# Patient Record
Sex: FEMALE | Race: WHITE | Employment: FULL TIME | URBAN - METROPOLITAN AREA
[De-identification: names, ages, dates, MRNs, and addresses within clinical notes are randomized per-mention and may not be internally consistent; named-entity substitution may affect disease eponyms.]

---

## 2017-01-11 ENCOUNTER — TELEPHONE (OUTPATIENT)
Dept: FAMILY MEDICINE CLINIC | Age: 47
End: 2017-01-11

## 2017-01-11 DIAGNOSIS — E11.9 TYPE 2 DIABETES MELLITUS WITHOUT COMPLICATION, WITH LONG-TERM CURRENT USE OF INSULIN (HCC): Primary | ICD-10-CM

## 2017-01-11 DIAGNOSIS — Z79.4 TYPE 2 DIABETES MELLITUS WITHOUT COMPLICATION, WITH LONG-TERM CURRENT USE OF INSULIN (HCC): Primary | ICD-10-CM

## 2017-01-24 RX ORDER — POTASSIUM CHLORIDE 750 MG/1
TABLET, EXTENDED RELEASE ORAL
Qty: 180 TABLET | Refills: 1 | Status: SHIPPED | OUTPATIENT
Start: 2017-01-24 | End: 2018-06-27 | Stop reason: SDUPTHER

## 2017-02-08 ENCOUNTER — OFFICE VISIT (OUTPATIENT)
Dept: SURGERY | Age: 47
End: 2017-02-08

## 2017-02-08 VITALS
WEIGHT: 201 LBS | SYSTOLIC BLOOD PRESSURE: 129 MMHG | DIASTOLIC BLOOD PRESSURE: 84 MMHG | HEIGHT: 63 IN | BODY MASS INDEX: 35.61 KG/M2 | HEART RATE: 88 BPM | OXYGEN SATURATION: 95 %

## 2017-02-08 DIAGNOSIS — E11.9 TYPE 2 DIABETES MELLITUS WITHOUT COMPLICATION, WITH LONG-TERM CURRENT USE OF INSULIN (HCC): Primary | ICD-10-CM

## 2017-02-08 DIAGNOSIS — Z79.4 TYPE 2 DIABETES MELLITUS WITHOUT COMPLICATION, WITH LONG-TERM CURRENT USE OF INSULIN (HCC): Primary | ICD-10-CM

## 2017-02-08 LAB
GLUCOSE BLD-MCNC: 419 MG/DL
HBA1C MFR BLD: 11.2 %

## 2017-02-08 PROCEDURE — 82962 GLUCOSE BLOOD TEST: CPT | Performed by: INTERNAL MEDICINE

## 2017-02-08 PROCEDURE — G8484 FLU IMMUNIZE NO ADMIN: HCPCS | Performed by: INTERNAL MEDICINE

## 2017-02-08 PROCEDURE — 3046F HEMOGLOBIN A1C LEVEL >9.0%: CPT | Performed by: INTERNAL MEDICINE

## 2017-02-08 PROCEDURE — 1036F TOBACCO NON-USER: CPT | Performed by: INTERNAL MEDICINE

## 2017-02-08 PROCEDURE — 99203 OFFICE O/P NEW LOW 30 MIN: CPT | Performed by: INTERNAL MEDICINE

## 2017-02-08 PROCEDURE — G8427 DOCREV CUR MEDS BY ELIG CLIN: HCPCS | Performed by: INTERNAL MEDICINE

## 2017-02-08 PROCEDURE — G8417 CALC BMI ABV UP PARAM F/U: HCPCS | Performed by: INTERNAL MEDICINE

## 2017-02-08 PROCEDURE — 83036 HEMOGLOBIN GLYCOSYLATED A1C: CPT | Performed by: INTERNAL MEDICINE

## 2017-02-08 RX ORDER — BLOOD SUGAR DIAGNOSTIC
1 STRIP MISCELLANEOUS DAILY
Qty: 100 EACH | Refills: 3 | Status: SHIPPED | OUTPATIENT
Start: 2017-02-08 | End: 2017-06-08 | Stop reason: ALTCHOICE

## 2017-02-19 ASSESSMENT — ENCOUNTER SYMPTOMS: VISUAL CHANGE: 0

## 2017-03-08 ENCOUNTER — OFFICE VISIT (OUTPATIENT)
Dept: SURGERY | Age: 47
End: 2017-03-08

## 2017-03-08 VITALS
DIASTOLIC BLOOD PRESSURE: 88 MMHG | HEIGHT: 63 IN | BODY MASS INDEX: 35.08 KG/M2 | WEIGHT: 198 LBS | HEART RATE: 84 BPM | SYSTOLIC BLOOD PRESSURE: 134 MMHG

## 2017-03-08 LAB — GLUCOSE BLD-MCNC: 323 MG/DL

## 2017-03-08 PROCEDURE — 3046F HEMOGLOBIN A1C LEVEL >9.0%: CPT | Performed by: INTERNAL MEDICINE

## 2017-03-08 PROCEDURE — G8417 CALC BMI ABV UP PARAM F/U: HCPCS | Performed by: INTERNAL MEDICINE

## 2017-03-08 PROCEDURE — 82962 GLUCOSE BLOOD TEST: CPT | Performed by: INTERNAL MEDICINE

## 2017-03-08 PROCEDURE — 1036F TOBACCO NON-USER: CPT | Performed by: INTERNAL MEDICINE

## 2017-03-08 PROCEDURE — G8484 FLU IMMUNIZE NO ADMIN: HCPCS | Performed by: INTERNAL MEDICINE

## 2017-03-08 PROCEDURE — G8427 DOCREV CUR MEDS BY ELIG CLIN: HCPCS | Performed by: INTERNAL MEDICINE

## 2017-03-08 PROCEDURE — 99213 OFFICE O/P EST LOW 20 MIN: CPT | Performed by: INTERNAL MEDICINE

## 2017-03-08 RX ORDER — BLOOD SUGAR DIAGNOSTIC
1 STRIP MISCELLANEOUS DAILY
Qty: 100 EACH | Refills: 3 | Status: SHIPPED | OUTPATIENT
Start: 2017-03-08 | End: 2019-08-12

## 2017-03-20 RX ORDER — RAMIPRIL 5 MG/1
5 CAPSULE ORAL DAILY
Qty: 90 CAPSULE | Refills: 3 | Status: SHIPPED | OUTPATIENT
Start: 2017-03-20 | End: 2017-03-21 | Stop reason: SDUPTHER

## 2017-03-21 RX ORDER — RAMIPRIL 5 MG/1
5 CAPSULE ORAL DAILY
Qty: 90 CAPSULE | Refills: 3 | Status: SHIPPED | OUTPATIENT
Start: 2017-03-21 | End: 2018-03-30 | Stop reason: SDUPTHER

## 2017-03-29 RX ORDER — PROPRANOLOL HYDROCHLORIDE 20 MG/1
TABLET ORAL
Qty: 180 TABLET | Refills: 0 | Status: SHIPPED | OUTPATIENT
Start: 2017-03-29 | End: 2017-08-02 | Stop reason: SDUPTHER

## 2017-06-08 ENCOUNTER — OFFICE VISIT (OUTPATIENT)
Dept: SURGERY | Age: 47
End: 2017-06-08

## 2017-06-08 VITALS
SYSTOLIC BLOOD PRESSURE: 110 MMHG | WEIGHT: 200 LBS | DIASTOLIC BLOOD PRESSURE: 74 MMHG | BODY MASS INDEX: 35.44 KG/M2 | HEART RATE: 86 BPM | HEIGHT: 63 IN

## 2017-06-08 DIAGNOSIS — E04.9 GOITER: ICD-10-CM

## 2017-06-08 LAB
GLUCOSE BLD-MCNC: 229 MG/DL
HBA1C MFR BLD: 12 %

## 2017-06-08 PROCEDURE — G8427 DOCREV CUR MEDS BY ELIG CLIN: HCPCS | Performed by: INTERNAL MEDICINE

## 2017-06-08 PROCEDURE — 99213 OFFICE O/P EST LOW 20 MIN: CPT | Performed by: INTERNAL MEDICINE

## 2017-06-08 PROCEDURE — G8417 CALC BMI ABV UP PARAM F/U: HCPCS | Performed by: INTERNAL MEDICINE

## 2017-06-08 PROCEDURE — 1036F TOBACCO NON-USER: CPT | Performed by: INTERNAL MEDICINE

## 2017-06-08 PROCEDURE — 3046F HEMOGLOBIN A1C LEVEL >9.0%: CPT | Performed by: INTERNAL MEDICINE

## 2017-06-08 PROCEDURE — 82962 GLUCOSE BLOOD TEST: CPT | Performed by: INTERNAL MEDICINE

## 2017-06-08 PROCEDURE — 83036 HEMOGLOBIN GLYCOSYLATED A1C: CPT | Performed by: INTERNAL MEDICINE

## 2017-06-08 RX ORDER — INSULIN GLARGINE 100 [IU]/ML
INJECTION, SOLUTION SUBCUTANEOUS
Qty: 1 VIAL | Refills: 3 | Status: SHIPPED | OUTPATIENT
Start: 2017-06-08 | End: 2017-09-07 | Stop reason: SDUPTHER

## 2017-06-08 RX ORDER — LAMOTRIGINE 200 MG/1
200 TABLET ORAL 2 TIMES DAILY
COMMUNITY
End: 2021-05-03

## 2017-06-18 ENCOUNTER — HOSPITAL ENCOUNTER (EMERGENCY)
Age: 47
Discharge: HOME | End: 2017-06-18
Attending: FAMILY MEDICINE
Payer: COMMERCIAL

## 2017-06-18 ENCOUNTER — APPOINTMENT (OUTPATIENT)
Dept: ULTRASOUND IMAGING | Age: 47
End: 2017-06-18
Payer: COMMERCIAL

## 2017-06-18 VITALS
TEMPERATURE: 97.9 F | SYSTOLIC BLOOD PRESSURE: 155 MMHG | HEIGHT: 63 IN | DIASTOLIC BLOOD PRESSURE: 95 MMHG | WEIGHT: 200 LBS | RESPIRATION RATE: 16 BRPM | BODY MASS INDEX: 35.44 KG/M2 | OXYGEN SATURATION: 96 % | HEART RATE: 99 BPM

## 2017-06-18 DIAGNOSIS — M79.605 PAIN OF LEFT LOWER EXTREMITY: Primary | ICD-10-CM

## 2017-06-18 PROCEDURE — 93971 EXTREMITY STUDY: CPT

## 2017-06-18 PROCEDURE — 99283 EMERGENCY DEPT VISIT LOW MDM: CPT

## 2017-06-18 RX ORDER — IBUPROFEN 600 MG/1
600 TABLET ORAL EVERY 6 HOURS PRN
Qty: 30 TABLET | Refills: 0 | Status: SHIPPED | OUTPATIENT
Start: 2017-06-18 | End: 2018-06-27

## 2017-06-18 ASSESSMENT — PAIN DESCRIPTION - DESCRIPTORS: DESCRIPTORS: ACHING

## 2017-06-18 ASSESSMENT — PAIN DESCRIPTION - ORIENTATION: ORIENTATION: LEFT

## 2017-06-18 ASSESSMENT — ENCOUNTER SYMPTOMS: BACK PAIN: 0

## 2017-06-18 ASSESSMENT — PAIN DESCRIPTION - PAIN TYPE: TYPE: ACUTE PAIN

## 2017-06-18 ASSESSMENT — PAIN DESCRIPTION - ONSET: ONSET: ON-GOING

## 2017-06-18 ASSESSMENT — PAIN DESCRIPTION - LOCATION: LOCATION: LEG

## 2017-06-18 ASSESSMENT — PAIN SCALES - GENERAL: PAINLEVEL_OUTOF10: 3

## 2017-06-18 ASSESSMENT — PAIN DESCRIPTION - FREQUENCY: FREQUENCY: CONTINUOUS

## 2017-06-27 RX ORDER — OMEPRAZOLE 20 MG/1
CAPSULE, DELAYED RELEASE ORAL
Qty: 360 CAPSULE | Refills: 1 | Status: SHIPPED | OUTPATIENT
Start: 2017-06-27 | End: 2017-12-13 | Stop reason: SDUPTHER

## 2017-07-08 ENCOUNTER — HOSPITAL ENCOUNTER (OUTPATIENT)
Dept: ULTRASOUND IMAGING | Age: 47
Discharge: HOME | End: 2017-07-08
Payer: COMMERCIAL

## 2017-07-08 DIAGNOSIS — E04.9 GOITER: ICD-10-CM

## 2017-07-08 PROCEDURE — 76536 US EXAM OF HEAD AND NECK: CPT

## 2017-07-13 ENCOUNTER — TELEPHONE (OUTPATIENT)
Dept: SURGERY | Age: 47
End: 2017-07-13

## 2017-08-04 RX ORDER — PROPRANOLOL HYDROCHLORIDE 20 MG/1
TABLET ORAL
Qty: 180 TABLET | Refills: 0 | Status: SHIPPED | OUTPATIENT
Start: 2017-08-04 | End: 2018-03-28 | Stop reason: SDUPTHER

## 2017-09-02 LAB
ANION GAP SERPL CALCULATED.3IONS-SCNC: 17 MEQ/L (ref 7–13)
BUN BLDV-MCNC: 8 MG/DL (ref 6–20)
CALCIUM SERPL-MCNC: 9.2 MG/DL (ref 8.6–10.2)
CHLORIDE BLD-SCNC: 97 MEQ/L (ref 98–107)
CO2: 29 MEQ/L (ref 22–29)
CREAT SERPL-MCNC: 0.32 MG/DL (ref 0.5–0.9)
CREATININE URINE: 50.7 MG/DL
GFR AFRICAN AMERICAN: >60
GFR NON-AFRICAN AMERICAN: >60
GLUCOSE BLD-MCNC: 311 MG/DL (ref 74–109)
MICROALBUMIN UR-MCNC: <1.2 MG/DL
MICROALBUMIN/CREAT UR-RTO: NORMAL MG/G (ref 0–30)
POTASSIUM SERPL-SCNC: 3.5 MEQ/L (ref 3.5–5.1)
SODIUM BLD-SCNC: 143 MEQ/L (ref 132–144)

## 2017-09-07 ENCOUNTER — OFFICE VISIT (OUTPATIENT)
Dept: SURGERY | Age: 47
End: 2017-09-07

## 2017-09-07 VITALS
HEART RATE: 94 BPM | DIASTOLIC BLOOD PRESSURE: 77 MMHG | HEIGHT: 63 IN | SYSTOLIC BLOOD PRESSURE: 112 MMHG | RESPIRATION RATE: 20 BRPM | OXYGEN SATURATION: 90 % | BODY MASS INDEX: 35.61 KG/M2 | WEIGHT: 201 LBS

## 2017-09-07 DIAGNOSIS — E04.2 GOITER, NONTOXIC, MULTINODULAR: ICD-10-CM

## 2017-09-07 LAB
GLUCOSE BLD-MCNC: 294 MG/DL
HBA1C MFR BLD: 10.2 %

## 2017-09-07 PROCEDURE — 82962 GLUCOSE BLOOD TEST: CPT | Performed by: INTERNAL MEDICINE

## 2017-09-07 PROCEDURE — 99213 OFFICE O/P EST LOW 20 MIN: CPT | Performed by: INTERNAL MEDICINE

## 2017-09-07 PROCEDURE — 1036F TOBACCO NON-USER: CPT | Performed by: INTERNAL MEDICINE

## 2017-09-07 PROCEDURE — 3046F HEMOGLOBIN A1C LEVEL >9.0%: CPT | Performed by: INTERNAL MEDICINE

## 2017-09-07 PROCEDURE — 83036 HEMOGLOBIN GLYCOSYLATED A1C: CPT | Performed by: INTERNAL MEDICINE

## 2017-09-07 PROCEDURE — G8427 DOCREV CUR MEDS BY ELIG CLIN: HCPCS | Performed by: INTERNAL MEDICINE

## 2017-09-07 PROCEDURE — G8417 CALC BMI ABV UP PARAM F/U: HCPCS | Performed by: INTERNAL MEDICINE

## 2017-09-07 RX ORDER — INSULIN GLARGINE 100 [IU]/ML
INJECTION, SOLUTION SUBCUTANEOUS
Qty: 1 VIAL | Refills: 3 | Status: SHIPPED | OUTPATIENT
Start: 2017-09-07 | End: 2018-06-27

## 2017-09-12 DIAGNOSIS — E78.2 MIXED HYPERLIPIDEMIA: Chronic | ICD-10-CM

## 2017-09-13 RX ORDER — ATORVASTATIN CALCIUM 80 MG/1
TABLET, FILM COATED ORAL
Qty: 90 TABLET | Refills: 1 | Status: SHIPPED | OUTPATIENT
Start: 2017-09-13 | End: 2018-06-27 | Stop reason: SDUPTHER

## 2017-09-28 ENCOUNTER — OFFICE VISIT (OUTPATIENT)
Dept: CARDIOLOGY | Age: 47
End: 2017-09-28

## 2017-09-28 VITALS
RESPIRATION RATE: 18 BRPM | HEIGHT: 63 IN | SYSTOLIC BLOOD PRESSURE: 123 MMHG | OXYGEN SATURATION: 96 % | BODY MASS INDEX: 34.73 KG/M2 | WEIGHT: 196 LBS | DIASTOLIC BLOOD PRESSURE: 80 MMHG | TEMPERATURE: 97.8 F | HEART RATE: 97 BPM

## 2017-09-28 DIAGNOSIS — I10 ESSENTIAL HYPERTENSION: Chronic | ICD-10-CM

## 2017-09-28 DIAGNOSIS — Z82.49 FAMILY HISTORY OF EARLY CAD: ICD-10-CM

## 2017-09-28 DIAGNOSIS — E78.2 MIXED HYPERLIPIDEMIA: Primary | Chronic | ICD-10-CM

## 2017-09-28 PROCEDURE — 99213 OFFICE O/P EST LOW 20 MIN: CPT | Performed by: INTERNAL MEDICINE

## 2017-09-28 PROCEDURE — G8427 DOCREV CUR MEDS BY ELIG CLIN: HCPCS | Performed by: INTERNAL MEDICINE

## 2017-09-28 PROCEDURE — 1036F TOBACCO NON-USER: CPT | Performed by: INTERNAL MEDICINE

## 2017-09-28 PROCEDURE — 93000 ELECTROCARDIOGRAM COMPLETE: CPT | Performed by: INTERNAL MEDICINE

## 2017-09-28 PROCEDURE — G8417 CALC BMI ABV UP PARAM F/U: HCPCS | Performed by: INTERNAL MEDICINE

## 2017-09-28 NOTE — MR AVS SNAPSHOT
After Visit Summary             Brenda Ricks   2017 4:00 PM   Office Visit    Description:  Female : 1970   Provider:  Mariam Dela Cruz MD   Department:  [facility]              Your Follow-Up and Future Appointments         Below is a list of your follow-up and future appointments. This may not be a complete list as you may have made appointments directly with providers that we are not aware of or your providers may have made some for you. Please call your providers to confirm appointments. It is important to keep your appointments. Please bring your current insurance card, photo ID, co-pay, and all medication bottles to your appointment. If self-pay, payment is expected at the time of service. Your To-Do List     Future Appointments Provider Department Dept Phone    2017 4:00 PM Yana Florence MD [address] [place] [address] [phone]    Please arrive 15 minutes prior to appointment, bring photo ID and insurance card. Please arrive 15 minutes prior to your appointment. 2018 4:15 PM Arya Hurley MD [facility] [phone]    Please arrive 15 minutes prior to appointment time, bring insurance card and photo ID. Information from Your Visit        Department     Name Address Phone Fax    [facility] [address] [facility] [facility] [address]  [address] [place] [address] [phone]      You Were Seen for:         Comments    Mixed hyperlipidemia   [272. 2. ICD-9-CM]         Vital Signs     Blood Pressure Pulse Temperature Respirations Height Weight    123/80 97 97.8 °F (36.6 °C) (Temporal) 18 5' 3\" (1.6 m) 196 lb (88.9 kg)    Oxygen Saturation Body Mass Index Smoking Status             96% 34.72 kg/m2 Never Smoker         Additional Information about your Body Mass Index (BMI)           Your BMI as listed above is considered obese (30 or more). BMI is an estimate of body fat, calculated from your height and weight.   The higher your BMI, the greater your risk of heart disease, high blood pressure, type 2 diabetes, stroke, gallstones, arthritis, sleep apnea, and certain cancers. BMI is not perfect. It may overestimate body fat in athletes and people who are more muscular. Even a small weight loss (between 5 and 10 percent of your current weight) by decreasing your calorie intake and becoming more physically active will help lower your risk of developing or worsening diseases associated with obesity. Learn more at: [place] [email or web address]             Today's Medication Changes          These changes are accurate as of: 9/28/17  5:19 PM.  If you have any questions, ask your nurse or doctor. CHANGE how you take these medications           * omeprazole 20 MG delayed release capsule   Commonly known as:  PRILOSEC   Instructions: Take 1 capsule by mouth Daily   Quantity:  360 capsule   Refills:  1   What changed:  when to take this   Changed by:  Rubén Barraza MD       * omeprazole 20 MG delayed release capsule   Commonly known as:  PRILOSEC   Instructions:  TAKE 2 CAPSULES BY MOUTH TWICE DAILY   Quantity:  360 capsule   Refills:  1   What changed:  Another medication with the same name was changed. Make sure you understand how and when to take each. Changed by:  Rubén Barraza MD       * Notice: This list has 2 medication(s) that are the same as other medications prescribed for you. Read the directions carefully, and ask your doctor or other care provider to review them with you.             Your Current Medications Are              cetirizine (ZYRTEC) 10 MG tablet Take 1 tablet by mouth daily    fluticasone (FLONASE) 50 MCG/ACT nasal spray 2 sprays by Nasal route daily    atorvastatin (LIPITOR) 80 MG tablet TAKE 1 TABLET BY MOUTH DAILY    insulin glargine (LANTUS) 100 UNIT/ML injection vial 50 units at bedtime Hyperlipidemia (Chronic)    Hypertension (Chronic)    GERD (gastroesophageal reflux disease)      Your Goals as of 9/28/2017 at 5:19 PM              9/7/17 6/8/17 2/8/17       Diet    Follow Diabetic Diet           Notes    Care Coordination Goal: Nutrition  Goal: I will follow the recommended diet- diabetic Will limit total carbohydrate intake to 11carb servings (165 grams) per day   Barriers: fear of failure, lack of motivation, lack of support and overwhelmed by complexity of regimen  Plan for overcoming my barriers: I will read food labels. I will keep a food diary. I will pack my lunch and plan at least that meal ahead. Confidence: 6/10             Lifestyle    Compliant with SBGM           Notes    Care Coordination Goal:  Patient Self-Monitoring  Choose a Goal:  BG monitoring: Yes - Other: Fasting in Am and postprandial in PM  Barriers to success: lack of motivation, overwhelmed by complexity of regimen and stress  Plan for overcoming my barriers: I will put the BG monitor in the kitchen to help me remember and I will keep a BG log. Confidence: 6/10                            Result Component    HEMOGLOBIN A1C < 7.0   10.2  12.0  11.2       Weight    Weight < 199 lb (90.266 kg)           Notes    Care Coordination Goal:    Weight loss Goal:  Patient will lose 5% of her current body weight- 16lbs   Barriers to success: fear of failure, lack of motivation, lack of support, overwhelmed by complexity of regimen, time constraints and medication side effects  Plan for overcoming my barriers: I will practice carb counting. Better plan for meals. Keep a food diary.      Confidence: 6/10        Immunizations as of 9/28/2017     Name Date    Influenza Virus Vaccine 12/16/2015, 12/1/2014    Influenza, Preston Drum, 3 Years and older, IM 10/24/2016      Preventive Care        Date Due    HIV screening is recommended for all people regardless of risk factors aged 15-65 years at least once (lifetime) who have never been HIV tested. 12/17/1986    Tetanus Combination Vaccine (1 - Tdap) 12/17/1990    Pneumococcal Vaccine - Pneumovax for adults aged 19-64 years with: chronic heart disease, chronic lung disease, diabetes mellitus, alcoholism, chronic liver disease, or cigarette smoking. (1 of 1 - PPSV23) 12/17/1990    Eye Exam By An Eye Doctor 7/18/2016    Yearly Flu Vaccine (1) 9/1/2017    Diabetic Foot Exam 11/16/2017    Cholesterol Screening 11/16/2017    Hemoglobin A1C (Test For Long-Term Glucose Control) 12/7/2017    Pap Smear 2/18/2018    Urine Check For Kidney Problems 9/2/2018            Compufirstt Signup           Our records indicate that you have an active [employer] account. You can view your After Visit Summary by going to [email or web address] and logging in with your [employer] username and password. If you don't have a [employer] username and password but a parent or guardian has access to your record, the parent or guardian should login with their own [employer] username and password and access your record to view the After Visit Summary. Additional Information  If you have questions, please contact the physician practice where you receive care. Remember, [employer] is NOT to be used for urgent needs. For medical emergencies, dial 911. For questions regarding your [employer] account call [phone]. If you have a clinical question, please call your doctor's office.

## 2017-10-09 RX ORDER — FLUCONAZOLE 150 MG/1
150 TABLET ORAL ONCE
Qty: 1 TABLET | Refills: 0 | Status: SHIPPED | OUTPATIENT
Start: 2017-10-09 | End: 2017-10-09

## 2017-10-09 NOTE — TELEPHONE ENCOUNTER
Diflucan rx request,  Pt has current infection and can not use otc treatment.   Please send to umesh on 28th and erik

## 2017-10-23 ASSESSMENT — ENCOUNTER SYMPTOMS
GASTROINTESTINAL NEGATIVE: 1
ALLERGIC/IMMUNOLOGIC NEGATIVE: 1
SHORTNESS OF BREATH: 0
CHEST TIGHTNESS: 0
EYES NEGATIVE: 1

## 2017-10-23 NOTE — PROGRESS NOTES
Diana Tristan A     40 y.o. female  Chief Complaint   Patient presents with    1 Year Follow Up     Inceased risk for CAD due to family history of heart disease.  Hypertension    Hyperlipidemia        History and Physical:   Please see attached note in media. Past Medical History: She  has a past medical history of Depression; Environmental allergies; GERD (gastroesophageal reflux disease); Hyperlipidemia; Hypertension; Leukocytosis; and Type II or unspecified type diabetes mellitus without mention of complication, not stated as uncontrolled. Past Surgical History: She  has a past surgical history that includes Gallbladder surgery (1999); Breast surgery (1999); bone marrow biopsy (2012); Cardiac catheterization (2009); Endometrial ablation (2012?); and Breast reduction surgery (1999). Family History:   Family History   Problem Relation Age of Onset    Heart Disease Mother     Cancer Father     Diabetes Father     Heart Disease Father        Social History: She  reports that she has never smoked. She has never used smokeless tobacco. She reports that she drinks alcohol. She reports that she does not use drugs.     Current Medications:   Current Outpatient Prescriptions on File Prior to Visit   Medication Sig Dispense Refill    cetirizine (ZYRTEC) 10 MG tablet Take 1 tablet by mouth daily 30 tablet 0    fluticasone (FLONASE) 50 MCG/ACT nasal spray 2 sprays by Nasal route daily 1 Bottle 0    atorvastatin (LIPITOR) 80 MG tablet TAKE 1 TABLET BY MOUTH DAILY 90 tablet 1    insulin glargine (LANTUS) 100 UNIT/ML injection vial 50 units at bedtime 1 vial 3    insulin 70-30 (NOVOLIN 70/30) (70-30) 100 UNIT per ML injection vial 90 units twice 5 vial 3    propranolol (INDERAL) 20 MG tablet TAKE 1 TABLET BY MOUTH TWICE DAILY 180 tablet 0    omeprazole (PRILOSEC) 20 MG delayed release capsule TAKE 2 CAPSULES BY MOUTH TWICE DAILY 360 capsule 1    L-Methylfolate-Algae-B12-B6 (METANX PO) Take 1

## 2017-12-13 ENCOUNTER — OFFICE VISIT (OUTPATIENT)
Dept: OBGYN | Age: 47
End: 2017-12-13

## 2017-12-13 VITALS
DIASTOLIC BLOOD PRESSURE: 78 MMHG | HEIGHT: 63 IN | BODY MASS INDEX: 34.2 KG/M2 | WEIGHT: 193 LBS | SYSTOLIC BLOOD PRESSURE: 122 MMHG

## 2017-12-13 DIAGNOSIS — Z78.0 POST-MENOPAUSAL: ICD-10-CM

## 2017-12-13 DIAGNOSIS — Z01.419 WOMEN'S ANNUAL ROUTINE GYNECOLOGICAL EXAMINATION: Primary | ICD-10-CM

## 2017-12-13 DIAGNOSIS — Z11.51 SPECIAL SCREENING EXAMINATION FOR HUMAN PAPILLOMAVIRUS (HPV): ICD-10-CM

## 2017-12-13 DIAGNOSIS — Z12.31 SCREENING MAMMOGRAM, ENCOUNTER FOR: ICD-10-CM

## 2017-12-13 DIAGNOSIS — N63.11 BREAST LUMP ON RIGHT SIDE AT 10 O'CLOCK POSITION: ICD-10-CM

## 2017-12-13 DIAGNOSIS — E11.9 TYPE 2 DIABETES MELLITUS WITHOUT COMPLICATION, UNSPECIFIED LONG TERM INSULIN USE STATUS: ICD-10-CM

## 2017-12-13 LAB
FOLLICLE STIMULATING HORMONE: 34.1 MIU/ML
LUTEINIZING HORMONE: 25.8 MIU/ML

## 2017-12-13 PROCEDURE — G8427 DOCREV CUR MEDS BY ELIG CLIN: HCPCS | Performed by: OBSTETRICS & GYNECOLOGY

## 2017-12-13 PROCEDURE — G8484 FLU IMMUNIZE NO ADMIN: HCPCS | Performed by: OBSTETRICS & GYNECOLOGY

## 2017-12-13 PROCEDURE — 99396 PREV VISIT EST AGE 40-64: CPT | Performed by: OBSTETRICS & GYNECOLOGY

## 2017-12-13 PROCEDURE — G8417 CALC BMI ABV UP PARAM F/U: HCPCS | Performed by: OBSTETRICS & GYNECOLOGY

## 2017-12-13 PROCEDURE — 3046F HEMOGLOBIN A1C LEVEL >9.0%: CPT | Performed by: OBSTETRICS & GYNECOLOGY

## 2017-12-13 PROCEDURE — 1036F TOBACCO NON-USER: CPT | Performed by: OBSTETRICS & GYNECOLOGY

## 2017-12-13 PROCEDURE — 99213 OFFICE O/P EST LOW 20 MIN: CPT | Performed by: OBSTETRICS & GYNECOLOGY

## 2017-12-13 RX ORDER — FLUOXETINE 10 MG/1
CAPSULE ORAL
COMMUNITY
Start: 2017-12-12 | End: 2018-05-15 | Stop reason: CLARIF

## 2017-12-13 RX ORDER — LURASIDONE HYDROCHLORIDE 40 MG/1
TABLET, FILM COATED ORAL
COMMUNITY
Start: 2017-12-12 | End: 2018-05-15

## 2017-12-13 NOTE — PROGRESS NOTES
Subjective:      Patient ID: Dominga Garcia is a 40 y.o. female    Annual exam.  No GYN complaints. Normal cycles. Pap performed and Dx mammogram ordered for findings of right breast lump. STD screening offered. Monthly SBE encouraged. F/U annual or prn. Pt also wished to discuss perimenopausal sx  today  extending her appointment time by 15 minutes. Patient states cycles are still regular, but are occasionally heavier followed by a lighter cycle. Also having some increased mood irritability throughout the month. Requested lab work to assess perimenopausal status. Ordered LH and FSH and discussed perimenopause and menopause. All questions answered. Vitals:  /78   Ht 5' 3\" (1.6 m)   Wt 193 lb (87.5 kg)   BMI 34.19 kg/m²   Past Medical History:   Diagnosis Date    Depression     Environmental allergies     GERD (gastroesophageal reflux disease)     HPV in female     Hyperlipidemia     Hypertension     Leukocytosis     Type II or unspecified type diabetes mellitus without mention of complication, not stated as uncontrolled      Past Surgical History:   Procedure Laterality Date    [facility]    BREAST REDUCTION SURGERY  1999    BREAST SURGERY  1999    CARDIAC CATHETERIZATION  2009    (-)[facility]ALKA    CHOLECYSTECTOMY      ENDOMETRIAL ABLATION  2012?     GALLBLADDER SURGERY  1999     Allergies:  Relafen [nabumetone]  Current Outpatient Prescriptions   Medication Sig Dispense Refill    FLUoxetine (PROZAC) 10 MG capsule       cetirizine (ZYRTEC) 10 MG tablet Take 1 tablet by mouth daily 30 tablet 0    fluticasone (FLONASE) 50 MCG/ACT nasal spray 2 sprays by Nasal route daily 1 Bottle 0    atorvastatin (LIPITOR) 80 MG tablet TAKE 1 TABLET BY MOUTH DAILY 90 tablet 1    insulin glargine (LANTUS) 100 UNIT/ML injection vial 50 units at bedtime 1 vial 3    insulin 70-30 (NOVOLIN 70/30) (70-30) 100 UNIT per ML injection vial 90 units twice 5 vial 3    propranolol (INDERAL) 20 MG tablet TAKE 1 TABLET BY MOUTH TWICE DAILY 180 tablet 0    ibuprofen (ADVIL;MOTRIN) 600 MG tablet Take 1 tablet by mouth every 6 hours as needed for Pain 30 tablet 0    lamoTRIgine (LAMICTAL) 200 MG tablet Take 200 mg by mouth 2 times daily      ramipril (ALTACE) 5 MG capsule Take 1 capsule by mouth daily 90 capsule 3    Insulin Syringe-Needle U-100 ([employer] INSULIN SYRINGE) 31G X 5/16\" 1 ML MISC 1 each by Does not apply route daily 100 each 3    metFORMIN (GLUCOPHAGE) 500 MG tablet 2 po at dinner 180 tablet 1    potassium chloride (KLOR-CON M) 10 MEQ extended release tablet TAKE 2 TABLETS BY MOUTH DAILY 180 tablet 1    desonide (DESOWEN) 0.05 % cream Apply topically 2 times daily. 1 Tube 1    NITROSTAT 0.4 MG SL tablet       clotrimazole-betamethasone (LOTRISONE) 1-0.05 % cream Apply topically 2 times daily. 45 g 1    B-D UF III MINI PEN NEEDLES 31G X 5 MM MISC Use 1 daily to inject insulin 100 each 1    omeprazole (PRILOSEC) 20 MG capsule Take 1 capsule by mouth Daily (Patient taking differently: Take 20 mg by mouth 2 times daily ) 360 capsule 1    VRAYLAR 3 MG CAPS TK ONE C PO  QHS  1    traZODone (DESYREL) 100 MG tablet TK 1 T PO  QHS  1    LATUDA 40 MG TABS tablet       L-Methylfolate-Algae-B12-B6 (METANX PO) Take 1 tablet by mouth 2 times daily Indications: for neuropathy      cholestyramine (QUESTRAN) 4 G packet Take 1 packet by mouth 2 times daily 90 packet 3    LORazepam (ATIVAN) 0.5 MG tablet Take 1 tablet by mouth daily as needed 30 tablet 2     No current facility-administered medications for this visit. Social History     Social History    Marital status:      Spouse name: N/A    Number of children: N/A    Years of education: N/A     Occupational History    Not on file. Social History Main Topics    Smoking status: Never Smoker    Smokeless tobacco: Never Used    Alcohol use 0.0 oz/week      Comment: ocassionally    Drug use:  No encounter:  No orders of the defined types were placed in this encounter. Orders placed this encounter:  Orders Placed This Encounter   Procedures    CANDIE DIGITAL SCREEN W OR WO CAD BILATERAL     Standing Status:   Future     Standing Expiration Date:   2/13/2019    PAP SMEAR     Standing Status:   Future     Standing Expiration Date:   12/13/2018     Order Specific Question:   Collection Type     Answer:   SurePath     Order Specific Question:   Prior Abnormal Pap Test     Answer:   No     Order Specific Question:   Screening or Diagnostic     Answer:   Screening     Order Specific Question:   HPV Requested? Answer:   HPV 16/18     Order Specific Question:   High Risk Patient     Answer:   N/A    Luteinizing Hormone     Standing Status:   Future     Standing Expiration Date:   02/92/8152    Follicle Stimulating Hormone     Standing Status:   Future     Standing Expiration Date:   12/13/2018         Follow up: Annual exam or prn.

## 2017-12-14 RX ORDER — FLUCONAZOLE 150 MG/1
150 TABLET ORAL
Qty: 5 TABLET | Refills: 2 | Status: SHIPPED | OUTPATIENT
Start: 2017-12-14 | End: 2017-12-14

## 2017-12-15 DIAGNOSIS — Z78.0 POST-MENOPAUSAL: Primary | ICD-10-CM

## 2017-12-17 ASSESSMENT — ENCOUNTER SYMPTOMS
CONSTIPATION: 0
RECTAL PAIN: 0
VOMITING: 0
ABDOMINAL PAIN: 0
ANAL BLEEDING: 0
RESPIRATORY NEGATIVE: 1
NAUSEA: 0
ABDOMINAL DISTENTION: 0
EYES NEGATIVE: 1
BLOOD IN STOOL: 0
ALLERGIC/IMMUNOLOGIC NEGATIVE: 1
DIARRHEA: 0

## 2017-12-22 DIAGNOSIS — Z11.51 SPECIAL SCREENING EXAMINATION FOR HUMAN PAPILLOMAVIRUS (HPV): ICD-10-CM

## 2017-12-22 DIAGNOSIS — Z01.419 WOMEN'S ANNUAL ROUTINE GYNECOLOGICAL EXAMINATION: ICD-10-CM

## 2018-01-23 ENCOUNTER — OFFICE VISIT (OUTPATIENT)
Dept: ENDOCRINOLOGY | Age: 48
End: 2018-01-23
Payer: COMMERCIAL

## 2018-01-23 VITALS
DIASTOLIC BLOOD PRESSURE: 71 MMHG | HEIGHT: 63 IN | BODY MASS INDEX: 34.02 KG/M2 | SYSTOLIC BLOOD PRESSURE: 107 MMHG | HEART RATE: 83 BPM | WEIGHT: 192 LBS

## 2018-01-23 DIAGNOSIS — E04.9 GOITER: ICD-10-CM

## 2018-01-23 DIAGNOSIS — E87.6 HYPOKALEMIA: ICD-10-CM

## 2018-01-23 LAB
ANION GAP SERPL CALCULATED.3IONS-SCNC: 13 MEQ/L (ref 7–13)
BUN BLDV-MCNC: 7 MG/DL (ref 6–20)
CALCIUM SERPL-MCNC: 9.2 MG/DL (ref 8.6–10.2)
CHLORIDE BLD-SCNC: 93 MEQ/L (ref 98–107)
CO2: 33 MEQ/L (ref 22–29)
CREAT SERPL-MCNC: 0.32 MG/DL (ref 0.5–0.9)
GFR AFRICAN AMERICAN: >60
GFR NON-AFRICAN AMERICAN: >60
GLUCOSE BLD-MCNC: 306 MG/DL (ref 74–109)
GLUCOSE BLD-MCNC: 315 MG/DL
HBA1C MFR BLD: 9.2 %
POTASSIUM SERPL-SCNC: 3.1 MEQ/L (ref 3.5–5.1)
SODIUM BLD-SCNC: 139 MEQ/L (ref 132–144)
T4 FREE: 1.3 NG/DL (ref 0.93–1.7)
TSH SERPL DL<=0.05 MIU/L-ACNC: 0.79 UIU/ML (ref 0.27–4.2)

## 2018-01-23 PROCEDURE — 83036 HEMOGLOBIN GLYCOSYLATED A1C: CPT | Performed by: INTERNAL MEDICINE

## 2018-01-23 PROCEDURE — 82962 GLUCOSE BLOOD TEST: CPT | Performed by: INTERNAL MEDICINE

## 2018-01-23 PROCEDURE — G8484 FLU IMMUNIZE NO ADMIN: HCPCS | Performed by: INTERNAL MEDICINE

## 2018-01-23 PROCEDURE — 99213 OFFICE O/P EST LOW 20 MIN: CPT | Performed by: INTERNAL MEDICINE

## 2018-01-23 PROCEDURE — G8417 CALC BMI ABV UP PARAM F/U: HCPCS | Performed by: INTERNAL MEDICINE

## 2018-01-23 PROCEDURE — G8427 DOCREV CUR MEDS BY ELIG CLIN: HCPCS | Performed by: INTERNAL MEDICINE

## 2018-01-23 PROCEDURE — 3046F HEMOGLOBIN A1C LEVEL >9.0%: CPT | Performed by: INTERNAL MEDICINE

## 2018-01-23 PROCEDURE — 1036F TOBACCO NON-USER: CPT | Performed by: INTERNAL MEDICINE

## 2018-01-24 ENCOUNTER — HOSPITAL ENCOUNTER (OUTPATIENT)
Dept: WOMENS IMAGING | Age: 48
Discharge: HOME | End: 2018-01-24
Payer: COMMERCIAL

## 2018-01-24 ENCOUNTER — APPOINTMENT (OUTPATIENT)
Dept: ULTRASOUND IMAGING | Age: 48
End: 2018-01-24
Payer: COMMERCIAL

## 2018-01-24 DIAGNOSIS — Z12.31 ENCOUNTER FOR SCREENING MAMMOGRAM FOR BREAST CANCER: ICD-10-CM

## 2018-01-24 DIAGNOSIS — N63.11 BREAST LUMP ON RIGHT SIDE AT 10 O'CLOCK POSITION: ICD-10-CM

## 2018-01-24 PROCEDURE — 77063 BREAST TOMOSYNTHESIS BI: CPT

## 2018-01-31 ENCOUNTER — HOSPITAL ENCOUNTER (OUTPATIENT)
Dept: ULTRASOUND IMAGING | Age: 48
Discharge: HOME | End: 2018-02-02
Payer: COMMERCIAL

## 2018-01-31 DIAGNOSIS — E04.9 GOITER: ICD-10-CM

## 2018-01-31 PROCEDURE — 76536 US EXAM OF HEAD AND NECK: CPT

## 2018-01-31 RX ORDER — INSULIN LISPRO 100 [IU]/ML
90 INJECTION, SUSPENSION SUBCUTANEOUS 2 TIMES DAILY WITH MEALS
Qty: 55 PEN | Refills: 3 | Status: SHIPPED | OUTPATIENT
Start: 2018-01-31 | End: 2019-03-01 | Stop reason: SDUPTHER

## 2018-02-20 ENCOUNTER — TELEPHONE (OUTPATIENT)
Dept: ENDOCRINOLOGY | Age: 48
End: 2018-02-20

## 2018-02-20 NOTE — TELEPHONE ENCOUNTER
----- Message from Michael Proctor MD sent at 1/30/2018 10:39 AM EST -----  Call patient potassium at 3.1 continue to take her potassium chloride 10 mEq 2 daily

## 2018-02-23 RX ORDER — OMEPRAZOLE 20 MG/1
CAPSULE, DELAYED RELEASE ORAL
Qty: 360 CAPSULE | Refills: 0 | Status: SHIPPED | OUTPATIENT
Start: 2018-02-23 | End: 2018-06-27 | Stop reason: SDUPTHER

## 2018-02-23 RX ORDER — OMEPRAZOLE 20 MG/1
CAPSULE, DELAYED RELEASE ORAL
Qty: 360 CAPSULE | Refills: 0 | Status: SHIPPED | OUTPATIENT
Start: 2018-02-23 | End: 2018-06-27

## 2018-03-30 RX ORDER — PROPRANOLOL HYDROCHLORIDE 20 MG/1
TABLET ORAL
Qty: 180 TABLET | Refills: 0 | Status: SHIPPED | OUTPATIENT
Start: 2018-03-30 | End: 2018-05-03 | Stop reason: SDUPTHER

## 2018-04-04 RX ORDER — RAMIPRIL 5 MG/1
5 CAPSULE ORAL DAILY
Qty: 90 CAPSULE | Refills: 3 | Status: SHIPPED | OUTPATIENT
Start: 2018-04-04 | End: 2018-06-27 | Stop reason: SDUPTHER

## 2018-04-20 ENCOUNTER — OFFICE VISIT (OUTPATIENT)
Dept: FAMILY MEDICINE CLINIC | Age: 48
End: 2018-04-20
Payer: COMMERCIAL

## 2018-04-20 VITALS
SYSTOLIC BLOOD PRESSURE: 118 MMHG | HEART RATE: 89 BPM | WEIGHT: 185 LBS | OXYGEN SATURATION: 96 % | BODY MASS INDEX: 32.78 KG/M2 | DIASTOLIC BLOOD PRESSURE: 76 MMHG | HEIGHT: 63 IN | TEMPERATURE: 98.2 F

## 2018-04-20 DIAGNOSIS — R31.9 HEMATURIA, UNSPECIFIED TYPE: ICD-10-CM

## 2018-04-20 DIAGNOSIS — R30.0 DYSURIA: ICD-10-CM

## 2018-04-20 DIAGNOSIS — E87.6 HYPOKALEMIA: ICD-10-CM

## 2018-04-20 DIAGNOSIS — R81 GLUCOSURIA: ICD-10-CM

## 2018-04-20 DIAGNOSIS — R30.0 DYSURIA: Primary | ICD-10-CM

## 2018-04-20 LAB
ALBUMIN SERPL-MCNC: 4.4 G/DL (ref 3.9–4.9)
ALP BLD-CCNC: 188 U/L (ref 40–130)
ALT SERPL-CCNC: 8 U/L (ref 0–33)
ANION GAP SERPL CALCULATED.3IONS-SCNC: 16 MEQ/L (ref 7–13)
AST SERPL-CCNC: 8 U/L (ref 0–35)
BASOPHILS ABSOLUTE: 0.1 K/UL (ref 0–0.2)
BASOPHILS RELATIVE PERCENT: 0.5 %
BILIRUB SERPL-MCNC: 0.3 MG/DL (ref 0–1.2)
BILIRUBIN, POC: NEGATIVE
BLOOD URINE, POC: ABNORMAL
BUN BLDV-MCNC: 5 MG/DL (ref 6–20)
CALCIUM SERPL-MCNC: 9.3 MG/DL (ref 8.6–10.2)
CHLORIDE BLD-SCNC: 89 MEQ/L (ref 98–107)
CLARITY, POC: ABNORMAL
CO2: 31 MEQ/L (ref 22–29)
COLOR, POC: YELLOW
CREAT SERPL-MCNC: 0.32 MG/DL (ref 0.5–0.9)
EOSINOPHILS ABSOLUTE: 0.2 K/UL (ref 0–0.7)
EOSINOPHILS RELATIVE PERCENT: 1.4 %
GFR AFRICAN AMERICAN: >60
GFR NON-AFRICAN AMERICAN: >60
GLOBULIN: 2.8 G/DL (ref 2.3–3.5)
GLUCOSE BLD-MCNC: 303 MG/DL (ref 74–109)
GLUCOSE BLD-MCNC: 353 MG/DL
GLUCOSE URINE, POC: ABNORMAL
HCT VFR BLD CALC: 44.4 % (ref 37–47)
HEMOGLOBIN: 14.9 G/DL (ref 12–16)
KETONES, POC: NEGATIVE
LEUKOCYTE EST, POC: NEGATIVE
LYMPHOCYTES ABSOLUTE: 4 K/UL (ref 1–4.8)
LYMPHOCYTES RELATIVE PERCENT: 23.4 %
MCH RBC QN AUTO: 30.2 PG (ref 27–31.3)
MCHC RBC AUTO-ENTMCNC: 33.5 % (ref 33–37)
MCV RBC AUTO: 90 FL (ref 82–100)
MONOCYTES ABSOLUTE: 1 K/UL (ref 0.2–0.8)
MONOCYTES RELATIVE PERCENT: 6.1 %
NEUTROPHILS ABSOLUTE: 11.8 K/UL (ref 1.4–6.5)
NEUTROPHILS RELATIVE PERCENT: 68.6 %
NITRITE, POC: NEGATIVE
PDW BLD-RTO: 13.5 % (ref 11.5–14.5)
PH, POC: 5.5
PLATELET # BLD: 341 K/UL (ref 130–400)
POTASSIUM SERPL-SCNC: 3.6 MEQ/L (ref 3.5–5.1)
PROTEIN, POC: POSITIVE
RBC # BLD: 4.93 M/UL (ref 4.2–5.4)
SODIUM BLD-SCNC: 136 MEQ/L (ref 132–144)
SPECIFIC GRAVITY, POC: 1.01
TOTAL PROTEIN: 7.2 G/DL (ref 6.4–8.1)
UROBILINOGEN, POC: NEGATIVE
WBC # BLD: 17.2 K/UL (ref 4.8–10.8)

## 2018-04-20 PROCEDURE — G8417 CALC BMI ABV UP PARAM F/U: HCPCS | Performed by: NURSE PRACTITIONER

## 2018-04-20 PROCEDURE — 99213 OFFICE O/P EST LOW 20 MIN: CPT | Performed by: NURSE PRACTITIONER

## 2018-04-20 PROCEDURE — G8427 DOCREV CUR MEDS BY ELIG CLIN: HCPCS | Performed by: NURSE PRACTITIONER

## 2018-04-20 PROCEDURE — 81002 URINALYSIS NONAUTO W/O SCOPE: CPT | Performed by: NURSE PRACTITIONER

## 2018-04-20 PROCEDURE — 93000 ELECTROCARDIOGRAM COMPLETE: CPT | Performed by: NURSE PRACTITIONER

## 2018-04-20 PROCEDURE — 82962 GLUCOSE BLOOD TEST: CPT | Performed by: NURSE PRACTITIONER

## 2018-04-20 PROCEDURE — 1036F TOBACCO NON-USER: CPT | Performed by: NURSE PRACTITIONER

## 2018-04-20 RX ORDER — CEPHALEXIN 500 MG/1
500 CAPSULE ORAL 4 TIMES DAILY
Qty: 20 CAPSULE | Refills: 0 | Status: SHIPPED | OUTPATIENT
Start: 2018-04-20 | End: 2018-04-25

## 2018-04-20 RX ORDER — FLUCONAZOLE 150 MG/1
150 TABLET ORAL ONCE
Qty: 1 TABLET | Refills: 0 | Status: SHIPPED | OUTPATIENT
Start: 2018-04-20 | End: 2018-04-20

## 2018-04-20 ASSESSMENT — ENCOUNTER SYMPTOMS
COUGH: 0
SORE THROAT: 0
CONSTIPATION: 0
NAUSEA: 1
DIARRHEA: 0
BACK PAIN: 0
ABDOMINAL DISTENTION: 0
SHORTNESS OF BREATH: 0
ABDOMINAL PAIN: 1
CHEST TIGHTNESS: 0
VOMITING: 0

## 2018-04-23 DIAGNOSIS — N39.0 UTI DUE TO KLEBSIELLA SPECIES: Primary | ICD-10-CM

## 2018-04-23 DIAGNOSIS — B96.89 UTI DUE TO KLEBSIELLA SPECIES: Primary | ICD-10-CM

## 2018-04-23 LAB
ORGANISM: ABNORMAL
URINE CULTURE, ROUTINE: ABNORMAL
URINE CULTURE, ROUTINE: ABNORMAL

## 2018-04-23 RX ORDER — SULFAMETHOXAZOLE AND TRIMETHOPRIM 800; 160 MG/1; MG/1
1 TABLET ORAL 2 TIMES DAILY
Qty: 14 TABLET | Refills: 0 | Status: SHIPPED | OUTPATIENT
Start: 2018-04-23 | End: 2018-04-30

## 2018-05-04 RX ORDER — PROPRANOLOL HYDROCHLORIDE 20 MG/1
TABLET ORAL
Qty: 180 TABLET | Refills: 1 | Status: SHIPPED | OUTPATIENT
Start: 2018-05-04 | End: 2018-06-27 | Stop reason: SDUPTHER

## 2018-05-15 ENCOUNTER — OFFICE VISIT (OUTPATIENT)
Dept: FAMILY MEDICINE CLINIC | Age: 48
End: 2018-05-15
Payer: COMMERCIAL

## 2018-05-15 VITALS
BODY MASS INDEX: 33.13 KG/M2 | DIASTOLIC BLOOD PRESSURE: 60 MMHG | WEIGHT: 187 LBS | HEART RATE: 88 BPM | HEIGHT: 63 IN | TEMPERATURE: 97.5 F | OXYGEN SATURATION: 97 % | SYSTOLIC BLOOD PRESSURE: 96 MMHG | RESPIRATION RATE: 16 BRPM

## 2018-05-15 DIAGNOSIS — E11.8 TYPE 2 DIABETES MELLITUS WITH COMPLICATION, WITH LONG-TERM CURRENT USE OF INSULIN (HCC): ICD-10-CM

## 2018-05-15 DIAGNOSIS — E78.5 HYPERLIPIDEMIA, UNSPECIFIED HYPERLIPIDEMIA TYPE: ICD-10-CM

## 2018-05-15 DIAGNOSIS — Z79.4 TYPE 2 DIABETES MELLITUS WITH COMPLICATION, WITH LONG-TERM CURRENT USE OF INSULIN (HCC): ICD-10-CM

## 2018-05-15 DIAGNOSIS — R09.1 PLEURISY: ICD-10-CM

## 2018-05-15 DIAGNOSIS — R07.9 CHEST PAIN, UNSPECIFIED TYPE: Primary | ICD-10-CM

## 2018-05-15 PROCEDURE — 3046F HEMOGLOBIN A1C LEVEL >9.0%: CPT | Performed by: INTERNAL MEDICINE

## 2018-05-15 PROCEDURE — G8427 DOCREV CUR MEDS BY ELIG CLIN: HCPCS | Performed by: INTERNAL MEDICINE

## 2018-05-15 PROCEDURE — 1036F TOBACCO NON-USER: CPT | Performed by: INTERNAL MEDICINE

## 2018-05-15 PROCEDURE — 2022F DILAT RTA XM EVC RTNOPTHY: CPT | Performed by: INTERNAL MEDICINE

## 2018-05-15 PROCEDURE — 99214 OFFICE O/P EST MOD 30 MIN: CPT | Performed by: INTERNAL MEDICINE

## 2018-05-15 PROCEDURE — G8417 CALC BMI ABV UP PARAM F/U: HCPCS | Performed by: INTERNAL MEDICINE

## 2018-05-15 PROCEDURE — 93000 ELECTROCARDIOGRAM COMPLETE: CPT | Performed by: INTERNAL MEDICINE

## 2018-05-15 RX ORDER — FLUCONAZOLE 150 MG/1
TABLET ORAL
Refills: 2 | COMMUNITY
Start: 2018-05-03 | End: 2018-06-27

## 2018-05-15 RX ORDER — LURASIDONE HYDROCHLORIDE 60 MG/1
TABLET, FILM COATED ORAL
Refills: 1 | COMMUNITY
Start: 2018-05-10 | End: 2019-02-11 | Stop reason: DRUGHIGH

## 2018-05-15 RX ORDER — SULFAMETHOXAZOLE AND TRIMETHOPRIM 800; 160 MG/1; MG/1
TABLET ORAL
Refills: 0 | COMMUNITY
Start: 2018-05-01 | End: 2018-06-27 | Stop reason: ALTCHOICE

## 2018-05-15 RX ORDER — BETAMETHASONE DIPROPIONATE 0.5 MG/G
CREAM TOPICAL
Refills: 3 | Status: ON HOLD | COMMUNITY
Start: 2018-05-03 | End: 2022-06-25

## 2018-05-15 ASSESSMENT — PATIENT HEALTH QUESTIONNAIRE - PHQ9
SUM OF ALL RESPONSES TO PHQ QUESTIONS 1-9: 0
2. FEELING DOWN, DEPRESSED OR HOPELESS: 0
1. LITTLE INTEREST OR PLEASURE IN DOING THINGS: 0
SUM OF ALL RESPONSES TO PHQ9 QUESTIONS 1 & 2: 0

## 2018-05-15 ASSESSMENT — ENCOUNTER SYMPTOMS
BACK PAIN: 0
SHORTNESS OF BREATH: 0
ABDOMINAL PAIN: 0
EYE PAIN: 0

## 2018-06-27 ENCOUNTER — OFFICE VISIT (OUTPATIENT)
Dept: FAMILY MEDICINE CLINIC | Age: 48
End: 2018-06-27
Payer: COMMERCIAL

## 2018-06-27 VITALS
WEIGHT: 184 LBS | HEART RATE: 76 BPM | HEIGHT: 63 IN | BODY MASS INDEX: 32.6 KG/M2 | DIASTOLIC BLOOD PRESSURE: 68 MMHG | TEMPERATURE: 97.5 F | SYSTOLIC BLOOD PRESSURE: 122 MMHG | RESPIRATION RATE: 16 BRPM

## 2018-06-27 DIAGNOSIS — I10 ESSENTIAL HYPERTENSION: Chronic | ICD-10-CM

## 2018-06-27 DIAGNOSIS — F41.8 MIXED ANXIETY AND DEPRESSIVE DISORDER: ICD-10-CM

## 2018-06-27 DIAGNOSIS — E78.2 MIXED HYPERLIPIDEMIA: Chronic | ICD-10-CM

## 2018-06-27 PROCEDURE — G8427 DOCREV CUR MEDS BY ELIG CLIN: HCPCS | Performed by: FAMILY MEDICINE

## 2018-06-27 PROCEDURE — 99214 OFFICE O/P EST MOD 30 MIN: CPT | Performed by: FAMILY MEDICINE

## 2018-06-27 PROCEDURE — 1036F TOBACCO NON-USER: CPT | Performed by: FAMILY MEDICINE

## 2018-06-27 PROCEDURE — 3046F HEMOGLOBIN A1C LEVEL >9.0%: CPT | Performed by: FAMILY MEDICINE

## 2018-06-27 PROCEDURE — 2022F DILAT RTA XM EVC RTNOPTHY: CPT | Performed by: FAMILY MEDICINE

## 2018-06-27 PROCEDURE — G8417 CALC BMI ABV UP PARAM F/U: HCPCS | Performed by: FAMILY MEDICINE

## 2018-06-27 RX ORDER — POTASSIUM CHLORIDE 750 MG/1
TABLET, EXTENDED RELEASE ORAL
Qty: 180 TABLET | Refills: 1 | Status: SHIPPED | OUTPATIENT
Start: 2018-06-27 | End: 2018-11-14 | Stop reason: ALTCHOICE

## 2018-06-27 RX ORDER — OMEPRAZOLE 20 MG/1
CAPSULE, DELAYED RELEASE ORAL
Qty: 360 CAPSULE | Refills: 1 | Status: SHIPPED | OUTPATIENT
Start: 2018-06-27

## 2018-06-27 RX ORDER — ATORVASTATIN CALCIUM 80 MG/1
TABLET, FILM COATED ORAL
Qty: 90 TABLET | Refills: 1 | Status: SHIPPED | OUTPATIENT
Start: 2018-06-27 | End: 2021-05-03

## 2018-06-27 RX ORDER — FLUOXETINE 10 MG/1
10 TABLET, FILM COATED ORAL DAILY
COMMUNITY
End: 2018-09-27 | Stop reason: ALTCHOICE

## 2018-06-27 RX ORDER — PROPRANOLOL HYDROCHLORIDE 20 MG/1
TABLET ORAL
Qty: 180 TABLET | Refills: 1 | Status: SHIPPED | OUTPATIENT
Start: 2018-06-27 | End: 2019-05-22 | Stop reason: SDUPTHER

## 2018-06-27 RX ORDER — RAMIPRIL 5 MG/1
5 CAPSULE ORAL DAILY
Qty: 90 CAPSULE | Refills: 1 | Status: SHIPPED | OUTPATIENT
Start: 2018-06-27 | End: 2019-05-22 | Stop reason: SDUPTHER

## 2018-08-27 ENCOUNTER — OFFICE VISIT (OUTPATIENT)
Dept: FAMILY MEDICINE CLINIC | Age: 48
End: 2018-08-27
Payer: COMMERCIAL

## 2018-08-27 VITALS
WEIGHT: 179 LBS | TEMPERATURE: 98.5 F | RESPIRATION RATE: 16 BRPM | HEART RATE: 108 BPM | OXYGEN SATURATION: 95 % | SYSTOLIC BLOOD PRESSURE: 122 MMHG | HEIGHT: 63 IN | BODY MASS INDEX: 31.71 KG/M2 | DIASTOLIC BLOOD PRESSURE: 80 MMHG

## 2018-08-27 DIAGNOSIS — L03.115 CELLULITIS OF RIGHT LOWER EXTREMITY: Primary | ICD-10-CM

## 2018-08-27 PROCEDURE — G8427 DOCREV CUR MEDS BY ELIG CLIN: HCPCS | Performed by: FAMILY MEDICINE

## 2018-08-27 PROCEDURE — 99213 OFFICE O/P EST LOW 20 MIN: CPT | Performed by: FAMILY MEDICINE

## 2018-08-27 PROCEDURE — G8417 CALC BMI ABV UP PARAM F/U: HCPCS | Performed by: FAMILY MEDICINE

## 2018-08-27 PROCEDURE — 1036F TOBACCO NON-USER: CPT | Performed by: FAMILY MEDICINE

## 2018-08-27 RX ORDER — DOXYCYCLINE HYCLATE 100 MG
100 TABLET ORAL 2 TIMES DAILY
Qty: 30 TABLET | Refills: 0 | Status: SHIPPED | OUTPATIENT
Start: 2018-08-27 | End: 2018-09-11

## 2018-08-27 RX ORDER — FLUCONAZOLE 150 MG/1
150 TABLET ORAL DAILY
Qty: 1 TABLET | Refills: 1 | Status: SHIPPED | OUTPATIENT
Start: 2018-08-27 | End: 2018-09-03

## 2018-08-27 ASSESSMENT — ENCOUNTER SYMPTOMS
GASTROINTESTINAL NEGATIVE: 1
RESPIRATORY NEGATIVE: 1
VOMITING: 0
SORE THROAT: 0
COUGH: 0
EYE PAIN: 0
DIARRHEA: 0
NAIL CHANGES: 0
RHINORRHEA: 0
SHORTNESS OF BREATH: 0

## 2018-08-30 ENCOUNTER — TELEPHONE (OUTPATIENT)
Dept: FAMILY MEDICINE CLINIC | Age: 48
End: 2018-08-30

## 2018-09-03 ENCOUNTER — PATIENT MESSAGE (OUTPATIENT)
Dept: FAMILY MEDICINE CLINIC | Age: 48
End: 2018-09-03

## 2018-09-04 NOTE — TELEPHONE ENCOUNTER
From: Margot Rank  To: Apple King MD  Sent: 9/3/2018 12:58 AM EDT  Subject: Test Results Question    Can you release the results from my pap smear from February 2015?

## 2018-09-27 ENCOUNTER — OFFICE VISIT (OUTPATIENT)
Dept: CARDIOLOGY CLINIC | Age: 48
End: 2018-09-27
Payer: COMMERCIAL

## 2018-09-27 VITALS
WEIGHT: 181 LBS | SYSTOLIC BLOOD PRESSURE: 112 MMHG | BODY MASS INDEX: 32.07 KG/M2 | HEIGHT: 63 IN | DIASTOLIC BLOOD PRESSURE: 74 MMHG | RESPIRATION RATE: 16 BRPM | OXYGEN SATURATION: 97 % | HEART RATE: 96 BPM

## 2018-09-27 DIAGNOSIS — E87.6 HYPOKALEMIA: Primary | ICD-10-CM

## 2018-09-27 DIAGNOSIS — E78.2 MIXED HYPERLIPIDEMIA: Chronic | ICD-10-CM

## 2018-09-27 DIAGNOSIS — I10 ESSENTIAL HYPERTENSION: Chronic | ICD-10-CM

## 2018-09-27 PROCEDURE — 99214 OFFICE O/P EST MOD 30 MIN: CPT | Performed by: INTERNAL MEDICINE

## 2018-09-27 PROCEDURE — 1036F TOBACCO NON-USER: CPT | Performed by: INTERNAL MEDICINE

## 2018-09-27 PROCEDURE — G8427 DOCREV CUR MEDS BY ELIG CLIN: HCPCS | Performed by: INTERNAL MEDICINE

## 2018-09-27 PROCEDURE — G8417 CALC BMI ABV UP PARAM F/U: HCPCS | Performed by: INTERNAL MEDICINE

## 2018-09-27 RX ORDER — ESCITALOPRAM OXALATE 10 MG/1
10 TABLET ORAL
COMMUNITY
Start: 2018-09-24 | End: 2019-02-11 | Stop reason: DRUGHIGH

## 2018-09-27 ASSESSMENT — ENCOUNTER SYMPTOMS
BLOOD IN STOOL: 0
SHORTNESS OF BREATH: 0
STRIDOR: 0
CHEST TIGHTNESS: 0
NAUSEA: 0
COUGH: 0
RESPIRATORY NEGATIVE: 1
GASTROINTESTINAL NEGATIVE: 1
EYES NEGATIVE: 1
WHEEZING: 0

## 2018-09-27 NOTE — PROGRESS NOTES
Other Topics Concern    None     Social History Narrative    None       Allergies   Allergen Reactions    Relafen [Nabumetone] Rash       Current Outpatient Prescriptions   Medication Sig Dispense Refill    escitalopram (LEXAPRO) 10 MG tablet Take 10 mg by mouth      insulin lispro (HUMALOG) 100 UNIT/ML pen Inject into the skin      atorvastatin (LIPITOR) 80 MG tablet TAKE 1 TABLET BY MOUTH DAILY 90 tablet 1    propranolol (INDERAL) 20 MG tablet TAKE 1 TABLET BY MOUTH TWICE DAILY 180 tablet 1    omeprazole (PRILOSEC) 20 MG delayed release capsule TAKE 2 CAPSULES BY MOUTH TWICE DAILY 360 capsule 1    ramipril (ALTACE) 5 MG capsule Take 1 capsule by mouth daily 90 capsule 1    potassium chloride (KLOR-CON M) 10 MEQ extended release tablet TAKE 2 TABLETS BY MOUTH DAILY 180 tablet 1    LATUDA 60 MG TABS tablet TK 1 T PO  QPM WITH MEALS  1    augmented betamethasone dipropionate (DIPROLENE-AF) 0.05 % cream SONDRA EXT AA BID  3    lamoTRIgine (LAMICTAL) 200 MG tablet Take 200 mg by mouth 2 times daily      Insulin Syringe-Needle U-100 ([employer] INSULIN SYRINGE) 31G X 5/16\" 1 ML MISC 1 each by Does not apply route daily 100 each 3    metFORMIN (GLUCOPHAGE) 500 MG tablet 2 po at dinner (Patient taking differently: Take 1,000 mg by mouth 2 times daily (with meals) 2 po at dinner) 180 tablet 1    B-D UF III MINI PEN NEEDLES 31G X 5 MM MISC Use 1 daily to inject insulin 100 each 1    VRAYLAR 3 MG CAPS TK ONE C PO  QHS  1    traZODone (DESYREL) 100 MG tablet 50 mg nightly  1    insulin lispro protamine & lispro (HUMALOG MIX 75/25 KWIKPEN) (75-25) 100 UNIT per ML SUPN injection pen Inject 0.9 mLs into the skin 2 times daily (with meals) 55 pens for 90-day supply 55 pen 3    NITROSTAT 0.4 MG SL tablet        No current facility-administered medications for this visit. Review of Systems:   Review of Systems   Constitutional: Negative. Negative for diaphoresis and fatigue. HENT: Negative.     Eyes: encounter. Assessment/Plan:    1. Mixed hyperlipidemia  Will need better compliance. She has not been taking Statin. She will begin. 2. Essential hypertension  stable  - ECHO Complete 2D W Doppler W Color; Future    3. Hypokalemia   Has not been taking K+. Recent K+ 3.0-  She will start taking 10 Meq QD       Counseling:  Heart Healthy Lifestyle, Improve BMI, Low Salt Diet, Take Precautions to Prevent Falls, Regular Exercise and Walk Daily    Return in about 1 year (around 9/27/2019) for Cardiovascular care. .      Electronically signed by Dolly Monte MD on 9/27/2018 at 4:38 PM

## 2018-11-14 ENCOUNTER — PATIENT MESSAGE (OUTPATIENT)
Dept: FAMILY MEDICINE CLINIC | Age: 48
End: 2018-11-14

## 2018-11-14 RX ORDER — POTASSIUM CHLORIDE 750 MG/1
10 TABLET, FILM COATED, EXTENDED RELEASE ORAL DAILY
Qty: 180 TABLET | Refills: 3 | Status: SHIPPED | OUTPATIENT
Start: 2018-11-14 | End: 2022-05-18

## 2018-12-07 ENCOUNTER — APPOINTMENT (OUTPATIENT)
Dept: GENERAL RADIOLOGY | Age: 48
End: 2018-12-07
Payer: OTHER MISCELLANEOUS

## 2018-12-07 ENCOUNTER — HOSPITAL ENCOUNTER (EMERGENCY)
Age: 48
Discharge: HOME | End: 2018-12-07
Payer: OTHER MISCELLANEOUS

## 2018-12-07 ENCOUNTER — APPOINTMENT (OUTPATIENT)
Dept: CT IMAGING | Age: 48
End: 2018-12-07
Payer: OTHER MISCELLANEOUS

## 2018-12-07 VITALS
RESPIRATION RATE: 16 BRPM | BODY MASS INDEX: 31.89 KG/M2 | DIASTOLIC BLOOD PRESSURE: 82 MMHG | HEART RATE: 92 BPM | HEIGHT: 63 IN | SYSTOLIC BLOOD PRESSURE: 124 MMHG | TEMPERATURE: 98.1 F | WEIGHT: 180 LBS | OXYGEN SATURATION: 96 %

## 2018-12-07 DIAGNOSIS — S16.1XXA ACUTE STRAIN OF NECK MUSCLE, INITIAL ENCOUNTER: ICD-10-CM

## 2018-12-07 DIAGNOSIS — T14.8XXA MYALGIA, TRAUMATIC: ICD-10-CM
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
## 2018-12-07 LAB
EKG ATRIAL RATE: 87 BPM
EKG P AXIS: 20 DEGREES
EKG P-R INTERVAL: 150 MS
EKG Q-T INTERVAL: 358 MS
EKG QRS DURATION: 76 MS
EKG QTC CALCULATION (BAZETT): 430 MS
EKG R AXIS: 11 DEGREES
EKG T AXIS: 31 DEGREES
EKG VENTRICULAR RATE: 87 BPM

## 2018-12-07 PROCEDURE — 93005 ELECTROCARDIOGRAM TRACING: CPT

## 2018-12-07 PROCEDURE — 70450 CT HEAD/BRAIN W/O DYE: CPT

## 2018-12-07 PROCEDURE — 71046 X-RAY EXAM CHEST 2 VIEWS: CPT

## 2018-12-07 PROCEDURE — 6370000000 HC RX 637 (ALT 250 FOR IP): Performed by: PERSONAL EMERGENCY RESPONSE ATTENDANT

## 2018-12-07 PROCEDURE — 72050 X-RAY EXAM NECK SPINE 4/5VWS: CPT

## 2018-12-07 PROCEDURE — 99285 EMERGENCY DEPT VISIT HI MDM: CPT

## 2018-12-07 RX ORDER — TIZANIDINE 4 MG/1
4 TABLET ORAL EVERY 6 HOURS PRN
Qty: 6 TABLET | Refills: 0 | Status: SHIPPED | OUTPATIENT
Start: 2018-12-07 | End: 2019-05-22 | Stop reason: ALTCHOICE

## 2018-12-07 RX ORDER — NAPROXEN 500 MG/1
500 TABLET ORAL ONCE
Status: COMPLETED | OUTPATIENT
Start: 2018-12-07 | End: 2018-12-07

## 2018-12-07 RX ADMIN — NAPROXEN 500 MG: 500 TABLET ORAL at 19:52

## 2018-12-07 ASSESSMENT — PAIN DESCRIPTION - FREQUENCY
FREQUENCY: CONTINUOUS
FREQUENCY: CONTINUOUS

## 2018-12-07 ASSESSMENT — PAIN DESCRIPTION - LOCATION
LOCATION: ARM;HEAD;LEG
LOCATION: ARM;LEG;HEAD

## 2018-12-07 ASSESSMENT — ENCOUNTER SYMPTOMS
VOMITING: 0
SHORTNESS OF BREATH: 0
SORE THROAT: 0
NAUSEA: 0
ABDOMINAL PAIN: 0
DIARRHEA: 0
RHINORRHEA: 0
COLOR CHANGE: 0
BLOOD IN STOOL: 0
COUGH: 0

## 2018-12-07 ASSESSMENT — PAIN DESCRIPTION - ORIENTATION
ORIENTATION: RIGHT;LEFT
ORIENTATION: LEFT
ORIENTATION: LEFT;RIGHT

## 2018-12-07 ASSESSMENT — PAIN DESCRIPTION - PAIN TYPE
TYPE: ACUTE PAIN

## 2018-12-07 ASSESSMENT — PAIN DESCRIPTION - DESCRIPTORS
DESCRIPTORS: ACHING
DESCRIPTORS: ACHING
DESCRIPTORS: ACHING;BURNING

## 2018-12-07 ASSESSMENT — PAIN SCALES - GENERAL
PAINLEVEL_OUTOF10: 6
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
## 2018-12-10 PROCEDURE — 93010 ELECTROCARDIOGRAM REPORT: CPT | Performed by: INTERNAL MEDICINE

## 2018-12-18 ENCOUNTER — OFFICE VISIT (OUTPATIENT)
Dept: OBGYN CLINIC | Age: 48
End: 2018-12-18
Payer: COMMERCIAL

## 2018-12-18 VITALS
WEIGHT: 185 LBS | HEIGHT: 63 IN | BODY MASS INDEX: 32.78 KG/M2 | SYSTOLIC BLOOD PRESSURE: 124 MMHG | DIASTOLIC BLOOD PRESSURE: 68 MMHG

## 2018-12-18 DIAGNOSIS — Z11.3 SCREEN FOR SEXUALLY TRANSMITTED DISEASES: ICD-10-CM

## 2018-12-18 DIAGNOSIS — Z01.419 PAP SMEAR, AS PART OF ROUTINE GYNECOLOGICAL EXAMINATION: Primary | ICD-10-CM

## 2018-12-18 DIAGNOSIS — Z11.51 SCREENING FOR HUMAN PAPILLOMAVIRUS: ICD-10-CM

## 2018-12-18 DIAGNOSIS — N63.11 BREAST LUMP ON RIGHT SIDE AT 10 O'CLOCK POSITION: ICD-10-CM

## 2018-12-18 PROCEDURE — G8484 FLU IMMUNIZE NO ADMIN: HCPCS | Performed by: OBSTETRICS & GYNECOLOGY

## 2018-12-18 PROCEDURE — 99396 PREV VISIT EST AGE 40-64: CPT | Performed by: OBSTETRICS & GYNECOLOGY

## 2018-12-18 ASSESSMENT — ENCOUNTER SYMPTOMS
DIARRHEA: 0
CONSTIPATION: 0
NAUSEA: 0
RESPIRATORY NEGATIVE: 1
ABDOMINAL PAIN: 0
RECTAL PAIN: 0
ANAL BLEEDING: 0
ALLERGIC/IMMUNOLOGIC NEGATIVE: 1
BLOOD IN STOOL: 0
VOMITING: 0
ABDOMINAL DISTENTION: 0
EYES NEGATIVE: 1

## 2018-12-18 ASSESSMENT — PATIENT HEALTH QUESTIONNAIRE - PHQ9
SUM OF ALL RESPONSES TO PHQ QUESTIONS 1-9: 0
SUM OF ALL RESPONSES TO PHQ QUESTIONS 1-9: 0
2. FEELING DOWN, DEPRESSED OR HOPELESS: 0
SUM OF ALL RESPONSES TO PHQ9 QUESTIONS 1 & 2: 0
1. LITTLE INTEREST OR PLEASURE IN DOING THINGS: 0

## 2018-12-18 NOTE — PROGRESS NOTES
Subjective:      Patient ID: Nadya Greene is a 53 y.o. female    Annual exam.  No GYN complaints. Normal cycles. Pap performed and screening mammogram ordered. STD screening offered. Monthly SBE encouraged. F/U annual or prn. Vitals:  /68   Ht 5' 3\" (1.6 m)   Wt 185 lb (83.9 kg)   BMI 32.77 kg/m²   Past Medical History:   Diagnosis Date    Depression     Environmental allergies     GERD (gastroesophageal reflux disease)     HPV in female     Hyperlipidemia     Hypertension     Leukocytosis     Type II or unspecified type diabetes mellitus without mention of complication, not stated as uncontrolled      Past Surgical History:   Procedure Laterality Date    [facility]    BREAST REDUCTION SURGERY  1999    BREAST SURGERY  1999    CARDIAC CATHETERIZATION  2009    (-)[facility]ALKA    CHOLECYSTECTOMY      ENDOMETRIAL ABLATION  2012?     GALLBLADDER SURGERY  1999     Allergies:  Relafen [nabumetone]  Current Outpatient Prescriptions   Medication Sig Dispense Refill    tiZANidine (ZANAFLEX) 4 MG tablet Take 1 tablet by mouth every 6 hours as needed (pain) 6 tablet 0    potassium chloride (K-TAB) 10 MEQ extended release tablet Take 1 tablet by mouth daily 180 tablet 3    escitalopram (LEXAPRO) 10 MG tablet Take 10 mg by mouth      insulin lispro (HUMALOG) 100 UNIT/ML pen Inject into the skin      atorvastatin (LIPITOR) 80 MG tablet TAKE 1 TABLET BY MOUTH DAILY 90 tablet 1    propranolol (INDERAL) 20 MG tablet TAKE 1 TABLET BY MOUTH TWICE DAILY 180 tablet 1    omeprazole (PRILOSEC) 20 MG delayed release capsule TAKE 2 CAPSULES BY MOUTH TWICE DAILY 360 capsule 1    ramipril (ALTACE) 5 MG capsule Take 1 capsule by mouth daily 90 capsule 1    LATUDA 60 MG TABS tablet TK 1 T PO  QPM WITH MEALS  1    augmented betamethasone dipropionate (DIPROLENE-AF) 0.05 % cream SONDRA EXT AA BID  3    insulin lispro protamine & lispro (HUMALOG MIX 75/25 KWIKPEN) (75-25) 100

## 2018-12-26 ENCOUNTER — OFFICE VISIT (OUTPATIENT)
Dept: FAMILY MEDICINE CLINIC | Age: 48
End: 2018-12-26
Payer: COMMERCIAL

## 2018-12-26 VITALS
BODY MASS INDEX: 32.78 KG/M2 | RESPIRATION RATE: 16 BRPM | HEART RATE: 90 BPM | DIASTOLIC BLOOD PRESSURE: 76 MMHG | WEIGHT: 185 LBS | HEIGHT: 63 IN | SYSTOLIC BLOOD PRESSURE: 116 MMHG | TEMPERATURE: 97.9 F

## 2018-12-26 DIAGNOSIS — I10 ESSENTIAL HYPERTENSION: Chronic | ICD-10-CM

## 2018-12-26 DIAGNOSIS — F41.8 MIXED ANXIETY AND DEPRESSIVE DISORDER: ICD-10-CM

## 2018-12-26 DIAGNOSIS — E78.2 MIXED HYPERLIPIDEMIA: Chronic | ICD-10-CM

## 2018-12-26 PROCEDURE — G8484 FLU IMMUNIZE NO ADMIN: HCPCS | Performed by: FAMILY MEDICINE

## 2018-12-26 PROCEDURE — G8427 DOCREV CUR MEDS BY ELIG CLIN: HCPCS | Performed by: FAMILY MEDICINE

## 2018-12-26 PROCEDURE — 1036F TOBACCO NON-USER: CPT | Performed by: FAMILY MEDICINE

## 2018-12-26 PROCEDURE — 2022F DILAT RTA XM EVC RTNOPTHY: CPT | Performed by: FAMILY MEDICINE

## 2018-12-26 PROCEDURE — 3046F HEMOGLOBIN A1C LEVEL >9.0%: CPT | Performed by: FAMILY MEDICINE

## 2018-12-26 PROCEDURE — G8417 CALC BMI ABV UP PARAM F/U: HCPCS | Performed by: FAMILY MEDICINE

## 2018-12-26 PROCEDURE — 99214 OFFICE O/P EST MOD 30 MIN: CPT | Performed by: FAMILY MEDICINE

## 2018-12-26 RX ORDER — FLUCONAZOLE 150 MG/1
150 TABLET ORAL
Qty: 2 TABLET | Refills: 5 | Status: SHIPPED | OUTPATIENT
Start: 2018-12-26 | End: 2018-12-30

## 2018-12-27 DIAGNOSIS — Z11.3 SCREEN FOR SEXUALLY TRANSMITTED DISEASES: ICD-10-CM

## 2018-12-27 DIAGNOSIS — Z01.419 PAP SMEAR, AS PART OF ROUTINE GYNECOLOGICAL EXAMINATION: ICD-10-CM

## 2018-12-27 DIAGNOSIS — Z11.51 SCREENING FOR HUMAN PAPILLOMAVIRUS: ICD-10-CM

## 2019-01-04 ENCOUNTER — TELEPHONE (OUTPATIENT)
Dept: OBGYN CLINIC | Age: 49
End: 2019-01-04

## 2019-01-04 RX ORDER — FLUCONAZOLE 150 MG/1
150 TABLET ORAL ONCE
Qty: 1 TABLET | Refills: 0 | Status: SHIPPED | OUTPATIENT
Start: 2019-01-04 | End: 2019-01-04

## 2019-01-04 RX ORDER — TINIDAZOLE 500 MG/1
2 TABLET ORAL ONCE
Qty: 4 TABLET | Refills: 0 | Status: SHIPPED | OUTPATIENT
Start: 2019-01-04 | End: 2019-01-04

## 2019-02-11 ENCOUNTER — OFFICE VISIT (OUTPATIENT)
Dept: FAMILY MEDICINE CLINIC | Age: 49
End: 2019-02-11
Payer: COMMERCIAL

## 2019-02-11 VITALS
WEIGHT: 190 LBS | HEART RATE: 72 BPM | BODY MASS INDEX: 33.66 KG/M2 | HEIGHT: 63 IN | SYSTOLIC BLOOD PRESSURE: 116 MMHG | TEMPERATURE: 98.4 F | RESPIRATION RATE: 16 BRPM | DIASTOLIC BLOOD PRESSURE: 60 MMHG

## 2019-02-11 DIAGNOSIS — I51.9 CARDIAC DISEASE: ICD-10-CM

## 2019-02-11 DIAGNOSIS — E78.2 MIXED HYPERLIPIDEMIA: Chronic | ICD-10-CM

## 2019-02-11 DIAGNOSIS — L03.031 PARONYCHIA OF GREAT TOE, RIGHT: ICD-10-CM

## 2019-02-11 DIAGNOSIS — F41.8 MIXED ANXIETY AND DEPRESSIVE DISORDER: ICD-10-CM

## 2019-02-11 DIAGNOSIS — I10 ESSENTIAL HYPERTENSION: Chronic | ICD-10-CM

## 2019-02-11 LAB — GLUCOSE BLD-MCNC: 268 MG/DL

## 2019-02-11 PROCEDURE — 2022F DILAT RTA XM EVC RTNOPTHY: CPT | Performed by: FAMILY MEDICINE

## 2019-02-11 PROCEDURE — 1036F TOBACCO NON-USER: CPT | Performed by: FAMILY MEDICINE

## 2019-02-11 PROCEDURE — G8417 CALC BMI ABV UP PARAM F/U: HCPCS | Performed by: FAMILY MEDICINE

## 2019-02-11 PROCEDURE — 82962 GLUCOSE BLOOD TEST: CPT | Performed by: FAMILY MEDICINE

## 2019-02-11 PROCEDURE — G8484 FLU IMMUNIZE NO ADMIN: HCPCS | Performed by: FAMILY MEDICINE

## 2019-02-11 PROCEDURE — 99214 OFFICE O/P EST MOD 30 MIN: CPT | Performed by: FAMILY MEDICINE

## 2019-02-11 PROCEDURE — 3046F HEMOGLOBIN A1C LEVEL >9.0%: CPT | Performed by: FAMILY MEDICINE

## 2019-02-11 PROCEDURE — G8427 DOCREV CUR MEDS BY ELIG CLIN: HCPCS | Performed by: FAMILY MEDICINE

## 2019-02-11 RX ORDER — ESCITALOPRAM OXALATE 20 MG/1
TABLET ORAL
Refills: 1 | COMMUNITY
Start: 2019-01-04

## 2019-02-11 RX ORDER — TRAZODONE HYDROCHLORIDE 50 MG/1
100 TABLET ORAL NIGHTLY
COMMUNITY
Start: 2019-02-04

## 2019-02-11 RX ORDER — AMOXICILLIN AND CLAVULANATE POTASSIUM 875; 125 MG/1; MG/1
1 TABLET, FILM COATED ORAL 2 TIMES DAILY
Qty: 14 TABLET | Refills: 0 | Status: SHIPPED | OUTPATIENT
Start: 2019-02-11 | End: 2019-02-18

## 2019-02-11 RX ORDER — LURASIDONE HYDROCHLORIDE 80 MG/1
TABLET, FILM COATED ORAL
Refills: 2 | COMMUNITY
Start: 2019-02-02 | End: 2019-08-12

## 2019-02-11 RX ORDER — TINIDAZOLE 500 MG/1
TABLET ORAL
Refills: 0 | COMMUNITY
Start: 2019-01-07 | End: 2019-05-22 | Stop reason: ALTCHOICE

## 2019-02-11 RX ORDER — BUPROPION HYDROCHLORIDE 150 MG/1
TABLET ORAL
Refills: 0 | COMMUNITY
Start: 2019-01-25 | End: 2019-05-22 | Stop reason: DRUGHIGH

## 2019-03-01 ENCOUNTER — PATIENT MESSAGE (OUTPATIENT)
Dept: FAMILY MEDICINE CLINIC | Age: 49
End: 2019-03-01

## 2019-03-04 RX ORDER — INSULIN LISPRO 100 [IU]/ML
90 INJECTION, SUSPENSION SUBCUTANEOUS 2 TIMES DAILY WITH MEALS
Qty: 55 PEN | Refills: 3 | Status: SHIPPED | OUTPATIENT
Start: 2019-03-04 | End: 2021-05-03

## 2019-03-09 DIAGNOSIS — E78.2 MIXED HYPERLIPIDEMIA: Chronic | ICD-10-CM

## 2019-03-09 DIAGNOSIS — L03.031 PARONYCHIA OF GREAT TOE, RIGHT: ICD-10-CM

## 2019-03-09 LAB
ALBUMIN SERPL-MCNC: 4.1 G/DL (ref 3.5–4.6)
ALP BLD-CCNC: 146 U/L (ref 40–130)
ALT SERPL-CCNC: 9 U/L (ref 0–33)
ANION GAP SERPL CALCULATED.3IONS-SCNC: 18 MEQ/L (ref 9–15)
AST SERPL-CCNC: 9 U/L (ref 0–35)
BILIRUB SERPL-MCNC: 0.3 MG/DL (ref 0.2–0.7)
BUN BLDV-MCNC: 7 MG/DL (ref 6–20)
CALCIUM SERPL-MCNC: 9.1 MG/DL (ref 8.5–9.9)
CHLORIDE BLD-SCNC: 93 MEQ/L (ref 95–107)
CHOLESTEROL, TOTAL: 244 MG/DL (ref 0–199)
CO2: 28 MEQ/L (ref 20–31)
CREAT SERPL-MCNC: 0.28 MG/DL (ref 0.5–0.9)
CREATININE URINE: 88.9 MG/DL
GFR AFRICAN AMERICAN: >60
GFR NON-AFRICAN AMERICAN: >60
GLOBULIN: 3.7 G/DL (ref 2.3–3.5)
GLUCOSE BLD-MCNC: 315 MG/DL (ref 70–99)
HBA1C MFR BLD: 11.6 % (ref 4.8–5.9)
HCT VFR BLD CALC: 43.6 % (ref 37–47)
HDLC SERPL-MCNC: 29 MG/DL (ref 40–59)
HEMOGLOBIN: 14.8 G/DL (ref 12–16)
LDL CHOLESTEROL CALCULATED: 153 MG/DL (ref 0–129)
MCH RBC QN AUTO: 30.8 PG (ref 27–31.3)
MCHC RBC AUTO-ENTMCNC: 33.8 % (ref 33–37)
MCV RBC AUTO: 90.9 FL (ref 82–100)
MICROALBUMIN UR-MCNC: 7.9 MG/DL
MICROALBUMIN/CREAT UR-RTO: 88.9 MG/G (ref 0–30)
PDW BLD-RTO: 13.4 % (ref 11.5–14.5)
PLATELET # BLD: 288 K/UL (ref 130–400)
POTASSIUM SERPL-SCNC: 2.9 MEQ/L (ref 3.4–4.9)
RBC # BLD: 4.8 M/UL (ref 4.2–5.4)
SODIUM BLD-SCNC: 139 MEQ/L (ref 135–144)
TOTAL PROTEIN: 7.8 G/DL (ref 6.3–8)
TRIGL SERPL-MCNC: 312 MG/DL (ref 0–150)
WBC # BLD: 11.7 K/UL (ref 4.8–10.8)

## 2019-03-15 ENCOUNTER — PROCEDURE VISIT (OUTPATIENT)
Dept: FAMILY MEDICINE CLINIC | Age: 49
End: 2019-03-15
Payer: COMMERCIAL

## 2019-03-15 VITALS
BODY MASS INDEX: 32.78 KG/M2 | HEIGHT: 63 IN | SYSTOLIC BLOOD PRESSURE: 138 MMHG | WEIGHT: 185 LBS | HEART RATE: 84 BPM | TEMPERATURE: 98.2 F | DIASTOLIC BLOOD PRESSURE: 92 MMHG | RESPIRATION RATE: 16 BRPM

## 2019-03-15 DIAGNOSIS — I10 ESSENTIAL HYPERTENSION: ICD-10-CM

## 2019-03-15 DIAGNOSIS — E87.6 HYPOKALEMIA: ICD-10-CM

## 2019-03-15 DIAGNOSIS — F41.8 MIXED ANXIETY AND DEPRESSIVE DISORDER: ICD-10-CM

## 2019-03-15 DIAGNOSIS — E78.2 MIXED HYPERLIPIDEMIA: ICD-10-CM

## 2019-03-15 DIAGNOSIS — D72.829 LEUKOCYTOSIS, UNSPECIFIED TYPE: ICD-10-CM

## 2019-03-15 PROCEDURE — 2022F DILAT RTA XM EVC RTNOPTHY: CPT | Performed by: FAMILY MEDICINE

## 2019-03-15 PROCEDURE — 99213 OFFICE O/P EST LOW 20 MIN: CPT | Performed by: FAMILY MEDICINE

## 2019-03-15 PROCEDURE — G8484 FLU IMMUNIZE NO ADMIN: HCPCS | Performed by: FAMILY MEDICINE

## 2019-03-15 PROCEDURE — 3046F HEMOGLOBIN A1C LEVEL >9.0%: CPT | Performed by: FAMILY MEDICINE

## 2019-03-15 PROCEDURE — G8417 CALC BMI ABV UP PARAM F/U: HCPCS | Performed by: FAMILY MEDICINE

## 2019-03-15 PROCEDURE — G8427 DOCREV CUR MEDS BY ELIG CLIN: HCPCS | Performed by: FAMILY MEDICINE

## 2019-03-15 PROCEDURE — 1036F TOBACCO NON-USER: CPT | Performed by: FAMILY MEDICINE

## 2019-04-15 ENCOUNTER — HOSPITAL ENCOUNTER (EMERGENCY)
Age: 49
Discharge: HOME | End: 2019-04-15
Payer: COMMERCIAL

## 2019-04-15 ENCOUNTER — APPOINTMENT (OUTPATIENT)
Dept: CT IMAGING | Age: 49
End: 2019-04-15
Payer: COMMERCIAL

## 2019-04-15 ENCOUNTER — HOSPITAL ENCOUNTER (EMERGENCY)
Age: 49
Discharge: HOME | End: 2019-04-16
Payer: COMMERCIAL

## 2019-04-15 VITALS
OXYGEN SATURATION: 96 % | HEIGHT: 63 IN | RESPIRATION RATE: 18 BRPM | WEIGHT: 185 LBS | BODY MASS INDEX: 32.78 KG/M2 | DIASTOLIC BLOOD PRESSURE: 85 MMHG | TEMPERATURE: 98 F | SYSTOLIC BLOOD PRESSURE: 135 MMHG | HEART RATE: 89 BPM

## 2019-04-15 DIAGNOSIS — R51.9 ACUTE NONINTRACTABLE HEADACHE, UNSPECIFIED HEADACHE TYPE: Primary | ICD-10-CM

## 2019-04-15 LAB
ALBUMIN SERPL-MCNC: 4.1 G/DL (ref 3.5–4.6)
ALP BLD-CCNC: 171 U/L (ref 40–130)
ALT SERPL-CCNC: 11 U/L (ref 0–33)
ANION GAP SERPL CALCULATED.3IONS-SCNC: 15 MEQ/L (ref 9–15)
AST SERPL-CCNC: 8 U/L (ref 0–35)
BASOPHILS ABSOLUTE: 0.1 K/UL (ref 0–0.2)
BASOPHILS RELATIVE PERCENT: 0.9 %
BILIRUB SERPL-MCNC: 0.3 MG/DL (ref 0.2–0.7)
BILIRUBIN URINE: NEGATIVE
BLOOD, URINE: NEGATIVE
BUN BLDV-MCNC: 8 MG/DL (ref 6–20)
CALCIUM SERPL-MCNC: 9.4 MG/DL (ref 8.5–9.9)
CHLORIDE BLD-SCNC: 90 MEQ/L (ref 95–107)
CLARITY: CLEAR
CO2: 29 MEQ/L (ref 20–31)
COLOR: YELLOW
CREAT SERPL-MCNC: 0.37 MG/DL (ref 0.5–0.9)
EOSINOPHILS ABSOLUTE: 0.2 K/UL (ref 0–0.7)
EOSINOPHILS RELATIVE PERCENT: 1.1 %
GFR AFRICAN AMERICAN: >60
GFR NON-AFRICAN AMERICAN: >60
GLOBULIN: 3.4 G/DL (ref 2.3–3.5)
GLUCOSE BLD-MCNC: 397 MG/DL (ref 70–99)
GLUCOSE URINE: >=1000 MG/DL
HCT VFR BLD CALC: 41.9 % (ref 37–47)
HEMOGLOBIN: 14.7 G/DL (ref 12–16)
KETONES, URINE: 15 MG/DL
LEUKOCYTE ESTERASE, URINE: NEGATIVE
LYMPHOCYTES ABSOLUTE: 3.6 K/UL (ref 1–4.8)
LYMPHOCYTES RELATIVE PERCENT: 23.6 %
MCH RBC QN AUTO: 31.2 PG (ref 27–31.3)
MCHC RBC AUTO-ENTMCNC: 35.1 % (ref 33–37)
MCV RBC AUTO: 89 FL (ref 82–100)
MONOCYTES ABSOLUTE: 1 K/UL (ref 0.2–0.8)
MONOCYTES RELATIVE PERCENT: 6.3 %
NEUTROPHILS ABSOLUTE: 10.3 K/UL (ref 1.4–6.5)
NEUTROPHILS RELATIVE PERCENT: 68.1 %
NITRITE, URINE: NEGATIVE
PDW BLD-RTO: 12.6 % (ref 11.5–14.5)
PH UA: 5.5 (ref 5–9)
PLATELET # BLD: 329 K/UL (ref 130–400)
POTASSIUM SERPL-SCNC: 3.3 MEQ/L (ref 3.4–4.9)
PROTEIN UA: NEGATIVE MG/DL
RBC # BLD: 4.71 M/UL (ref 4.2–5.4)
SODIUM BLD-SCNC: 134 MEQ/L (ref 135–144)
SPECIFIC GRAVITY UA: 1.04 (ref 1–1.03)
TOTAL PROTEIN: 7.5 G/DL (ref 6.3–8)
URINE REFLEX TO CULTURE: ABNORMAL
UROBILINOGEN, URINE: 0.2 E.U./DL
WBC # BLD: 15.2 K/UL (ref 4.8–10.8)

## 2019-04-15 PROCEDURE — 80053 COMPREHEN METABOLIC PANEL: CPT

## 2019-04-15 PROCEDURE — 96374 THER/PROPH/DIAG INJ IV PUSH: CPT

## 2019-04-15 PROCEDURE — 99284 EMERGENCY DEPT VISIT MOD MDM: CPT

## 2019-04-15 PROCEDURE — 36415 COLL VENOUS BLD VENIPUNCTURE: CPT

## 2019-04-15 PROCEDURE — 96375 TX/PRO/DX INJ NEW DRUG ADDON: CPT

## 2019-04-15 PROCEDURE — 85025 COMPLETE CBC W/AUTO DIFF WBC: CPT

## 2019-04-15 PROCEDURE — 70450 CT HEAD/BRAIN W/O DYE: CPT

## 2019-04-15 PROCEDURE — 99283 EMERGENCY DEPT VISIT LOW MDM: CPT

## 2019-04-15 PROCEDURE — 6360000002 HC RX W HCPCS: Performed by: NURSE PRACTITIONER

## 2019-04-15 PROCEDURE — 2580000003 HC RX 258: Performed by: NURSE PRACTITIONER

## 2019-04-15 PROCEDURE — 81003 URINALYSIS AUTO W/O SCOPE: CPT

## 2019-04-15 RX ORDER — BUTALBITAL, ACETAMINOPHEN AND CAFFEINE 300; 40; 50 MG/1; MG/1; MG/1
1 CAPSULE ORAL EVERY 4 HOURS PRN
Qty: 56 CAPSULE | Refills: 0 | Status: SHIPPED | OUTPATIENT
Start: 2019-04-15 | End: 2019-05-22 | Stop reason: ALTCHOICE

## 2019-04-15 RX ORDER — DIPHENHYDRAMINE HYDROCHLORIDE 50 MG/ML
50 INJECTION INTRAMUSCULAR; INTRAVENOUS ONCE
Status: COMPLETED | OUTPATIENT
Start: 2019-04-15 | End: 2019-04-15

## 2019-04-15 RX ORDER — METOCLOPRAMIDE HYDROCHLORIDE 5 MG/ML
10 INJECTION INTRAMUSCULAR; INTRAVENOUS ONCE
Status: COMPLETED | OUTPATIENT
Start: 2019-04-15 | End: 2019-04-15

## 2019-04-15 RX ORDER — 0.9 % SODIUM CHLORIDE 0.9 %
1000 INTRAVENOUS SOLUTION INTRAVENOUS ONCE
Status: COMPLETED | OUTPATIENT
Start: 2019-04-15 | End: 2019-04-15

## 2019-04-15 RX ORDER — KETOROLAC TROMETHAMINE 30 MG/ML
30 INJECTION, SOLUTION INTRAMUSCULAR; INTRAVENOUS ONCE
Status: COMPLETED | OUTPATIENT
Start: 2019-04-15 | End: 2019-04-15

## 2019-04-15 RX ORDER — ONDANSETRON 2 MG/ML
4 INJECTION INTRAMUSCULAR; INTRAVENOUS ONCE
Status: COMPLETED | OUTPATIENT
Start: 2019-04-15 | End: 2019-04-15

## 2019-04-15 RX ADMIN — KETOROLAC TROMETHAMINE 30 MG: 30 INJECTION, SOLUTION INTRAMUSCULAR at 04:29

## 2019-04-15 RX ADMIN — METOCLOPRAMIDE 10 MG: 5 INJECTION, SOLUTION INTRAMUSCULAR; INTRAVENOUS at 04:28

## 2019-04-15 RX ADMIN — SODIUM CHLORIDE 1000 ML: 9 INJECTION, SOLUTION INTRAVENOUS at 04:28

## 2019-04-15 RX ADMIN — ONDANSETRON 4 MG: 2 INJECTION INTRAMUSCULAR; INTRAVENOUS at 04:29

## 2019-04-15 RX ADMIN — DIPHENHYDRAMINE HYDROCHLORIDE 50 MG: 50 INJECTION, SOLUTION INTRAMUSCULAR; INTRAVENOUS at 04:29

## 2019-04-15 ASSESSMENT — ENCOUNTER SYMPTOMS
BACK PAIN: 0
ABDOMINAL PAIN: 0
NAUSEA: 1
COLOR CHANGE: 0
DIARRHEA: 0
COUGH: 0
VOMITING: 1
RHINORRHEA: 0
WHEEZING: 0
CHEST TIGHTNESS: 0
SHORTNESS OF BREATH: 0
PHOTOPHOBIA: 0

## 2019-04-15 ASSESSMENT — PAIN SCALES - GENERAL
PAINLEVEL_OUTOF10: 10
PAINLEVEL_OUTOF10: 10
PAINLEVEL_OUTOF10: 1
PAINLEVEL_OUTOF10: 9

## 2019-04-15 ASSESSMENT — PAIN DESCRIPTION - PAIN TYPE
TYPE: ACUTE PAIN

## 2019-04-15 ASSESSMENT — PAIN DESCRIPTION - DESCRIPTORS
DESCRIPTORS: ACHING
DESCRIPTORS: HEADACHE

## 2019-04-15 ASSESSMENT — PAIN DESCRIPTION - LOCATION
LOCATION: HEAD

## 2019-04-15 ASSESSMENT — PAIN DESCRIPTION - ORIENTATION: ORIENTATION: POSTERIOR

## 2019-04-15 ASSESSMENT — PAIN DESCRIPTION - FREQUENCY: FREQUENCY: CONTINUOUS

## 2019-04-15 ASSESSMENT — PAIN DESCRIPTION - PROGRESSION: CLINICAL_PROGRESSION: RAPIDLY IMPROVING

## 2019-04-15 NOTE — ED PROVIDER NOTES
[facility] ED  eMERGENCY dEPARTMENT eNCOUnter      Pt Name: Jessica Rodriguez  MRN: [id number]  [place] 1970  Date of evaluation: 4/15/2019  Provider: PAOLO Morris 6626       Chief Complaint   Patient presents with    Headache     onset 10p assoc with nausea and vomiting. Pt denies dizziness, diplopia, or CP. HISTORY OF PRESENT ILLNESS   (Location/Symptom, Timing/Onset,Context/Setting, Quality, Duration, Modifying Factors, Severity)  Note limiting factors. Jessica Rodriguez is a 53 y.o. female who presents to the emergency department for complaint of onset of an intense headache with nausea and vomiting around 10 PM last night. Patient states that the headache came on seemingly suddenly followed by nausea and times one episode of vomiting. She states that she attempted to take some over-the-counter medications at home and relax and rest to relieve the headache. She states that this did not provide relief and is currently 10 out of 10 pressure aching in the back of her head. She states that the presentation and the headache was sudden and rapidly increased, was made worse after the vomiting episode. She denies any changes in vision dizziness disorientation. She denies any chest pain or abdominal pain. She states that although she has vomited only one time she continues to have sensation of nausea feels that she might vomit again. She denies any recent injuries to her head or neck. Denies neck pain. Nursing Notes were reviewed. REVIEW OF SYSTEMS    (2-9 systems for level 4, 10 or more for level 5)     Review of Systems   Constitutional: Negative for activity change, appetite change, chills, diaphoresis, fatigue and fever. HENT: Negative for congestion and rhinorrhea. Eyes: Negative for photophobia and visual disturbance. Respiratory: Negative for cough, chest tightness, shortness of breath and wheezing.     Cardiovascular: Days per week: None     Minutes per session: None    Stress: None   Relationships    Social connections:     Talks on phone: None     Gets together: None     Attends Sabianist service: None     Active member of club or organization: None     Attends meetings of clubs or organizations: None     Relationship status: None    Intimate partner violence:     Fear of current or ex partner: None     Emotionally abused: None     Physically abused: None     Forced sexual activity: None   Other Topics Concern    None   Social History Narrative    None       SCREENINGS      @[id number])@      PHYSICAL EXAM    (up to 7 for level 4, 8 or more for level 5)     ED Triage Vitals [04/15/19 0353]   BP Temp Temp Source Pulse Resp SpO2 Height Weight   (!) 161/84 98 °F (36.7 °C) Oral 108 19 94 % 5' 3\" (1.6 m) 185 lb (83.9 kg)       Physical Exam   Constitutional: She is oriented to person, place, and time. She appears well-developed and well-nourished. No distress. HENT:   Head: Normocephalic and atraumatic. Right Ear: External ear normal.   Left Ear: External ear normal.   Mouth/Throat: Oropharynx is clear and moist. No oropharyngeal exudate. Eyes: Pupils are equal, round, and reactive to light. Conjunctivae and EOM are normal. Right eye exhibits no discharge. Left eye exhibits no discharge. Neck: Normal range of motion. Cardiovascular: Normal rate, regular rhythm and intact distal pulses. Pulmonary/Chest: Effort normal.   Abdominal: Soft. She exhibits no distension. There is no tenderness. Musculoskeletal: Normal range of motion. She exhibits no tenderness. Neurological: She is alert and oriented to person, place, and time. No cranial nerve deficit. She exhibits normal muscle tone. Coordination normal.   Skin: Skin is warm and dry. Capillary refill takes less than 2 seconds. She is not diaphoretic.        DIAGNOSTIC RESULTS     EKG: All EKG's are interpreted by the Emergency Department Physician who either signs

## 2019-04-16 VITALS
OXYGEN SATURATION: 96 % | DIASTOLIC BLOOD PRESSURE: 85 MMHG | WEIGHT: 180 LBS | SYSTOLIC BLOOD PRESSURE: 133 MMHG | RESPIRATION RATE: 18 BRPM | BODY MASS INDEX: 31.89 KG/M2 | TEMPERATURE: 97.5 F | HEART RATE: 94 BPM

## 2019-04-16 PROCEDURE — 2580000003 HC RX 258: Performed by: NURSE PRACTITIONER

## 2019-04-16 PROCEDURE — 96374 THER/PROPH/DIAG INJ IV PUSH: CPT

## 2019-04-16 PROCEDURE — 6360000002 HC RX W HCPCS: Performed by: NURSE PRACTITIONER

## 2019-04-16 PROCEDURE — 96375 TX/PRO/DX INJ NEW DRUG ADDON: CPT

## 2019-04-16 RX ORDER — 0.9 % SODIUM CHLORIDE 0.9 %
1000 INTRAVENOUS SOLUTION INTRAVENOUS ONCE
Status: COMPLETED | OUTPATIENT
Start: 2019-04-16 | End: 2019-04-16

## 2019-04-16 RX ORDER — METOCLOPRAMIDE HYDROCHLORIDE 5 MG/ML
10 INJECTION INTRAMUSCULAR; INTRAVENOUS ONCE
Status: COMPLETED | OUTPATIENT
Start: 2019-04-16 | End: 2019-04-16

## 2019-04-16 RX ORDER — DIPHENHYDRAMINE HYDROCHLORIDE 50 MG/ML
50 INJECTION INTRAMUSCULAR; INTRAVENOUS ONCE
Status: COMPLETED | OUTPATIENT
Start: 2019-04-16 | End: 2019-04-16

## 2019-04-16 RX ORDER — ONDANSETRON 2 MG/ML
4 INJECTION INTRAMUSCULAR; INTRAVENOUS ONCE
Status: COMPLETED | OUTPATIENT
Start: 2019-04-16 | End: 2019-04-16

## 2019-04-16 RX ORDER — KETOROLAC TROMETHAMINE 30 MG/ML
30 INJECTION, SOLUTION INTRAMUSCULAR; INTRAVENOUS ONCE
Status: COMPLETED | OUTPATIENT
Start: 2019-04-16 | End: 2019-04-16

## 2019-04-16 RX ADMIN — ONDANSETRON 4 MG: 2 INJECTION INTRAMUSCULAR; INTRAVENOUS at 00:55

## 2019-04-16 RX ADMIN — KETOROLAC TROMETHAMINE 30 MG: 30 INJECTION, SOLUTION INTRAMUSCULAR at 00:55

## 2019-04-16 RX ADMIN — METOCLOPRAMIDE 10 MG: 5 INJECTION, SOLUTION INTRAMUSCULAR; INTRAVENOUS at 00:55

## 2019-04-16 RX ADMIN — SODIUM CHLORIDE 1000 ML: 9 INJECTION, SOLUTION INTRAVENOUS at 00:55

## 2019-04-16 RX ADMIN — DIPHENHYDRAMINE HYDROCHLORIDE 50 MG: 50 INJECTION INTRAMUSCULAR; INTRAVENOUS at 00:55

## 2019-04-16 ASSESSMENT — PAIN DESCRIPTION - ORIENTATION: ORIENTATION: POSTERIOR

## 2019-04-16 ASSESSMENT — ENCOUNTER SYMPTOMS
COUGH: 0
PHOTOPHOBIA: 0
NAUSEA: 1
DIARRHEA: 0
VOMITING: 0
RHINORRHEA: 0
ABDOMINAL PAIN: 0
CHEST TIGHTNESS: 0
WHEEZING: 0
SHORTNESS OF BREATH: 0
COLOR CHANGE: 0
BACK PAIN: 0

## 2019-04-16 ASSESSMENT — PAIN DESCRIPTION - DESCRIPTORS: DESCRIPTORS: THROBBING

## 2019-04-16 ASSESSMENT — PAIN DESCRIPTION - LOCATION: LOCATION: HEAD

## 2019-04-16 ASSESSMENT — PAIN SCALES - GENERAL
PAINLEVEL_OUTOF10: 10
PAINLEVEL_OUTOF10: 2

## 2019-04-16 ASSESSMENT — PAIN DESCRIPTION - PROGRESSION: CLINICAL_PROGRESSION: RAPIDLY IMPROVING

## 2019-04-16 ASSESSMENT — PAIN DESCRIPTION - PAIN TYPE: TYPE: ACUTE PAIN

## 2019-04-16 NOTE — ED TRIAGE NOTES
Pt presents from home with c/o headache. Pt seen in er 3am 4/15 for same complaint. Work up including CT brain negative. Pt a&o x4. resp even. Skin p/w/d. Pt reports nausea, no active emesis noted. Pt reports pain to posterior head, throbbing in nature. All neuro checks intact. Reports photosensitivity.

## 2019-04-16 NOTE — ED NOTES
Patient given water and watching TV at this time with mother at bedside. No distress noted. Patient medicated for pain per order. Patient A&Ox4 and reports being seen in ER last night for same complaint. Patient reports hx.  Of migraines     Adalberto Lopez RN  04/16/19 0110

## 2019-04-16 NOTE — ED NOTES
Patient discharge instructions reviewed with patient at this time. Patient verbalized understanding of the instructions reviewed with her, need for further follow up, medications prescribed, and when to return to the ER for worsening or persistent symptoms at this time. Patient denies any comments, questions, or concerns prior to discharge. Patient stable and ambulatory upon discharge home. No acute signs or symptoms of distress noted at this time.       Aaron Ridley RN  04/16/19 5434

## 2019-04-16 NOTE — ED PROVIDER NOTES
[address] [facility] ED  eMERGENCY dEPARTMENT eNCOUnter      Pt Name: Jerry Duke  MRN: [id number]  [place] 1970  Date of evaluation: 4/15/2019  Provider: Dia Collier       Chief Complaint   Patient presents with    Headache         HISTORY OF PRESENT ILLNESS   (Location/Symptom, Timing/Onset,Context/Setting, Quality, Duration, Modifying Factors, Severity)  Note limiting factors. Jerry Duke is a 53 y.o. female who presents to the emergency department for complaint of returning headache. Patient reports that she was seen in the ER last night with a similar headache had a workup done along with medications. She states that the medications that she was given in the ER previously worked significantly to reduce and relieve the headache. She states that earlier in the day she began to feel the pressure sensation in the back of her head which slowly increased to a current 10 out of 10 aching throbbing headache. She states that the headache is exactly the same as the previous headache without a sudden onset she denies any thunderclap. She denies any changes in vision dizziness or disorientation. She denies any difficulties with speech or facial droop or loss of sensation or function of extremity. She exhibits a Tylenol at home which she states delayed the onset of the intensity of headache but did not relieve it. She did not yet fill the prescription for Fioricet and has not yet started taking. She states that she is nauseated with the intensity of the headache but has not vomited. She denies any illnesses or injuries. She returns to the ER requesting the IV medications given last night. Nursing Notes were reviewed. REVIEW OF SYSTEMS    (2-9 systems for level 4, 10 or more for level 5)     Review of Systems   Constitutional: Negative for activity change, appetite change, chills, diaphoresis, fatigue and fever.    HENT: Negative for congestion and rhinorrhea. Eyes: Negative for photophobia and visual disturbance. Respiratory: Negative for cough, chest tightness, shortness of breath and wheezing. Cardiovascular: Negative for chest pain and palpitations. Gastrointestinal: Positive for nausea. Negative for abdominal pain, diarrhea and vomiting. Genitourinary: Negative for difficulty urinating, dysuria, flank pain, frequency and urgency. Musculoskeletal: Negative for arthralgias, back pain, myalgias, neck pain and neck stiffness. Skin: Negative for color change and rash. Neurological: Positive for headaches. Negative for dizziness, tremors, seizures, syncope, speech difficulty, weakness, light-headedness and numbness. Except as noted above the remainder of the review of systems was reviewed and negative. PAST MEDICAL HISTORY     Past Medical History:   Diagnosis Date    Depression     Environmental allergies     GERD (gastroesophageal reflux disease)     HPV in female     Hyperlipidemia     Hypertension     Leukocytosis     Type II or unspecified type diabetes mellitus without mention of complication, not stated as uncontrolled      Past Surgical History:   Procedure Laterality Date    [facility]SY  2012    [facility]    [facility]    CARDIAC CATHETERIZATION  2009    (-)[facility]    CHOLECYSTECTOMY      ENDOMETRIAL ABLATION  2012?     GALLBLADDER SURGERY  1999     Social History     Socioeconomic History    Marital status:      Spouse name: None    Number of children: None    Years of education: None    Highest education level: None   Occupational History    None   Social Needs    Financial resource strain: None    Food insecurity:     Worry: None     Inability: None    Transportation needs:     Medical: None     Non-medical: None   Tobacco Use    Smoking status: Never Smoker    Smokeless tobacco: Never Used   Substance and Sexual Activity    Alcohol use: Yes     Alcohol/week: 0.0 oz     Comment: ocassionally    Drug use: No    Sexual activity: Not Currently     Partners: Male   Lifestyle    Physical activity:     Days per week: None     Minutes per session: None    Stress: None   Relationships    Social connections:     Talks on phone: None     Gets together: None     Attends Taoist service: None     Active member of club or organization: None     Attends meetings of clubs or organizations: None     Relationship status: None    Intimate partner violence:     Fear of current or ex partner: None     Emotionally abused: None     Physically abused: None     Forced sexual activity: None   Other Topics Concern    None   Social History Narrative    None       SCREENINGS      @[id number])@      PHYSICAL EXAM    (up to 7 for level 4, 8 or more for level 5)     ED Triage Vitals   BP Temp Temp Source Pulse Resp SpO2 Height Weight   04/16/19 0001 04/15/19 2356 04/15/19 2356 04/15/19 2356 04/15/19 2356 04/15/19 2356 -- 04/15/19 2356   (!) 176/107 97.5 °F (36.4 °C) Oral 98 18 96 %  180 lb (81.6 kg)       Physical Exam   Constitutional: She is oriented to person, place, and time. She appears well-developed and well-nourished. No distress. HENT:   Head: Normocephalic and atraumatic. Eyes: Pupils are equal, round, and reactive to light. Conjunctivae and EOM are normal. Right eye exhibits no discharge. Left eye exhibits no discharge. Neck: Normal range of motion. Neck supple. Cardiovascular: Normal rate, regular rhythm and intact distal pulses. Pulmonary/Chest: Effort normal.   Neurological: She is alert and oriented to person, place, and time. No cranial nerve deficit or sensory deficit. She exhibits normal muscle tone. Coordination normal.   Skin: Skin is warm and dry. Capillary refill takes less than 2 seconds. She is not diaphoretic.        DIAGNOSTIC RESULTS     EKG: All EKG's are interpreted by the Emergency Department Physician who either signs or Co-signsthis chart in the absence of a cardiologist.        RADIOLOGY:   Gregary Francesco such as CT, Ultrasound and MRI are read by the radiologist. Plain radiographic images are visualized and preliminarily interpreted by the emergency physician with the below findings:        Interpretation per the Radiologist below, if available at the time ofthis note:    No orders to display         ED BEDSIDE ULTRASOUND:   Performed by ED Physician - none    LABS:  Labs Reviewed - No data to display    All other labs were within normal range or not returned as of this dictation. EMERGENCY DEPARTMENT COURSE and DIFFERENTIAL DIAGNOSIS/MDM:   Vitals:    Vitals:    04/16/19 0001 04/16/19 0109 04/16/19 0131 04/16/19 0225   BP: (!) 176/107 (!) 169/100 (!) 148/83 (!) 143/79   Pulse:  96 94 94   Resp:  18 16 18   Temp:       TempSrc:       SpO2:  98% 97% 96%   Weight:                MDM patient presents to the ER nontoxic afebrile no acute distress hemodynamically stable with report of recurrent headache. This patient was seen by this provider 24 hours prior with similar complaint without a sudden onset. At that time patient had a CT scan of the head that was negative for acute changes. Lab work at that time was performed. This time patient's headache is of less intensity but similar results. She is requesting the IV medications and provided recently. Patient has been given these IV medications for headache relief. On reevaluation patient states she feels significantly better pain is now a 2 out of 10 tolerable is requesting be discharged home. Patient again has no signs of neurological focal deficits. She stable for discharge at this time and encouraged to fill the prescription for the headache medication. Follow-up primary care providers as possible further evaluation return to the ER medially for any onset of new concerning symptoms or worsening condition.   Patient verbalizes understand of all given instruction education. CRITICAL CARE TIME       CONSULTS:  None    PROCEDURES:  Unless otherwise noted below, none     Procedures    FINAL IMPRESSION      1.  Acute nonintractable headache, unspecified headache type          DISPOSITION/PLAN   DISPOSITION        PATIENT REFERRED TO:  Jose Francisco Jang MD    Call in 1 day        DISCHARGE MEDICATIONS:  New Prescriptions    No medications on file          (Please notethat portions of this note were completed with a voice recognition program.  Efforts were made to edit the dictations but occasionally words are mis-transcribed.)    PAOLO Esquivel CNP (electronically signed)  Attending Emergency Physician         PAOLO Esquivel CNP  04/16/19 8411

## 2019-04-16 NOTE — ED NOTES
Patient reports that her headache has rapidly improved since being medicated for pain per order. Patient reports that her headache is now a 2/10 at this time. No distress noted from patient at this time.       Elo Castillo RN  04/16/19 9242

## 2019-04-16 NOTE — ED NOTES
Patient reports that her headache is completely resolved at this time. Patient resting in bed, no distress noted.  Mother remains at [address] [place] [address]  04/16/19 0720

## 2019-04-23 ENCOUNTER — HOSPITAL ENCOUNTER (EMERGENCY)
Age: 49
Discharge: HOME | End: 2019-04-23
Attending: EMERGENCY MEDICINE
Payer: COMMERCIAL

## 2019-04-23 VITALS
RESPIRATION RATE: 17 BRPM | OXYGEN SATURATION: 93 % | HEART RATE: 94 BPM | HEIGHT: 63 IN | SYSTOLIC BLOOD PRESSURE: 148 MMHG | TEMPERATURE: 97.5 F | BODY MASS INDEX: 32.43 KG/M2 | DIASTOLIC BLOOD PRESSURE: 78 MMHG | WEIGHT: 183 LBS

## 2019-04-23 DIAGNOSIS — G44.039 NONINTRACTABLE PAROXYSMAL HEMICRANIA, UNSPECIFIED CHRONICITY PATTERN: Primary | ICD-10-CM

## 2019-04-23 PROCEDURE — 6360000002 HC RX W HCPCS: Performed by: EMERGENCY MEDICINE

## 2019-04-23 PROCEDURE — 99283 EMERGENCY DEPT VISIT LOW MDM: CPT

## 2019-04-23 PROCEDURE — 96375 TX/PRO/DX INJ NEW DRUG ADDON: CPT

## 2019-04-23 PROCEDURE — 2580000003 HC RX 258: Performed by: EMERGENCY MEDICINE

## 2019-04-23 PROCEDURE — 96374 THER/PROPH/DIAG INJ IV PUSH: CPT

## 2019-04-23 RX ORDER — 0.9 % SODIUM CHLORIDE 0.9 %
1000 INTRAVENOUS SOLUTION INTRAVENOUS ONCE
Status: COMPLETED | OUTPATIENT
Start: 2019-04-23 | End: 2019-04-23

## 2019-04-23 RX ORDER — ONDANSETRON 4 MG/1
4-8 TABLET, ORALLY DISINTEGRATING ORAL EVERY 12 HOURS PRN
Qty: 12 TABLET | Refills: 0 | Status: SHIPPED | OUTPATIENT
Start: 2019-04-23 | End: 2021-05-03

## 2019-04-23 RX ORDER — DEXAMETHASONE SODIUM PHOSPHATE 10 MG/ML
10 INJECTION INTRAMUSCULAR; INTRAVENOUS ONCE
Status: COMPLETED | OUTPATIENT
Start: 2019-04-23 | End: 2019-04-23

## 2019-04-23 RX ORDER — METOCLOPRAMIDE HYDROCHLORIDE 5 MG/ML
10 INJECTION INTRAMUSCULAR; INTRAVENOUS ONCE
Status: COMPLETED | OUTPATIENT
Start: 2019-04-23 | End: 2019-04-23

## 2019-04-23 RX ORDER — KETOROLAC TROMETHAMINE 30 MG/ML
30 INJECTION, SOLUTION INTRAMUSCULAR; INTRAVENOUS ONCE
Status: COMPLETED | OUTPATIENT
Start: 2019-04-23 | End: 2019-04-23

## 2019-04-23 RX ORDER — DIPHENHYDRAMINE HYDROCHLORIDE 50 MG/ML
50 INJECTION INTRAMUSCULAR; INTRAVENOUS ONCE
Status: COMPLETED | OUTPATIENT
Start: 2019-04-23 | End: 2019-04-23

## 2019-04-23 RX ADMIN — DEXAMETHASONE SODIUM PHOSPHATE 10 MG: 10 INJECTION INTRAMUSCULAR; INTRAVENOUS at 03:06

## 2019-04-23 RX ADMIN — METOCLOPRAMIDE 10 MG: 5 INJECTION, SOLUTION INTRAMUSCULAR; INTRAVENOUS at 03:06

## 2019-04-23 RX ADMIN — DIPHENHYDRAMINE HYDROCHLORIDE 50 MG: 50 INJECTION, SOLUTION INTRAMUSCULAR; INTRAVENOUS at 03:06

## 2019-04-23 RX ADMIN — SODIUM CHLORIDE 1000 ML: 9 INJECTION, SOLUTION INTRAVENOUS at 03:05

## 2019-04-23 RX ADMIN — KETOROLAC TROMETHAMINE 30 MG: 30 INJECTION, SOLUTION INTRAMUSCULAR at 03:06

## 2019-04-23 ASSESSMENT — PAIN DESCRIPTION - LOCATION: LOCATION: HEAD

## 2019-04-23 ASSESSMENT — PAIN SCALES - GENERAL
PAINLEVEL_OUTOF10: 10
PAINLEVEL_OUTOF10: 10

## 2019-04-23 ASSESSMENT — PAIN DESCRIPTION - ONSET: ONSET: AWAKENED FROM SLEEP

## 2019-04-23 ASSESSMENT — PAIN DESCRIPTION - DESCRIPTORS: DESCRIPTORS: ACHING

## 2019-04-23 ASSESSMENT — ENCOUNTER SYMPTOMS
RESPIRATORY NEGATIVE: 1
GASTROINTESTINAL NEGATIVE: 1
ALLERGIC/IMMUNOLOGIC NEGATIVE: 1

## 2019-04-23 ASSESSMENT — PAIN DESCRIPTION - PAIN TYPE: TYPE: ACUTE PAIN

## 2019-04-23 NOTE — ED TRIAGE NOTES
Patient presents to the ER with c/o severe headache that awakened her at 0100 today. Patient has been seen in this ER for same. Patient states she took tylenol at 2200 prior to going to bed but still awakened with headache. Patient reports pain 10/10 at this time. Patient A&Ox4 at this time. Patient speech clear, no deficits noted. Patient states photosensitivity, but denies blurred or impaired vision.

## 2019-04-23 NOTE — ED NOTES
Patient medicated for headache, just before I was medicating her she began vomiting, vomited approximately 300ml of liquid emesis. Gave her a towel and some ice water to sip on.       Kimberly Toure RN  04/23/19 2431

## 2019-04-23 NOTE — ED PROVIDER NOTES
[facility] [facility] ED  EMERGENCY DEPARTMENT ENCOUNTER      Pt Name: Barbie Rae  MRN: [id number]  [place] 1970  Date of evaluation: 4/23/2019  Provider: Fallon Martinez MD, MD    [address] [place] [address]       Chief Complaint   Patient presents with    Headache     onset at 0100          HISTORY OF PRESENT ILLNESS   (Location/Symptom, Timing/Onset, Context/Setting, Quality, Duration, Modifying Factors, Severity)  Note limiting factors. Barbie Rae is a 53 y.o. female who presents to the emergency department headache onset approximately 1 AM today after waking from sleep describes the pain as a throbbing posterior headache with associated photophobia and phonophobia and the presentation is similar to multiple recent episodes of headaches for which she has been seen and had a negative CAT scan evaluation as well as ER visit and outpatient follow-up. Fioricet normally helps but did not help tonight when she took it. Associated nausea with retching but no productive emesis. HPI    Nursing Notes were reviewed. REVIEW OF SYSTEMS    (2-9 systems for level 4, 10 or more for level 5)     Review of Systems   Constitutional: Negative. HENT: Negative. Eyes:        As per HPI   Respiratory: Negative. Cardiovascular: Negative. Gastrointestinal: Negative. Endocrine: Negative. Genitourinary: Negative. Musculoskeletal: Negative. Skin: Negative. Allergic/Immunologic: Negative. Neurological:        As per HPI   Hematological: Negative. Psychiatric/Behavioral: Negative. Except as noted above the remainder of the review of systems was reviewed and negative.        PAST MEDICAL HISTORY     Past Medical History:   Diagnosis Date    Depression     Environmental allergies     GERD (gastroesophageal reflux disease)     HPV in female     Hyperlipidemia     Hypertension     Leukocytosis     Migraines     Type II or unspecified type diabetes mellitus without mention of complication, not stated as uncontrolled          SURGICAL HISTORY       Past Surgical History:   Procedure Laterality Date    BONE MARROW BIOPSY  2012    (-)[facility]    BREAST REDUCTION SURGERY  1999    BREAST SURGERY  1999    CARDIAC CATHETERIZATION  2009    (-)[facility]    CHOLECYSTECTOMY      ENDOMETRIAL ABLATION  2012?     GALLBLADDER SURGERY  1999         CURRENT MEDICATIONS       Previous Medications    ATORVASTATIN (LIPITOR) 80 MG TABLET    TAKE 1 TABLET BY MOUTH DAILY    AUGMENTED BETAMETHASONE DIPROPIONATE (DIPROLENE-AF) 0.05 % CREAM    SONDRA EXT AA BID    B-D UF III MINI PEN NEEDLES 31G X 5 MM MISC    Use 1 daily to inject insulin    BUPROPION (WELLBUTRIN XL) 150 MG EXTENDED RELEASE TABLET    TK 2 TS PO QD    BUTALBITAL-APAP-CAFFEINE (FIORICET) -40 MG CAPS PER CAPSULE    Take 1 capsule by mouth every 4 hours as needed for Headaches or Migraine    ESCITALOPRAM (LEXAPRO) 20 MG TABLET    TK 1 T PO QD    INSULIN LISPRO (HUMALOG) 100 UNIT/ML PEN    Inject into the skin    INSULIN LISPRO PROTAMINE & LISPRO (HUMALOG MIX 75/25 KWIKPEN) (75-25) 100 UNIT PER ML SUPN INJECTION PEN    Inject 0.9 mLs into the skin 2 times daily (with meals) 55 pens for 90-day supply    INSULIN SYRINGE-NEEDLE U-100 ([employer] INSULIN SYRINGE) 31G X 5/16\" 1 ML MISC    1 each by Does not apply route daily    LAMOTRIGINE (LAMICTAL) 200 MG TABLET    Take 200 mg by mouth 2 times daily    LATUDA 80 MG TABS TABLET    TK 1 T PO QPM WC    METFORMIN (GLUCOPHAGE) 500 MG TABLET    2 po at dinner    NITROSTAT 0.4 MG SL TABLET        OMEPRAZOLE (PRILOSEC) 20 MG DELAYED RELEASE CAPSULE    TAKE 2 CAPSULES BY MOUTH TWICE DAILY    POTASSIUM CHLORIDE (K-TAB) 10 MEQ EXTENDED RELEASE TABLET    Take 1 tablet by mouth daily    PROPRANOLOL (INDERAL) 20 MG TABLET    TAKE 1 TABLET BY MOUTH TWICE DAILY    RAMIPRIL (ALTACE) 5 MG CAPSULE    Take 1 capsule by mouth daily    TINIDAZOLE (TINDAMAX) 500 MG TABLET    TK 4 TS PO 1 TIME FOR 1 DOSE TIZANIDINE (ZANAFLEX) 4 MG TABLET    Take 1 tablet by mouth every 6 hours as needed (pain)    TRAZODONE (DESYREL) 50 MG TABLET        VRAYLAR 3 MG CAPS    TK ONE C PO  QHS       ALLERGIES     Relafen [nabumetone]    FAMILY HISTORY       Family History   Problem Relation Age of Onset    Heart Disease Mother     Cancer Father     Diabetes Father     Heart Disease Father     No Known Problems Sister     No Known Problems Paternal Grandfather     No Known Problems Paternal Grandmother     No Known Problems Maternal Grandmother     No Known Problems Maternal Grandfather     No Known Problems Brother     No Known Problems Other     Breast Cancer Neg Hx     Colon Cancer Neg Hx     Eclampsia Neg Hx     Hypertension Neg Hx     Ovarian Cancer Neg Hx      Labor Neg Hx     Spont Abortions Neg Hx     Stroke Neg Hx           SOCIAL HISTORY       Social History     Socioeconomic History    Marital status:      Spouse name: None    Number of children: None    Years of education: None    Highest education level: None   Occupational History    None   Social Needs    Financial resource strain: None    Food insecurity:     Worry: None     Inability: None    Transportation needs:     Medical: None     Non-medical: None   Tobacco Use    Smoking status: Never Smoker    Smokeless tobacco: Never Used   Substance and Sexual Activity    Alcohol use:  Yes     Alcohol/week: 0.0 oz     Comment: ocassionally    Drug use: No    Sexual activity: Not Currently     Partners: Male   Lifestyle    Physical activity:     Days per week: None     Minutes per session: None    Stress: None   Relationships    Social connections:     Talks on phone: None     Gets together: None     Attends Holiness service: None     Active member of club or organization: None     Attends meetings of clubs or organizations: None     Relationship status: None    Intimate partner violence:     Fear of current or ex partner: None Emotionally abused: None     Physically abused: None     Forced sexual activity: None   Other Topics Concern    None   Social History Narrative    None       SCREENINGS               PHYSICAL EXAM    (up to 7 for level 4, 8 or more for level 5)     ED Triage Vitals   BP Temp Temp Source Pulse Resp SpO2 Height Weight   04/23/19 0214 04/23/19 0212 04/23/19 0212 04/23/19 0212 04/23/19 0212 04/23/19 0214 04/23/19 0212 04/23/19 0212   (!) 205/97 97.5 °F (36.4 °C) Oral 78 18 95 % 5' 3\" (1.6 m) 183 lb (83 kg)       Physical Exam   Constitutional: She is oriented to person, place, and time. She appears well-developed and well-nourished. HENT:   Head: Normocephalic and atraumatic. Eyes: Pupils are equal, round, and reactive to light. Conjunctivae and EOM are normal.   Neck: Normal range of motion. Neck supple. Cardiovascular: Normal rate and regular rhythm. Pulmonary/Chest: Effort normal and breath sounds normal.   Abdominal: Soft. Musculoskeletal: Normal range of motion. Neurological: She is alert and oriented to person, place, and time. Skin: Skin is warm and dry. Psychiatric: She has a normal mood and affect. DIAGNOSTIC RESULTS       Interpretation per the Radiologist below, if available at the time of this note:    No orders to display         ED BEDSIDE ULTRASOUND:   Performed by ED Physician - none    LABS:  Labs Reviewed - No data to display    All other labs were within normal range or not returned as of this dictation. EMERGENCY DEPARTMENT COURSE and DIFFERENTIAL DIAGNOSIS/MDM:   Vitals:    Vitals:    04/23/19 0212 04/23/19 0214 04/23/19 0231   BP:  (!) 205/97 (!) 170/101   Pulse: 78  85   Resp: 18  18   Temp: 97.5 °F (36.4 °C)     TempSrc: Oral     SpO2:  95% 96%   Weight: 183 lb (83 kg)     Height: 5' 3\" (1.6 m)     The patient has similar presentation to prior headaches and has been evaluated in the past for same including a negative head CT.   I do not suspect a subarachnoid hemorrhage and would not pursue a lumbar puncture for xanthochromia at this time. Enzymatic treatment similar prior visits and reevaluation for disposition    MDM      REASSESSMENT       4 AM: The patient is significantly improved and near resolved and does not want any further treatment at this time is comfortable with discharge. We discussed outpatient follow-up as well as red flag signs for reasons to return to the emergency department        CONSULTS:  None    PROCEDURES:  Unless otherwise noted below, none     Procedures        FINAL IMPRESSION    Headache      DISPOSITION/PLAN   DISPOSITION    Home    PATIENT REFERRED TO:  No follow-up provider specified. DISCHARGE MEDICATIONS:  New Prescriptions    No medications on file     Controlled Substances Monitoring:     No flowsheet data found.     (Please note that portions of this note were completed with a voice recognition program.  Efforts were made to edit the dictations but occasionally words are mis-transcribed.)    Janie Vizcarra MD, MD (electronically signed)  Attending Emergency Physician           Janie Vizcarra MD  04/23/19 7344

## 2019-04-26 ENCOUNTER — TELEPHONE (OUTPATIENT)
Dept: FAMILY MEDICINE CLINIC | Age: 49
End: 2019-04-26

## 2019-05-22 ENCOUNTER — OFFICE VISIT (OUTPATIENT)
Dept: FAMILY MEDICINE CLINIC | Age: 49
End: 2019-05-22
Payer: COMMERCIAL

## 2019-05-22 VITALS
BODY MASS INDEX: 32.28 KG/M2 | HEART RATE: 112 BPM | DIASTOLIC BLOOD PRESSURE: 80 MMHG | SYSTOLIC BLOOD PRESSURE: 112 MMHG | OXYGEN SATURATION: 98 % | TEMPERATURE: 97.4 F | RESPIRATION RATE: 16 BRPM | WEIGHT: 182.2 LBS | HEIGHT: 63 IN

## 2019-05-22 DIAGNOSIS — G47.33 OSA (OBSTRUCTIVE SLEEP APNEA): ICD-10-CM

## 2019-05-22 DIAGNOSIS — I10 ESSENTIAL HYPERTENSION: Chronic | ICD-10-CM

## 2019-05-22 DIAGNOSIS — E78.2 MIXED HYPERLIPIDEMIA: ICD-10-CM

## 2019-05-22 DIAGNOSIS — G44.1 OTHER VASCULAR HEADACHE: ICD-10-CM

## 2019-05-22 PROCEDURE — G8427 DOCREV CUR MEDS BY ELIG CLIN: HCPCS | Performed by: FAMILY MEDICINE

## 2019-05-22 PROCEDURE — 3046F HEMOGLOBIN A1C LEVEL >9.0%: CPT | Performed by: FAMILY MEDICINE

## 2019-05-22 PROCEDURE — 99204 OFFICE O/P NEW MOD 45 MIN: CPT | Performed by: FAMILY MEDICINE

## 2019-05-22 PROCEDURE — 1036F TOBACCO NON-USER: CPT | Performed by: FAMILY MEDICINE

## 2019-05-22 PROCEDURE — G8417 CALC BMI ABV UP PARAM F/U: HCPCS | Performed by: FAMILY MEDICINE

## 2019-05-22 PROCEDURE — 2022F DILAT RTA XM EVC RTNOPTHY: CPT | Performed by: FAMILY MEDICINE

## 2019-05-22 RX ORDER — FLUCONAZOLE 150 MG/1
1 TABLET ORAL PRN
Refills: 5 | COMMUNITY
Start: 2019-03-15 | End: 2019-05-22 | Stop reason: SDUPTHER

## 2019-05-22 RX ORDER — FLUCONAZOLE 150 MG/1
150 TABLET ORAL DAILY
Qty: 4 TABLET | Refills: 1 | Status: SHIPPED | OUTPATIENT
Start: 2019-05-22 | End: 2020-02-10

## 2019-05-22 RX ORDER — PROPRANOLOL HYDROCHLORIDE 20 MG/1
TABLET ORAL
Qty: 180 TABLET | Refills: 3 | Status: SHIPPED | OUTPATIENT
Start: 2019-05-22

## 2019-05-22 RX ORDER — BUPROPION HYDROCHLORIDE 300 MG/1
1 TABLET ORAL DAILY
Refills: 2 | COMMUNITY
Start: 2019-04-11 | End: 2021-05-03

## 2019-05-22 RX ORDER — RAMIPRIL 5 MG/1
5 CAPSULE ORAL DAILY
Qty: 90 CAPSULE | Refills: 3 | Status: ON HOLD | OUTPATIENT
Start: 2019-05-22 | End: 2022-06-25

## 2019-05-22 RX ORDER — TOPIRAMATE 25 MG/1
2 TABLET ORAL 2 TIMES DAILY
Refills: 1 | COMMUNITY
Start: 2019-05-10 | End: 2021-05-03

## 2019-05-22 NOTE — PROGRESS NOTES
my decision making for this patient for care, diagnostics, consultations and treatment for today's visit. This patient has completely given up on her health. She stopped taking her insulin she's not been following up and she finally did get new psychiatrist.  We spent about 25 minutes discussing all of her issues and reviewing everything going over the chart. She and her psychiatrist come to the understanding that she'll focus on to things and then she'll work on putting everything back in order she can deal with it. First she was sleep study as it runs in her family obstructive sleep apnea and she stores and stops breathing in her family T is her about this. Next she is a get her diabetes under control she stopped her insulin. Her last hemoglobin A1c was over 11 and today its over 12. We spent a good deal of time discussing this and will try a reinstitute her insulin and get her in to see endocrinology. Review of Systems    Constitutional: positive for fatigue and sweats and negative for fever. HEENT: Negative for eye discharge and vision loss. Negative for ear drainage, hearing loss and nasal drainage. Respiratory: Negative for cough, dyspnea and wheezing. Cardiovascular:  Negative for chest pain, claudication and irregular heartbeat/palpitations. Gastrointestinal: Negative for abdominal pain, nausea, constipation and diarrhea. Genitourinary: Negative for dysuria, patient had an ablation. Metabolic/Endocrine: Negative for cold intolerance, heat intolerance, polydipsia and polyphagia. No unintended weight loss or weight gain. Neuro/Psychiatric: Negative for gait disturbance. Negative for psychiatric symptoms. Dermatologic: Negative for pruritus and rash. Musculoskeletal: positive for bone/joint symptoms. No numbness or tingling. No loss of function. Hematology: Negative for bleeding and easy bruising. Immunology:  Negative for environmental allergies and food allergies.     Physical Exam    Patient's medication, allergies, past medical, surgical, social and family histories were reviewed and updated as appropriate. PHYSICAL EXAM   General appearance: Alert oriented pleasant cooperative in no acute distress. HEENT: Eyes clear nonicteric facial muscles symmetrical.  Hearing normal oropharynx clear she's not pale  Neck: Soft nontender no adenopathy or masses no carotid bruits  Lungs: Clear to auscultation no wheezes rhonchi or rales  Heart: Regular rate and rhythm without murmurs rubs or gallops  Extremities: No rashes or edema. Assessment:   Diagnosis Orders   1. Uncontrolled type 2 diabetes mellitus without complication, with long-term current use of insulin ([facility] [address]  Marilin Bianchi, Endocrinology, [place]  And have her start her insulin at 45 units twice a day this 7525 blind in that she's anemic with 15 units with meals she's had increase her insulin by 3-5 units every 5 days and bring a log in of her blood sugars every other day at her next visit. 2. Essential hypertension  ramipril (ALTACE) 5 MG capsule   3. Mixed hyperlipidemia     4. BMI 32.0-32.9,adult     5. FRANK (obstructive sleep apnea)  Baseline Diagnostic Sleep Study   6. Other vascular headache                 Plan:  Current Outpatient Medications   Medication Sig Dispense Refill    buPROPion (WELLBUTRIN XL) 300 MG extended release tablet Take 1 tablet by mouth daily  2    topiramate (TOPAMAX) 25 MG tablet Take 2 tablets by mouth 2 times daily  1    propranolol (INDERAL) 20 MG tablet TAKE 1 TABLET BY MOUTH TWICE DAILY 180 tablet 3    ramipril (ALTACE) 5 MG capsule Take 1 capsule by mouth daily 90 capsule 3    fluconazole (DIFLUCAN) 150 MG tablet Take 1 tablet by mouth daily As needed for discharge 4 tablet 1    ondansetron (ZOFRAN ODT) 4 MG disintegrating tablet Take 1-2 tablets by mouth every 12 hours as needed for Nausea May Sub regular tablet (non-ODT) if insurance does not cover ODT.  12 tablet 0  insulin lispro protamine & lispro (HUMALOG MIX 75/25 KWIKPEN) (75-25) 100 UNIT per ML SUPN injection pen Inject 0.9 mLs into the skin 2 times daily (with meals) 55 pens for 90-day supply 55 pen 3    escitalopram (LEXAPRO) 20 MG tablet TK 1 T PO QD  1    LATUDA 80 MG TABS tablet TK 1 T PO QPM WC  2    traZODone (DESYREL) 50 MG tablet Take 50 mg by mouth nightly       potassium chloride (K-TAB) 10 MEQ extended release tablet Take 1 tablet by mouth daily 180 tablet 3    insulin lispro (HUMALOG) 100 UNIT/ML pen Inject into the skin      atorvastatin (LIPITOR) 80 MG tablet TAKE 1 TABLET BY MOUTH DAILY 90 tablet 1    omeprazole (PRILOSEC) 20 MG delayed release capsule TAKE 2 CAPSULES BY MOUTH TWICE DAILY 360 capsule 1    augmented betamethasone dipropionate (DIPROLENE-AF) 0.05 % cream SONDRA EXT AA BID  3    lamoTRIgine (LAMICTAL) 200 MG tablet Take 200 mg by mouth 2 times daily      Insulin Syringe-Needle U-100 ([employer] INSULIN SYRINGE) 31G X 5/16\" 1 ML MISC 1 each by Does not apply route daily 100 each 3    metFORMIN (GLUCOPHAGE) 500 MG tablet 2 po at dinner (Patient taking differently: Take 1,000 mg by mouth 2 times daily (with meals) 2 po at dinner) 180 tablet 1    B-D UF III MINI PEN NEEDLES 31G X 5 MM MISC Use 1 daily to inject insulin 100 each 1    VRAYLAR 3 MG CAPS TK ONE C PO  QHS  1     No current facility-administered medications for this visit. Orders Placed This Encounter   Procedures   [facility], Endocrinology, [place]     Referral Priority:   Routine     Referral Type:   Eval and Treat     Referral Reason:   Specialty Services Required     Referred to Provider:   MANDO Mcgregor     Requested Specialty:   Endocrinology     Number of Visits Requested:   1    Baseline Diagnostic Sleep Study     Patients with abnormal results will be assessed and referred to the sleep  as needed.      Standing Status:   Future     Standing Expiration Date:   5/22/2020 Scheduling Instructions:      Scheduling and Pre-Certification: [phone]      The following must be completed and FAXED to [phone]      1) Sleep Evaluation Orders Form      2) Office note justifying study      3) Demographic info / insurance card     Order Specific Question:   Adult or Pediatric     Answer:   Adult Study (>7 Years)     Order Specific Question:   Location For Sleep Study     Answer:   [place]     Order Specific Question:   Select Sleep Lab Location     Answer:   [facility]     Comments:   [place]       Orders Placed This Encounter   Medications    propranolol (INDERAL) 20 MG tablet     Sig: TAKE 1 TABLET BY MOUTH TWICE DAILY     Dispense:  180 tablet     Refill:  3     **Patient requests 90 days supply**    ramipril (ALTACE) 5 MG capsule     Sig: Take 1 capsule by mouth daily     Dispense:  90 capsule     Refill:  3    fluconazole (DIFLUCAN) 150 MG tablet     Sig: Take 1 tablet by mouth daily As needed for discharge     Dispense:  4 tablet     Refill:  1              Return in about 2 weeks (around 6/5/2019).     Dr. Alex Keen      5/22/19  4:32 PM

## 2019-08-06 ENCOUNTER — HOSPITAL ENCOUNTER (EMERGENCY)
Age: 49
Discharge: HOME | End: 2019-08-06
Attending: EMERGENCY MEDICINE
Payer: COMMERCIAL

## 2019-08-06 ENCOUNTER — APPOINTMENT (OUTPATIENT)
Dept: GENERAL RADIOLOGY | Age: 49
End: 2019-08-06
Payer: COMMERCIAL

## 2019-08-06 VITALS
BODY MASS INDEX: 31.89 KG/M2 | TEMPERATURE: 97.9 F | HEIGHT: 63 IN | SYSTOLIC BLOOD PRESSURE: 120 MMHG | RESPIRATION RATE: 14 BRPM | DIASTOLIC BLOOD PRESSURE: 85 MMHG | HEART RATE: 71 BPM | OXYGEN SATURATION: 96 % | WEIGHT: 180 LBS

## 2019-08-06 DIAGNOSIS — N63.10 BREAST MASS, RIGHT: ICD-10-CM

## 2019-08-06 DIAGNOSIS — R73.9 HYPERGLYCEMIA: ICD-10-CM

## 2019-08-06 DIAGNOSIS — R07.89 CHEST WALL PAIN: Primary | ICD-10-CM

## 2019-08-06 LAB
ALBUMIN SERPL-MCNC: 4.3 G/DL (ref 3.5–4.6)
ALP BLD-CCNC: 190 U/L (ref 40–130)
ALT SERPL-CCNC: 12 U/L (ref 0–33)
AMYLASE: 13 U/L (ref 22–93)
ANION GAP SERPL CALCULATED.3IONS-SCNC: 16 MEQ/L (ref 9–15)
AST SERPL-CCNC: 10 U/L (ref 0–35)
BASOPHILS ABSOLUTE: 0.1 K/UL (ref 0–0.2)
BASOPHILS RELATIVE PERCENT: 0.8 %
BILIRUB SERPL-MCNC: <0.2 MG/DL (ref 0.2–0.7)
BUN BLDV-MCNC: 7 MG/DL (ref 6–20)
CALCIUM SERPL-MCNC: 9.4 MG/DL (ref 8.5–9.9)
CHLORIDE BLD-SCNC: 95 MEQ/L (ref 95–107)
CHP ED QC CHECK: NORMAL
CO2: 26 MEQ/L (ref 20–31)
CREAT SERPL-MCNC: 0.37 MG/DL (ref 0.5–0.9)
D DIMER: 0.35 MG/L FEU (ref 0–0.5)
EKG ATRIAL RATE: 90 BPM
EKG P AXIS: 44 DEGREES
EKG P-R INTERVAL: 164 MS
EKG Q-T INTERVAL: 338 MS
EKG QRS DURATION: 64 MS
EKG QTC CALCULATION (BAZETT): 413 MS
EKG R AXIS: 8 DEGREES
EKG T AXIS: 32 DEGREES
EKG VENTRICULAR RATE: 90 BPM
EOSINOPHILS ABSOLUTE: 0.2 K/UL (ref 0–0.7)
EOSINOPHILS RELATIVE PERCENT: 1.2 %
GFR AFRICAN AMERICAN: >60
GFR NON-AFRICAN AMERICAN: >60
GLOBULIN: 3.5 G/DL (ref 2.3–3.5)
GLUCOSE BLD-MCNC: 369 MG/DL
GLUCOSE BLD-MCNC: 369 MG/DL (ref 60–115)
GLUCOSE BLD-MCNC: 497 MG/DL (ref 70–99)
HCT VFR BLD CALC: 40.9 % (ref 37–47)
HEMOGLOBIN: 14.5 G/DL (ref 12–16)
LIPASE: 9 U/L (ref 12–95)
LYMPHOCYTES ABSOLUTE: 3.4 K/UL (ref 1–4.8)
LYMPHOCYTES RELATIVE PERCENT: 25.4 %
MCH RBC QN AUTO: 31.8 PG (ref 27–31.3)
MCHC RBC AUTO-ENTMCNC: 35.4 % (ref 33–37)
MCV RBC AUTO: 89.8 FL (ref 82–100)
MONOCYTES ABSOLUTE: 0.9 K/UL (ref 0.2–0.8)
MONOCYTES RELATIVE PERCENT: 6.8 %
NEUTROPHILS ABSOLUTE: 8.7 K/UL (ref 1.4–6.5)
NEUTROPHILS RELATIVE PERCENT: 65.8 %
PDW BLD-RTO: 12.5 % (ref 11.5–14.5)
PERFORMED ON: ABNORMAL
PLATELET # BLD: 323 K/UL (ref 130–400)
POTASSIUM SERPL-SCNC: 3.3 MEQ/L (ref 3.4–4.9)
RBC # BLD: 4.55 M/UL (ref 4.2–5.4)
SODIUM BLD-SCNC: 137 MEQ/L (ref 135–144)
TOTAL CK: 32 U/L (ref 0–170)
TOTAL PROTEIN: 7.8 G/DL (ref 6.3–8)
TROPONIN: <0.01 NG/ML (ref 0–0.01)
WBC # BLD: 13.2 K/UL (ref 4.8–10.8)

## 2019-08-06 PROCEDURE — 82550 ASSAY OF CK (CPK): CPT

## 2019-08-06 PROCEDURE — 83690 ASSAY OF LIPASE: CPT

## 2019-08-06 PROCEDURE — 6370000000 HC RX 637 (ALT 250 FOR IP): Performed by: NURSE PRACTITIONER

## 2019-08-06 PROCEDURE — 80053 COMPREHEN METABOLIC PANEL: CPT

## 2019-08-06 PROCEDURE — 85025 COMPLETE CBC W/AUTO DIFF WBC: CPT

## 2019-08-06 PROCEDURE — 36415 COLL VENOUS BLD VENIPUNCTURE: CPT

## 2019-08-06 PROCEDURE — 71046 X-RAY EXAM CHEST 2 VIEWS: CPT

## 2019-08-06 PROCEDURE — 2580000003 HC RX 258: Performed by: NURSE PRACTITIONER

## 2019-08-06 PROCEDURE — 93005 ELECTROCARDIOGRAM TRACING: CPT | Performed by: EMERGENCY MEDICINE

## 2019-08-06 PROCEDURE — 99285 EMERGENCY DEPT VISIT HI MDM: CPT

## 2019-08-06 PROCEDURE — 85379 FIBRIN DEGRADATION QUANT: CPT

## 2019-08-06 PROCEDURE — 82150 ASSAY OF AMYLASE: CPT

## 2019-08-06 PROCEDURE — 84484 ASSAY OF TROPONIN QUANT: CPT

## 2019-08-06 RX ORDER — CYCLOBENZAPRINE HCL 10 MG
10 TABLET ORAL 3 TIMES DAILY PRN
Qty: 15 TABLET | Refills: 0 | Status: SHIPPED | OUTPATIENT
Start: 2019-08-06 | End: 2019-08-16

## 2019-08-06 RX ORDER — NAPROXEN 500 MG/1
500 TABLET ORAL 2 TIMES DAILY
Qty: 20 TABLET | Refills: 0 | Status: SHIPPED | OUTPATIENT
Start: 2019-08-06 | End: 2021-05-03

## 2019-08-06 RX ORDER — IBUPROFEN 800 MG/1
800 TABLET ORAL ONCE
Status: COMPLETED | OUTPATIENT
Start: 2019-08-06 | End: 2019-08-06

## 2019-08-06 RX ORDER — 0.9 % SODIUM CHLORIDE 0.9 %
1000 INTRAVENOUS SOLUTION INTRAVENOUS ONCE
Status: COMPLETED | OUTPATIENT
Start: 2019-08-06 | End: 2019-08-06

## 2019-08-06 RX ADMIN — SODIUM CHLORIDE 1000 ML: 9 INJECTION, SOLUTION INTRAVENOUS at 12:07

## 2019-08-06 RX ADMIN — IBUPROFEN 800 MG: 800 TABLET, FILM COATED ORAL at 11:00

## 2019-08-06 ASSESSMENT — ENCOUNTER SYMPTOMS
BACK PAIN: 0
DIARRHEA: 0
COUGH: 0
VOMITING: 0
NAUSEA: 0
CHEST TIGHTNESS: 0
SORE THROAT: 0
SHORTNESS OF BREATH: 0
ABDOMINAL PAIN: 0
WHEEZING: 0

## 2019-08-06 ASSESSMENT — PAIN SCALES - GENERAL
PAINLEVEL_OUTOF10: 3
PAINLEVEL_OUTOF10: 3

## 2019-08-06 ASSESSMENT — PAIN DESCRIPTION - ORIENTATION: ORIENTATION: RIGHT

## 2019-08-06 ASSESSMENT — PAIN DESCRIPTION - LOCATION: LOCATION: CHEST

## 2019-08-06 ASSESSMENT — PAIN DESCRIPTION - DESCRIPTORS: DESCRIPTORS: ACHING

## 2019-08-06 ASSESSMENT — HEART SCORE: ECG: 0

## 2019-08-06 ASSESSMENT — PAIN DESCRIPTION - PAIN TYPE: TYPE: ACUTE PAIN

## 2019-08-06 NOTE — ED NOTES
Pt discharged with DC instructions, education, and scripts  Pt denies having any questions at time of DC  Up with steady gait  To [facility] with PCP and 49 Alex Romero RN  08/06/19 5900

## 2019-08-07 PROCEDURE — 93010 ELECTROCARDIOGRAM REPORT: CPT | Performed by: INTERNAL MEDICINE

## 2019-08-12 ENCOUNTER — OFFICE VISIT (OUTPATIENT)
Dept: ENDOCRINOLOGY | Age: 49
End: 2019-08-12
Payer: COMMERCIAL

## 2019-08-12 VITALS
DIASTOLIC BLOOD PRESSURE: 94 MMHG | WEIGHT: 176 LBS | SYSTOLIC BLOOD PRESSURE: 154 MMHG | BODY MASS INDEX: 31.18 KG/M2 | HEART RATE: 96 BPM | HEIGHT: 63 IN

## 2019-08-12 LAB
CHP ED QC CHECK: NORMAL
GLUCOSE BLD-MCNC: 356 MG/DL

## 2019-08-12 PROCEDURE — G8417 CALC BMI ABV UP PARAM F/U: HCPCS | Performed by: PHYSICIAN ASSISTANT

## 2019-08-12 PROCEDURE — G8427 DOCREV CUR MEDS BY ELIG CLIN: HCPCS | Performed by: PHYSICIAN ASSISTANT

## 2019-08-12 PROCEDURE — 99243 OFF/OP CNSLTJ NEW/EST LOW 30: CPT | Performed by: PHYSICIAN ASSISTANT

## 2019-08-12 PROCEDURE — 95250 CONT GLUC MNTR PHYS/QHP EQP: CPT | Performed by: PHYSICIAN ASSISTANT

## 2019-08-12 PROCEDURE — 2022F DILAT RTA XM EVC RTNOPTHY: CPT | Performed by: PHYSICIAN ASSISTANT

## 2019-08-12 RX ORDER — METFORMIN HYDROCHLORIDE 500 MG/1
500 TABLET, EXTENDED RELEASE ORAL
Qty: 30 TABLET | Refills: 3 | Status: SHIPPED | OUTPATIENT
Start: 2019-08-12 | End: 2021-07-09 | Stop reason: SINTOL

## 2019-08-12 ASSESSMENT — ENCOUNTER SYMPTOMS
ABDOMINAL PAIN: 0
EYE PAIN: 0
COUGH: 0
SINUS PRESSURE: 0
SORE THROAT: 0
RHINORRHEA: 0
WHEEZING: 0
SHORTNESS OF BREATH: 0
NAUSEA: 0
BLURRED VISION: 1
EYE REDNESS: 0
VOMITING: 0
DIARRHEA: 0

## 2019-09-04 ENCOUNTER — OFFICE VISIT (OUTPATIENT)
Dept: FAMILY MEDICINE CLINIC | Age: 49
End: 2019-09-04
Payer: COMMERCIAL

## 2019-09-04 VITALS
OXYGEN SATURATION: 98 % | SYSTOLIC BLOOD PRESSURE: 136 MMHG | DIASTOLIC BLOOD PRESSURE: 88 MMHG | TEMPERATURE: 98.1 F | BODY MASS INDEX: 31.4 KG/M2 | WEIGHT: 177.2 LBS | HEIGHT: 63 IN | RESPIRATION RATE: 16 BRPM | HEART RATE: 104 BPM

## 2019-09-04 DIAGNOSIS — H60.312 ACUTE DIFFUSE OTITIS EXTERNA OF LEFT EAR: ICD-10-CM

## 2019-09-04 PROCEDURE — 1036F TOBACCO NON-USER: CPT | Performed by: NURSE PRACTITIONER

## 2019-09-04 PROCEDURE — G8417 CALC BMI ABV UP PARAM F/U: HCPCS | Performed by: NURSE PRACTITIONER

## 2019-09-04 PROCEDURE — 99213 OFFICE O/P EST LOW 20 MIN: CPT | Performed by: NURSE PRACTITIONER

## 2019-09-04 PROCEDURE — 4130F TOPICAL PREP RX AOE: CPT | Performed by: NURSE PRACTITIONER

## 2019-09-04 PROCEDURE — G8427 DOCREV CUR MEDS BY ELIG CLIN: HCPCS | Performed by: NURSE PRACTITIONER

## 2019-09-04 RX ORDER — LURASIDONE HYDROCHLORIDE 20 MG/1
TABLET, FILM COATED ORAL
Refills: 0 | COMMUNITY
Start: 2019-07-19 | End: 2021-05-03

## 2019-09-04 ASSESSMENT — ENCOUNTER SYMPTOMS
CHEST TIGHTNESS: 0
NAUSEA: 0
SORE THROAT: 0
WHEEZING: 0
SHORTNESS OF BREATH: 0
ABDOMINAL DISTENTION: 0
FACIAL SWELLING: 0
BACK PAIN: 0
SINUS PRESSURE: 0
EYE REDNESS: 0
EYE DISCHARGE: 0
ABDOMINAL PAIN: 0
COUGH: 0
DIARRHEA: 0
CONSTIPATION: 0

## 2019-09-04 NOTE — PROGRESS NOTES
Tobacco Use    Smoking status: Never Smoker    Smokeless tobacco: Never Used   Substance and Sexual Activity    Alcohol use:  Yes     Alcohol/week: 0.0 standard drinks     Comment: ocassionally    Drug use: No    Sexual activity: Not Currently     Partners: Male   Lifestyle    Physical activity:     Days per week: Not on file     Minutes per session: Not on file    Stress: Not on file   Relationships    Social connections:     Talks on phone: Not on file     Gets together: Not on file     Attends Congregational service: Not on file     Active member of club or organization: Not on file     Attends meetings of clubs or organizations: Not on file     Relationship status: Not on file    Intimate partner violence:     Fear of current or ex partner: Not on file     Emotionally abused: Not on file     Physically abused: Not on file     Forced sexual activity: Not on file   Other Topics Concern    Not on file   Social History Narrative    Not on file     Current Outpatient Medications on File Prior to Visit   Medication Sig Dispense Refill    LATUDA 20 MG TABS tablet TK 1 T PO QD  0    Insulin Degludec (TRESIBA FLEXTOUCH) 100 UNIT/ML SOPN Inject 50 Units into the skin nightly 5 pen 3    insulin lispro (HUMALOG KWIKPEN) 100 UNIT/ML pen Inject 20 Units into the skin 3 times daily (before meals) 5 pen 3    metFORMIN (GLUCOPHAGE XR) 500 MG extended release tablet Take 1 tablet by mouth daily (with breakfast) 30 tablet 3    buPROPion (WELLBUTRIN XL) 300 MG extended release tablet Take 1 tablet by mouth daily  2    topiramate (TOPAMAX) 25 MG tablet Take 2 tablets by mouth 2 times daily  1    propranolol (INDERAL) 20 MG tablet TAKE 1 TABLET BY MOUTH TWICE DAILY 180 tablet 3    ramipril (ALTACE) 5 MG capsule Take 1 capsule by mouth daily 90 capsule 3    fluconazole (DIFLUCAN) 150 MG tablet Take 1 tablet by mouth daily As needed for discharge 4 tablet 1    ondansetron (ZOFRAN ODT) 4 MG disintegrating tablet Take

## 2019-11-05 ENCOUNTER — HOSPITAL ENCOUNTER (OUTPATIENT)
Dept: WOMENS IMAGING | Age: 49
Discharge: HOME | End: 2019-11-07
Payer: COMMERCIAL

## 2019-11-05 ENCOUNTER — APPOINTMENT (OUTPATIENT)
Dept: ULTRASOUND IMAGING | Age: 49
End: 2019-11-05
Payer: COMMERCIAL

## 2019-11-05 DIAGNOSIS — N63.11 BREAST LUMP ON RIGHT SIDE AT 10 O'CLOCK POSITION: ICD-10-CM

## 2019-11-05 PROCEDURE — G0279 TOMOSYNTHESIS, MAMMO: HCPCS

## 2019-12-02 ENCOUNTER — OFFICE VISIT (OUTPATIENT)
Dept: FAMILY MEDICINE CLINIC | Age: 49
End: 2019-12-02
Payer: COMMERCIAL

## 2019-12-02 VITALS
TEMPERATURE: 98 F | RESPIRATION RATE: 16 BRPM | WEIGHT: 177 LBS | DIASTOLIC BLOOD PRESSURE: 84 MMHG | SYSTOLIC BLOOD PRESSURE: 132 MMHG | BODY MASS INDEX: 31.36 KG/M2 | HEART RATE: 67 BPM | HEIGHT: 63 IN | OXYGEN SATURATION: 98 %

## 2019-12-02 DIAGNOSIS — M79.18 INTERCOSTAL MUSCLE PAIN: Primary | ICD-10-CM

## 2019-12-02 PROCEDURE — 96372 THER/PROPH/DIAG INJ SC/IM: CPT | Performed by: NURSE PRACTITIONER

## 2019-12-02 PROCEDURE — 93000 ELECTROCARDIOGRAM COMPLETE: CPT | Performed by: NURSE PRACTITIONER

## 2019-12-02 PROCEDURE — 99213 OFFICE O/P EST LOW 20 MIN: CPT | Performed by: NURSE PRACTITIONER

## 2019-12-02 RX ORDER — KETOROLAC TROMETHAMINE 10 MG/1
10 TABLET, FILM COATED ORAL EVERY 6 HOURS PRN
Qty: 12 TABLET | Refills: 0 | Status: SHIPPED | OUTPATIENT
Start: 2019-12-02 | End: 2021-05-03

## 2019-12-02 RX ORDER — TRIAMCINOLONE ACETONIDE 40 MG/ML
40 INJECTION, SUSPENSION INTRA-ARTICULAR; INTRAMUSCULAR ONCE
Status: COMPLETED | OUTPATIENT
Start: 2019-12-02 | End: 2019-12-02

## 2019-12-02 RX ORDER — CYCLOBENZAPRINE HCL 5 MG
5 TABLET ORAL 2 TIMES DAILY PRN
Qty: 10 TABLET | Refills: 0 | Status: SHIPPED | OUTPATIENT
Start: 2019-12-02 | End: 2019-12-12

## 2019-12-02 RX ORDER — KETOROLAC TROMETHAMINE 30 MG/ML
60 INJECTION, SOLUTION INTRAMUSCULAR; INTRAVENOUS ONCE
Status: COMPLETED | OUTPATIENT
Start: 2019-12-02 | End: 2019-12-02

## 2019-12-02 RX ADMIN — TRIAMCINOLONE ACETONIDE 40 MG: 40 INJECTION, SUSPENSION INTRA-ARTICULAR; INTRAMUSCULAR at 13:38

## 2019-12-02 RX ADMIN — KETOROLAC TROMETHAMINE 60 MG: 30 INJECTION, SOLUTION INTRAMUSCULAR; INTRAVENOUS at 13:20

## 2019-12-02 ASSESSMENT — ENCOUNTER SYMPTOMS
NAUSEA: 0
VOMITING: 0
WHEEZING: 0
COUGH: 0
SORE THROAT: 1
CHEST TIGHTNESS: 0
SHORTNESS OF BREATH: 1

## 2019-12-05 ENCOUNTER — HOSPITAL ENCOUNTER (OUTPATIENT)
Dept: GENERAL RADIOLOGY | Age: 49
Discharge: HOME | End: 2019-12-07
Payer: COMMERCIAL

## 2019-12-05 DIAGNOSIS — M79.18 INTERCOSTAL MUSCLE PAIN: ICD-10-CM

## 2019-12-05 PROCEDURE — 71111 X-RAY EXAM RIBS/CHEST4/> VWS: CPT

## 2020-02-25 ENCOUNTER — HOSPITAL ENCOUNTER (EMERGENCY)
Age: 50
Discharge: HOME | End: 2020-02-25
Payer: COMMERCIAL

## 2020-02-25 VITALS
RESPIRATION RATE: 18 BRPM | HEART RATE: 117 BPM | OXYGEN SATURATION: 95 % | BODY MASS INDEX: 31.54 KG/M2 | HEIGHT: 63 IN | WEIGHT: 178 LBS | TEMPERATURE: 98.3 F | DIASTOLIC BLOOD PRESSURE: 72 MMHG | SYSTOLIC BLOOD PRESSURE: 143 MMHG

## 2020-02-25 LAB
INFLUENZA A BY PCR: NEGATIVE
INFLUENZA B BY PCR: POSITIVE

## 2020-02-25 PROCEDURE — 87502 INFLUENZA DNA AMP PROBE: CPT

## 2020-02-25 PROCEDURE — 6370000000 HC RX 637 (ALT 250 FOR IP): Performed by: PHYSICIAN ASSISTANT

## 2020-02-25 PROCEDURE — 99283 EMERGENCY DEPT VISIT LOW MDM: CPT

## 2020-02-25 RX ORDER — OSELTAMIVIR PHOSPHATE 75 MG/1
75 CAPSULE ORAL 2 TIMES DAILY
Qty: 10 CAPSULE | Refills: 0 | Status: SHIPPED | OUTPATIENT
Start: 2020-02-25 | End: 2021-05-03

## 2020-02-25 RX ORDER — OSELTAMIVIR PHOSPHATE 75 MG/1
75 CAPSULE ORAL ONCE
Status: COMPLETED | OUTPATIENT
Start: 2020-02-25 | End: 2020-02-25

## 2020-02-25 RX ORDER — ACETAMINOPHEN 325 MG/1
650 TABLET ORAL EVERY 6 HOURS PRN
Qty: 20 TABLET | Refills: 3 | Status: SHIPPED | OUTPATIENT
Start: 2020-02-25 | End: 2021-05-03

## 2020-02-25 RX ADMIN — OSELTAMIVIR PHOSPHATE 75 MG: 75 CAPSULE ORAL at 10:13

## 2020-02-25 ASSESSMENT — PAIN SCALES - GENERAL: PAINLEVEL_OUTOF10: 7

## 2020-02-25 ASSESSMENT — PAIN DESCRIPTION - LOCATION: LOCATION: GENERALIZED

## 2020-02-25 ASSESSMENT — PAIN DESCRIPTION - PAIN TYPE: TYPE: ACUTE PAIN

## 2020-02-25 ASSESSMENT — ENCOUNTER SYMPTOMS
EYES NEGATIVE: 1
GASTROINTESTINAL NEGATIVE: 1
COUGH: 1
RHINORRHEA: 1

## 2020-02-25 ASSESSMENT — PAIN DESCRIPTION - DESCRIPTORS: DESCRIPTORS: ACHING

## 2020-02-25 NOTE — ED PROVIDER NOTES
BY MOUTH DAILY    AUGMENTED BETAMETHASONE DIPROPIONATE (DIPROLENE-AF) 0.05 % CREAM    SONDRA EXT AA BID    B-D UF III MINI PEN NEEDLES 31G X 5 MM MISC    Use 1 daily to inject insulin    BUPROPION (WELLBUTRIN XL) 300 MG EXTENDED RELEASE TABLET    Take 1 tablet by mouth daily    ESCITALOPRAM (LEXAPRO) 20 MG TABLET    TK 1 T PO QD    FLUCONAZOLE (DIFLUCAN) 150 MG TABLET    TAKE 1 TABLET BY MOUTH DAILY AS NEEDED FOR DISCHARGE    INSULIN DEGLUDEC (TRESIBA FLEXTOUCH) 100 UNIT/ML SOPN    Inject 50 Units into the skin nightly    INSULIN DEGLUDEC (TRESIBA FLEXTOUCH) 100 UNIT/ML SOPN    Lot [id number] Exp 06/2021 given by Brea Kam for [email or web address] Law on 08/12/2019    INSULIN LISPRO (HUMALOG KWIKPEN) 100 UNIT/ML PEN    Inject 20 Units into the skin 3 times daily (before meals)    INSULIN LISPRO (HUMALOG) 100 UNIT/ML PEN    Inject into the skin Take 2 units for every 50 above 150    INSULIN LISPRO PROTAMINE & LISPRO (HUMALOG MIX 75/25 KWIKPEN) (75-25) 100 UNIT PER ML SUPN INJECTION PEN    Inject 0.9 mLs into the skin 2 times daily (with meals) 55 pens for 90-day supply    KETOROLAC (TORADOL) 10 MG TABLET    Take 1 tablet by mouth every 6 hours as needed for Pain    LAMOTRIGINE (LAMICTAL) 200 MG TABLET    Take 200 mg by mouth 2 times daily    LATUDA 20 MG TABS TABLET    TK 1 T PO QD    METFORMIN (GLUCOPHAGE XR) 500 MG EXTENDED RELEASE TABLET    Take 1 tablet by mouth daily (with breakfast)    NAPROXEN (NAPROSYN) 500 MG TABLET    Take 1 tablet by mouth 2 times daily for 20 doses    OMEPRAZOLE (PRILOSEC) 20 MG DELAYED RELEASE CAPSULE    TAKE 2 CAPSULES BY MOUTH TWICE DAILY    ONDANSETRON (ZOFRAN ODT) 4 MG DISINTEGRATING TABLET    Take 1-2 tablets by mouth every 12 hours as needed for Nausea May Sub regular tablet (non-ODT) if insurance does not cover ODT.     POTASSIUM CHLORIDE (K-TAB) 10 MEQ EXTENDED RELEASE TABLET    Take 1 tablet by mouth daily    PROPRANOLOL (INDERAL) 20 MG TABLET    TAKE 1 TABLET BY MOUTH TWICE DAILY    RAMIPRIL

## 2020-10-23 ENCOUNTER — APPOINTMENT (OUTPATIENT)
Dept: CT IMAGING | Age: 50
End: 2020-10-23
Payer: COMMERCIAL

## 2020-10-23 ENCOUNTER — HOSPITAL ENCOUNTER (EMERGENCY)
Age: 50
Discharge: HOME | End: 2020-10-23
Attending: EMERGENCY MEDICINE
Payer: COMMERCIAL

## 2020-10-23 ENCOUNTER — TELEPHONE (OUTPATIENT)
Dept: NEUROSURGERY | Age: 50
End: 2020-10-23

## 2020-10-23 VITALS
TEMPERATURE: 96.9 F | WEIGHT: 170 LBS | HEART RATE: 97 BPM | RESPIRATION RATE: 17 BRPM | SYSTOLIC BLOOD PRESSURE: 138 MMHG | DIASTOLIC BLOOD PRESSURE: 90 MMHG | BODY MASS INDEX: 30.12 KG/M2 | HEIGHT: 63 IN | OXYGEN SATURATION: 99 %

## 2020-10-23 PROCEDURE — 72131 CT LUMBAR SPINE W/O DYE: CPT

## 2020-10-23 PROCEDURE — 6370000000 HC RX 637 (ALT 250 FOR IP): Performed by: EMERGENCY MEDICINE

## 2020-10-23 PROCEDURE — 74176 CT ABD & PELVIS W/O CONTRAST: CPT

## 2020-10-23 PROCEDURE — 96372 THER/PROPH/DIAG INJ SC/IM: CPT

## 2020-10-23 PROCEDURE — 6360000002 HC RX W HCPCS: Performed by: EMERGENCY MEDICINE

## 2020-10-23 PROCEDURE — 72128 CT CHEST SPINE W/O DYE: CPT

## 2020-10-23 PROCEDURE — 71250 CT THORAX DX C-: CPT

## 2020-10-23 PROCEDURE — 99285 EMERGENCY DEPT VISIT HI MDM: CPT

## 2020-10-23 RX ORDER — OXYCODONE HYDROCHLORIDE AND ACETAMINOPHEN 5; 325 MG/1; MG/1
1 TABLET ORAL EVERY 6 HOURS PRN
Qty: 20 TABLET | Refills: 0 | Status: SHIPPED | OUTPATIENT
Start: 2020-10-23 | End: 2020-10-30

## 2020-10-23 RX ORDER — ONDANSETRON 4 MG/1
4 TABLET, ORALLY DISINTEGRATING ORAL ONCE
Status: COMPLETED | OUTPATIENT
Start: 2020-10-23 | End: 2020-10-23

## 2020-10-23 RX ADMIN — ONDANSETRON 4 MG: 4 TABLET, ORALLY DISINTEGRATING ORAL at 09:01

## 2020-10-23 RX ADMIN — HYDROMORPHONE HYDROCHLORIDE 1 MG: 1 INJECTION, SOLUTION INTRAMUSCULAR; INTRAVENOUS; SUBCUTANEOUS at 09:01

## 2020-10-23 ASSESSMENT — PAIN SCALES - GENERAL
PAINLEVEL_OUTOF10: 8
PAINLEVEL_OUTOF10: 8
PAINLEVEL_OUTOF10: 3
PAINLEVEL_OUTOF10: 3

## 2020-10-23 ASSESSMENT — PAIN DESCRIPTION - FREQUENCY
FREQUENCY: CONTINUOUS

## 2020-10-23 ASSESSMENT — PAIN DESCRIPTION - PAIN TYPE
TYPE: ACUTE PAIN

## 2020-10-23 ASSESSMENT — PAIN DESCRIPTION - DESCRIPTORS
DESCRIPTORS: ACHING

## 2020-10-23 ASSESSMENT — PAIN DESCRIPTION - LOCATION
LOCATION: BACK

## 2020-10-23 NOTE — TELEPHONE ENCOUNTER
I TRIED CALLING PT TO SCHEDULE BRACE FITTING APPT FOR Monday, 10/26 ANYTIME BETWEEN 8:30-4 AND CONSULT WITH DR. Coco Unger FOR WED, 10/28 @ 9:00AM IN [place]. WHEN PT CALLS BACK, PLEASE SCHEDULE FOR BOTH APPTS.

## 2020-10-23 NOTE — ED PROVIDER NOTES
HPI:  10/23/20, Time: 8:50 AM EDT         Jordin Jo is a 51 y.o. female presenting to the ED for back pain after a fall, beginning several hours ago. The complaint has been intermittent, moderate in severity, and worsened by changing position. Patient had a mechanical fall injuring her midline and lower back. No incontinence no paresthesias no radicular pain. She describes midline back pain that is worse with movement. She also has pain in the rib cage bilaterally posteriorly. No abdominal pain but she states there is pain around the sacrum. No numbness no tingling no paresthesias      ROS:   Pertinent positives and negatives are stated within HPI, all other systems reviewed and are negative.  --------------------------------------------- PAST HISTORY ---------------------------------------------  Past Medical History:  has a past medical history of Depression, Environmental allergies, GERD (gastroesophageal reflux disease), Headache, HPV in female, Hyperlipidemia, Hypertension, Leukocytosis, Migraines, and Type II or unspecified type diabetes mellitus without mention of complication, not stated as uncontrolled. Past Surgical History:  has a past surgical history that includes Gallbladder surgery (1999); Breast surgery (1999); bone marrow biopsy (2012); Cardiac catheterization (2009); Endometrial ablation (2012?); Breast reduction surgery (1999); and Cholecystectomy. Social History:  reports that she has never smoked. She has never used smokeless tobacco. She reports current alcohol use. She reports that she does not use drugs. Family History: family history includes Cancer in her father; Diabetes in her father; Heart Disease in her father and mother; No Known Problems in her brother, maternal grandfather, maternal grandmother, paternal grandfather, paternal grandmother, sister, and another family member. The patients home medications have been reviewed.     Allergies: Relafen [nabumetone]    ---------------------------------------------------PHYSICAL EXAM--------------------------------------     Constitutional/General: Alert and oriented x3, well appearing, non toxic in NAD  Head: Normocephalic and atraumatic  Eyes: PERRL, EOMI  Mouth: Oropharynx clear, handling secretions, no trismus  Neck: Supple, full ROM, non tender to palpation in the midline, no stridor, no crepitus, no meningeal signs  Pulmonary: Lungs clear to auscultation bilaterally, no wheezes, rales, or rhonchi. Not in respiratory distress  Cardiovascular:  Regular rate. Regular rhythm. No murmurs, gallops, or rubs. 2+ distal pulses  Back reveals midline tenderness from T8-T12. There is also midline lumbar tenderness on exam.  There is no tenderness of the tailbone or coccyx. There is thoracic and lumbar paraspinal muscle spasm  Chest: There is tenderness of the ribs bilaterally posteriorly no step-off no deformity. No anterior nor lateral chest wall tenderness  Abdomen: Soft. Non tender. Non distended. +BS. No rebound, guarding, or rigidity. No pulsatile masses appreciated. Musculoskeletal: Moves all extremities x 4. Warm and well perfused, no clubbing, cyanosis, or edema. Capillary refill <3 seconds  Skin: warm and dry. No rashes. Neurologic: GCS 15, CN 2-12 grossly intact, no focal deficits, symmetric strength 5/5 in the upper and lower extremities bilaterally  Psych: Normal Affect    -------------------------------------------------- RESULTS -------------------------------------------------  I have personally reviewed all laboratory and imaging results for this patient. Results are listed below. LABS:  No results found for this visit on 10/23/20. RADIOLOGY:  Interpreted by Radiologist.  Steffanie Carroll   Final Result   NEGATIVE CT OF THE CHEST. EXAMINATION: CT ABDOMEN AND PELVIS WITHOUT CONTRAST       TECHNIQUE: Contiguous axial CT sections of the abdomen and pelvis.   Imaging was obtained after IV contrast administration. .  All CT scans at this facility use dose modulation, iterative reconstruction, and/or weight based dosing when appropriate to    reduce radiation dose to as low as reasonably achievable. FINDINGS      Liver: The liver is normal in size and attenuation without intra or extrahepatic bile duct dilatation. There are no focal lesions. There is no intra or extrahepatic bile duct dilatation. Gallbladder: Patient is status post cholecystectomy. Spleen: There are no focal lesions or calcifications in the spleen. There is no splenomegaly        Pancreas: The pancreas is normal in size and attenuation without focal lesions or dilatation of the pancreatic duct. Kidneys: There are no solid or cystic lesions. There is no hydronephrosis. Adrenal glands are negative. Bowel: There is no bowel dilatation or evidence of diverticulitis. Appendix: There  is no CT evidence for appendicitis. Nodes: No lymphadenopathy. Aorta: No aneurysm        Peritoneum: No free fluid or free air. The abdominal wall is intact. Pelvis: There are no solid or cystic lesions. The urinary bladder is within normal limits. IMPRESSION: No acute pathology in the abdomen and pelvis. EXAMINATION: CT THORACIC SPINE WITHOUT CONTRAST      CLINICAL HISTORY:  Bilateral posterior rib pain       COMPARISONS:  NONE AVAILABLE      TECHNIQUE:  Contiguous axial images of the spine were obtained without IV contrast administration. Sagittal and coronal reformats were performed. All CT scans at this facility use dose modulation, iterative reconstruction, and/or weight based dosing when appropriate to reduce radiation dose to as low as reasonably achievable. FINDINGS: There is no loss of vertebral body height. There is no acute fracture or subluxation.       There is mild irregularity with disc space narrowing at each level and there are mild marginal contrast administration. .  All CT scans at this facility use dose modulation, iterative reconstruction, and/or weight based dosing when appropriate to    reduce radiation dose to as low as reasonably achievable. FINDINGS      Liver: The liver is normal in size and attenuation without intra or extrahepatic bile duct dilatation. There are no focal lesions. There is no intra or extrahepatic bile duct dilatation. Gallbladder: Patient is status post cholecystectomy. Spleen: There are no focal lesions or calcifications in the spleen. There is no splenomegaly        Pancreas: The pancreas is normal in size and attenuation without focal lesions or dilatation of the pancreatic duct. Kidneys: There are no solid or cystic lesions. There is no hydronephrosis. Adrenal glands are negative. Bowel: There is no bowel dilatation or evidence of diverticulitis. Appendix: There  is no CT evidence for appendicitis. Nodes: No lymphadenopathy. Aorta: No aneurysm        Peritoneum: No free fluid or free air. The abdominal wall is intact. Pelvis: There are no solid or cystic lesions. The urinary bladder is within normal limits. IMPRESSION: No acute pathology in the abdomen and pelvis. EXAMINATION: CT THORACIC SPINE WITHOUT CONTRAST      CLINICAL HISTORY:  Bilateral posterior rib pain       COMPARISONS:  NONE AVAILABLE      TECHNIQUE:  Contiguous axial images of the spine were obtained without IV contrast administration. Sagittal and coronal reformats were performed. All CT scans at this facility use dose modulation, iterative reconstruction, and/or weight based dosing when appropriate to reduce radiation dose to as low as reasonably achievable. FINDINGS: There is no loss of vertebral body height. There is no acute fracture or subluxation.       There is mild irregularity with disc space narrowing at each level and there are mild marginal osteophytes of the mid thoracic spine. . The spine is in anatomic alignment. There is no evidence of central canal narrowing or neural foraminal narrowing at any level. The surrounding soft tissue are within normal limits. IMPRESSION:  Negative thoracic spine CT. EXAMINATION: CT LUMBAR SPINE WITHOUT CONTRAST      CLINICAL HISTORY:  Bilateral posterior rib pain       COMPARISONS:  NONE AVAILABLE      TECHNIQUE:  Contiguous axial images of the spine were obtained without IV contrast administration. Sagittal and coronal reformats were performed. All CT scans at this facility use dose modulation, iterative reconstruction, and/or weight based dosing when appropriate to reduce radiation dose to as low as reasonably achievable. FINDINGS: There is intervertebral body compression fracture involving the superior articular surface of L1 with approximately 10% vertebral body height loss. There is mild surrounding soft tissue swelling. There are no significant retropulsed fragments    into spinal canal or extension into the posterior elements. The remaining vertebral bodies are intact. There is preservation of the intervertebral disc spaces. The spine is in anatomic alignment. There is no evidence of central canal narrowing or neural foraminal narrowing at any level. The surrounding soft tissue are within normal limits. IMPRESSION:  Acute vertebral body compression fracture involving the superior articular surface of L1 with approximately 10% vertebral body height loss. There is no retropulsion of bony fragments to the central canal. The remaining vertebral bodies are    within normal limits. CT THORACIC SPINE WO CONTRAST   Final Result   NEGATIVE CT OF THE CHEST. EXAMINATION: CT ABDOMEN AND PELVIS WITHOUT CONTRAST       TECHNIQUE: Contiguous axial CT sections of the abdomen and pelvis. Imaging was obtained after IV contrast administration. .  All CT scans at this facility use dose modulation, iterative reconstruction, and/or weight based dosing when appropriate to    reduce radiation dose to as low as reasonably achievable. FINDINGS      Liver: The liver is normal in size and attenuation without intra or extrahepatic bile duct dilatation. There are no focal lesions. There is no intra or extrahepatic bile duct dilatation. Gallbladder: Patient is status post cholecystectomy. Spleen: There are no focal lesions or calcifications in the spleen. There is no splenomegaly        Pancreas: The pancreas is normal in size and attenuation without focal lesions or dilatation of the pancreatic duct. Kidneys: There are no solid or cystic lesions. There is no hydronephrosis. Adrenal glands are negative. Bowel: There is no bowel dilatation or evidence of diverticulitis. Appendix: There  is no CT evidence for appendicitis. Nodes: No lymphadenopathy. Aorta: No aneurysm        Peritoneum: No free fluid or free air. The abdominal wall is intact. Pelvis: There are no solid or cystic lesions. The urinary bladder is within normal limits. IMPRESSION: No acute pathology in the abdomen and pelvis. EXAMINATION: CT THORACIC SPINE WITHOUT CONTRAST      CLINICAL HISTORY:  Bilateral posterior rib pain       COMPARISONS:  NONE AVAILABLE      TECHNIQUE:  Contiguous axial images of the spine were obtained without IV contrast administration. Sagittal and coronal reformats were performed. All CT scans at this facility use dose modulation, iterative reconstruction, and/or weight based dosing when appropriate to reduce radiation dose to as low as reasonably achievable. FINDINGS: There is no loss of vertebral body height. There is no acute fracture or subluxation. There is mild irregularity with disc space narrowing at each level and there are mild marginal osteophytes of the mid thoracic spine. . The spine is in anatomic alignment. There is no evidence of central canal narrowing or neural foraminal narrowing at any level. The surrounding soft tissue are within normal limits. IMPRESSION:  Negative thoracic spine CT. EXAMINATION: CT LUMBAR SPINE WITHOUT CONTRAST      CLINICAL HISTORY:  Bilateral posterior rib pain       COMPARISONS:  NONE AVAILABLE      TECHNIQUE:  Contiguous axial images of the spine were obtained without IV contrast administration. Sagittal and coronal reformats were performed. All CT scans at this facility use dose modulation, iterative reconstruction, and/or weight based dosing when appropriate to reduce radiation dose to as low as reasonably achievable. FINDINGS: There is intervertebral body compression fracture involving the superior articular surface of L1 with approximately 10% vertebral body height loss. There is mild surrounding soft tissue swelling. There are no significant retropulsed fragments    into spinal canal or extension into the posterior elements. The remaining vertebral bodies are intact. There is preservation of the intervertebral disc spaces. The spine is in anatomic alignment. There is no evidence of central canal narrowing or neural foraminal narrowing at any level. The surrounding soft tissue are within normal limits. IMPRESSION:  Acute vertebral body compression fracture involving the superior articular surface of L1 with approximately 10% vertebral body height loss. There is no retropulsion of bony fragments to the central canal. The remaining vertebral bodies are    within normal limits. CT LUMBAR SPINE WO CONTRAST   Final Result   NEGATIVE CT OF THE CHEST. EXAMINATION: CT ABDOMEN AND PELVIS WITHOUT CONTRAST       TECHNIQUE: Contiguous axial CT sections of the abdomen and pelvis. Imaging was obtained after IV contrast administration. .  All CT scans at this facility use dose modulation, iterative reconstruction, and/or weight based dosing when appropriate to    reduce radiation dose to as low as reasonably achievable. FINDINGS      Liver: The liver is normal in size and attenuation without intra or extrahepatic bile duct dilatation. There are no focal lesions. There is no intra or extrahepatic bile duct dilatation. Gallbladder: Patient is status post cholecystectomy. Spleen: There are no focal lesions or calcifications in the spleen. There is no splenomegaly        Pancreas: The pancreas is normal in size and attenuation without focal lesions or dilatation of the pancreatic duct. Kidneys: There are no solid or cystic lesions. There is no hydronephrosis. Adrenal glands are negative. Bowel: There is no bowel dilatation or evidence of diverticulitis. Appendix: There  is no CT evidence for appendicitis. Nodes: No lymphadenopathy. Aorta: No aneurysm        Peritoneum: No free fluid or free air. The abdominal wall is intact. Pelvis: There are no solid or cystic lesions. The urinary bladder is within normal limits. IMPRESSION: No acute pathology in the abdomen and pelvis. EXAMINATION: CT THORACIC SPINE WITHOUT CONTRAST      CLINICAL HISTORY:  Bilateral posterior rib pain       COMPARISONS:  NONE AVAILABLE      TECHNIQUE:  Contiguous axial images of the spine were obtained without IV contrast administration. Sagittal and coronal reformats were performed. All CT scans at this facility use dose modulation, iterative reconstruction, and/or weight based dosing when appropriate to reduce radiation dose to as low as reasonably achievable. FINDINGS: There is no loss of vertebral body height. There is no acute fracture or subluxation. There is mild irregularity with disc space narrowing at each level and there are mild marginal osteophytes of the mid thoracic spine. . The spine is in anatomic alignment.       There is no evidence of central canal narrowing or neural foraminal narrowing at any level. The surrounding soft tissue are within normal limits. IMPRESSION:  Negative thoracic spine CT. EXAMINATION: CT LUMBAR SPINE WITHOUT CONTRAST      CLINICAL HISTORY:  Bilateral posterior rib pain       COMPARISONS:  NONE AVAILABLE      TECHNIQUE:  Contiguous axial images of the spine were obtained without IV contrast administration. Sagittal and coronal reformats were performed. All CT scans at this facility use dose modulation, iterative reconstruction, and/or weight based dosing when appropriate to reduce radiation dose to as low as reasonably achievable. FINDINGS: There is intervertebral body compression fracture involving the superior articular surface of L1 with approximately 10% vertebral body height loss. There is mild surrounding soft tissue swelling. There are no significant retropulsed fragments    into spinal canal or extension into the posterior elements. The remaining vertebral bodies are intact. There is preservation of the intervertebral disc spaces. The spine is in anatomic alignment. There is no evidence of central canal narrowing or neural foraminal narrowing at any level. The surrounding soft tissue are within normal limits. IMPRESSION:  Acute vertebral body compression fracture involving the superior articular surface of L1 with approximately 10% vertebral body height loss. There is no retropulsion of bony fragments to the central canal. The remaining vertebral bodies are    within normal limits.                            ------------------------- NURSING NOTES AND VITALS REVIEWED ---------------------------   The nursing notes within the ED encounter and vital signs as below have been reviewed by myself.   BP (!) 138/90   Pulse 97   Temp 96.9 °F (36.1 °C) (Temporal)   Resp 17   Ht 5' 3\" (1.6 m)   Wt 170 lb (77.1 kg)   SpO2 99%   BMI 30.11 kg/m²   Oxygen Saturation Every effort was made to ensure accuracy; however, inadvertent computerized transcription errors may be present          Jada Riddle MD  10/23/20 [id number]

## 2020-10-23 NOTE — ED NOTES
Pt c/o back pain after falling in the grass today, Pt states she landed on her buttocks rolling backwards and hitting her head, no obvious injuries noted, sensation and movement intact throughout extremities, Pt denies  LOC and loss of bowel or bladder control.      Talha Powers RN  10/23/20 [id number]

## 2020-10-23 NOTE — TELEPHONE ENCOUNTER
REC'D CALL FROM ER RE: PT AND L1 FRACTURE. I HAVE ORDERED [facility] BR[facility] AND WOULD LIKE TO SEE PT THIS COMING WED AT [place] OFFICE.     PLEASE SCHEDULE [facility] FITTING APPT AND CONSULT APPT WITH ME.

## 2020-12-19 ENCOUNTER — HOSPITAL ENCOUNTER (OUTPATIENT)
Dept: GENERAL RADIOLOGY | Age: 50
Discharge: HOME | End: 2020-12-21
Payer: COMMERCIAL

## 2020-12-19 ENCOUNTER — HOSPITAL ENCOUNTER (OUTPATIENT)
Age: 50
Discharge: HOME | End: 2020-12-21
Payer: COMMERCIAL

## 2020-12-19 PROCEDURE — 72100 X-RAY EXAM L-S SPINE 2/3 VWS: CPT

## 2021-01-05 ENCOUNTER — HOSPITAL ENCOUNTER (OUTPATIENT)
Dept: WOMENS IMAGING | Age: 51
Discharge: HOME | End: 2021-01-07
Payer: COMMERCIAL

## 2021-01-05 DIAGNOSIS — Z12.31 ENCOUNTER FOR SCREENING MAMMOGRAM FOR BREAST CANCER: ICD-10-CM

## 2021-01-05 PROCEDURE — 77063 BREAST TOMOSYNTHESIS BI: CPT

## 2021-01-11 ENCOUNTER — OFFICE VISIT (OUTPATIENT)
Dept: CARDIOLOGY CLINIC | Age: 51
End: 2021-01-11
Payer: COMMERCIAL

## 2021-01-11 VITALS
BODY MASS INDEX: 31.18 KG/M2 | RESPIRATION RATE: 18 BRPM | OXYGEN SATURATION: 98 % | HEART RATE: 116 BPM | WEIGHT: 176 LBS | DIASTOLIC BLOOD PRESSURE: 78 MMHG | SYSTOLIC BLOOD PRESSURE: 117 MMHG | HEIGHT: 63 IN

## 2021-01-11 DIAGNOSIS — I10 ESSENTIAL HYPERTENSION: Chronic | ICD-10-CM

## 2021-01-11 DIAGNOSIS — R06.09 DOE (DYSPNEA ON EXERTION): ICD-10-CM

## 2021-01-11 DIAGNOSIS — R07.9 CHEST PAIN, UNSPECIFIED TYPE: ICD-10-CM

## 2021-01-11 DIAGNOSIS — E78.2 MIXED HYPERLIPIDEMIA: Primary | Chronic | ICD-10-CM

## 2021-01-11 PROCEDURE — 1036F TOBACCO NON-USER: CPT | Performed by: INTERNAL MEDICINE

## 2021-01-11 PROCEDURE — 99214 OFFICE O/P EST MOD 30 MIN: CPT | Performed by: INTERNAL MEDICINE

## 2021-01-11 PROCEDURE — G8427 DOCREV CUR MEDS BY ELIG CLIN: HCPCS | Performed by: INTERNAL MEDICINE

## 2021-01-11 PROCEDURE — 93000 ELECTROCARDIOGRAM COMPLETE: CPT | Performed by: INTERNAL MEDICINE

## 2021-01-11 PROCEDURE — G8417 CALC BMI ABV UP PARAM F/U: HCPCS | Performed by: INTERNAL MEDICINE

## 2021-01-11 PROCEDURE — G8484 FLU IMMUNIZE NO ADMIN: HCPCS | Performed by: INTERNAL MEDICINE

## 2021-01-11 RX ORDER — FENOFIBRATE 145 MG/1
145 TABLET, COATED ORAL DAILY
Qty: 90 TABLET | Refills: 3 | Status: SHIPPED | OUTPATIENT
Start: 2021-01-11

## 2021-01-11 RX ORDER — ATORVASTATIN CALCIUM 20 MG/1
20 TABLET, FILM COATED ORAL DAILY
Qty: 90 TABLET | Refills: 3 | Status: SHIPPED | OUTPATIENT
Start: 2021-01-11

## 2021-01-11 ASSESSMENT — ENCOUNTER SYMPTOMS
COUGH: 0
STRIDOR: 0
BLOOD IN STOOL: 0
RESPIRATORY NEGATIVE: 1
EYES NEGATIVE: 1
NAUSEA: 0
SHORTNESS OF BREATH: 0
CHEST TIGHTNESS: 0
GASTROINTESTINAL NEGATIVE: 1
WHEEZING: 0

## 2021-01-11 NOTE — PROGRESS NOTES
Subsequent Progress Note  Patient: Reed Almendarez  YOB: 1970  MRN: [id number]    Chief Complaint: HTN cp sob   Chief Complaint   Patient presents with    Follow-up     over 2 year    Hypertension    Hyperlipidemia    Chest Pain     LEFT ARM PAIN    Shortness of Breath       CV Data:   Cath negative  2016 spect negative   Subjective/HPI: no cp breathing ok no falls no bleed     strong FH  Nonsmoker  Work - [employer]. EKG: ST    Past Medical History:   Diagnosis Date    Depression     Environmental allergies     GERD (gastroesophageal reflux disease)     Headache     Dr. Tom Ybarra HPV in female     Hyperlipidemia     Hypertension     Leukocytosis     Migraines     Type II or unspecified type diabetes mellitus without mention of complication, not stated as uncontrolled        Past Surgical History:   Procedure Laterality Date    [facility]    BREAST REDUCTION SURGERY     [facility] CARDIAC CATHETERIZATION      (-)hospitals    CHOLECYSTECTOMY      ENDOMETRIAL ABLATION  ?     GALLBLADDER SURGERY         Family History   Problem Relation Age of Onset    Heart Disease Mother     Cancer Father     Diabetes Father     Heart Disease Father     No Known Problems Sister     No Known Problems Paternal Grandfather     No Known Problems Paternal Grandmother     No Known Problems Maternal Grandmother     No Known Problems Maternal Grandfather     No Known Problems Brother     No Known Problems Other     Breast Cancer Neg Hx     Colon Cancer Neg Hx     Eclampsia Neg Hx     Hypertension Neg Hx     Ovarian Cancer Neg Hx      Labor Neg Hx     Spont Abortions Neg Hx     Stroke Neg Hx        Social History     Socioeconomic History    Marital status:      Spouse name: None    Number of children: None    Years of education: None    Highest education level: None   Occupational History  None   Social Needs    Financial resource strain: None    Food insecurity     Worry: None     Inability: None    Transportation needs     Medical: None     Non-medical: None   Tobacco Use    Smoking status: Never Smoker    Smokeless tobacco: Never Used   Substance and Sexual Activity    Alcohol use: Yes     Alcohol/week: 0.0 standard drinks     Comment: ocassionally    Drug use: No    Sexual activity: Not Currently     Partners: Male   Lifestyle    Physical activity     Days per week: None     Minutes per session: None    Stress: None   Relationships    Social connections     Talks on phone: None     Gets together: None     Attends [employer] service: None     Active member of club or organization: None     Attends meetings of clubs or organizations: None     Relationship status: None    Intimate partner violence     Fear of current or ex partner: None     Emotionally abused: None     Physically abused: None     Forced sexual activity: None   Other Topics Concern    None   Social History Narrative    None       Allergies   Allergen Reactions    Relafen [Nabumetone] Rash       Current Outpatient Medications   Medication Sig Dispense Refill    gabapentin (NEURONTIN) 100 MG capsule Take 100 mg by mouth 3 times daily.       fluconazole (DIFLUCAN) 150 MG tablet TAKE 1 TABLET BY MOUTH DAILY AS NEEDED FOR DISCHARGE 4 tablet 0    Insulin Degludec (TRESIBA FLEXTOUCH) 100 UNIT/ML SOPN Inject 50 Units into the skin nightly 5 pen 3    propranolol (INDERAL) 20 MG tablet TAKE 1 TABLET BY MOUTH TWICE DAILY (Patient taking differently: 20 mg daily ) 180 tablet 3    ramipril (ALTACE) 5 MG capsule Take 1 capsule by mouth daily (Patient taking differently: Take 10 mg by mouth daily ) 90 capsule 3    escitalopram (LEXAPRO) 20 MG tablet TK 1 T PO QD  1    traZODone (DESYREL) 50 MG tablet Take 50 mg by mouth nightly  potassium chloride (K-TAB) 10 MEQ extended release tablet Take 1 tablet by mouth daily (Patient taking differently: Take 10 mEq by mouth 3 times daily ) 180 tablet 3    omeprazole (PRILOSEC) 20 MG delayed release capsule TAKE 2 CAPSULES BY MOUTH TWICE DAILY (Patient taking differently: TAKE 1 CAPSULES BY MOUTH TWICE DAILY) 360 capsule 1    augmented betamethasone dipropionate (DIPROLENE-AF) 0.05 % cream SONDRA EXT AA BID  3    B-D UF III MINI PEN NEEDLES 31G X 5 MM MISC Use 1 daily to inject insulin 100 each 1    VRAYLAR 3 MG CAPS TK ONE C PO  QHS  1    oseltamivir (TAMIFLU) 75 MG capsule Take 1 capsule by mouth 2 times daily 10 capsule 0    acetaminophen (AMINOFEN) 325 MG tablet Take 2 tablets by mouth every 6 hours as needed for Pain 20 tablet 3    ketorolac (TORADOL) 10 MG tablet Take 1 tablet by mouth every 6 hours as needed for Pain 12 tablet 0    Insulin Degludec (TRESIBA FLEXTOUCH) 100 UNIT/ML SOPN Lot [id number] Exp 06/2021 given by Skinny Jimenez for Bree Tay on 08/12/2019 2 pen 0    LATUDA 20 MG TABS tablet TK 1 T PO QD  0    insulin lispro (HUMALOG KWIKPEN) 100 UNIT/ML pen Inject 20 Units into the skin 3 times daily (before meals) 5 pen 3    metFORMIN (GLUCOPHAGE XR) 500 MG extended release tablet Take 1 tablet by mouth daily (with breakfast) (Patient not taking: Reported on 1/11/2021) 30 tablet 3    naproxen (NAPROSYN) 500 MG tablet Take 1 tablet by mouth 2 times daily for 20 doses 20 tablet 0    buPROPion (WELLBUTRIN XL) 300 MG extended release tablet Take 1 tablet by mouth daily  2    topiramate (TOPAMAX) 25 MG tablet Take 2 tablets by mouth 2 times daily  1    ondansetron (ZOFRAN ODT) 4 MG disintegrating tablet Take 1-2 tablets by mouth every 12 hours as needed for Nausea May Sub regular tablet (non-ODT) if insurance does not cover ODT.  12 tablet 0  insulin lispro protamine & lispro (HUMALOG MIX 75/25 KWIKPEN) (75-25) 100 UNIT per ML SUPN injection pen Inject 0.9 mLs into the skin 2 times daily (with meals) 55 pens for 90-day supply 55 pen 3    insulin lispro (HUMALOG) 100 UNIT/ML pen Inject into the skin Take 2 units for every 50 above 150      atorvastatin (LIPITOR) 80 MG tablet TAKE 1 TABLET BY MOUTH DAILY (Patient not taking: Reported on 1/11/2021) 90 tablet 1    lamoTRIgine (LAMICTAL) 200 MG tablet Take 200 mg by mouth 2 times daily       No current facility-administered medications for this visit. Review of Systems:   Review of Systems   Constitutional: Negative. Negative for diaphoresis and fatigue. HENT: Negative. Eyes: Negative. Respiratory: Negative. Negative for cough, chest tightness, shortness of breath, wheezing and stridor. Cardiovascular: Negative. Negative for chest pain, palpitations and leg swelling. Gastrointestinal: Negative. Negative for blood in stool and nausea. Genitourinary: Negative. Musculoskeletal: Negative. Skin: Negative. Neurological: Negative. Negative for dizziness, syncope, weakness and light-headedness. Hematological: Negative. Psychiatric/Behavioral: Negative. Physical Examination:    /78 (Site: Left Upper Arm, Position: Sitting, Cuff Size: Large Adult)   Pulse 116   Resp 18   Ht 5' 3\" (1.6 m)   Wt 176 lb (79.8 kg)   SpO2 98%   BMI 31.18 kg/m²    Physical Exam   Constitutional: She appears healthy. No distress. HENT:   Normal cephalic and Atraumatic   Eyes: Pupils are equal, round, and reactive to light. Neck: Normal range of motion and thyroid normal. Neck supple. No JVD present. No neck adenopathy. No thyromegaly present. Cardiovascular: Normal rate, regular rhythm, normal heart sounds, intact distal pulses and normal pulses. Pulmonary/Chest: Effort normal and breath sounds normal. She has no wheezes. She has no rales. She exhibits no tenderness. Abdominal: Soft. Bowel sounds are normal. There is no abdominal tenderness. Musculoskeletal: Normal range of motion. General: No tenderness or edema. Neurological: She is alert and oriented to person, place, and time. Skin: Skin is warm. No cyanosis. Nails show no clubbing.        LABS:  CBC:   Lab Results   Component Value Date    WBC 13.2 08/06/2019    RBC 4.55 08/06/2019    RBC 3.85 12/17/2011    HGB 14.5 08/06/2019    HCT 40.9 08/06/2019    MCV 89.8 08/06/2019    MCH 31.8 08/06/2019    MCHC 35.4 08/06/2019    RDW 12.5 08/06/2019     08/06/2019    MPV 7.5 09/30/2015     Lipids:  Lab Results   Component Value Date    CHOL 244 (H) 03/09/2019    CHOL 272 (H) 09/01/2018    CHOL 179 11/16/2016     Lab Results   Component Value Date    TRIG 312 (H) 03/09/2019    TRIG 241 (H) 09/01/2018    TRIG 161 11/16/2016     Lab Results   Component Value Date    HDL 29 (L) 03/09/2019    HDL 30 (L) 09/01/2018    HDL 28 (L) 11/16/2016     Lab Results   Component Value Date    LDLCALC 153 (H) 03/09/2019    LDLCALC 194 (H) 09/01/2018    LDLCALC 119 11/16/2016     No results found for: LABVLDL, VLDL  No results found for: CHOLHDLRATIO  CMP:    Lab Results   Component Value Date     08/06/2019    K 3.3 08/06/2019    CL 95 08/06/2019    CO2 26 08/06/2019    BUN 7 08/06/2019    CREATININE 0.37 08/06/2019    GFRAA >60.0 08/06/2019    LABGLOM >60.0 08/06/2019    GLUCOSE 356 08/12/2019    GLUCOSE 144 12/18/2011    PROT 7.8 08/06/2019    LABALBU 4.3 08/06/2019    LABALBU 3.6 12/18/2011    CALCIUM 9.4 08/06/2019    BILITOT <0.2 08/06/2019    ALKPHOS 190 08/06/2019    AST 10 08/06/2019    ALT 12 08/06/2019     BMP:    Lab Results   Component Value Date     08/06/2019    K 3.3 08/06/2019    CL 95 08/06/2019    CO2 26 08/06/2019    BUN 7 08/06/2019    LABALBU 4.3 08/06/2019    LABALBU 3.6 12/18/2011    CREATININE 0.37 08/06/2019    CALCIUM 9.4 08/06/2019    GFRAA >60.0 08/06/2019    LABGLOM >60.0 08/06/2019 GLUCOSE 356 08/12/2019    GLUCOSE 144 12/18/2011     Magnesium:  No results found for: MG  TSH:  Lab Results   Component Value Date    TSH 1.050 09/01/2018       Patient Active Problem List   Diagnosis    Mixed hyperlipidemia    Essential hypertension    GERD (gastroesophageal reflux disease)    Leukocytosis    Cardiac disease    Mixed anxiety and depressive disorder    Uncontrolled type 2 diabetes mellitus without complication, with long-term current use of insulin    Headache    Acute diffuse otitis externa of left ear       There are no discontinued medications. Modified Medications    No medications on file       No orders of the defined types were placed in this encounter. Assessment/Plan:    1. Mixed hyperlipidemia  Will need better compliance. She has not been taking Statin. She will begin. 2. Essential hypertension  stable  - ECHO Complete 2D W Doppler W Color; Future    3. Hypokalemia   Has not been taking K+. Recent K+ 3.0-  On K    4. CP/HERNANDEZ- SPECT and Echo   RTO after        5. . Start Tricor and Lipitor. Counseling:  Heart Healthy Lifestyle, Improve BMI, Low Salt Diet, Take Precautions to Prevent Falls, Regular Exercise and Walk Daily    Return for AFTER TESTS.       Electronically signed by Pete Ricks MD on 1/11/2021 at 2:40 PM

## 2021-01-27 ENCOUNTER — HOSPITAL ENCOUNTER (OUTPATIENT)
Dept: NON INVASIVE DIAGNOSTICS | Age: 51
Discharge: HOME | End: 2021-01-27
Payer: COMMERCIAL

## 2021-01-27 ENCOUNTER — HOSPITAL ENCOUNTER (OUTPATIENT)
Dept: NUCLEAR MEDICINE | Age: 51
Discharge: HOME | End: 2021-01-29
Payer: COMMERCIAL

## 2021-01-27 DIAGNOSIS — R07.9 CHEST PAIN, UNSPECIFIED TYPE: ICD-10-CM

## 2021-01-27 DIAGNOSIS — R06.09 DOE (DYSPNEA ON EXERTION): ICD-10-CM

## 2021-01-27 LAB
LV EF: 53 %
LV EF: 77 %
LVEF MODALITY: NORMAL
LVEF MODALITY: NORMAL

## 2021-01-27 PROCEDURE — 3430000000 HC RX DIAGNOSTIC RADIOPHARMACEUTICAL: Performed by: INTERNAL MEDICINE

## 2021-01-27 PROCEDURE — 78452 HT MUSCLE IMAGE SPECT MULT: CPT

## 2021-01-27 PROCEDURE — 93017 CV STRESS TEST TRACING ONLY: CPT

## 2021-01-27 PROCEDURE — 2580000003 HC RX 258: Performed by: INTERNAL MEDICINE

## 2021-01-27 PROCEDURE — 78452 HT MUSCLE IMAGE SPECT MULT: CPT | Performed by: INTERNAL MEDICINE

## 2021-01-27 PROCEDURE — 6360000002 HC RX W HCPCS: Performed by: INTERNAL MEDICINE

## 2021-01-27 PROCEDURE — A9502 TC99M TETROFOSMIN: HCPCS | Performed by: INTERNAL MEDICINE

## 2021-01-27 PROCEDURE — 93306 TTE W/DOPPLER COMPLETE: CPT

## 2021-01-27 RX ORDER — SODIUM CHLORIDE 0.9 % (FLUSH) 0.9 %
10 SYRINGE (ML) INJECTION PRN
Status: DISCONTINUED | OUTPATIENT
Start: 2021-01-27 | End: 2021-01-30 | Stop reason: HOSPADM

## 2021-01-27 RX ADMIN — REGADENOSON 0.4 MG: 0.08 INJECTION, SOLUTION INTRAVENOUS at 10:44

## 2021-01-27 RX ADMIN — Medication 10 ML: at 10:44

## 2021-01-27 RX ADMIN — Medication 10 ML: at 10:45

## 2021-01-27 RX ADMIN — Medication 10 ML: at 09:15

## 2021-01-27 RX ADMIN — TETROFOSMIN 29.9 MILLICURIE: 1.38 INJECTION, POWDER, LYOPHILIZED, FOR SOLUTION INTRAVENOUS at 10:44

## 2021-01-27 RX ADMIN — TETROFOSMIN 11 MILLICURIE: 1.38 INJECTION, POWDER, LYOPHILIZED, FOR SOLUTION INTRAVENOUS at 09:15

## 2021-01-27 NOTE — PROGRESS NOTES
Reviewed history, allergies, and medications. Patient held all medications prior to testing. Consent confirmed. Lexiscan exam explained. Placed patient on monitor. @ [address] here to inject Robson Martinez. SOB noted during recovery phase. Denied chest pain. No ectopy noted. Patient off monitor and instructed to eat, will have last part of exam in 1 hour.

## 2021-02-03 ENCOUNTER — VIRTUAL VISIT (OUTPATIENT)
Dept: CARDIOLOGY CLINIC | Age: 51
End: 2021-02-03
Payer: COMMERCIAL

## 2021-02-03 DIAGNOSIS — E78.2 MIXED HYPERLIPIDEMIA: Primary | ICD-10-CM

## 2021-02-03 DIAGNOSIS — R06.09 DOE (DYSPNEA ON EXERTION): ICD-10-CM

## 2021-02-03 DIAGNOSIS — I10 ESSENTIAL HYPERTENSION: ICD-10-CM

## 2021-02-03 DIAGNOSIS — R07.9 CHEST PAIN, UNSPECIFIED TYPE: ICD-10-CM

## 2021-02-03 PROCEDURE — 99214 OFFICE O/P EST MOD 30 MIN: CPT | Performed by: INTERNAL MEDICINE

## 2021-02-03 PROCEDURE — G8428 CUR MEDS NOT DOCUMENT: HCPCS | Performed by: INTERNAL MEDICINE

## 2021-02-03 ASSESSMENT — ENCOUNTER SYMPTOMS
SHORTNESS OF BREATH: 0
BLOOD IN STOOL: 0
NAUSEA: 0
EYES NEGATIVE: 1
GASTROINTESTINAL NEGATIVE: 1
STRIDOR: 0
WHEEZING: 0
CHEST TIGHTNESS: 0
COUGH: 0
RESPIRATORY NEGATIVE: 1

## 2021-02-03 NOTE — PROGRESS NOTES
Subsequent Progress Note  Patient: Susy Spaulding  YOB: 1970  MRN: [id number]    Chief Complaint: HTN cp sob   Chief Complaint   Patient presents with    Chest Pain       CV Data:   Cath negative  2016 spect negative    spect negative         Subjective/HPI: no cp breathing ok no falls no bleed     2/3/21 TELEHEALTH EVALUATION -- Audio/Visual (During [id number] public health emergency)    Less sob no cp no falls no bleed takes meds    strong FH  Nonsmoker  Work - [employer]. EKG: ST    Past Medical History:   Diagnosis Date    Depression     Environmental allergies     GERD (gastroesophageal reflux disease)     Headache     Dr. Rosa Deshpande HPV in female     Hyperlipidemia     Hypertension     Leukocytosis     Migraines     Type II or unspecified type diabetes mellitus without mention of complication, not stated as uncontrolled        Past Surgical History:   Procedure Laterality Date    BONE MARROW BIOPSY      (-)[facility] [place]    BREAST REDUCTION SURGERY     [facility] CARDIAC CATHETERIZATION      (-)[place]    CHOLECYSTECTOMY      ENDOMETRIAL ABLATION  ?     GALLBLADDER SURGERY         Family History   Problem Relation Age of Onset    Heart Disease Mother     Cancer Father     Diabetes Father     Heart Disease Father     No Known Problems Sister     No Known Problems Paternal Grandfather     No Known Problems Paternal Grandmother     No Known Problems Maternal Grandmother     No Known Problems Maternal Grandfather     No Known Problems Brother     No Known Problems Other     Breast Cancer Neg Hx     Colon Cancer Neg Hx     Eclampsia Neg Hx     Hypertension Neg Hx     Ovarian Cancer Neg Hx      Labor Neg Hx     Spont Abortions Neg Hx     Stroke Neg Hx        Social History     Socioeconomic History    Marital status:      Spouse name: Not on file    Number of children: Not on file  Years of education: Not on file    Highest education level: Not on file   Occupational History    Not on file   Social Needs    Financial resource strain: Not on file    Food insecurity     Worry: Not on file     Inability: Not on file    Transportation needs     Medical: Not on file     Non-medical: Not on file   Tobacco Use    Smoking status: Never Smoker    Smokeless tobacco: Never Used   Substance and Sexual Activity    Alcohol use: Yes     Alcohol/week: 0.0 standard drinks     Comment: ocassionally    Drug use: No    Sexual activity: Not Currently     Partners: Male   Lifestyle    Physical activity     Days per week: Not on file     Minutes per session: Not on file    Stress: Not on file   Relationships    Social connections     Talks on phone: Not on file     Gets together: Not on file     Attends Jew service: Not on file     Active member of club or organization: Not on file     Attends meetings of clubs or organizations: Not on file     Relationship status: Not on file    Intimate partner violence     Fear of current or ex partner: Not on file     Emotionally abused: Not on file     Physically abused: Not on file     Forced sexual activity: Not on file   Other Topics Concern    Not on file   Social History Narrative    Not on file       Allergies   Allergen Reactions    Relafen [Nabumetone] Rash       Current Outpatient Medications   Medication Sig Dispense Refill    fenofibrate (TRICOR) 145 MG tablet Take 1 tablet by mouth daily 90 tablet 3    atorvastatin (LIPITOR) 20 MG tablet Take 1 tablet by mouth daily 90 tablet 3    gabapentin (NEURONTIN) 100 MG capsule Take 100 mg by mouth 3 times daily.       oseltamivir (TAMIFLU) 75 MG capsule Take 1 capsule by mouth 2 times daily 10 capsule 0    acetaminophen (AMINOFEN) 325 MG tablet Take 2 tablets by mouth every 6 hours as needed for Pain 20 tablet 3  fluconazole (DIFLUCAN) 150 MG tablet TAKE 1 TABLET BY MOUTH DAILY AS NEEDED FOR DISCHARGE 4 tablet 0    ketorolac (TORADOL) 10 MG tablet Take 1 tablet by mouth every 6 hours as needed for Pain 12 tablet 0    Insulin Degludec (TRESIBA FLEXTOUCH) 100 UNIT/ML SOPN Lot [id number] Exp 06/2021 given by Naren Ambrose for Vancedanitza Chris on 08/12/2019 2 pen 0    LATUDA 20 MG TABS tablet TK 1 T PO QD  0    Insulin Degludec (TRESIBA FLEXTOUCH) 100 UNIT/ML SOPN Inject 50 Units into the skin nightly 5 pen 3    insulin lispro (HUMALOG KWIKPEN) 100 UNIT/ML pen Inject 20 Units into the skin 3 times daily (before meals) 5 pen 3    metFORMIN (GLUCOPHAGE XR) 500 MG extended release tablet Take 1 tablet by mouth daily (with breakfast) (Patient not taking: Reported on 1/11/2021) 30 tablet 3    naproxen (NAPROSYN) 500 MG tablet Take 1 tablet by mouth 2 times daily for 20 doses 20 tablet 0    buPROPion (WELLBUTRIN XL) 300 MG extended release tablet Take 1 tablet by mouth daily  2    topiramate (TOPAMAX) 25 MG tablet Take 2 tablets by mouth 2 times daily  1    propranolol (INDERAL) 20 MG tablet TAKE 1 TABLET BY MOUTH TWICE DAILY (Patient taking differently: 20 mg daily ) 180 tablet 3    ramipril (ALTACE) 5 MG capsule Take 1 capsule by mouth daily (Patient taking differently: Take 10 mg by mouth daily ) 90 capsule 3    ondansetron (ZOFRAN ODT) 4 MG disintegrating tablet Take 1-2 tablets by mouth every 12 hours as needed for Nausea May Sub regular tablet (non-ODT) if insurance does not cover ODT.  12 tablet 0    insulin lispro protamine & lispro (HUMALOG MIX 75/25 KWIKPEN) (75-25) 100 UNIT per ML SUPN injection pen Inject 0.9 mLs into the skin 2 times daily (with meals) 55 pens for 90-day supply 55 pen 3    escitalopram (LEXAPRO) 20 MG tablet TK 1 T PO QD  1    traZODone (DESYREL) 50 MG tablet Take 50 mg by mouth nightly  potassium chloride (K-TAB) 10 MEQ extended release tablet Take 1 tablet by mouth daily (Patient taking differently: Take 10 mEq by mouth 3 times daily ) 180 tablet 3    insulin lispro (HUMALOG) 100 UNIT/ML pen Inject into the skin Take 2 units for every 50 above 150      atorvastatin (LIPITOR) 80 MG tablet TAKE 1 TABLET BY MOUTH DAILY (Patient not taking: Reported on 1/11/2021) 90 tablet 1    omeprazole (PRILOSEC) 20 MG delayed release capsule TAKE 2 CAPSULES BY MOUTH TWICE DAILY (Patient taking differently: TAKE 1 CAPSULES BY MOUTH TWICE DAILY) 360 capsule 1    augmented betamethasone dipropionate (DIPROLENE-AF) 0.05 % cream SONDRA EXT AA BID  3    lamoTRIgine (LAMICTAL) 200 MG tablet Take 200 mg by mouth 2 times daily      B-D UF III MINI PEN NEEDLES 31G X 5 MM MISC Use 1 daily to inject insulin 100 each 1    VRAYLAR 3 MG CAPS TK ONE C PO  QHS  1     No current facility-administered medications for this visit. Review of Systems:   Review of Systems   Constitutional: Negative. Negative for diaphoresis and fatigue. HENT: Negative. Eyes: Negative. Respiratory: Negative. Negative for cough, chest tightness, shortness of breath, wheezing and stridor. Cardiovascular: Negative. Negative for chest pain, palpitations and leg swelling. Gastrointestinal: Negative. Negative for blood in stool and nausea. Genitourinary: Negative. Musculoskeletal: Negative. Skin: Negative. Neurological: Negative. Negative for dizziness, syncope, weakness and light-headedness. Hematological: Negative. Psychiatric/Behavioral: Negative. Physical Examination:    There were no vitals taken for this visit.    Physical Exam    LABS:  CBC:   Lab Results   Component Value Date    WBC 13.2 08/06/2019    RBC 4.55 08/06/2019    RBC 3.85 12/17/2011    HGB 14.5 08/06/2019    HCT 40.9 08/06/2019    MCV 89.8 08/06/2019    MCH 31.8 08/06/2019    MCHC 35.4 08/06/2019    RDW 12.5 08/06/2019  08/06/2019    MPV 7.5 09/30/2015     Lipids:  Lab Results   Component Value Date    CHOL 244 (H) 03/09/2019    CHOL 272 (H) 09/01/2018    CHOL 179 11/16/2016     Lab Results   Component Value Date    TRIG 312 (H) 03/09/2019    TRIG 241 (H) 09/01/2018    TRIG 161 11/16/2016     Lab Results   Component Value Date    HDL 29 (L) 03/09/2019    HDL 30 (L) 09/01/2018    HDL 28 (L) 11/16/2016     Lab Results   Component Value Date    LDLCALC 153 (H) 03/09/2019    LDLCALC 194 (H) 09/01/2018    LDLCALC 119 11/16/2016     No results found for: LABVLDL, VLDL  No results found for: CHOLHDLRATIO  CMP:    Lab Results   Component Value Date     08/06/2019    K 3.3 08/06/2019    CL 95 08/06/2019    CO2 26 08/06/2019    BUN 7 08/06/2019    CREATININE 0.37 08/06/2019    GFRAA >60.0 08/06/2019    LABGLOM >60.0 08/06/2019    GLUCOSE 356 08/12/2019    GLUCOSE 144 12/18/2011    PROT 7.8 08/06/2019    LABALBU 4.3 08/06/2019    LABALBU 3.6 12/18/2011    CALCIUM 9.4 08/06/2019    BILITOT <0.2 08/06/2019    ALKPHOS 190 08/06/2019    AST 10 08/06/2019    ALT 12 08/06/2019     BMP:    Lab Results   Component Value Date     08/06/2019    K 3.3 08/06/2019    CL 95 08/06/2019    CO2 26 08/06/2019    BUN 7 08/06/2019    LABALBU 4.3 08/06/2019    LABALBU 3.6 12/18/2011    CREATININE 0.37 08/06/2019    CALCIUM 9.4 08/06/2019    GFRAA >60.0 08/06/2019    LABGLOM >60.0 08/06/2019    GLUCOSE 356 08/12/2019    GLUCOSE 144 12/18/2011     Magnesium:  No results found for: MG  TSH:  Lab Results   Component Value Date    TSH 1.050 09/01/2018       Patient Active Problem List   Diagnosis    Mixed hyperlipidemia    Essential hypertension    GERD (gastroesophageal reflux disease)    Leukocytosis    Cardiac disease    Mixed anxiety and depressive disorder    Uncontrolled type 2 diabetes mellitus without complication, with long-term current use of insulin    Headache    Acute diffuse otitis externa of left ear There are no discontinued medications. Modified Medications    No medications on file       No orders of the defined types were placed in this encounter. Assessment/Plan:    1. Mixed hyperlipidemia  Will need better compliance. She has not been taking Statin. She will begin. 2. Essential hypertension  stable  - ECHO Complete 2D W Doppler W Color; Future    3. Hypokalemia   Has not been taking K+. Recent K+ 3.0-  On K    4. CP/HERNANDEZ- SPECT and Echo   RTO after - stable        5. . Start Tricor and Lipitor. - Labs      Counseling:  Heart Healthy Lifestyle, Improve BMI, Low Salt Diet, Take Precautions to Prevent Falls, Regular Exercise and Walk Daily    Return in about 6 months (around 8/3/2021). Pursuant to the emergency declaration under the [facility] [place], [facility] waiver authority and the [employer] and [employer] Act, this Virtual Visit was conducted, with patient's consent, to reduce the patient's risk of exposure to COVID-19 and provide continuity of care for an established patient. Services were provided through a video synchronous discussion virtually to substitute for in-person clinic visit. Visit completed using [email or web address]. Patient was located at home and I was located in the clinic.   Electronically signed by Guillermo Sharpe MD on 2/3/2021 at 11:59 AM

## 2021-05-03 ENCOUNTER — OFFICE VISIT (OUTPATIENT)
Dept: OBGYN CLINIC | Age: 51
End: 2021-05-03
Payer: COMMERCIAL

## 2021-05-03 VITALS
DIASTOLIC BLOOD PRESSURE: 88 MMHG | WEIGHT: 177 LBS | HEIGHT: 63 IN | BODY MASS INDEX: 31.36 KG/M2 | SYSTOLIC BLOOD PRESSURE: 136 MMHG

## 2021-05-03 DIAGNOSIS — Z01.419 WOMEN'S ANNUAL ROUTINE GYNECOLOGICAL EXAMINATION: Primary | ICD-10-CM

## 2021-05-03 DIAGNOSIS — Z11.51 SCREENING FOR HUMAN PAPILLOMAVIRUS: ICD-10-CM

## 2021-05-03 DIAGNOSIS — Z12.31 SCREENING MAMMOGRAM, ENCOUNTER FOR: ICD-10-CM

## 2021-05-03 DIAGNOSIS — N63.10 BREAST MASS, RIGHT: ICD-10-CM

## 2021-05-03 DIAGNOSIS — R10.2 PELVIC PAIN: ICD-10-CM

## 2021-05-03 PROCEDURE — 99396 PREV VISIT EST AGE 40-64: CPT | Performed by: OBSTETRICS & GYNECOLOGY

## 2021-05-03 PROCEDURE — G8417 CALC BMI ABV UP PARAM F/U: HCPCS | Performed by: OBSTETRICS & GYNECOLOGY

## 2021-05-03 PROCEDURE — 99212 OFFICE O/P EST SF 10 MIN: CPT | Performed by: OBSTETRICS & GYNECOLOGY

## 2021-05-03 PROCEDURE — G8427 DOCREV CUR MEDS BY ELIG CLIN: HCPCS | Performed by: OBSTETRICS & GYNECOLOGY

## 2021-05-03 PROCEDURE — 1036F TOBACCO NON-USER: CPT | Performed by: OBSTETRICS & GYNECOLOGY

## 2021-05-03 ASSESSMENT — VISUAL ACUITY: OU: 1

## 2021-05-03 ASSESSMENT — ENCOUNTER SYMPTOMS
EYES NEGATIVE: 1
ALLERGIC/IMMUNOLOGIC NEGATIVE: 1
ANAL BLEEDING: 0
ABDOMINAL PAIN: 0
DIARRHEA: 0
CONSTIPATION: 0
BLOOD IN STOOL: 0
VOMITING: 0
NAUSEA: 0
RECTAL PAIN: 0
ABDOMINAL DISTENTION: 0
RESPIRATORY NEGATIVE: 1

## 2021-05-03 NOTE — PROGRESS NOTES
Subjective:      Patient ID: Gab Baires is a 53 y.o. female    Annual exam.  No cycles s/p ablation. Pap performed and screening mammogram ordered. STD screening offered. Monthly SBE encouraged. F/U annual or prn. Pt also wished to discuss intermittent RLQ pain  extending her appointment time by 10 minutes. S/P ablation and no bleeding / cycles. Denies abnormal vaginal discharge or new partners. States she has also recently had a dx of gastroparesis and L5 fracture. Ordered US to R/O GYN etiology. Also discussed palpable right breast mass. States this mass has been palpable on exam and read as a dysmorphic area of calcifications on last several mammograms,. But she thinks it may be enlarging. Last mammogram 1/2021. Discussed and referred to Dr Mere Mckeon for further evaluation. All questions answered. Vitals:  /88   Ht 5' 3\" (1.6 m)   Wt 177 lb (80.3 kg)   BMI 31.35 kg/m²   Past Medical History:   Diagnosis Date    Depression     Environmental allergies     Gastroparesis     GERD (gastroesophageal reflux disease)     Headache     Dr. Salgado Peak HPV in female     Hyperlipidemia     Hypertension     Leukocytosis     Migraines     Type II or unspecified type diabetes mellitus without mention of complication, not stated as uncontrolled      Past Surgical History:   Procedure Laterality Date    BONE MARROW BIOPSY  2012    (-)[facility]    BREAST REDUCTION SURGERY  1999   [address] [place] [address] CARDIAC CATHETERIZATION  2009    (-)[facility]    CHOLECYSTECTOMY      ENDOMETRIAL ABLATION  2012?  GALLBLADDER SURGERY  1999     Allergies:  Relafen [nabumetone]  Current Outpatient Medications   Medication Sig Dispense Refill    fenofibrate (TRICOR) 145 MG tablet Take 1 tablet by mouth daily 90 tablet 3    atorvastatin (LIPITOR) 20 MG tablet Take 1 tablet by mouth daily 90 tablet 3    gabapentin (NEURONTIN) 100 MG capsule Take 100 mg by mouth 3 times daily.       fluconazole (DIFLUCAN) 150 MG tablet TAKE 1 TABLET BY MOUTH DAILY AS NEEDED FOR DISCHARGE 4 tablet 0    Insulin Degludec (TRESIBA FLEXTOUCH) 100 UNIT/ML SOPN Inject 50 Units into the skin nightly 5 pen 3    insulin lispro (HUMALOG KWIKPEN) 100 UNIT/ML pen Inject 20 Units into the skin 3 times daily (before meals) 5 pen 3    metFORMIN (GLUCOPHAGE XR) 500 MG extended release tablet Take 1 tablet by mouth daily (with breakfast) 30 tablet 3    propranolol (INDERAL) 20 MG tablet TAKE 1 TABLET BY MOUTH TWICE DAILY (Patient taking differently: 20 mg daily ) 180 tablet 3    ramipril (ALTACE) 5 MG capsule Take 1 capsule by mouth daily (Patient taking differently: Take 10 mg by mouth daily ) 90 capsule 3    escitalopram (LEXAPRO) 20 MG tablet TK 1 T PO QD  1    traZODone (DESYREL) 50 MG tablet Take 50 mg by mouth nightly       potassium chloride (K-TAB) 10 MEQ extended release tablet Take 1 tablet by mouth daily (Patient taking differently: Take 10 mEq by mouth 3 times daily ) 180 tablet 3    omeprazole (PRILOSEC) 20 MG delayed release capsule TAKE 2 CAPSULES BY MOUTH TWICE DAILY (Patient taking differently: TAKE 1 CAPSULES BY MOUTH TWICE DAILY) 360 capsule 1    augmented betamethasone dipropionate (DIPROLENE-AF) 0.05 % cream SONDRA EXT AA BID  3    B-D UF III MINI PEN NEEDLES 31G X 5 MM MISC Use 1 daily to inject insulin 100 each 1    VRAYLAR 3 MG CAPS TK ONE C PO  QHS  1     No current facility-administered medications for this visit.       Social History     Socioeconomic History    Marital status:      Spouse name: Not on file    Number of children: Not on file    Years of education: Not on file    Highest education level: Not on file   Occupational History    Not on file   Social Needs    Financial resource strain: Not on file    Food insecurity     Worry: Not on file     Inability: Not on file    Transportation needs     Medical: Not on file     Non-medical: Not on file   Tobacco Use    Smoking status: Never Smoker    Smokeless tobacco: Never Used   Substance and Sexual Activity    Alcohol use: Yes     Alcohol/week: 0.0 standard drinks     Comment: ocassionally    Drug use: No    Sexual activity: Not Currently     Partners: Male   Lifestyle    Physical activity     Days per week: Not on file     Minutes per session: Not on file    Stress: Not on file   Relationships    Social connections     Talks on phone: Not on file     Gets together: Not on file     Attends Mormonism service: Not on file     Active member of club or organization: Not on file     Attends meetings of clubs or organizations: Not on file     Relationship status: Not on file    Intimate partner violence     Fear of current or ex partner: Not on file     Emotionally abused: Not on file     Physically abused: Not on file     Forced sexual activity: Not on file   Other Topics Concern    Not on file   Social History Narrative    Not on file     Family History   Problem Relation Age of Onset    Heart Disease Mother     Cancer Father     Diabetes Father     Heart Disease Father     No Known Problems Sister     No Known Problems Paternal Grandfather     No Known Problems Paternal Grandmother     No Known Problems Maternal Grandmother     No Known Problems Maternal Grandfather     No Known Problems Brother     No Known Problems Other     Breast Cancer Neg Hx     Colon Cancer Neg Hx     Eclampsia Neg Hx     Hypertension Neg Hx     Ovarian Cancer Neg Hx      Labor Neg Hx     Spont Abortions Neg Hx     Stroke Neg Hx        Review of Systems   Constitutional: Negative. Negative for activity change, appetite change, chills, diaphoresis, fatigue, fever and unexpected weight change. HENT: Negative. Eyes: Negative. Respiratory: Negative. Cardiovascular: Negative.     Gastrointestinal: Negative for abdominal distention, abdominal pain, anal bleeding, blood in stool, constipation, diarrhea, nausea, rectal pain and vomiting. Endocrine: Negative. Genitourinary: Negative for decreased urine volume, difficulty urinating, dyspareunia, dysuria, enuresis, flank pain, frequency, genital sores, hematuria, menstrual problem, pelvic pain, urgency, vaginal bleeding, vaginal discharge and vaginal pain. Musculoskeletal: Negative. Skin: Negative. Allergic/Immunologic: Negative. Neurological: Negative. Hematological: Negative. Psychiatric/Behavioral: Negative. Objective:     Physical Exam  Constitutional:       Appearance: She is well-developed. HENT:      Head: Normocephalic. Eyes:      General: Lids are normal. Vision grossly intact. Neck:      Musculoskeletal: Normal range of motion and neck supple. Thyroid: No thyromegaly. Cardiovascular:      Rate and Rhythm: Normal rate and regular rhythm. Heart sounds: Normal heart sounds. Pulmonary:      Effort: Pulmonary effort is normal. No respiratory distress. Breath sounds: Normal breath sounds. No wheezing or rales. Chest:      Chest wall: No tenderness. Breasts:         Right: Normal. No swelling, bleeding, inverted nipple, mass, nipple discharge, skin change or tenderness. Left: Normal. No swelling, bleeding, inverted nipple, mass, nipple discharge, skin change or tenderness. Abdominal:      General: There is no distension. Palpations: Abdomen is soft. There is no mass. Tenderness: There is no abdominal tenderness. There is no guarding or rebound. Hernia: No hernia is present. There is no hernia in the left inguinal area or right inguinal area. Genitourinary:     General: Normal vulva. Pubic Area: No rash. Labia:         Right: No rash, tenderness, lesion or injury. Left: No rash, tenderness, lesion or injury. Urethra: No prolapse, urethral swelling or urethral lesion. Vagina: Normal. No signs of injury and foreign body.  No vaginal discharge, erythema, tenderness or bleeding. Cervix: No cervical motion tenderness, discharge or friability. Uterus: Not deviated, not enlarged, not fixed and not tender. Adnexa:         Right: No mass, tenderness or fullness. Left: No mass, tenderness or fullness. Rectum: Normal.   Musculoskeletal: Normal range of motion. General: No tenderness. Lymphadenopathy:      Cervical: No cervical adenopathy. Upper Body:      Right upper body: No supraclavicular or axillary adenopathy. Left upper body: No supraclavicular or axillary adenopathy. Lower Body: No right inguinal adenopathy. No left inguinal adenopathy. Skin:     General: Skin is warm and dry. Coloration: Skin is not pale. Findings: No erythema or rash. Neurological:      Mental Status: She is alert and oriented to person, place, and time. Psychiatric:         Behavior: Behavior normal.         Thought Content: Thought content normal.         Judgment: Judgment normal.         Assessment:      Diagnosis Orders   1. Women's annual routine gynecological examination  PAP SMEAR   2. Screening for human papillomavirus  PAP SMEAR   3. Screening mammogram, encounter for  CANDIE DIGITAL SCREEN W OR WO CAD BILATERAL   4. Pelvic pain           Plan:      Medications placedthis encounter:  No orders of the defined types were placed in this encounter. Orders placedthis encounter:  Orders Placed This Encounter   Procedures    CANDIE DIGITAL SCREEN W OR WO CAD BILATERAL     Standing Status:   Future     Standing Expiration Date:   5/3/2022    PAP SMEAR     Standing Status:   Future     Standing Expiration Date:   5/3/2022     Order Specific Question:   Collection Type     Answer: Thin Prep     Order Specific Question:   Prior Abnormal Pap Test     Answer:   No     Order Specific Question:   Screening or Diagnostic     Answer:   Screening     Order Specific Question:   HPV Requested?      Answer:   Yes     Order Specific Question: High Risk Patient     Answer:   N/A         Follow up:  Return in about 1 year (around 5/3/2022) for Annual, Consult Dr Darren Beaulieu, Ultrasound, Results Review. Repeat Annual GYN exam every 1 year. Cervical Cytology exam starts age 24. If Negative Cytology;  follow-up screening per current guidelines. Mammograms yearly starting at age 36. Calcium and Vitamin D dosing reviewed ( age appropriate ). Colonoscopy and bone density screening discussed ( age appropriate ). Birth control and STD prevention discussed ( age appropriate ). Gardisil counseling completed for all patients ( age appropriate ). Routine health maintenance ( per PCP and guidelines ). The patient was asked if she would like a chaperone present for her intimate exam. She  Declined the chaperone.  Sheliah Bernheim

## 2021-05-14 ENCOUNTER — OFFICE VISIT (OUTPATIENT)
Dept: SURGERY | Age: 51
End: 2021-05-14
Payer: COMMERCIAL

## 2021-05-14 VITALS
SYSTOLIC BLOOD PRESSURE: 126 MMHG | HEART RATE: 95 BPM | HEIGHT: 63 IN | DIASTOLIC BLOOD PRESSURE: 82 MMHG | WEIGHT: 186.2 LBS | BODY MASS INDEX: 32.99 KG/M2 | RESPIRATION RATE: 16 BRPM

## 2021-05-14 DIAGNOSIS — N63.10 BREAST MASS, RIGHT: Primary | ICD-10-CM

## 2021-05-14 DIAGNOSIS — E66.9 OBESITY, CLASS I, BMI 30-34.9: ICD-10-CM

## 2021-05-14 PROBLEM — E66.811 OBESITY, CLASS I, BMI 30-34.9: Status: ACTIVE | Noted: 2021-05-14

## 2021-05-14 PROCEDURE — 99243 OFF/OP CNSLTJ NEW/EST LOW 30: CPT | Performed by: SURGERY

## 2021-05-14 PROCEDURE — G8427 DOCREV CUR MEDS BY ELIG CLIN: HCPCS | Performed by: SURGERY

## 2021-05-14 PROCEDURE — G8417 CALC BMI ABV UP PARAM F/U: HCPCS | Performed by: SURGERY

## 2021-05-14 ASSESSMENT — ENCOUNTER SYMPTOMS
COUGH: 0
CHEST TIGHTNESS: 0
SORE THROAT: 0
COLOR CHANGE: 0
SHORTNESS OF BREATH: 0
ABDOMINAL PAIN: 0
NAUSEA: 0
VOMITING: 0

## 2021-05-14 NOTE — PROGRESS NOTES
NEW BREAST PATIENT         SERVICE DATE: 5/14/21  SERVICE TIME:  1:36 PM EDT    REFERRED BY:  Miguel Berumen MD  REASON FOR TODAY'S VISIT:    Chief Complaint   Patient presents with    New Patient     concerned that the calcification on the right breast is getting bigger, patient can feel the area sice she had breast reduction in [facility], but the lump has gotten much larger, denies any breast pain, nipple drainage or skin changes in bilateral breasts      CHAPERONE WAS OFFERED, PATIENT RESPONDED: no    HISTORY AND CHIEF COMPLAINT:  Harriet Jiang is a 53 y.o.  female who is here for a New Patient (concerned that the calcification on the right breast is getting bigger, patient can feel the area sice she had breast reduction in 1999, but the lump has gotten much larger, denies any breast pain, nipple drainage or skin changes in bilateral breasts)  she has no discomfort      BREAST HISTORY  Her past breast history (prior to this encounter) is as follows:   Abnormal mammogram:   No  Abnormal Breast US:  No  Breast biopsy:    Yes she thinks left breast stereotactic about [id number] benign  Breast cysts:    No  Breast surgery:    Yes, Breast Reduction Bilateral Breasts 1999  Breast cancer              No  History of Breastfeeding: Yes  Tried with daughter  Currently Breastfeeding: No      RISK FACTORS FOR BREAST CANCER:  Family History of Breast Cancer: No.  History of ovarian cancer: unknown  Ashkenazi Ancestry: no  Age at the birth of first child: 33  Age at the onset of menses: 16  Age at menopause: Ablation age 44   Hormonal therapy: no  Postmenopausal obesity: yes, 32.98     BRA SIZE: 46C    Past Medical History:   Diagnosis Date    Depression     Environmental allergies     Gastroparesis     GERD (gastroesophageal reflux disease)     Headache     Dr. Oscar Nevarez HPV in female     Hyperlipidemia     Hypertension     Leukocytosis     Migraines     Type II or unspecified type diabetes mellitus without mention of complication, not stated as uncontrolled      Past Surgical History:   Procedure Laterality Date    [facility]SY      (-)[facility]    BREAST REDUCTION SURGERY      BREAST SURGERY      CARDIAC CATHETERIZATION      (-)[facility]    CHOLECYSTECTOMY      ENDOMETRIAL ABLATION  ?  GALLBLADDER SURGERY       Family History   Problem Relation Age of Onset    Heart Disease Mother     Diabetes Father     Heart Disease Father     Cancer Father         thyroid    No Known Problems Sister     No Known Problems Paternal Grandfather     No Known Problems Paternal Grandmother     No Known Problems Maternal Grandmother     No Known Problems Maternal Grandfather     No Known Problems Brother     No Known Problems Other     Breast Cancer Neg Hx     Colon Cancer Neg Hx     Eclampsia Neg Hx     Hypertension Neg Hx     Ovarian Cancer Neg Hx      Labor Neg Hx     Spont Abortions Neg Hx     Stroke Neg Hx      Social History     Socioeconomic History    Marital status:      Spouse name: Not on file    Number of children: Not on file    Years of education: Not on file    Highest education level: Not on file   Occupational History    Not on file   Social Needs    Financial resource strain: Not on file    Food insecurity     Worry: Not on file     Inability: Not on file    Transportation needs     Medical: Not on file     Non-medical: Not on file   Tobacco Use    Smoking status: Never Smoker    Smokeless tobacco: Never Used   Substance and Sexual Activity    Alcohol use:  Yes     Alcohol/week: 0.0 standard drinks     Comment: ocassionally    Drug use: No    Sexual activity: Not Currently     Partners: Male   Lifestyle    Physical activity     Days per week: Not on file     Minutes per session: Not on file    Stress: Not on file   Relationships    Social connections     Talks on phone: Not on file     Gets together: Not on file     Attends Nondenominational service: Not on file     Active member of club or organization: Not on file     Attends meetings of clubs or organizations: Not on file     Relationship status: Not on file    Intimate partner violence     Fear of current or ex partner: Not on file     Emotionally abused: Not on file     Physically abused: Not on file     Forced sexual activity: Not on file   Other Topics Concern    Not on file   Social History Narrative    Not on file       Review of Systems   Constitutional: Negative for activity change, appetite change, chills, diaphoresis, fatigue, fever and unexpected weight change. HENT: Negative for congestion, ear pain, hearing loss, mouth sores, nosebleeds and sore throat. Respiratory: Negative for cough, chest tightness and shortness of breath. Cardiovascular: Negative for chest pain, palpitations and leg swelling. Gastrointestinal: Negative for abdominal pain, nausea and vomiting. Endocrine: Negative for cold intolerance, heat intolerance, polydipsia, polyphagia and polyuria. Genitourinary: Negative for difficulty urinating, menstrual problem and vaginal bleeding. Musculoskeletal: Negative for neck pain and neck stiffness. Skin: Negative for color change, pallor, rash and wound. Allergic/Immunologic: Negative for environmental allergies and immunocompromised state. Neurological: Negative for weakness. Hematological: Does not bruise/bleed easily. Psychiatric/Behavioral: Negative for agitation, confusion, sleep disturbance and suicidal ideas. The patient is not nervous/anxious. Have you ever tested positive for AIDS? no  Have you ever tested positive for Hepatitis? no    ANTICOAGULANT MEDICATIONS:  none    SOCIAL HISTORY   Marital status:   Occupation:    Tobacco use: Jim TURCIOS  reports that she has never smoked. She has never used smokeless tobacco.  Alcohol use: Jim TURCIOS  reports current alcohol use.   Drug use: Howard Winter reports no history of drug use. Caffeine intake: 6-7 cups of caffeinated soda per day(s)  Exercise:  not active    Resp 16   Ht 5' 3\" (1.6 m)   Wt 186 lb 3.2 oz (84.5 kg)   BMI 32.98 kg/m²     Physical Exam  Vitals signs reviewed. Constitutional:       Appearance: Normal appearance. She is well-developed. HENT:      Head: Normocephalic and atraumatic. Nose: Nose normal.   Eyes:      Conjunctiva/sclera: Conjunctivae normal.      Right eye: No hemorrhage. Left eye: No hemorrhage. Neck:      Musculoskeletal: Normal range of motion and neck supple. Cardiovascular:      Rate and Rhythm: Normal rate. Pulmonary:      Effort: Pulmonary effort is normal. No respiratory distress. Chest:      Breasts: Breasts are symmetrical.         Right: Mass (3 cm UOQ right smooth mass, mobile, non tender) present. No inverted nipple, nipple discharge, skin change or tenderness. Left: No inverted nipple, mass, nipple discharge, skin change or tenderness. Musculoskeletal: Normal range of motion. Comments: Normal Range of motion in upper and lower extremities. Lymphadenopathy:      Cervical: No cervical adenopathy. Right cervical: No superficial, deep or posterior cervical adenopathy. Left cervical: No superficial, deep or posterior cervical adenopathy. Upper Body:      Right upper body: No supraclavicular, axillary or pectoral adenopathy. Left upper body: No supraclavicular, axillary or pectoral adenopathy. Skin:     General: Skin is warm and dry. Findings: No abrasion, bruising, erythema or lesion. Neurological:      Mental Status: She is alert and oriented to person, place, and time. She is not disoriented. Psychiatric:         Speech: Speech normal.         Behavior: Behavior normal. Behavior is cooperative. Thought Content:  Thought content normal.         Judgment: Judgment normal.       RADIOGRAPHIC FINDINGS:    See epic    ASSESSMENT      IMPRESSION :      ICD-10-CM    1. Breast mass, right  N63.10    2. Obesity, Class I, BMI 30-34.9  E66.9         PLAN:    Reviewed images with the patient. Area is fat necrosis from her reduction. The area is benign clinically and radiographically. Patient is not symptomatic. Offered excision but patient declined. Recommend as needed follow up. 1.  Annual mammogram   2. Annual CBE by PCP. 3.  Breast Self-Awareness  4. Return to me if any new breast lump, discharge, or concerns. Recommend an active lifestyle, healthy diet, limited alcohol intake, achieve and maintain a healthy BMI to optimize breast cancer outcomes / decrease risk of breast cancer. Total face to face time was 40 minutes with greater than 50% spent on counseling the patient or coordinating her care. Rachelle Rosenthal MD    CC: Luz Maria Bradford MD, Jose Bradley MD    The chief complaint, extensive medical and family history, and review of systems were asked and reviewed with the patient by me. I also reviewed the note in detail. This note was partially generated using Dragon voice recognition system, and there may be some incorrect words, spellings, punctuation that were not noticed in checking the note before saving.

## 2021-05-14 NOTE — Clinical Note
Thanks for referring her. This is just a large area of fat necrosis we see after reductions and since it is not bothering her, we can leave it alone.

## 2021-06-09 ENCOUNTER — TELEPHONE (OUTPATIENT)
Dept: OBGYN CLINIC | Age: 51
End: 2021-06-09

## 2021-06-09 DIAGNOSIS — R10.2 PELVIC PAIN: ICD-10-CM

## 2021-06-09 DIAGNOSIS — Z11.51 SCREENING FOR HUMAN PAPILLOMAVIRUS: ICD-10-CM

## 2021-06-09 DIAGNOSIS — Z01.419 WOMEN'S ANNUAL ROUTINE GYNECOLOGICAL EXAMINATION: ICD-10-CM

## 2021-06-14 ENCOUNTER — OFFICE VISIT (OUTPATIENT)
Dept: OBGYN CLINIC | Age: 51
End: 2021-06-14
Payer: COMMERCIAL

## 2021-06-14 VITALS
HEIGHT: 63 IN | DIASTOLIC BLOOD PRESSURE: 88 MMHG | BODY MASS INDEX: 33.13 KG/M2 | SYSTOLIC BLOOD PRESSURE: 136 MMHG | WEIGHT: 187 LBS

## 2021-06-14 DIAGNOSIS — R87.610 ASCUS WITH POSITIVE HIGH RISK HPV CERVICAL: Primary | ICD-10-CM

## 2021-06-14 DIAGNOSIS — R87.810 ASCUS WITH POSITIVE HIGH RISK HPV CERVICAL: Primary | ICD-10-CM

## 2021-06-14 PROCEDURE — G8417 CALC BMI ABV UP PARAM F/U: HCPCS | Performed by: OBSTETRICS & GYNECOLOGY

## 2021-06-14 PROCEDURE — 99212 OFFICE O/P EST SF 10 MIN: CPT | Performed by: OBSTETRICS & GYNECOLOGY

## 2021-06-14 PROCEDURE — G8427 DOCREV CUR MEDS BY ELIG CLIN: HCPCS | Performed by: OBSTETRICS & GYNECOLOGY

## 2021-06-14 PROCEDURE — 1036F TOBACCO NON-USER: CPT | Performed by: OBSTETRICS & GYNECOLOGY

## 2021-06-14 RX ORDER — NITROFURANTOIN 25; 75 MG/1; MG/1
CAPSULE ORAL
COMMUNITY
Start: 2021-05-20 | End: 2021-07-09 | Stop reason: ALTCHOICE

## 2021-06-14 ASSESSMENT — ENCOUNTER SYMPTOMS
ABDOMINAL PAIN: 0
VOMITING: 0
RECTAL PAIN: 0
ANAL BLEEDING: 0
EYES NEGATIVE: 1
CONSTIPATION: 0
BLOOD IN STOOL: 0
ALLERGIC/IMMUNOLOGIC NEGATIVE: 1
NAUSEA: 0
RESPIRATORY NEGATIVE: 1
DIARRHEA: 0
ABDOMINAL DISTENTION: 0

## 2021-06-14 NOTE — PATIENT INSTRUCTIONS
Patient Education        Human Papillomavirus (HPV): Care Instructions  Overview  The human papillomavirus (HPV) is a very common virus. There are many types of HPV. Some types cause the common skin wart. Other types cause genital warts, which can be spread by sexual contact. Some types can increase the risk for certain cancers, such as cervical or anal cancer. Having one type of HPV doesn't lead to having another type. Many women who have HPV may not know that they're infected until it's found with a cervical cancer screening test, such as an HPV test.  If an HPV screening test finds that you have the types of HPV that might lead to cancer, your doctor may suggest more tests. This doesn't mean you'll get cancer. But it means that you may have an increased risk. Abnormal cell changes caused by HPV often go away on their own. If they don't, they can be treated. Follow-up care is a key part of your treatment and safety. Be sure to make and go to all appointments, and call your doctor if you are having problems. It's also a good idea to know your test results and keep a list of the medicines you take. How can you care for yourself at home? · Do not smoke. Smoking increases the risk for cervical problems and cervical cancer. If you need help quitting, talk to your doctor about stop-smoking programs and medicines. These can increase your chances of quitting for good. · Use condoms every time you have sex. Use them from the beginning to the end of sexual contact. · Be sure to tell your sexual partner or partners that you have HPV. Even if you do not have symptoms, you can still pass HPV to others. · Having one sex partner (who does not have STIs and does not have sex with anyone else) can decrease your risk of getting STIs. When should you call for help?   Watch closely for changes in your health, and be sure to contact your doctor if:    · You have vaginal pain during or after sex.     · You have vaginal bleeding keep a list of the medicines you take. How do you prepare for the procedure? Procedures can be stressful. This information will help you understand what you can expect. And it will help you safely prepare for your procedure. Preparing for the procedure    · Tell your doctor if:  ? You are having your menstrual period. This procedure usually isn't done during your period. This is because blood makes it harder to see your cervix. ? You are or might be pregnant. A blood or urine test may be done to see if you are pregnant. Colposcopy is safe during pregnancy. The chance of miscarriage is very small. But you may have some bleeding from a biopsy.     · Be sure you have someone to take you home. Anesthesia and pain medicine will make it unsafe for you to drive or get home on your own.     · If you take aspirin or some other blood thinner, ask your doctor if you should stop taking it before your procedure. Make sure that you understand exactly what your doctor wants you to do. These medicines increase the risk of bleeding.     · Understand exactly what procedure is planned, along with the risks, benefits, and other options.     · Tell your doctor ALL the medicines, vitamins, supplements, and herbal remedies you take. Some may increase the risk of problems during your procedure. Your doctor will tell you if you should stop taking any of them before the procedure and how soon to do it.     · Make sure your doctor and the hospital have a copy of your advance directive. If you don't have one, you may want to prepare one. It lets others know your health care wishes. It's a good thing to have before any type of surgery or procedure. What happens on the day of the procedure?    · You may want to take a pain reliever 30 to 60 minutes before the test. This can help reduce any cramping pain from a biopsy. Ibuprofen (Advil or Motrin) is a good choice.     · Take a bath or shower before you come in for your procedure.  Do not apply lotions, perfumes, deodorants, or nail polish. At the doctor's office   · Bring a picture ID.     · The procedure will take about 15 to 30 minutes. When should you call your doctor? · You have questions or concerns.     · You don't understand how to prepare for your procedure.     · You become ill before the procedure (such as fever, flu, or a cold).     · You need to reschedule or have changed your mind about having the procedure. Where can you learn more? Go to [email or web address] org and sign in to your Heap account. Enter E290 in the [employer] box to learn more about \"Colposcopy: Before Your Procedure. \"     If you do not have an account, please click on the \"Sign Up Now\" link. Current as of: December 17, 2020               Content Version: 12.8  © 2006-2021 [employer]. Care instructions adapted under license by [facility]. If you have questions about a medical condition or this instruction, always ask your healthcare professional. [place] [address] any warranty or liability for your use of this information. Patient Education        Colposcopy: What to Expect at [address] may feel some soreness in your vagina for a day or two if you had a biopsy. Some vaginal bleeding or discharge is normal for up to a week after a biopsy. The discharge may be dark-colored if a solution was put on your cervix. You can use a sanitary pad for the bleeding. It may take a week or two for you to get the test results. This care sheet gives you a general idea about how long it will take for you to recover. But each person recovers at a different pace. Follow the steps below to feel better as quickly as possible. How can you care for yourself at home?   Activity    · You can return to work and most daily activities right after the test.   Exercise    · Do not exercise for 1 day after the test.   Medicines    · Your doctor will tell you if and when you can restart your medicines. You will also get instructions about taking any new medicines.     · If you take aspirin or some other blood thinner, ask your doctor if and when to start taking it again. Make sure that you understand exactly what your doctor wants you to do.     · Take an over-the-counter pain medicine, such as acetaminophen (Tylenol), ibuprofen (Advil, Motrin), or naproxen (Aleve). Be safe with medicines. Read and follow all instructions on the label. Do not take two or more pain medicines at the same time unless the doctor told you to. Many pain medicines have acetaminophen, which is Tylenol. Too much acetaminophen (Tylenol) can be harmful. Other instructions    · Some vaginal bleeding or discharge is normal for up to a week after a biopsy. The discharge may be dark-colored. Use a pad if you have some bleeding.     · Do not douche, have sexual intercourse, or use tampons for 1 week if you had a biopsy. This will allow time for your cervix to heal.     · You can take a bath or shower anytime after the test.   Follow-up care is a key part of your treatment and safety. Be sure to make and go to all appointments, and call your doctor if you are having problems. It's also a good idea to know your test results and keep a list of the medicines you take. When should you call for help? Call your doctor now or seek immediate medical care if:    · You have severe vaginal bleeding. This means that you are soaking through your usual pads or tampons each hour for 2 or more hours.     · You have pain that does not get better after you take pain medicine.     · You have signs of infection, such as:  ? Increased pain. ? Bad-smelling vaginal discharge. ? A fever. Watch closely for any changes in your health, and be sure to contact your doctor if:    · You have questions or concerns. Where can you learn more? Go to https://[facility]. org and sign in to your MyChart account. Enter M523 in the [facility] box to learn more about \"Colposcopy: What to Expect at Home. \"     If you do not have an account, please click on the \"Sign Up Now\" link. Current as of: December 17, 2020               Content Version: 12.8  © 0181-9369 [employer]. Care instructions adapted under license by [facility] ([facility]). If you have questions about a medical condition or this instruction, always ask your healthcare professional. [address] any warranty or liability for your use of this information.

## 2021-06-14 NOTE — PROGRESS NOTES
Subjective:      Patient ID: Rosaria Crigler is a 53 y. o.female    Pt here to discuss abnormal pap dx of ASCUS / HPV. Discussed abnormal paps and HPV in detail. Discussed pathophysiology of HPV and how the HPV virus affects the cervical cells  . Literature provided. Discussed F/U with colposcopy for further evaluation. Pt was seen with total face to face time of 15  minutes with more than 50% of the visit being counseling and education regarding the encounter dx of abnormal paps and HPV. Vitals:  /88   Ht 5' 3\" (1.6 m)   Wt 187 lb (84.8 kg)   BMI 33.13 kg/m²   Past Medical History:   Diagnosis Date    Depression     Environmental allergies     Gastroparesis     GERD (gastroesophageal reflux disease)     Headache     Dr. Tl Naidu HPV in female     Hyperlipidemia     Hypertension     Leukocytosis     Migraines     Type II or unspecified type diabetes mellitus without mention of complication, not stated as uncontrolled      Past Surgical History:   Procedure Laterality Date    BONE MARROW BIOPSY  2012    (-)[facility] [place]    BREAST REDUCTION SURGERY  1999   [address] [place] [address] CARDIAC CATHETERIZATION  2009    (-)[facility]    CHOLECYSTECTOMY      ENDOMETRIAL ABLATION  2012?     GALLBLADDER SURGERY  1999     Allergies:  Relafen [nabumetone]  Current Outpatient Medications   Medication Sig Dispense Refill    nitrofurantoin, macrocrystal-monohydrate, (MACROBID) 100 MG capsule       fenofibrate (TRICOR) 145 MG tablet Take 1 tablet by mouth daily 90 tablet 3    atorvastatin (LIPITOR) 20 MG tablet Take 1 tablet by mouth daily 90 tablet 3    fluconazole (DIFLUCAN) 150 MG tablet TAKE 1 TABLET BY MOUTH DAILY AS NEEDED FOR DISCHARGE 4 tablet 0    Insulin Degludec (TRESIBA FLEXTOUCH) 100 UNIT/ML SOPN Inject 50 Units into the skin nightly 5 pen 3    insulin lispro (HUMALOG KWIKPEN) 100 UNIT/ML pen Inject 20 Units into the skin 3 times daily (before meals) 5 pen 3    metFORMIN (GLUCOPHAGE XR) 500 MG extended release tablet Take 1 tablet by mouth daily (with breakfast) 30 tablet 3    propranolol (INDERAL) 20 MG tablet TAKE 1 TABLET BY MOUTH TWICE DAILY (Patient taking differently: 20 mg daily ) 180 tablet 3    ramipril (ALTACE) 5 MG capsule Take 1 capsule by mouth daily (Patient taking differently: Take 10 mg by mouth daily ) 90 capsule 3    escitalopram (LEXAPRO) 20 MG tablet TK 1 T PO QD  1    traZODone (DESYREL) 50 MG tablet Take 100 mg by mouth nightly       potassium chloride (K-TAB) 10 MEQ extended release tablet Take 1 tablet by mouth daily (Patient taking differently: Take 10 mEq by mouth 3 times daily ) 180 tablet 3    omeprazole (PRILOSEC) 20 MG delayed release capsule TAKE 2 CAPSULES BY MOUTH TWICE DAILY (Patient taking differently: TAKE 1 CAPSULES BY MOUTH TWICE DAILY) 360 capsule 1    augmented betamethasone dipropionate (DIPROLENE-AF) 0.05 % cream SONDRA EXT AA BID  3    B-D UF III MINI PEN NEEDLES 31G X 5 MM MISC Use 1 daily to inject insulin 100 each 1    VRAYLAR 3 MG CAPS TK ONE C PO  QHS  1     No current facility-administered medications for this visit. Social History     Socioeconomic History    Marital status:      Spouse name: Not on file    Number of children: Not on file    Years of education: Not on file    Highest education level: Not on file   Occupational History    Not on file   Tobacco Use    Smoking status: Never Smoker    Smokeless tobacco: Never Used   Vaping Use    Vaping Use: Never used   Substance and Sexual Activity    Alcohol use:  Yes     Alcohol/week: 0.0 standard drinks     Comment: ocassionally    Drug use: No    Sexual activity: Not Currently     Partners: Male   Other Topics Concern    Not on file   Social History Narrative    Not on file     Social Determinants of Health     Financial Resource Strain:     Difficulty of Paying Living Expenses:    Food Insecurity:     Worried About Running Out of Food in the Last Year:     Ran Out of Food in the Last Year:    Transportation Needs:     Lack of Transportation (Medical):  Lack of Transportation (Non-Medical):    Physical Activity:     Days of Exercise per Week:     Minutes of Exercise per Session:    Stress:     Feeling of Stress :    Social Connections:     Frequency of Communication with Friends and Family:     Frequency of Social Gatherings with Friends and Family:     Attends [employer] Services:     Active Member of Clubs or Organizations:     Attends Club or Organization Meetings:     Marital Status:    Intimate Partner Violence:     Fear of Current or Ex-Partner:     Emotionally Abused:     Physically Abused:     Sexually Abused:      Family History   Problem Relation Age of Onset    Heart Disease Mother     Diabetes Father     Heart Disease Father     Cancer Father         thyroid    No Known Problems Sister     No Known Problems Paternal Grandfather     No Known Problems Paternal Grandmother     No Known Problems Maternal Grandmother     No Known Problems Maternal Grandfather     No Known Problems Brother     No Known Problems Other     Breast Cancer Neg Hx     Colon Cancer Neg Hx     Eclampsia Neg Hx     Hypertension Neg Hx     Ovarian Cancer Neg Hx      Labor Neg Hx     Spont Abortions Neg Hx     Stroke Neg Hx        Review of Systems   Constitutional: Negative. Negative for activity change, appetite change, chills, diaphoresis, fatigue, fever and unexpected weight change. HENT: Negative. Eyes: Negative. Respiratory: Negative. Cardiovascular: Negative. Gastrointestinal: Negative for abdominal distention, abdominal pain, anal bleeding, blood in stool, constipation, diarrhea, nausea, rectal pain and vomiting. Endocrine: Negative.     Genitourinary: Negative for decreased urine volume, difficulty urinating, dyspareunia, dysuria, enuresis, flank pain, frequency, genital sores, hematuria, menstrual problem, pelvic pain, urgency, vaginal bleeding, vaginal discharge and vaginal pain. Musculoskeletal: Negative. Skin: Negative. Allergic/Immunologic: Negative. Neurological: Negative. Hematological: Negative. Psychiatric/Behavioral: Negative. Objective:     Physical Exam  Constitutional:       General: She is not in acute distress. Appearance: She is well-developed. She is not diaphoretic. HENT:      Head: Normocephalic and atraumatic. Eyes:      Conjunctiva/sclera: Conjunctivae normal.   Cardiovascular:      Rate and Rhythm: Normal rate and regular rhythm. Pulmonary:      Effort: Pulmonary effort is normal. No respiratory distress. Musculoskeletal:         General: No tenderness or deformity. Normal range of motion. Cervical back: Normal range of motion and neck supple. Skin:     General: Skin is warm and dry. Coloration: Skin is not pale. Neurological:      Mental Status: She is alert and oriented to person, place, and time. Motor: No abnormal muscle tone. Coordination: Coordination normal.   Psychiatric:         Behavior: Behavior normal.         Thought Content: Thought content normal.         Judgment: Judgment normal.         Assessment:       Diagnosis Orders   1. ASCUS with positive high risk HPV cervical          Plan:      Medications placedthis encounter:  No orders of the defined types were placed in this encounter. Orders placedthis encounter:  No orders of the defined types were placed in this encounter. Follow up:  Return for Colposcopy.

## 2021-06-29 ENCOUNTER — PROCEDURE VISIT (OUTPATIENT)
Dept: OBGYN CLINIC | Age: 51
End: 2021-06-29
Payer: COMMERCIAL

## 2021-06-29 VITALS
SYSTOLIC BLOOD PRESSURE: 138 MMHG | WEIGHT: 189 LBS | DIASTOLIC BLOOD PRESSURE: 84 MMHG | BODY MASS INDEX: 33.49 KG/M2 | HEIGHT: 63 IN

## 2021-06-29 DIAGNOSIS — Z32.02 URINE PREGNANCY TEST NEGATIVE: ICD-10-CM

## 2021-06-29 DIAGNOSIS — R87.810 ASCUS WITH POSITIVE HIGH RISK HPV CERVICAL: Primary | ICD-10-CM

## 2021-06-29 DIAGNOSIS — R87.610 ASCUS WITH POSITIVE HIGH RISK HPV CERVICAL: Primary | ICD-10-CM

## 2021-06-29 LAB
HCG, URINE, POC: NEGATIVE
Lab: NORMAL
NEGATIVE QC PASS/FAIL: NORMAL
POSITIVE QC PASS/FAIL: NORMAL

## 2021-06-29 PROCEDURE — 57454 BX/CURETT OF CERVIX W/SCOPE: CPT | Performed by: OBSTETRICS & GYNECOLOGY

## 2021-06-29 PROCEDURE — 81025 URINE PREGNANCY TEST: CPT | Performed by: OBSTETRICS & GYNECOLOGY

## 2021-06-29 ASSESSMENT — ENCOUNTER SYMPTOMS
CONSTIPATION: 0
ALLERGIC/IMMUNOLOGIC NEGATIVE: 1
VOMITING: 0
DIARRHEA: 0
RESPIRATORY NEGATIVE: 1
ANAL BLEEDING: 0
ABDOMINAL DISTENTION: 0
NAUSEA: 0
RECTAL PAIN: 0
EYES NEGATIVE: 1
ABDOMINAL PAIN: 0
BLOOD IN STOOL: 0

## 2021-06-29 NOTE — PROGRESS NOTES
tablet 3    ramipril (ALTACE) 5 MG capsule Take 1 capsule by mouth daily (Patient taking differently: Take 10 mg by mouth daily ) 90 capsule 3    escitalopram (LEXAPRO) 20 MG tablet TK 1 T PO QD  1    traZODone (DESYREL) 50 MG tablet Take 100 mg by mouth nightly       potassium chloride (K-TAB) 10 MEQ extended release tablet Take 1 tablet by mouth daily (Patient taking differently: Take 10 mEq by mouth 3 times daily ) 180 tablet 3    omeprazole (PRILOSEC) 20 MG delayed release capsule TAKE 2 CAPSULES BY MOUTH TWICE DAILY (Patient taking differently: TAKE 1 CAPSULES BY MOUTH TWICE DAILY) 360 capsule 1    augmented betamethasone dipropionate (DIPROLENE-AF) 0.05 % cream SONDRA EXT AA BID  3    B-D UF III MINI PEN NEEDLES 31G X 5 MM MISC Use 1 daily to inject insulin 100 each 1    VRAYLAR 3 MG CAPS TK ONE C PO  QHS  1     No current facility-administered medications for this visit. Social History     Socioeconomic History    Marital status:      Spouse name: Not on file    Number of children: Not on file    Years of education: Not on file    Highest education level: Not on file   Occupational History    Not on file   Tobacco Use    Smoking status: Never Smoker    Smokeless tobacco: Never Used   Vaping Use    Vaping Use: Never used   Substance and Sexual Activity    Alcohol use: Yes     Alcohol/week: 0.0 standard drinks     Comment: ocassionally    Drug use: No    Sexual activity: Not Currently     Partners: Male   Other Topics Concern    Not on file   Social History Narrative    Not on file     Social Determinants of Health     Financial Resource Strain:     Difficulty of Paying Living Expenses:    Food Insecurity:     Worried About Running Out of Food in the Last Year:     [address] in the Last Year:    Transportation Needs:     Lack of Transportation (Medical):      Lack of Transportation (Non-Medical):    Physical Activity:     Days of Exercise per Week:     Minutes of Exercise per Session:    Stress:     Feeling of Stress :    Social Connections:     Frequency of Communication with Friends and Family:     Frequency of Social Gatherings with Friends and Family:     Attends Church Services:     Active Member of Clubs or Organizations:     Attends Club or Organization Meetings:     Marital Status:    Intimate Partner Violence:     Fear of Current or Ex-Partner:     Emotionally Abused:     Physically Abused:     Sexually Abused:      Family History   Problem Relation Age of Onset    Heart Disease Mother     Diabetes Father     Heart Disease Father     Cancer Father         thyroid    No Known Problems Sister     No Known Problems Paternal Grandfather     No Known Problems Paternal Grandmother     No Known Problems Maternal Grandmother     No Known Problems Maternal Grandfather     No Known Problems Brother     No Known Problems Other     Breast Cancer Neg Hx     Colon Cancer Neg Hx     Eclampsia Neg Hx     Hypertension Neg Hx     Ovarian Cancer Neg Hx      Labor Neg Hx     Spont Abortions Neg Hx     Stroke Neg Hx        Review of Systems   Constitutional: Negative. Negative for activity change, appetite change, chills, diaphoresis, fatigue, fever and unexpected weight change. HENT: Negative. Eyes: Negative. Respiratory: Negative. Cardiovascular: Negative. Gastrointestinal: Negative for abdominal distention, abdominal pain, anal bleeding, blood in stool, constipation, diarrhea, nausea, rectal pain and vomiting. Endocrine: Negative. Genitourinary: Negative for decreased urine volume, difficulty urinating, dyspareunia, dysuria, enuresis, flank pain, frequency, genital sores, hematuria, menstrual problem, pelvic pain, urgency, vaginal bleeding, vaginal discharge and vaginal pain. Musculoskeletal: Negative. Skin: Negative. Allergic/Immunologic: Negative. Neurological: Negative. Hematological: Negative. Psychiatric/Behavioral: Negative.        :     Physical Exam  Constitutional:       General: She is not in acute distress. Appearance: She is well-developed. She is not diaphoretic. HENT:      Head: Normocephalic and atraumatic. Eyes:      Conjunctiva/sclera: Conjunctivae normal.   Cardiovascular:      Rate and Rhythm: Normal rate and regular rhythm. Pulmonary:      Effort: Pulmonary effort is normal. No respiratory distress. Genitourinary:     Labia:         Right: No rash, tenderness, lesion or injury. Left: No rash, tenderness, lesion or injury. Vagina: Normal. No signs of injury and foreign body. No vaginal discharge, erythema, tenderness or bleeding. Comments: No abnormal discharge. No cervical or vaginal lesions. Normal external genitalia. Musculoskeletal:         General: No tenderness or deformity. Normal range of motion. Cervical back: Normal range of motion and neck supple. Skin:     General: Skin is warm and dry. Coloration: Skin is not pale. Neurological:      Mental Status: She is alert and oriented to person, place, and time. Motor: No abnormal muscle tone. Coordination: Coordination normal.   Psychiatric:         Behavior: Behavior normal.         Thought Content: Thought content normal.         Judgment: Judgment normal.         Colposcopy:     Cervix with mild KHADIJAH ( bx done 11:00 ); no abnormal vasculature or punctation. ECC performed Good hemostasis and bx site. Assessment:      Diagnosis Orders   1. ASCUS with positive high risk HPV cervical  POC Pregnancy Urine Qual    08786 - [place] COLPOSC,CERVIX W/ADJ VAG,W/BX & CURRETAG   2. Urine pregnancy test negative           :      Medications placedthis encounter:  No orders of the defined types were placed in this encounter.         Orders placedthis encounter:  Orders Placed This Encounter   Procedures    POC Pregnancy Urine Qual    97243 - [place] COLPOSC,CERVIX W/ADJ VAG,W/BX &

## 2021-07-09 ENCOUNTER — OFFICE VISIT (OUTPATIENT)
Dept: FAMILY MEDICINE CLINIC | Age: 51
End: 2021-07-09
Payer: COMMERCIAL

## 2021-07-09 VITALS
HEART RATE: 87 BPM | DIASTOLIC BLOOD PRESSURE: 70 MMHG | HEIGHT: 63 IN | OXYGEN SATURATION: 98 % | WEIGHT: 189 LBS | BODY MASS INDEX: 33.49 KG/M2 | TEMPERATURE: 96.8 F | SYSTOLIC BLOOD PRESSURE: 120 MMHG

## 2021-07-09 DIAGNOSIS — R42 DIZZINESS: Primary | ICD-10-CM

## 2021-07-09 DIAGNOSIS — H65.193 ACUTE EFFUSION OF BOTH MIDDLE EARS: ICD-10-CM

## 2021-07-09 LAB
CHP ED QC CHECK: ABNORMAL
GLUCOSE BLD-MCNC: 306 MG/DL

## 2021-07-09 PROCEDURE — 82962 GLUCOSE BLOOD TEST: CPT | Performed by: PHYSICIAN ASSISTANT

## 2021-07-09 PROCEDURE — 99213 OFFICE O/P EST LOW 20 MIN: CPT | Performed by: PHYSICIAN ASSISTANT

## 2021-07-09 RX ORDER — MECLIZINE HYDROCHLORIDE 25 MG/1
25 TABLET ORAL 3 TIMES DAILY PRN
Qty: 15 TABLET | Refills: 0 | Status: SHIPPED | OUTPATIENT
Start: 2021-07-09 | End: 2021-07-14

## 2021-07-09 RX ORDER — PSEUDOEPHEDRINE HYDROCHLORIDE 30 MG/1
30 TABLET ORAL 4 TIMES DAILY PRN
Qty: 20 TABLET | Refills: 0 | Status: SHIPPED | OUTPATIENT
Start: 2021-07-09 | End: 2021-07-14

## 2021-07-09 SDOH — ECONOMIC STABILITY: FOOD INSECURITY: WITHIN THE PAST 12 MONTHS, THE FOOD YOU BOUGHT JUST DIDN'T LAST AND YOU DIDN'T HAVE MONEY TO GET MORE.: OFTEN TRUE

## 2021-07-09 SDOH — ECONOMIC STABILITY: FOOD INSECURITY: WITHIN THE PAST 12 MONTHS, YOU WORRIED THAT YOUR FOOD WOULD RUN OUT BEFORE YOU GOT MONEY TO BUY MORE.: OFTEN TRUE

## 2021-07-09 ASSESSMENT — ENCOUNTER SYMPTOMS
SORE THROAT: 0
NAUSEA: 1
DIARRHEA: 0
RHINORRHEA: 0
ABDOMINAL PAIN: 0
COUGH: 0
VOMITING: 1

## 2021-07-09 ASSESSMENT — SOCIAL DETERMINANTS OF HEALTH (SDOH): HOW HARD IS IT FOR YOU TO PAY FOR THE VERY BASICS LIKE FOOD, HOUSING, MEDICAL CARE, AND HEATING?: HARD

## 2021-07-09 NOTE — PROGRESS NOTES
[facility] Encounter  CHIEF COMPLAINT       Chief Complaint   Patient presents with    Otalgia     pressure in ear, water in ears, pain in middle of the night, x3days     Nausea & Vomiting     patient states mostly water, vomiting 3 times     Dizziness     patient states she left work early with dizziness         HISTORY OF PRESENT ILLNESS   Aramis Riley is a 53 y.o. female who presents with:  Otalgia   There is pain in both ears. This is a new problem. Episode onset: 3 days. The problem occurs constantly. There has been no fever. Associated symptoms include rhinorrhea and a sore throat. Pertinent negatives include no abdominal pain, coughing, diarrhea, ear discharge, headaches, hearing loss, neck pain, rash or vomiting. She has tried nothing for the symptoms. Nausea & Vomiting  This is a new problem. The current episode started today (three days). The problem has been unchanged. Associated symptoms include congestion, nausea, a sore throat and vertigo. Pertinent negatives include no abdominal pain, anorexia, arthralgias, change in bowel habit, chest pain, chills, coughing, diaphoresis, fatigue, fever, headaches, joint swelling, myalgias, neck pain, numbness, rash, swollen glands, urinary symptoms, visual change, vomiting or weakness. The symptoms are aggravated by bending. She has tried nothing for the symptoms. Dizziness  This is a new problem. Episode onset: three days. The problem has been unchanged. Associated symptoms include congestion, nausea, a sore throat and vertigo. Pertinent negatives include no abdominal pain, anorexia, arthralgias, change in bowel habit, chest pain, chills, coughing, diaphoresis, fatigue, fever, headaches, joint swelling, myalgias, neck pain, numbness, rash, swollen glands, urinary symptoms, visual change, vomiting or weakness. The symptoms are aggravated by bending. She has tried nothing for the symptoms.      REVIEW OF SYSTEMS Review of Systems   Constitutional: Negative for activity change, appetite change, chills, diaphoresis, fatigue and fever. HENT: Positive for congestion, ear pain, rhinorrhea and sore throat. Negative for drooling, ear discharge, hearing loss, sinus pressure and sinus pain. Eyes: Negative for visual disturbance. Respiratory: Negative for cough, chest tightness and shortness of breath. Cardiovascular: Negative for chest pain. Gastrointestinal: Positive for nausea. Negative for abdominal pain, anorexia, change in bowel habit, diarrhea and vomiting. Endocrine: Negative for cold intolerance. Genitourinary: Negative for dysuria, flank pain, frequency and hematuria. Musculoskeletal: Negative for arthralgias, back pain, joint swelling, myalgias and neck pain. Skin: Negative for rash. Allergic/Immunologic: Negative for food allergies. Neurological: Positive for dizziness and vertigo. Negative for weakness, light-headedness, numbness and headaches. Hematological: Does not bruise/bleed easily. PAST MEDICAL HISTORY         Diagnosis Date    Depression     Environmental allergies     Gastroparesis     GERD (gastroesophageal reflux disease)     Headache     Dr. Melisa Khoury HPV in female     Hyperlipidemia     Hypertension     Leukocytosis     Migraines     Type II or unspecified type diabetes mellitus without mention of complication, not stated as uncontrolled      SURGICAL HISTORY     Patient  has a past surgical history that includes Gallbladder surgery (1999); Breast surgery (1999); bone marrow biopsy (2012); Cardiac catheterization (2009); Endometrial ablation (2012?); Breast reduction surgery (1999); and Cholecystectomy.   CURRENT MEDICATIONS       Previous Medications    ATORVASTATIN (LIPITOR) 20 MG TABLET    Take 1 tablet by mouth daily    AUGMENTED BETAMETHASONE DIPROPIONATE (DIPROLENE-AF) 0.05 % CREAM    SONDRA EXT AA BID    B-D UF III MINI PEN NEEDLES 31G X 5 MM MISC    Use 1 daily to inject insulin    ESCITALOPRAM (LEXAPRO) 20 MG TABLET    TK 1 T PO QD    FENOFIBRATE (TRICOR) 145 MG TABLET    Take 1 tablet by mouth daily    FLUCONAZOLE (DIFLUCAN) 150 MG TABLET    TAKE 1 TABLET BY MOUTH DAILY AS NEEDED FOR DISCHARGE    INSULIN DEGLUDEC (TRESIBA FLEXTOUCH) 100 UNIT/ML SOPN    Inject 50 Units into the skin nightly    INSULIN LISPRO (HUMALOG KWIKPEN) 100 UNIT/ML PEN    Inject 20 Units into the skin 3 times daily (before meals)    OMEPRAZOLE (PRILOSEC) 20 MG DELAYED RELEASE CAPSULE    TAKE 2 CAPSULES BY MOUTH TWICE DAILY    POTASSIUM CHLORIDE (K-TAB) 10 MEQ EXTENDED RELEASE TABLET    Take 1 tablet by mouth daily    PROPRANOLOL (INDERAL) 20 MG TABLET    TAKE 1 TABLET BY MOUTH TWICE DAILY    RAMIPRIL (ALTACE) 5 MG CAPSULE    Take 1 capsule by mouth daily    TRAZODONE (DESYREL) 50 MG TABLET    Take 100 mg by mouth nightly     VRAYLAR 3 MG CAPS    TK ONE C PO  QHS     ALLERGIES     Patient is is allergic to relafen [nabumetone]. FAMILY HISTORY     Patient'sfamily history includes Cancer in her father; Diabetes in her father; Heart Disease in her father and mother; No Known Problems in her brother, maternal grandfather, maternal grandmother, paternal grandfather, paternal grandmother, sister, and another family member. SOCIAL HISTORY     Patient  reports that she has never smoked. She has never used smokeless tobacco. She reports current alcohol use. She reports that she does not use drugs. PHYSICAL EXAM     VITALS  BP: 120/70, Temp: 96.8 °F (36 °C), Pulse: 87,  , SpO2: 98 %  Physical Exam  Vitals and nursing note reviewed. Constitutional:       General: She is awake. She is not in acute distress. Appearance: Normal appearance. She is well-developed. She is not ill-appearing, toxic-appearing or diaphoretic. HENT:      Head: Normocephalic and atraumatic. Right Ear: Hearing, ear canal and external ear normal. A middle ear effusion is present. Tympanic membrane is bulging.       Left Ear: Hearing, ear canal and external ear normal. A middle ear effusion is present. Tympanic membrane is bulging. Nose: Congestion present. Mouth/Throat:      Pharynx: Oropharynx is clear. Uvula midline. Eyes:      General: Lids are normal. Vision grossly intact. Gaze aligned appropriately. Conjunctiva/sclera: Conjunctivae normal.   Cardiovascular:      Rate and Rhythm: Normal rate and regular rhythm. Pulses: Normal pulses. Heart sounds: Normal heart sounds, S1 normal and S2 normal.   Pulmonary:      Effort: Pulmonary effort is normal.      Breath sounds: Normal breath sounds and air entry. Musculoskeletal:      Cervical back: Normal range of motion. Skin:     General: Skin is warm. Capillary Refill: Capillary refill takes less than 2 seconds. Neurological:      Mental Status: She is alert and oriented to person, place, and time. Gait: Gait is intact. Psychiatric:         Attention and Perception: Attention normal.         Mood and Affect: Mood normal.         Speech: Speech normal.         Behavior: Behavior normal. Behavior is cooperative. READY CARE COURSE   Labs:  Results for POC orders placed in visit on 07/09/21   POCT Glucose   Result Value Ref Range    Glucose 306 mg/dL    QC OK? IMAGING:  No orders to display     Scheduled Meds:  Continuous Infusions:  PRN Meds:. PROCEDURES:  FINAL IMPRESSION      1. Dizziness    2. Acute effusion of both middle ears      DISPOSITION/PLAN   1. Patient's congestions is most likely causing pressure in her ear. There is effusion present which can explain the patient's dizziness. Patient will be started on sudafed for the congestions and meclizine for the dizziness. Patient is agreeable to this therapy. Went over potential s/e with the medications. Discussed signs and symptoms which require immediate follow-up in ED/call to 911. Patient verbalized understanding.     On this date 7/9/2021 I have spent 20 minutes reviewing previous notes, test results and face to face with the patient discussing the diagnosis and importance of compliance with the treatment plan as well as documenting on the day of the visit. PATIENT REFERRED TO:  No follow-ups on file. DISCHARGE MEDICATIONS:  New Prescriptions    MECLIZINE (ANTIVERT) 25 MG TABLET    Take 1 tablet by mouth 3 times daily as needed for Dizziness    PSEUDOEPHEDRINE (SUDAFED) 30 MG TABLET    Take 1 tablet by mouth 4 times daily as needed for Congestion     Cannot display discharge medications since this is not an admission.        Evans Yancey

## 2021-07-09 NOTE — PROGRESS NOTES
sister, and another family member. SOCIAL HISTORY     Patient  reports that she has never smoked. She has never used smokeless tobacco. She reports current alcohol use. She reports that she does not use drugs. PHYSICAL EXAM     VITALS  BP: 120/70, Temp: 96.8 °F (36 °C), Pulse: 87,  , SpO2: 98 %  Physical Exam  READY CARE COURSE   Labs:  No results found for this visit on 07/09/21. IMAGING:  No orders to display     Scheduled Meds:  Continuous Infusions:  PRN Meds:. PROCEDURES:  FINAL IMPRESSION    No diagnosis found. DISPOSITION/PLAN           PATIENT REFERRED TO:  No follow-ups on file. DISCHARGE MEDICATIONS:  New Prescriptions    No medications on file     Cannot display discharge medications since this is not an admission.        Tono Starkey, [address]

## 2021-07-10 ASSESSMENT — ENCOUNTER SYMPTOMS
BACK PAIN: 0
SINUS PRESSURE: 0
DIARRHEA: 0
SORE THROAT: 1
CHANGE IN BOWEL HABIT: 0
SINUS PAIN: 0
CHEST TIGHTNESS: 0
SWOLLEN GLANDS: 0
SHORTNESS OF BREATH: 0
COUGH: 0
VOMITING: 0
VISUAL CHANGE: 0
ABDOMINAL PAIN: 0
RHINORRHEA: 1

## 2021-07-10 ASSESSMENT — VISUAL ACUITY: OU: 1

## 2021-07-19 ENCOUNTER — TELEPHONE (OUTPATIENT)
Dept: OBGYN CLINIC | Age: 51
End: 2021-07-19

## 2021-07-19 NOTE — TELEPHONE ENCOUNTER
Pt stated that she has vertigo and unable to drive.  She would like to know if she could get a virtual visit for that day

## 2021-07-22 ENCOUNTER — TELEMEDICINE (OUTPATIENT)
Dept: OBGYN CLINIC | Age: 51
End: 2021-07-22
Payer: COMMERCIAL

## 2021-07-22 DIAGNOSIS — R87.810 ASCUS WITH POSITIVE HIGH RISK HPV CERVICAL: Primary | ICD-10-CM

## 2021-07-22 DIAGNOSIS — R87.610 ASCUS WITH POSITIVE HIGH RISK HPV CERVICAL: Primary | ICD-10-CM

## 2021-07-22 PROCEDURE — 99441 PR PHYS/QHP TELEPHONE EVALUATION 5-10 MIN: CPT | Performed by: OBSTETRICS & GYNECOLOGY

## 2021-07-22 NOTE — PROGRESS NOTES
Yudi York is a 53 y.o. female evaluated via telephone on 7/22/2021. Consent:  She and/or health care decision maker is aware that that she may receive a bill for this telephone service, depending on her insurance coverage, and has provided verbal consent to proceed: Yes      Documentation:  I communicated with the patient and/or health care decision maker about colposcopy results. Details of this discussion including any medical advice provided:    Pap ASCUS / HPV and cervical bx negative for dysplasia. Discussed results and all questions answered. Instructed to F/U with repeat pap 6 months. I affirm this is a Patient Initiated Episode with a Patient who has not had a related appointment within my department in the past 7 days or scheduled within the next 24 hours. Patient identification was verified at the start of the visit: Yes    Total Time: minutes: 5-10 minutes    The visit was conducted pursuant to the emergency declaration under the [facility], [address] [place] [address] authority and the [employer] General Act. Patient identification was verified, and a caregiver was present when appropriate. The patient was located in a state where the provider was credentialed to provide care.     Note: not billable if this call serves to triage the patient into an appointment for the relevant concern      Marco Blount MD

## 2021-08-24 ENCOUNTER — OFFICE VISIT (OUTPATIENT)
Dept: URGENT CARE | Facility: URGENT CARE | Age: 51
End: 2021-08-24
Payer: COMMERCIAL

## 2021-08-24 VITALS
RESPIRATION RATE: 20 BRPM | HEART RATE: 94 BPM | BODY MASS INDEX: 33.66 KG/M2 | TEMPERATURE: 98.1 F | SYSTOLIC BLOOD PRESSURE: 154 MMHG | DIASTOLIC BLOOD PRESSURE: 106 MMHG | WEIGHT: 190 LBS | HEIGHT: 63 IN | OXYGEN SATURATION: 95 %

## 2021-08-24 DIAGNOSIS — L03.032 PARONYCHIA OF GREAT TOE, LEFT: ICD-10-CM

## 2021-08-24 DIAGNOSIS — M79.675 GREAT TOE PAIN, LEFT: Primary | ICD-10-CM

## 2021-08-24 PROCEDURE — 99203 OFFICE O/P NEW LOW 30 MIN: CPT | Performed by: PHYSICIAN ASSISTANT

## 2021-08-24 RX ORDER — TRAZODONE HYDROCHLORIDE 100 MG/1
100 TABLET ORAL NIGHTLY
COMMUNITY
Start: 2021-07-27

## 2021-08-24 RX ORDER — INSULIN PUMP CONTROLLER
EACH MISCELLANEOUS
COMMUNITY
Start: 2021-08-19

## 2021-08-24 RX ORDER — ATORVASTATIN CALCIUM 20 MG/1
20 TABLET, FILM COATED ORAL
COMMUNITY
Start: 2021-01-11

## 2021-08-24 RX ORDER — CARIPRAZINE 3 MG/1
3 CAPSULE, GELATIN COATED ORAL DAILY
COMMUNITY
Start: 2021-07-27

## 2021-08-24 RX ORDER — INSULIN LISPRO 200 [IU]/ML
INJECTION, SOLUTION SUBCUTANEOUS
COMMUNITY
Start: 2021-07-01

## 2021-08-24 RX ORDER — PROPRANOLOL HYDROCHLORIDE 20 MG/1
TABLET ORAL
COMMUNITY

## 2021-08-24 RX ORDER — ESOMEPRAZOLE MAGNESIUM 40 MG/1
CAPSULE, DELAYED RELEASE ORAL
COMMUNITY

## 2021-08-24 RX ORDER — NAPROXEN 500 MG/1
TABLET ORAL
COMMUNITY

## 2021-08-24 RX ORDER — SCOPOLAMINE 1 MG/3D
PATCH, EXTENDED RELEASE TRANSDERMAL
COMMUNITY
Start: 2021-08-13

## 2021-08-24 RX ORDER — DIAZEPAM 5 MG/1
5 TABLET ORAL 3 TIMES DAILY
COMMUNITY
Start: 2021-07-12

## 2021-08-24 RX ORDER — ESCITALOPRAM OXALATE 20 MG/1
TABLET ORAL
COMMUNITY

## 2021-08-24 RX ORDER — FENOFIBRATE 145 MG/1
145 TABLET, FILM COATED ORAL
COMMUNITY
Start: 2021-01-11

## 2021-08-24 RX ORDER — DOXYCYCLINE 100 MG/1
100 CAPSULE ORAL 2 TIMES DAILY
Qty: 20 CAPSULE | Refills: 0 | Status: SHIPPED | OUTPATIENT
Start: 2021-08-24 | End: 2021-09-03

## 2021-08-24 RX ORDER — POTASSIUM CHLORIDE 750 MG/1
TABLET, EXTENDED RELEASE ORAL
COMMUNITY
Start: 2021-08-09

## 2021-08-24 ASSESSMENT — ENCOUNTER SYMPTOMS
FEVER: 0
CHILLS: 0
NUMBNESS: 1
COLOR CHANGE: 1

## 2021-08-24 ASSESSMENT — PAIN SCALES - GENERAL: PAINLEVEL: 0-NO PAIN

## 2021-08-24 NOTE — PROGRESS NOTES
Subjective   HPI  Patient presents accompanied by  for left great toe discoloration and drainage.  Patient is visiting the area from [place].  She is traveling back to [place] on Thursday of this week.  Patient denies traumatic injury to her left toe.  Patient states for the past 4 days she has noticed redness and swelling to her left big toe.  Past couple days she has had some white and clear drainage from the area.  Patient denies pain.  Patient states she has a history of diabetic neuropathy and cannot feel pain in her feet.      Review of Systems   Constitutional: Negative for chills and fever.   Skin: Positive for color change. Negative for pallor.   Neurological: Positive for numbness (Chronic neuropathy of feet.).        Allergies   Allergen Reactions   • Nabumetone Rash      Past Medical History:   Diagnosis Date   • Diabetes mellitus (CMS/HCC) (HCC)    • Hypercholesteremia    • Neuropathy      Past Surgical History:   Procedure Laterality Date   • CHOLECYSTECTOMY        Social History     Tobacco Use   Smoking Status Never Smoker   Smokeless Tobacco Never Used        Current Outpatient Medications:   •  atorvastatin (LIPITOR) 20 mg tablet, Take 20 mg by mouth, Disp: , Rfl:   •  Vraylar 3 mg capsule capsule, Take 3 mg by mouth daily, Disp: , Rfl:   •  escitalopram (Lexapro) 20 mg tablet, , Disp: , Rfl:   •  esomeprazole (NexIUM) 40 mg capsule, , Disp: , Rfl:   •  fenofibrate (TRICOR) 145 mg tablet, Take 145 mg by mouth, Disp: , Rfl:   •  HumaLOG KwikPen Insulin 200 unit/mL (3 mL) insulin pen, SMARTSIG:SUB-Q, Disp: , Rfl:   •  Omnipod Dash 5 Pack Pod cartridge, SMARTSIG:SUB-Q, Disp: , Rfl:   •  naproxen (NAPROSYN) 500 mg tablet, , Disp: , Rfl:   •  potassium chloride 10 mEq CR tablet, , Disp: , Rfl:   •  propranoloL (INDERAL) 20 mg tablet, , Disp: , Rfl:   •  scopolamine (TRANSDERM-SCOP) 1 mg over 3 days patch 3 day, , Disp: , Rfl:   •  traZODone (DESYREL) 100 mg tablet, Take 100 mg by mouth nightly, Disp: ,  "Rfl:   •  diazePAM (VALIUM) 5 mg tablet, Take 5 mg by mouth 3 (three) times a day, Disp: , Rfl:     Objective   BP (!) 154/106 (BP Location: Right arm, Patient Position: Sitting, Cuff Size: Long Adult) Comment: manual  Pulse 94   Temp 36.7 °C (98.1 °F) (Temporal)   Resp 20   Ht 1.6 m (5' 3\")   Wt 86.2 kg (190 lb)   SpO2 95%   BMI 33.66 kg/m²   Physical Exam   General: Obese female.  Patient appears well.  She is in no acute distress.  -----Lower Extremity Exam-----  Left foot:  Skin: Patient has localized erythema to dorsal aspect of left great toe and surrounding erythema. There is no involvement of DIP joint. There is very small fluctuance to base of nail.  There is small amount of white drainage.  There is no ecchymosis, coolness, or crepitus.  Vascular: Dorsalis pedis +2 and systemic. Distal capillary refill is brisk.  Musculoskeletal: Full active range of motion  Neurological: Distal sensation is absent.  Patient states this is normal for her.    Imaging  No results found.    Laboratory  No results found for this or any previous visit (from the past 12 hour(s)).    Procedure  No procedures performed.    Assessment/Plan   MDM: Sarai Dunn is a 50 y.o. female with a past medical history of type 2 diabetes and diabetic neuropathy who presents for paronychia of left great toe.  The infection appears to be localized at this time with evidence of cellulitis.  There is no involvement of DIP joint. Recommend treatment with doxycycline.  Warm soaks 5-6 times a day for 10 to 15 minutes at a time.  Recommend reevaluation for worsening symptoms beyond 3 days of therapy.  Educational handout was provided.  Patient and  verbalized understanding and were agreement plan of care.    Diagnoses and all orders for this visit:    Great toe pain, left    Paronychia of great toe, left  -     doxycycline (MONODOX) 100 mg capsule; Take 1 capsule (100 mg total) by mouth 2 (two) times a day for 10 days         Melchor " ARIAS Loja PA-C    A voice recognition program was used to aid in documentation of this record. Sometimes words are not printed exactly as they were spoken.  While efforts were made to carefully edit and correct any inaccuracies, some areas may be present; please take these into context.  Please contact the provider if areas are identified.

## 2021-08-24 NOTE — PATIENT INSTRUCTIONS
Patient Education     Paronychia  Paronychia is an infection of the skin. It happens near a fingernail or toenail. It may cause pain and swelling around the nail. In some cases, a fluid-filled bump (abscess) can form near or under the nail.  Usually, this condition is not serious, and it clears up with treatment.  Follow these instructions at home:  Wound care  · Keep the affected area clean.  · Soak the fingers or toes in warm water as told by your doctor. You may be told to do this for 20 minutes, 2-3 times a day.  · Keep the area dry when you are not soaking it.  · Do not try to drain a fluid-filled bump on your own.  · Follow instructions from your doctor about how to take care of the affected area. Make sure you:  ? Wash your hands with soap and water before you change your bandage (dressing). If you cannot use soap and water, use hand .  ? Change your bandage as told by your doctor.  · If you had a fluid-filled bump and your doctor drained it, check the area every day for signs of infection. Check for:  ? Redness, swelling, or pain.  ? Fluid or blood.  ? Warmth.  ? Pus or a bad smell.  Medicines    · Take over-the-counter and prescription medicines only as told by your doctor.  · If you were prescribed an antibiotic medicine, take it as told by your doctor. Do not stop taking it even if you start to feel better.  General instructions  · Avoid touching any chemicals.  · Do not pick at the affected area.  Prevention  · To prevent this condition from happening again:  ? Wear rubber gloves when putting your hands in water for washing dishes or other tasks.  ? Wear gloves if your hands might touch  or chemicals.  ? Avoid injuring your nails or fingertips.  ? Do not bite your nails or tear hangnails.  ? Do not cut your nails very short.  ? Do not cut the skin at the base and sides of the nail (cuticles).  ? Use clean nail clippers or scissors when trimming nails.  Contact a doctor if:  · You feel  worse.  · You do not get better.  · You have more fluid, blood, or pus coming from the affected area.  · Your finger or knuckle is swollen or is hard to move.  Get help right away if you have:  · A fever or chills.  · Redness spreading from the affected area.  · Pain in a joint or muscle.  Summary  · Paronychia is an infection of the skin. It happens near a fingernail or toenail.  · This condition may cause pain and swelling around the nail.  · Soak the fingers or toes in warm water as told by your doctor.  · Usually, this condition is not serious, and it clears up with treatment.  This information is not intended to replace advice given to you by your health care provider. Make sure you discuss any questions you have with your health care provider.  Document Revised: 01/04/2019 Document Reviewed: 12/31/2018  Elsevier Patient Education © 2021 [employer]

## 2021-10-01 ENCOUNTER — OFFICE VISIT (OUTPATIENT)
Dept: ENDOCRINOLOGY | Age: 51
End: 2021-10-01
Payer: COMMERCIAL

## 2021-10-01 VITALS
HEART RATE: 107 BPM | HEIGHT: 63 IN | OXYGEN SATURATION: 95 % | BODY MASS INDEX: 33.49 KG/M2 | WEIGHT: 189 LBS | SYSTOLIC BLOOD PRESSURE: 138 MMHG | DIASTOLIC BLOOD PRESSURE: 81 MMHG

## 2021-10-01 DIAGNOSIS — E11.69 TYPE 2 DIABETES MELLITUS WITH OTHER SPECIFIED COMPLICATION, WITHOUT LONG-TERM CURRENT USE OF INSULIN (HCC): Primary | ICD-10-CM

## 2021-10-01 LAB
CHP ED QC CHECK: NORMAL
GLUCOSE BLD-MCNC: 128 MG/DL
HBA1C MFR BLD: 11.2 %

## 2021-10-01 PROCEDURE — 99243 OFF/OP CNSLTJ NEW/EST LOW 30: CPT | Performed by: INTERNAL MEDICINE

## 2021-10-01 PROCEDURE — G8427 DOCREV CUR MEDS BY ELIG CLIN: HCPCS | Performed by: INTERNAL MEDICINE

## 2021-10-01 PROCEDURE — 82962 GLUCOSE BLOOD TEST: CPT | Performed by: INTERNAL MEDICINE

## 2021-10-01 PROCEDURE — G8417 CALC BMI ABV UP PARAM F/U: HCPCS | Performed by: INTERNAL MEDICINE

## 2021-10-01 PROCEDURE — 2022F DILAT RTA XM EVC RTNOPTHY: CPT | Performed by: INTERNAL MEDICINE

## 2021-10-01 PROCEDURE — 83036 HEMOGLOBIN GLYCOSYLATED A1C: CPT | Performed by: INTERNAL MEDICINE

## 2021-10-01 PROCEDURE — G8484 FLU IMMUNIZE NO ADMIN: HCPCS | Performed by: INTERNAL MEDICINE

## 2021-10-01 RX ORDER — PIOGLITAZONEHYDROCHLORIDE 15 MG/1
15 TABLET ORAL DAILY
Qty: 30 TABLET | Refills: 3 | Status: SHIPPED | OUTPATIENT
Start: 2021-10-01 | End: 2022-09-09 | Stop reason: SDUPTHER

## 2021-10-01 RX ORDER — DULAGLUTIDE 0.75 MG/.5ML
0.75 INJECTION, SOLUTION SUBCUTANEOUS WEEKLY
Qty: 4 PEN | Refills: 3 | Status: ON HOLD | OUTPATIENT
Start: 2021-10-01 | End: 2022-06-25

## 2021-10-01 ASSESSMENT — ENCOUNTER SYMPTOMS
RESPIRATORY NEGATIVE: 1
VISUAL CHANGE: 0

## 2021-10-01 NOTE — PROGRESS NOTES
10/1/2021    Assessment:       Diagnosis Orders   1. Type 2 diabetes mellitus with other specified complication, without long-term current use of insulin (Formerly [facility])  POCT Glucose    POCT glycosylated hemoglobin (Hb A1C)    Microalbumin / Creatinine Urine Ratio    Lipid, Fasting    HM DIABETES FOOT EXAM    Hemoglobin L4R    Basic Metabolic Panel, Fasting         PLAN:     Orders Placed This Encounter   Procedures    Microalbumin / Creatinine Urine Ratio     Standing Status:   Future     Standing Expiration Date:   10/1/2022    Lipid, Fasting     Standing Status:   Future     Standing Expiration Date:   10/1/2022    Hemoglobin A1C     Standing Status:   Future     Standing Expiration Date:   10/1/2022    Basic Metabolic Panel, Fasting     Standing Status:   Future     Standing Expiration Date:   10/1/2022    POCT Glucose    POCT glycosylated hemoglobin (Hb A1C)     DIABETES FOOT EXAM     Continue insulin via pump as per current setting add Trulicity and Actos X1S goal of 7 or lower more than 50% of 40 minutes spent patient education counseling  Thank you for the referral  Diabetes education provided today:    Insulin pumps, how they work and how they affect blood sugar levels. Continuous Glucose monitor. How it works and checks blood sugars every 5 min. for 4 days during our tests. Orders Placed This Encounter   Medications    Dulaglutide (TRULICITY) 0.75 YB/5.6PL SOPN     Sig: Inject 0.75 mg into the skin once a week     Dispense:  4 pen     Refill:  3    pioglitazone (ACTOS) 15 MG tablet     Sig: Take 1 tablet by mouth daily     Dispense:  30 tablet     Refill:  3           Orders Placed This Encounter   Procedures    POCT Glucose    POCT glycosylated hemoglobin (Hb A1C)     Subjective:     Chief Complaint   Patient presents with    Diabetes     There were no vitals filed for this visit.   Wt Readings from Last 3 Encounters:   07/09/21 189 lb (85.7 kg)   06/29/21 189 lb (85.7 kg)   06/14/21 187 lb (84.8 kg)     BP Readings from Last 3 Encounters:   07/09/21 120/70   06/29/21 138/84   06/14/21 136/88     Patient referred here for uncontrolled type 2 diabetes currently on Omni pod insulin pump using Humalog U 200 is trying not to obtain any using it in the OmniPod blood sugars are still high in the 260 range also not really blood sugar readings to review on  download    Diabetes  She presents for her initial diabetic visit. She has type 2 diabetes mellitus. Associated symptoms include fatigue. Pertinent negatives for diabetes include no polydipsia, no polyuria, no visual change and no weight loss. There are no hypoglycemic complications. Risk factors for coronary artery disease include obesity. Current diabetic treatment includes insulin pump. Her overall blood glucose range is >200 mg/dl. (Lab Results       Component                Value               Date                       LABA1C                   11.2                10/01/2021              Pump settings were reviewed basal rate was 3 units/h sensitivity was 50 carb ratio was 15    Patient has been taking preset boluses of 45units also been running  Out of pods ) An ACE inhibitor/angiotensin II receptor blocker is being taken. Past Medical History:   Diagnosis Date    Depression     Environmental allergies     Gastroparesis     GERD (gastroesophageal reflux disease)     Headache     Dr. Trang Paredes HPV in female     Hyperlipidemia     Hypertension     Leukocytosis     Migraines     Type II or unspecified type diabetes mellitus without mention of complication, not stated as uncontrolled      Past Surgical History:   Procedure Laterality Date    [facility]SY  2012    [facility]    BREAST REDUCTION SURGERY  1999   [address] [facility] [place]    CARDIAC CATHETERIZATION  2009    (-)[facility]    CHOLECYSTECTOMY      ENDOMETRIAL ABLATION  2012?     GALLBLADDER SURGERY  1999     Social History     Socioeconomic History    Marital status:  Spouse name: Not on file    Number of children: Not on file    Years of education: Not on file    Highest education level: Not on file   Occupational History    Not on file   Tobacco Use    Smoking status: Never Smoker    Smokeless tobacco: Never Used   Vaping Use    Vaping Use: Never used   Substance and Sexual Activity    Alcohol use: Yes     Alcohol/week: 0.0 standard drinks     Comment: ocassionally    Drug use: No    Sexual activity: Not Currently     Partners: Male   Other Topics Concern    Not on file   Social History Narrative    Not on file     Social Determinants of Health     Financial Resource Strain: High Risk    Difficulty of Paying Living Expenses: Hard   Food Insecurity: Food Insecurity Present    Worried About Running Out of Food in the Last Year: Often true    Gayle of Food in the Last Year: Often true   Transportation Needs:     Lack of Transportation (Medical):      Lack of Transportation (Non-Medical):    Physical Activity:     Days of Exercise per Week:     Minutes of Exercise per Session:    Stress:     Feeling of Stress :    Social Connections:     Frequency of Communication with Friends and Family:     Frequency of Social Gatherings with Friends and Family:     Attends Hindu Services:     Active Member of Clubs or Organizations:     Attends Club or Organization Meetings:     Marital Status:    Intimate Partner Violence:     Fear of Current or Ex-Partner:     Emotionally Abused:     Physically Abused:     Sexually Abused:      Family History   Problem Relation Age of Onset    Heart Disease Mother     Diabetes Father     Heart Disease Father     Cancer Father         thyroid    No Known Problems Sister     No Known Problems Paternal Grandfather     No Known Problems Paternal Grandmother     No Known Problems Maternal Grandmother     No Known Problems Maternal Grandfather     No Known Problems Brother     No Known Problems Other     Breast Cancer Neg Hx     Colon Cancer Neg Hx     Eclampsia Neg Hx     Hypertension Neg Hx     Ovarian Cancer Neg Hx      Labor Neg Hx     Spont Abortions Neg Hx     Stroke Neg Hx      Allergies   Allergen Reactions    Relafen [Nabumetone] Rash       Current Outpatient Medications:     fenofibrate (TRICOR) 145 MG tablet, Take 1 tablet by mouth daily, Disp: 90 tablet, Rfl: 3    atorvastatin (LIPITOR) 20 MG tablet, Take 1 tablet by mouth daily, Disp: 90 tablet, Rfl: 3    fluconazole (DIFLUCAN) 150 MG tablet, TAKE 1 TABLET BY MOUTH DAILY AS NEEDED FOR DISCHARGE, Disp: 4 tablet, Rfl: 0    Insulin Degludec (TRESIBA FLEXTOUCH) 100 UNIT/ML SOPN, Inject 50 Units into the skin nightly, Disp: 5 pen, Rfl: 3    insulin lispro (HUMALOG KWIKPEN) 100 UNIT/ML pen, Inject 20 Units into the skin 3 times daily (before meals), Disp: 5 pen, Rfl: 3    propranolol (INDERAL) 20 MG tablet, TAKE 1 TABLET BY MOUTH TWICE DAILY (Patient taking differently: 20 mg daily ), Disp: 180 tablet, Rfl: 3    ramipril (ALTACE) 5 MG capsule, Take 1 capsule by mouth daily (Patient taking differently: Take 10 mg by mouth daily ), Disp: 90 capsule, Rfl: 3    escitalopram (LEXAPRO) 20 MG tablet, TK 1 T PO QD, Disp: , Rfl: 1    traZODone (DESYREL) 50 MG tablet, Take 100 mg by mouth nightly , Disp: , Rfl:     potassium chloride (K-TAB) 10 MEQ extended release tablet, Take 1 tablet by mouth daily (Patient taking differently: Take 10 mEq by mouth 3 times daily ), Disp: 180 tablet, Rfl: 3    omeprazole (PRILOSEC) 20 MG delayed release capsule, TAKE 2 CAPSULES BY MOUTH TWICE DAILY (Patient taking differently: TAKE 1 CAPSULES BY MOUTH TWICE DAILY), Disp: 360 capsule, Rfl: 1    augmented betamethasone dipropionate (DIPROLENE-AF) 0.05 % cream, SONDRA EXT AA BID, Disp: , Rfl: 3    B-D UF III MINI PEN NEEDLES 31G X 5 MM MISC, Use 1 daily to inject insulin, Disp: 100 each, Rfl: 1    VRAYLAR 3 MG CAPS, TK ONE C PO  QHS, Disp: , Rfl: 1  Lab Results Component Value Date     08/06/2019    K 3.3 (L) 08/06/2019    CL 95 08/06/2019    CO2 26 08/06/2019    BUN 7 08/06/2019    CREATININE 0.37 (L) 08/06/2019    GLUCOSE 128 10/01/2021    CALCIUM 9.4 08/06/2019    PROT 7.8 08/06/2019    LABALBU 4.3 08/06/2019    BILITOT <0.2 08/06/2019    ALKPHOS 190 (H) 08/06/2019    AST 10 08/06/2019    ALT 12 08/06/2019    LABGLOM >60.0 08/06/2019    GFRAA >60.0 08/06/2019    GLOB 3.5 08/06/2019     Lab Results   Component Value Date    WBC 13.2 (H) 08/06/2019    HGB 14.5 08/06/2019    HCT 40.9 08/06/2019    MCV 89.8 08/06/2019     08/06/2019     Lab Results   Component Value Date    LABA1C 11.6 (H) 03/09/2019    LABA1C 12.3 (H) 09/01/2018    LABA1C 9.2 01/23/2018     Lab Results   Component Value Date    HDL 29 (L) 03/09/2019    HDL 30 (L) 09/01/2018    HDL 28 (L) 11/16/2016    LDLCALC 153 (H) 03/09/2019    LDLCALC 194 (H) 09/01/2018    LDLCALC 119 11/16/2016    CHOL 244 (H) 03/09/2019    CHOL 272 (H) 09/01/2018    CHOL 179 11/16/2016    TRIG 312 (H) 03/09/2019    TRIG 241 (H) 09/01/2018    TRIG 161 11/16/2016     No results found for: TESTM  Lab Results   Component Value Date    TSH 1.050 09/01/2018    TSH 0.785 01/23/2018    TSH 0.898 11/16/2016    T4FREE 1.30 01/23/2018    T4FREE 1.23 11/19/2014     No results found for: TPOABS    Review of Systems   Constitutional: Positive for fatigue. Negative for weight loss. Respiratory: Negative. Cardiovascular: Negative. Gastrointestinal: Negative. Endocrine: Negative for polydipsia and polyuria. Psychiatric/Behavioral: Positive for dysphoric mood. All other systems reviewed and are negative. Objective:   Physical Exam  Vitals reviewed. Constitutional:       General: She is not in acute distress. Appearance: Normal appearance. She is obese. HENT:      Head: Normocephalic and atraumatic.  Hair is normal.      Right Ear: External ear normal.      Left Ear: External ear normal.      Nose: Nose normal. Mouth/Throat:      Mouth: Mucous membranes are moist.   Eyes:      General: No scleral icterus. Right eye: No discharge. Left eye: No discharge. Extraocular Movements: Extraocular movements intact. Conjunctiva/sclera: Conjunctivae normal.   Neck:      Trachea: Trachea normal.   Cardiovascular:      Rate and Rhythm: Normal rate and regular rhythm. Pulses: Normal pulses. Heart sounds: Normal heart sounds. Pulmonary:      Effort: Pulmonary effort is normal.      Breath sounds: Normal breath sounds. No wheezing or rhonchi. Abdominal:      Palpations: Abdomen is soft. Musculoskeletal:         General: Normal range of motion. Cervical back: Normal range of motion and neck supple. Feet:    Skin:     General: Skin is warm and dry. Neurological:      General: No focal deficit present. Mental Status: She is alert and oriented to person, place, and time.    Psychiatric:         Mood and Affect: Mood normal.         Behavior: Behavior normal.

## 2021-10-10 ASSESSMENT — ENCOUNTER SYMPTOMS: GASTROINTESTINAL NEGATIVE: 1

## 2022-01-25 ENCOUNTER — OFFICE VISIT (OUTPATIENT)
Dept: FAMILY MEDICINE CLINIC | Age: 52
End: 2022-01-25
Payer: COMMERCIAL

## 2022-01-25 VITALS
WEIGHT: 182 LBS | TEMPERATURE: 96.2 F | BODY MASS INDEX: 32.25 KG/M2 | HEIGHT: 63 IN | DIASTOLIC BLOOD PRESSURE: 84 MMHG | HEART RATE: 94 BPM | OXYGEN SATURATION: 96 % | SYSTOLIC BLOOD PRESSURE: 142 MMHG

## 2022-01-25 DIAGNOSIS — R35.0 FREQUENCY OF URINATION: ICD-10-CM

## 2022-01-25 DIAGNOSIS — N30.01 ACUTE CYSTITIS WITH HEMATURIA: Primary | ICD-10-CM

## 2022-01-25 DIAGNOSIS — R30.0 DYSURIA: ICD-10-CM

## 2022-01-25 LAB
BILIRUBIN, POC: NEGATIVE
BLOOD URINE, POC: 200
CLARITY, POC: ABNORMAL
COLOR, POC: ABNORMAL
GLUCOSE URINE, POC: 60
KETONES, POC: NEGATIVE
LEUKOCYTE EST, POC: 15
NITRITE, POC: NEGATIVE
PH, POC: 6
PROTEIN, POC: 0.3
SPECIFIC GRAVITY, POC: 1.02
UROBILINOGEN, POC: 3.5

## 2022-01-25 PROCEDURE — G8427 DOCREV CUR MEDS BY ELIG CLIN: HCPCS | Performed by: NURSE PRACTITIONER

## 2022-01-25 PROCEDURE — G8417 CALC BMI ABV UP PARAM F/U: HCPCS | Performed by: NURSE PRACTITIONER

## 2022-01-25 PROCEDURE — 99213 OFFICE O/P EST LOW 20 MIN: CPT | Performed by: NURSE PRACTITIONER

## 2022-01-25 PROCEDURE — G8484 FLU IMMUNIZE NO ADMIN: HCPCS | Performed by: NURSE PRACTITIONER

## 2022-01-25 PROCEDURE — 81003 URINALYSIS AUTO W/O SCOPE: CPT | Performed by: NURSE PRACTITIONER

## 2022-01-25 PROCEDURE — 3017F COLORECTAL CA SCREEN DOC REV: CPT | Performed by: NURSE PRACTITIONER

## 2022-01-25 PROCEDURE — 1036F TOBACCO NON-USER: CPT | Performed by: NURSE PRACTITIONER

## 2022-01-25 RX ORDER — NITROFURANTOIN 25; 75 MG/1; MG/1
100 CAPSULE ORAL 2 TIMES DAILY
Qty: 14 CAPSULE | Refills: 0 | Status: SHIPPED | OUTPATIENT
Start: 2022-01-25 | End: 2022-02-01

## 2022-01-25 RX ORDER — PHENAZOPYRIDINE HYDROCHLORIDE 200 MG/1
200 TABLET, FILM COATED ORAL 3 TIMES DAILY PRN
Qty: 9 TABLET | Refills: 0 | Status: SHIPPED | OUTPATIENT
Start: 2022-01-25 | End: 2022-01-28

## 2022-01-25 ASSESSMENT — PATIENT HEALTH QUESTIONNAIRE - PHQ9
6. FEELING BAD ABOUT YOURSELF - OR THAT YOU ARE A FAILURE OR HAVE LET YOURSELF OR YOUR FAMILY DOWN: 0
2. FEELING DOWN, DEPRESSED OR HOPELESS: 0
SUM OF ALL RESPONSES TO PHQ QUESTIONS 1-9: 0
8. MOVING OR SPEAKING SO SLOWLY THAT OTHER PEOPLE COULD HAVE NOTICED. OR THE OPPOSITE, BEING SO FIGETY OR RESTLESS THAT YOU HAVE BEEN MOVING AROUND A LOT MORE THAN USUAL: 0
SUM OF ALL RESPONSES TO PHQ QUESTIONS 1-9: 0
4. FEELING TIRED OR HAVING LITTLE ENERGY: 0
SUM OF ALL RESPONSES TO PHQ9 QUESTIONS 1 & 2: 0
3. TROUBLE FALLING OR STAYING ASLEEP: 0
5. POOR APPETITE OR OVEREATING: 0
SUM OF ALL RESPONSES TO PHQ QUESTIONS 1-9: 0
9. THOUGHTS THAT YOU WOULD BE BETTER OFF DEAD, OR OF HURTING YOURSELF: 0
7. TROUBLE CONCENTRATING ON THINGS, SUCH AS READING THE NEWSPAPER OR WATCHING TELEVISION: 0
SUM OF ALL RESPONSES TO PHQ QUESTIONS 1-9: 0
10. IF YOU CHECKED OFF ANY PROBLEMS, HOW DIFFICULT HAVE THESE PROBLEMS MADE IT FOR YOU TO DO YOUR WORK, TAKE CARE OF THINGS AT HOME, OR GET ALONG WITH OTHER PEOPLE: 0
1. LITTLE INTEREST OR PLEASURE IN DOING THINGS: 0

## 2022-01-25 ASSESSMENT — ENCOUNTER SYMPTOMS
NAUSEA: 0
DIARRHEA: 0
SHORTNESS OF BREATH: 0
COUGH: 0
WHEEZING: 0
VOMITING: 0
SORE THROAT: 0
RHINORRHEA: 0
BACK PAIN: 1

## 2022-01-25 NOTE — PROGRESS NOTES
Subjective:      Patient ID: Page Rodriguez is a 49 y.o. female who presents today for:  Chief Complaint   Patient presents with    Urinary Tract Infection     burning, pressure, frequency, stabbing pain, pt states today is the worst, x4days       HPI    Feels like a uti   Starting to ramp up today  Last couple days maybe something there  A little bit of burning an a little pain when stops urinating   shes feeling pressure  frequency   A little tinge of blood on the TP  No fever/chills or nausea   She did shovel snow and related back pain to that and she had chroninc back pain     Past Medical History:   Diagnosis Date    Depression     Environmental allergies     Gastroparesis     GERD (gastroesophageal reflux disease)     Headache     Dr. Gustabo Triplett HPV in female     Hyperlipidemia     Hypertension     Leukocytosis     Migraines     Type II or unspecified type diabetes mellitus without mention of complication, not stated as uncontrolled      Past Surgical History:   Procedure Laterality Date    [facility]    BREAST REDUCTION SURGERY  1999   [address] [place] [address]  2009    (-)[facility]    CHOLECYSTECTOMY      ENDOMETRIAL ABLATION  2012?  GALLBLADDER SURGERY  1999     Social History     Socioeconomic History    Marital status:      Spouse name: Not on file    Number of children: Not on file    Years of education: Not on file    Highest education level: Not on file   Occupational History    Not on file   Tobacco Use    Smoking status: Never Smoker    Smokeless tobacco: Never Used   Vaping Use    Vaping Use: Never used   Substance and Sexual Activity    Alcohol use:  Yes     Alcohol/week: 0.0 standard drinks     Comment: ocassionally    Drug use: No    Sexual activity: Not Currently     Partners: Male   Other Topics Concern    Not on file   Social History Narrative    Not on file     Social Determinants of Health Financial Resource Strain: High Risk    Difficulty of Paying Living Expenses: Hard   Food Insecurity: Food Insecurity Present    Worried About Running Out of Food in the Last Year: Often true    Gayle of Food in the Last Year: Often true   Transportation Needs:     Lack of Transportation (Medical): Not on file    Lack of Transportation (Non-Medical):  Not on file   Physical Activity:     Days of Exercise per Week: Not on file    Minutes of Exercise per Session: Not on file   Stress:     Feeling of Stress : Not on file   Social Connections:     Frequency of Communication with Friends and Family: Not on file    Frequency of Social Gatherings with Friends and Family: Not on file    Attends Zoroastrianism Services: Not on file    Active Member of Clubs or Organizations: Not on file    Attends Club or Organization Meetings: Not on file    Marital Status: Not on file   Intimate Partner Violence:     Fear of Current or Ex-Partner: Not on file    Emotionally Abused: Not on file    Physically Abused: Not on file    Sexually Abused: Not on file   Housing Stability:     Unable to Pay for Housing in the Last Year: Not on file    Number of Places Lived in the Last Year: Not on file    Unstable Housing in the Last Year: Not on file     Family History   Problem Relation Age of Onset    Heart Disease Mother     Diabetes Father     Heart Disease Father     Cancer Father         thyroid    No Known Problems Sister     No Known Problems Paternal Grandfather     No Known Problems Paternal Grandmother     No Known Problems Maternal Grandmother     No Known Problems Maternal Grandfather     No Known Problems Brother     No Known Problems Other     Breast Cancer Neg Hx     Colon Cancer Neg Hx     Eclampsia Neg Hx     Hypertension Neg Hx     Ovarian Cancer Neg Hx      Labor Neg Hx     Spont Abortions Neg Hx     Stroke Neg Hx      Allergies   Allergen Reactions    Relafen [Nabumetone] Rash Current Outpatient Medications   Medication Sig Dispense Refill    nitrofurantoin, macrocrystal-monohydrate, (MACROBID) 100 MG capsule Take 1 capsule by mouth 2 times daily for 7 days 14 capsule 0    phenazopyridine (PYRIDIUM) 200 MG tablet Take 1 tablet by mouth 3 times daily as needed for Pain 9 tablet 0    Dulaglutide (TRULICITY) 7.36 JQ/8.6TI SOPN Inject 0.75 mg into the skin once a week 4 pen 3    pioglitazone (ACTOS) 15 MG tablet Take 1 tablet by mouth daily 30 tablet 3    atorvastatin (LIPITOR) 20 MG tablet Take 1 tablet by mouth daily 90 tablet 3    fluconazole (DIFLUCAN) 150 MG tablet TAKE 1 TABLET BY MOUTH DAILY AS NEEDED FOR DISCHARGE 4 tablet 0    Insulin Degludec (TRESIBA FLEXTOUCH) 100 UNIT/ML SOPN Inject 50 Units into the skin nightly 5 pen 3    insulin lispro (HUMALOG KWIKPEN) 100 UNIT/ML pen Inject 20 Units into the skin 3 times daily (before meals) 5 pen 3    propranolol (INDERAL) 20 MG tablet TAKE 1 TABLET BY MOUTH TWICE DAILY (Patient taking differently: 20 mg daily ) 180 tablet 3    escitalopram (LEXAPRO) 20 MG tablet TK 1 T PO QD  1    traZODone (DESYREL) 50 MG tablet Take 100 mg by mouth nightly       potassium chloride (K-TAB) 10 MEQ extended release tablet Take 1 tablet by mouth daily (Patient taking differently: Take 10 mEq by mouth 3 times daily ) 180 tablet 3    augmented betamethasone dipropionate (DIPROLENE-AF) 0.05 % cream SONDRA EXT AA BID  3    B-D UF III MINI PEN NEEDLES 31G X 5 MM MISC Use 1 daily to inject insulin 100 each 1    VRAYLAR 3 MG CAPS TK ONE C PO  QHS  1    fenofibrate (TRICOR) 145 MG tablet Take 1 tablet by mouth daily (Patient not taking: Reported on 1/25/2022) 90 tablet 3    ramipril (ALTACE) 5 MG capsule Take 1 capsule by mouth daily (Patient not taking: Reported on 1/25/2022) 90 capsule 3    omeprazole (PRILOSEC) 20 MG delayed release capsule TAKE 2 CAPSULES BY MOUTH TWICE DAILY (Patient not taking: Reported on 1/25/2022) 360 capsule 1     No Assessment:       Diagnosis Orders   1. Acute cystitis with hematuria  nitrofurantoin, macrocrystal-monohydrate, (MACROBID) 100 MG capsule   2. Frequency of urination  POCT Urinalysis No Micro (Auto)    Culture, Urine   3. Dysuria  phenazopyridine (PYRIDIUM) 200 MG tablet     Results for POC orders placed in visit on 01/25/22   POCT Urinalysis No Micro (Auto)   Result Value Ref Range    Color, UA dark yellowish/brown     Clarity, UA cloudy     Glucose, UA POC 60     Bilirubin, UA negative     Ketones, UA negative     Spec Grav, UA 1.025     Blood, UA      pH, UA 6.0     Protein, UA POC 0.3     Urobilinogen, UA 3.5     Leukocytes, UA 15     Nitrite, UA negative       Plan:     Assessment & Plan   Stanley TURCIOS was seen today for urinary tract infection. Diagnoses and all orders for this visit:    Acute cystitis with hematuria  -     nitrofurantoin, macrocrystal-monohydrate, (MACROBID) 100 MG capsule; Take 1 capsule by mouth 2 times daily for 7 days    Frequency of urination  -     POCT Urinalysis No Micro (Auto)  -     Culture, Urine; Future    Dysuria  -     phenazopyridine (PYRIDIUM) 200 MG tablet; Take 1 tablet by mouth 3 times daily as needed for Pain      Orders Placed This Encounter   Procedures    Culture, Urine     Standing Status:   Future     Standing Expiration Date:   1/25/2023     Order Specific Question:   Specify (ex-cath, midstream, cysto, etc)? Answer:   mid stream    POCT Urinalysis No Micro (Auto)     Orders Placed This Encounter   Medications    nitrofurantoin, macrocrystal-monohydrate, (MACROBID) 100 MG capsule     Sig: Take 1 capsule by mouth 2 times daily for 7 days     Dispense:  14 capsule     Refill:  0    phenazopyridine (PYRIDIUM) 200 MG tablet     Sig: Take 1 tablet by mouth 3 times daily as needed for Pain     Dispense:  9 tablet     Refill:  0     There are no discontinued medications. Return if symptoms worsen or fail to improve.         Reviewed with the

## 2022-01-25 NOTE — PATIENT INSTRUCTIONS
Patient Education        Urinary Tract Infection (UTI) in Women: Care Instructions  Overview     A urinary tract infection, or UTI, is a general term for an infection anywhere between the kidneys and the urethra (where urine comes out). Most UTIs are bladder infections. They often cause pain or burning when you urinate. UTIs are caused by bacteria and can be cured with antibiotics. Be sure to complete your treatment so that the infection does not get worse. Follow-up care is a key part of your treatment and safety. Be sure to make and go to all appointments, and call your doctor if you are having problems. It's also a good idea to know your test results and keep a list of the medicines you take. How can you care for yourself at home? · Take your antibiotics as directed. Do not stop taking them just because you feel better. You need to take the full course of antibiotics. · Drink extra water and other fluids for the next day or two. This will help make the urine less concentrated and help wash out the bacteria that are causing the infection. (If you have kidney, heart, or liver disease and have to limit fluids, talk with your doctor before you increase the amount of fluids you drink.)  · Avoid drinks that are carbonated or have caffeine. They can irritate the bladder. · Urinate often. Try to empty your bladder each time. · To relieve pain, take a hot bath or lay a heating pad set on low over your lower belly or genital area. Never go to sleep with a heating pad in place. To prevent UTIs  · Drink plenty of water each day. This helps you urinate often, which clears bacteria from your system. (If you have kidney, heart, or liver disease and have to limit fluids, talk with your doctor before you increase the amount of fluids you drink.)  · Urinate when you need to. · If you are sexually active, urinate right after you have sex. · Change sanitary pads often.   · Avoid douches, bubble baths, feminine hygiene sprays, and other feminine hygiene products that have deodorants. · After going to the bathroom, wipe from front to back. When should you call for help? Call your doctor now or seek immediate medical care if:    · Symptoms such as fever, chills, nausea, or vomiting get worse or appear for the first time.     · You have new pain in your back just below your rib cage. This is called flank pain.     · There is new blood or pus in your urine.     · You have any problems with your antibiotic medicine. Watch closely for changes in your health, and be sure to contact your doctor if:    · You are not getting better after taking an antibiotic for 2 days.     · Your symptoms go away but then come back. Where can you learn more? Go to [email or web address] org and sign in to your [employer] account. Enter G170 in the [employer] box to learn more about \"Urinary Tract Infection (UTI) in Women: Care Instructions. \"     If you do not have an account, please click on the \"Sign Up Now\" link. Current as of: February 10, 2021               Content Version: 13.1  © 2521-7708 [employer]. Care instructions adapted under license by [facility] ([facility]). If you have questions about a medical condition or this instruction, always ask your healthcare professional. [address] any warranty or liability for your use of this information.

## 2022-01-27 LAB
ORGANISM: ABNORMAL
URINE CULTURE, ROUTINE: ABNORMAL

## 2022-02-16 ENCOUNTER — TELEPHONE (OUTPATIENT)
Dept: ENDOCRINOLOGY | Age: 52
End: 2022-02-16

## 2022-02-16 ENCOUNTER — OFFICE VISIT (OUTPATIENT)
Dept: ENDOCRINOLOGY | Age: 52
End: 2022-02-16
Payer: COMMERCIAL

## 2022-02-16 VITALS
BODY MASS INDEX: 33.13 KG/M2 | DIASTOLIC BLOOD PRESSURE: 81 MMHG | HEIGHT: 63 IN | HEART RATE: 111 BPM | WEIGHT: 187 LBS | OXYGEN SATURATION: 95 % | SYSTOLIC BLOOD PRESSURE: 133 MMHG

## 2022-02-16 DIAGNOSIS — E11.69 TYPE 2 DIABETES MELLITUS WITH OTHER SPECIFIED COMPLICATION, WITHOUT LONG-TERM CURRENT USE OF INSULIN (HCC): Primary | ICD-10-CM

## 2022-02-16 LAB
CHP ED QC CHECK: NORMAL
GLUCOSE BLD-MCNC: 224 MG/DL
HBA1C MFR BLD: 11.9 %

## 2022-02-16 PROCEDURE — 3017F COLORECTAL CA SCREEN DOC REV: CPT | Performed by: INTERNAL MEDICINE

## 2022-02-16 PROCEDURE — 2022F DILAT RTA XM EVC RTNOPTHY: CPT | Performed by: INTERNAL MEDICINE

## 2022-02-16 PROCEDURE — G8427 DOCREV CUR MEDS BY ELIG CLIN: HCPCS | Performed by: INTERNAL MEDICINE

## 2022-02-16 PROCEDURE — G8484 FLU IMMUNIZE NO ADMIN: HCPCS | Performed by: INTERNAL MEDICINE

## 2022-02-16 PROCEDURE — 99213 OFFICE O/P EST LOW 20 MIN: CPT | Performed by: INTERNAL MEDICINE

## 2022-02-16 PROCEDURE — 3046F HEMOGLOBIN A1C LEVEL >9.0%: CPT | Performed by: INTERNAL MEDICINE

## 2022-02-16 PROCEDURE — 83036 HEMOGLOBIN GLYCOSYLATED A1C: CPT | Performed by: INTERNAL MEDICINE

## 2022-02-16 PROCEDURE — G8417 CALC BMI ABV UP PARAM F/U: HCPCS | Performed by: INTERNAL MEDICINE

## 2022-02-16 PROCEDURE — 1036F TOBACCO NON-USER: CPT | Performed by: INTERNAL MEDICINE

## 2022-02-16 PROCEDURE — 82962 GLUCOSE BLOOD TEST: CPT | Performed by: INTERNAL MEDICINE

## 2022-02-16 RX ORDER — METOCLOPRAMIDE 10 MG/1
10 TABLET ORAL
Qty: 120 TABLET | Refills: 3 | Status: ON HOLD | OUTPATIENT
Start: 2022-02-16 | End: 2022-06-25

## 2022-02-16 NOTE — TELEPHONE ENCOUNTER
Shanti Alejo is calling because the reglan can interact with the vraylar and has a high chance of causing tardive dyskinesia. Would you like to send in an alternative medication? They would recommend a zofran or something along those that line. Please advise. Thanks!

## 2022-02-16 NOTE — PROGRESS NOTES
2/16/2022    Assessment:       Diagnosis Orders   1. Type 2 diabetes mellitus with other specified complication, without long-term current use of insulin ([facility])  POCT Glucose    POCT glycosylated hemoglobin (Hb A1C)         PLAN:     Continue patient on current pump setting patient to talk to [employer] pump representative to switch to [employer] pump for better control especially with sensor CGM combination    Orders Placed This Encounter   Procedures    POCT Glucose    POCT glycosylated hemoglobin (Hb A1C)       Subjective:     Chief Complaint   Patient presents with    Diabetes     Vitals:    02/16/22 1410   BP: 133/81   Pulse: 111   SpO2: 95%   Weight: 187 lb (84.8 kg)   Height: 5' 3\" (1.6 m)     Wt Readings from Last 3 Encounters:   02/16/22 187 lb (84.8 kg)   01/25/22 182 lb (82.6 kg)   10/01/21 189 lb (85.7 kg)     BP Readings from Last 3 Encounters:   02/16/22 133/81   01/25/22 (!) 142/84   10/01/21 138/81     Follow-up on type 2 diabetes patient currently is on Omni pod insulin pump has not been testing sugars and also has not been taking her boluses does not have a continuous glucose monitoring which works with the pump overall A1c's have been still high  Last hemoglobin A1c was 11.9  Overall blood sugars are staying in the 200 range no blood sugar logs to review    Diabetes  She presents for her follow-up diabetic visit. She has type 2 diabetes mellitus. Her disease course has been fluctuating. Symptoms are worsening. Current diabetic treatment includes insulin pump. Her overall blood glucose range is >200 mg/dl.  (Lab Results       Component                Value               Date                       LABA1C                   11.9                02/16/2022              Basal use 52% bolus 37%  Reviewed the OmniPod pump settings basal rate was 3.5 units/h  Carb ratio 5  Sensitivity was 15  Blood glucose target was 120)     Past Medical History:   Diagnosis Date    Depression     Environmental allergies  Gastroparesis     GERD (gastroesophageal reflux disease)     Headache     Dr. Kiran Baird HPV in female     Hyperlipidemia     Hypertension     Leukocytosis     Migraines     Type II or unspecified type diabetes mellitus without mention of complication, not stated as uncontrolled      Past Surgical History:   Procedure Laterality Date    B[facility] M[facility]SY  2012    [facility]    BREAST [facility]    CARDIAC CATHETERIZATION  2009    (-)[facility]    CHOLECYSTECTOMY      ENDOMETRIAL ABLATION  2012?  GALLBLADDER SURGERY  1999     Social History     Socioeconomic History    Marital status:      Spouse name: Not on file    Number of children: Not on file    Years of education: Not on file    Highest education level: Not on file   Occupational History    Not on file   Tobacco Use    Smoking status: Never Smoker    Smokeless tobacco: Never Used   Vaping Use    Vaping Use: Never used   Substance and Sexual Activity    Alcohol use: Yes     Alcohol/week: 0.0 standard drinks     Comment: ocassionally    Drug use: No    Sexual activity: Not Currently     Partners: Male   Other Topics Concern    Not on file   Social History Narrative    Not on file     Social Determinants of Health     Financial Resource Strain: High Risk    Difficulty of Paying Living Expenses: Hard   Food Insecurity: Food Insecurity Present    Worried About Running Out of Food in the Last Year: Often true    Gayle of Food in the Last Year: Often true   Transportation Needs:     Lack of Transportation (Medical): Not on file    Lack of Transportation (Non-Medical):  Not on file   Physical Activity:     Days of Exercise per Week: Not on file    Minutes of Exercise per Session: Not on file   Stress:     Feeling of Stress : Not on file   Social Connections:     Frequency of Communication with Friends and Family: Not on file    Frequency of Social Gatherings with Friends and Family: Not on file    Attends Episcopal Services: Not on file    Active Member of Clubs or Organizations: Not on file    Attends Club or Organization Meetings: Not on file    Marital Status: Not on file   Intimate Partner Violence:     Fear of Current or Ex-Partner: Not on file    Emotionally Abused: Not on file    Physically Abused: Not on file    Sexually Abused: Not on file   Housing Stability:     Unable to Pay for Housing in the Last Year: Not on file    Number of Places Lived in the Last Year: Not on file    Unstable Housing in the Last Year: Not on file     Family History   Problem Relation Age of Onset    Heart Disease Mother     Diabetes Father     Heart Disease Father     Cancer Father         thyroid    No Known Problems Sister     No Known Problems Paternal Grandfather     No Known Problems Paternal Grandmother     No Known Problems Maternal Grandmother     No Known Problems Maternal Grandfather     No Known Problems Brother     No Known Problems Other     Breast Cancer Neg Hx     Colon Cancer Neg Hx     Eclampsia Neg Hx     Hypertension Neg Hx     Ovarian Cancer Neg Hx      Labor Neg Hx     Spont Abortions Neg Hx     Stroke Neg Hx      Allergies   Allergen Reactions    Relafen [Nabumetone] Rash       Current Outpatient Medications:     Dulaglutide (TRULICITY) 2.95 FA/5.3FM SOPN, Inject 0.75 mg into the skin once a week, Disp: 4 pen, Rfl: 3    pioglitazone (ACTOS) 15 MG tablet, Take 1 tablet by mouth daily, Disp: 30 tablet, Rfl: 3    fenofibrate (TRICOR) 145 MG tablet, Take 1 tablet by mouth daily, Disp: 90 tablet, Rfl: 3    atorvastatin (LIPITOR) 20 MG tablet, Take 1 tablet by mouth daily, Disp: 90 tablet, Rfl: 3    fluconazole (DIFLUCAN) 150 MG tablet, TAKE 1 TABLET BY MOUTH DAILY AS NEEDED FOR DISCHARGE, Disp: 4 tablet, Rfl: 0    Insulin Degludec (TRESIBA FLEXTOUCH) 100 UNIT/ML SOPN, Inject 50 Units into the skin nightly, Disp: 5 pen, Rfl: 3    insulin lispro (HUMALOG KWIKPEN) 100 UNIT/ML pen, Inject 20 Units into the skin 3 times daily (before meals), Disp: 5 pen, Rfl: 3    propranolol (INDERAL) 20 MG tablet, TAKE 1 TABLET BY MOUTH TWICE DAILY (Patient taking differently: 20 mg daily ), Disp: 180 tablet, Rfl: 3    ramipril (ALTACE) 5 MG capsule, Take 1 capsule by mouth daily, Disp: 90 capsule, Rfl: 3    escitalopram (LEXAPRO) 20 MG tablet, TK 1 T PO QD, Disp: , Rfl: 1    traZODone (DESYREL) 50 MG tablet, Take 100 mg by mouth nightly , Disp: , Rfl:     potassium chloride (K-TAB) 10 MEQ extended release tablet, Take 1 tablet by mouth daily (Patient taking differently: Take 10 mEq by mouth 3 times daily ), Disp: 180 tablet, Rfl: 3    omeprazole (PRILOSEC) 20 MG delayed release capsule, TAKE 2 CAPSULES BY MOUTH TWICE DAILY, Disp: 360 capsule, Rfl: 1    augmented betamethasone dipropionate (DIPROLENE-AF) 0.05 % cream, SONDRA EXT AA BID, Disp: , Rfl: 3    B-D UF III MINI PEN NEEDLES 31G X 5 MM MISC, Use 1 daily to inject insulin, Disp: 100 each, Rfl: 1    VRAYLAR 3 MG CAPS, TK ONE C PO  QHS, Disp: , Rfl: 1  Lab Results   Component Value Date     08/06/2019    K 3.3 (L) 08/06/2019    CL 95 08/06/2019    CO2 26 08/06/2019    BUN 7 08/06/2019    CREATININE 0.37 (L) 08/06/2019    GLUCOSE 224 02/16/2022    CALCIUM 9.4 08/06/2019    PROT 7.8 08/06/2019    LABALBU 4.3 08/06/2019    BILITOT <0.2 08/06/2019    ALKPHOS 190 (H) 08/06/2019    AST 10 08/06/2019    ALT 12 08/06/2019    LABGLOM >60.0 08/06/2019    GFRAA >60.0 08/06/2019    GLOB 3.5 08/06/2019     Lab Results   Component Value Date    WBC 13.2 (H) 08/06/2019    HGB 14.5 08/06/2019    HCT 40.9 08/06/2019    MCV 89.8 08/06/2019     08/06/2019     Lab Results   Component Value Date    LABA1C 11.2 10/01/2021    LABA1C 11.6 (H) 03/09/2019    LABA1C 12.3 (H) 09/01/2018     Lab Results   Component Value Date    HDL 29 (L) 03/09/2019    HDL 30 (L) 09/01/2018    HDL 28 (L) 11/16/2016    LDLCALC 153 (H) 03/09/2019    LDLCALC 194 (H) 09/01/2018    LDLCALC 119 11/16/2016    CHOL 244 (H) 03/09/2019    CHOL 272 (H) 09/01/2018    CHOL 179 11/16/2016    TRIG 312 (H) 03/09/2019    TRIG 241 (H) 09/01/2018    TRIG 161 11/16/2016     No results found for: TESTM  Lab Results   Component Value Date    TSH 1.050 09/01/2018    TSH 0.785 01/23/2018    TSH 0.898 11/16/2016    T4FREE 1.30 01/23/2018    T4FREE 1.23 11/19/2014     No results found for: TPOABS    Review of Systems   Cardiovascular: Negative. Endocrine: Negative. All other systems reviewed and are negative. Objective:   Physical Exam  Vitals reviewed. Constitutional:       Appearance: Normal appearance. She is obese. HENT:      Head: Normocephalic and atraumatic. Hair is normal.      Right Ear: External ear normal.      Left Ear: External ear normal.      Nose: Nose normal.   Eyes:      General: No scleral icterus. Right eye: No discharge. Left eye: No discharge. Extraocular Movements: Extraocular movements intact. Conjunctiva/sclera: Conjunctivae normal.   Neck:      Trachea: Trachea normal.   Cardiovascular:      Rate and Rhythm: Normal rate. Pulmonary:      Effort: Pulmonary effort is normal.   Musculoskeletal:         General: Normal range of motion. Cervical back: Normal range of motion and neck supple. Neurological:      General: No focal deficit present. Mental Status: She is alert and oriented to person, place, and time.    Psychiatric:         Mood and Affect: Mood normal.         Behavior: Behavior normal.

## 2022-05-13 ENCOUNTER — APPOINTMENT (OUTPATIENT)
Dept: CT IMAGING | Age: 52
End: 2022-05-13
Payer: COMMERCIAL

## 2022-05-13 ENCOUNTER — OFFICE VISIT (OUTPATIENT)
Dept: FAMILY MEDICINE CLINIC | Age: 52
End: 2022-05-13
Payer: COMMERCIAL

## 2022-05-13 ENCOUNTER — HOSPITAL ENCOUNTER (EMERGENCY)
Age: 52
Discharge: HOME | End: 2022-05-13
Payer: COMMERCIAL

## 2022-05-13 VITALS
BODY MASS INDEX: 31.54 KG/M2 | SYSTOLIC BLOOD PRESSURE: 138 MMHG | HEART RATE: 77 BPM | HEIGHT: 63 IN | DIASTOLIC BLOOD PRESSURE: 82 MMHG | TEMPERATURE: 98.5 F | WEIGHT: 178 LBS | RESPIRATION RATE: 14 BRPM | OXYGEN SATURATION: 94 %

## 2022-05-13 VITALS
WEIGHT: 178 LBS | HEIGHT: 63 IN | TEMPERATURE: 96.1 F | DIASTOLIC BLOOD PRESSURE: 100 MMHG | HEART RATE: 90 BPM | OXYGEN SATURATION: 96 % | SYSTOLIC BLOOD PRESSURE: 188 MMHG | BODY MASS INDEX: 31.54 KG/M2

## 2022-05-13 DIAGNOSIS — R03.0 ELEVATED BP WITHOUT DIAGNOSIS OF HYPERTENSION: ICD-10-CM

## 2022-05-13 DIAGNOSIS — E87.6 HYPOKALEMIA: ICD-10-CM

## 2022-05-13 DIAGNOSIS — N39.0 URINARY TRACT INFECTION WITHOUT HEMATURIA, SITE UNSPECIFIED: ICD-10-CM

## 2022-05-13 DIAGNOSIS — I10 ESSENTIAL HYPERTENSION: ICD-10-CM

## 2022-05-13 DIAGNOSIS — K02.9 DENTAL CARIES: ICD-10-CM

## 2022-05-13 DIAGNOSIS — R51.9 ACUTE NONINTRACTABLE HEADACHE, UNSPECIFIED HEADACHE TYPE: Primary | ICD-10-CM

## 2022-05-13 DIAGNOSIS — E11.65 TYPE 2 DIABETES MELLITUS WITH HYPERGLYCEMIA, UNSPECIFIED WHETHER LONG TERM INSULIN USE (HCC): ICD-10-CM

## 2022-05-13 LAB
ALBUMIN SERPL-MCNC: 3.8 G/DL (ref 3.5–4.6)
ALP BLD-CCNC: 164 U/L (ref 40–130)
ALT SERPL-CCNC: 8 U/L (ref 0–33)
ANION GAP SERPL CALCULATED.3IONS-SCNC: 13 MEQ/L (ref 9–15)
AST SERPL-CCNC: 9 U/L (ref 0–35)
BACTERIA: ABNORMAL /HPF
BASOPHILS ABSOLUTE: 0.1 K/UL (ref 0–0.2)
BASOPHILS RELATIVE PERCENT: 0.5 %
BILIRUB SERPL-MCNC: <0.2 MG/DL (ref 0.2–0.7)
BILIRUBIN URINE: NEGATIVE
BLOOD, URINE: NEGATIVE
BUN BLDV-MCNC: 10 MG/DL (ref 6–20)
C-REACTIVE PROTEIN: 13.9 MG/L (ref 0–5)
CALCIUM SERPL-MCNC: 8.8 MG/DL (ref 8.5–9.9)
CHLORIDE BLD-SCNC: 96 MEQ/L (ref 95–107)
CLARITY: CLEAR
CO2: 27 MEQ/L (ref 20–31)
COLOR: YELLOW
CREAT SERPL-MCNC: 0.32 MG/DL (ref 0.5–0.9)
EOSINOPHILS ABSOLUTE: 0.2 K/UL (ref 0–0.7)
EOSINOPHILS RELATIVE PERCENT: 2 %
EPITHELIAL CELLS, UA: ABNORMAL /HPF (ref 0–5)
GFR AFRICAN AMERICAN: >60
GFR NON-AFRICAN AMERICAN: >60
GLOBULIN: 3.3 G/DL (ref 2.3–3.5)
GLUCOSE BLD-MCNC: 343 MG/DL (ref 70–99)
GLUCOSE URINE: >=1000 MG/DL
HCT VFR BLD CALC: 39.4 % (ref 37–47)
HEMOGLOBIN: 13 G/DL (ref 12–16)
HYALINE CASTS: ABNORMAL /HPF (ref 0–5)
KETONES, URINE: ABNORMAL MG/DL
LEUKOCYTE ESTERASE, URINE: ABNORMAL
LYMPHOCYTES ABSOLUTE: 3.3 K/UL (ref 1–4.8)
LYMPHOCYTES RELATIVE PERCENT: 32.1 %
MAGNESIUM: 1.6 MG/DL (ref 1.7–2.4)
MCH RBC QN AUTO: 29.5 PG (ref 27–31.3)
MCHC RBC AUTO-ENTMCNC: 33 % (ref 33–37)
MCV RBC AUTO: 89.5 FL (ref 82–100)
MONOCYTES ABSOLUTE: 0.8 K/UL (ref 0.2–0.8)
MONOCYTES RELATIVE PERCENT: 7.6 %
NEUTROPHILS ABSOLUTE: 6 K/UL (ref 1.4–6.5)
NEUTROPHILS RELATIVE PERCENT: 57.8 %
NITRITE, URINE: NEGATIVE
PDW BLD-RTO: 12.9 % (ref 11.5–14.5)
PH UA: 6.5 (ref 5–9)
PLATELET # BLD: 281 K/UL (ref 130–400)
POTASSIUM SERPL-SCNC: 2.9 MEQ/L (ref 3.4–4.9)
PROTEIN UA: NEGATIVE MG/DL
RBC # BLD: 4.4 M/UL (ref 4.2–5.4)
RBC UA: ABNORMAL /HPF (ref 0–5)
SODIUM BLD-SCNC: 136 MEQ/L (ref 135–144)
SPECIFIC GRAVITY UA: 1.04 (ref 1–1.03)
TOTAL PROTEIN: 7.1 G/DL (ref 6.3–8)
URINE REFLEX TO CULTURE: YES
UROBILINOGEN, URINE: 0.2 E.U./DL
WBC # BLD: 10.3 K/UL (ref 4.8–10.8)
WBC UA: ABNORMAL /HPF (ref 0–5)

## 2022-05-13 PROCEDURE — 1036F TOBACCO NON-USER: CPT | Performed by: NURSE PRACTITIONER

## 2022-05-13 PROCEDURE — G8417 CALC BMI ABV UP PARAM F/U: HCPCS | Performed by: NURSE PRACTITIONER

## 2022-05-13 PROCEDURE — 81001 URINALYSIS AUTO W/SCOPE: CPT

## 2022-05-13 PROCEDURE — 80053 COMPREHEN METABOLIC PANEL: CPT

## 2022-05-13 PROCEDURE — 93005 ELECTROCARDIOGRAM TRACING: CPT | Performed by: EMERGENCY MEDICINE

## 2022-05-13 PROCEDURE — 85025 COMPLETE CBC W/AUTO DIFF WBC: CPT

## 2022-05-13 PROCEDURE — 86140 C-REACTIVE PROTEIN: CPT

## 2022-05-13 PROCEDURE — 96374 THER/PROPH/DIAG INJ IV PUSH: CPT

## 2022-05-13 PROCEDURE — G8427 DOCREV CUR MEDS BY ELIG CLIN: HCPCS | Performed by: NURSE PRACTITIONER

## 2022-05-13 PROCEDURE — 83735 ASSAY OF MAGNESIUM: CPT

## 2022-05-13 PROCEDURE — 99284 EMERGENCY DEPT VISIT MOD MDM: CPT

## 2022-05-13 PROCEDURE — 36415 COLL VENOUS BLD VENIPUNCTURE: CPT

## 2022-05-13 PROCEDURE — 87086 URINE CULTURE/COLONY COUNT: CPT

## 2022-05-13 PROCEDURE — 96375 TX/PRO/DX INJ NEW DRUG ADDON: CPT

## 2022-05-13 PROCEDURE — 70450 CT HEAD/BRAIN W/O DYE: CPT

## 2022-05-13 PROCEDURE — 6360000002 HC RX W HCPCS: Performed by: PHYSICIAN ASSISTANT

## 2022-05-13 PROCEDURE — 2580000003 HC RX 258: Performed by: PHYSICIAN ASSISTANT

## 2022-05-13 PROCEDURE — 99212 OFFICE O/P EST SF 10 MIN: CPT | Performed by: NURSE PRACTITIONER

## 2022-05-13 PROCEDURE — 3017F COLORECTAL CA SCREEN DOC REV: CPT | Performed by: NURSE PRACTITIONER

## 2022-05-13 PROCEDURE — 6370000000 HC RX 637 (ALT 250 FOR IP): Performed by: PHYSICIAN ASSISTANT

## 2022-05-13 RX ORDER — LANOLIN ALCOHOL/MO/W.PET/CERES
400 CREAM (GRAM) TOPICAL DAILY
Status: DISCONTINUED | OUTPATIENT
Start: 2022-05-13 | End: 2022-05-13 | Stop reason: HOSPADM

## 2022-05-13 RX ORDER — 0.9 % SODIUM CHLORIDE 0.9 %
500 INTRAVENOUS SOLUTION INTRAVENOUS ONCE
Status: COMPLETED | OUTPATIENT
Start: 2022-05-13 | End: 2022-05-13

## 2022-05-13 RX ORDER — KETOROLAC TROMETHAMINE 30 MG/ML
30 INJECTION, SOLUTION INTRAMUSCULAR; INTRAVENOUS ONCE
Status: COMPLETED | OUTPATIENT
Start: 2022-05-13 | End: 2022-05-13

## 2022-05-13 RX ORDER — ONDANSETRON 2 MG/ML
4 INJECTION INTRAMUSCULAR; INTRAVENOUS ONCE
Status: COMPLETED | OUTPATIENT
Start: 2022-05-13 | End: 2022-05-13

## 2022-05-13 RX ORDER — MORPHINE SULFATE 4 MG/ML
4 INJECTION, SOLUTION INTRAMUSCULAR; INTRAVENOUS ONCE
Status: COMPLETED | OUTPATIENT
Start: 2022-05-13 | End: 2022-05-13

## 2022-05-13 RX ORDER — CEPHALEXIN 500 MG/1
500 CAPSULE ORAL 4 TIMES DAILY
Qty: 28 CAPSULE | Refills: 0 | Status: SHIPPED | OUTPATIENT
Start: 2022-05-13 | End: 2022-05-20

## 2022-05-13 RX ORDER — KETOROLAC TROMETHAMINE 10 MG/1
10 TABLET, FILM COATED ORAL EVERY 6 HOURS PRN
Qty: 20 TABLET | Refills: 0 | Status: ON HOLD | OUTPATIENT
Start: 2022-05-13 | End: 2022-06-25

## 2022-05-13 RX ADMIN — POTASSIUM BICARBONATE 40 MEQ: 782 TABLET, EFFERVESCENT ORAL at 18:35

## 2022-05-13 RX ADMIN — MORPHINE SULFATE 4 MG: 4 INJECTION, SOLUTION INTRAMUSCULAR; INTRAVENOUS at 16:46

## 2022-05-13 RX ADMIN — ONDANSETRON 4 MG: 2 INJECTION INTRAMUSCULAR; INTRAVENOUS at 16:47

## 2022-05-13 RX ADMIN — KETOROLAC TROMETHAMINE 30 MG: 30 INJECTION, SOLUTION INTRAMUSCULAR at 18:35

## 2022-05-13 RX ADMIN — SODIUM CHLORIDE 500 ML: 9 INJECTION, SOLUTION INTRAVENOUS at 18:36

## 2022-05-13 RX ADMIN — Medication 400 MG: at 18:35

## 2022-05-13 ASSESSMENT — ENCOUNTER SYMPTOMS
WHEEZING: 0
VOMITING: 0
DIARRHEA: 0
ANAL BLEEDING: 0
APNEA: 0
NAUSEA: 0
ABDOMINAL DISTENTION: 0
COUGH: 0
SHORTNESS OF BREATH: 0
SORE THROAT: 0
VOICE CHANGE: 0
EYE REDNESS: 0
PHOTOPHOBIA: 0
ABDOMINAL PAIN: 0
EYE DISCHARGE: 0
BACK PAIN: 0
VOMITING: 0
EYE PAIN: 0
NAUSEA: 0
BACK PAIN: 0

## 2022-05-13 ASSESSMENT — PAIN - FUNCTIONAL ASSESSMENT: PAIN_FUNCTIONAL_ASSESSMENT: 0-10

## 2022-05-13 ASSESSMENT — PAIN DESCRIPTION - PAIN TYPE: TYPE: ACUTE PAIN

## 2022-05-13 ASSESSMENT — PAIN DESCRIPTION - ORIENTATION: ORIENTATION: RIGHT

## 2022-05-13 ASSESSMENT — PAIN DESCRIPTION - DESCRIPTORS: DESCRIPTORS: ACHING;SORE

## 2022-05-13 ASSESSMENT — PAIN SCALES - GENERAL
PAINLEVEL_OUTOF10: 1
PAINLEVEL_OUTOF10: 9
PAINLEVEL_OUTOF10: 3

## 2022-05-13 ASSESSMENT — PAIN DESCRIPTION - FREQUENCY: FREQUENCY: CONTINUOUS

## 2022-05-13 NOTE — ED PROVIDER NOTES
[address] ED  eMERGENCY dEPARTMENT eNCOUnter      Pt Name: Carlos Ngo  MRN: [id number]  [place] 1970  Date of evaluation: 5/13/2022  Provider: Nicholas Wang PA-C    CHIEF COMPLAINT       Chief Complaint   Patient presents with    Hypertension     hx of, with associated right sided facial pain         HISTORY OF PRESENT ILLNESS   (Location/Symptom, Timing/Onset,Context/Setting, Quality, Duration, Modifying Factors, Severity)  Note limiting factors. Carlos Ngo is a 49 y.o. female who presents to the emergency department patient has right-sided headache facial pain x3 days. Glucose is elevated some dysuria she does note that she has had an issue with dentition she does take propranolol for her blood pressure is not currently on an ACE inhibitor. She notes her blood pressure usually normal tensive. Patient denies actively seeing speaking weakness. She did take pain medication prior to arrival without relief. HPI    NursingNotes were reviewed. REVIEW OF SYSTEMS    (2-9 systems for level 4, 10 or more for level 5)     Review of Systems   Constitutional: Negative for activity change, appetite change and unexpected weight change. HENT: Negative for ear discharge, nosebleeds and voice change. Eyes: Negative for discharge. Respiratory: Negative for apnea. Cardiovascular: Negative for chest pain. Gastrointestinal: Negative for abdominal distention, anal bleeding, nausea and vomiting. Genitourinary: Positive for dysuria. Musculoskeletal: Negative for back pain and neck pain. Skin: Negative for pallor. Neurological: Positive for headaches. Negative for seizures and facial asymmetry. Psychiatric/Behavioral: Negative for behavioral problems, self-injury and sleep disturbance. All other systems reviewed and are negative. Except as noted above the remainder of the review of systems was reviewed and negative.        PAST MEDICAL HISTORY     Past Medical History:   Diagnosis Date    Depression     Environmental allergies     Gastroparesis     GERD (gastroesophageal reflux disease)     Headache     Dr. Elisa Olivares HPV in female     Hyperlipidemia     Hypertension     Leukocytosis     Migraines     Type II or unspecified type diabetes mellitus without mention of complication, not stated as uncontrolled          SURGICALHISTORY       Past Surgical History:   Procedure Laterality Date    [facility]    [facility]    BREAST REDUCTION SURGERY  1999   [address]  CARDIAC CATHETERIZATION  2009    (-)[facility]    CHOLECYSTECTOMY      ENDOMETRIAL ABLATION  2012? [address] [facility] MEDICATIONS       Discharge Medication List as of 5/13/2022  6:39 PM      CONTINUE these medications which have NOT CHANGED    Details   metoclopramide (REGLAN) 10 MG tablet Take 1 tablet by mouth 3 times daily (with meals), Disp-120 tablet, R-3Normal      Dulaglutide (TRULICITY) 9.55 RV/9.9RO SOPN Inject 0.75 mg into the skin once a week, Disp-4 pen, R-3Normal      pioglitazone (ACTOS) 15 MG tablet Take 1 tablet by mouth daily, Disp-30 tablet, R-3Normal      fenofibrate (TRICOR) 145 MG tablet Take 1 tablet by mouth daily, Disp-90 tablet, R-3Normal      atorvastatin (LIPITOR) 20 MG tablet Take 1 tablet by mouth daily, Disp-90 tablet, R-3Normal      fluconazole (DIFLUCAN) 150 MG tablet TAKE 1 TABLET BY MOUTH DAILY AS NEEDED FOR DISCHARGE, Disp-4 tablet, R-0No more refills until seen in office. Normal      Insulin Degludec (TRESIBA FLEXTOUCH) 100 UNIT/ML SOPN Inject 50 Units into the skin nightly, Disp-5 pen, R-3Normal      insulin lispro (HUMALOG KWIKPEN) 100 UNIT/ML pen Inject 20 Units into the skin 3 times daily (before meals), Disp-5 pen, R-3Normal      propranolol (INDERAL) 20 MG tablet TAKE 1 TABLET BY MOUTH TWICE DAILY, Disp-180 tablet, R-3**Patient requests 90 days supply**Normal      ramipril (ALTACE) 5 MG capsule Take 1 capsule by mouth daily, Disp-90 capsule, R-3Normal      escitalopram (LEXAPRO) 20 MG tablet TK 1 T PO QD, R-1Historical Med      traZODone (DESYREL) 50 MG tablet Take 100 mg by mouth nightly Historical Med      potassium chloride (K-TAB) 10 MEQ extended release tablet Take 1 tablet by mouth daily, Disp-180 tablet, R-3Normal      omeprazole (PRILOSEC) 20 MG delayed release capsule TAKE 2 CAPSULES BY MOUTH TWICE DAILY, Disp-360 capsule, R-1**Patient requests 90 days supply**Normal      augmented betamethasone dipropionate (DIPROLENE-AF) 0.05 % cream SONDRA EXT AA BID, R-3, Historical Med      B-D UF III MINI PEN NEEDLES 31G X 5 MM MISC Disp-100 each, R-1, VENUS, NormalUse 1 daily to inject insulin      VRAYLAR 3 MG CAPS TK ONE C PO  QHS, R-1, VENUS                  Relafen [nabumetone]    FAMILY HISTORY       Family History   Problem Relation Age of Onset    Heart Disease Mother     Diabetes Father     Heart Disease Father     Cancer Father         thyroid    No Known Problems Sister     No Known Problems Paternal Grandfather     No Known Problems Paternal Grandmother     No Known Problems Maternal Grandmother     No Known Problems Maternal Grandfather     No Known Problems Brother     No Known Problems Other     Breast Cancer Neg Hx     Colon Cancer Neg Hx     Eclampsia Neg Hx     Hypertension Neg Hx     Ovarian Cancer Neg Hx      Labor Neg Hx     Spont Abortions Neg Hx     Stroke Neg Hx           SOCIAL HISTORY       Social History     Socioeconomic History    Marital status:      Spouse name: None    Number of children: None    Years of education: None    Highest education level: None   Occupational History    None   Tobacco Use    Smoking status: Never Smoker    Smokeless tobacco: Never Used   Vaping Use    Vaping Use: Never used   Substance and Sexual Activity    Alcohol use:  Yes     Alcohol/week: 0.0 standard drinks     Comment: ocassionally    Drug use: No    Sexual activity: Not Currently     Partners: Male   Other Topics Concern    None   Social History Narrative    None     Social Determinants of Health     Financial Resource Strain: High Risk    Difficulty of Paying Living Expenses: Hard   Food Insecurity: Food Insecurity Present    Worried About Running Out of Food in the Last Year: Often true    Gayle of Food in the Last Year: Often true   Transportation Needs:     Lack of Transportation (Medical): Not on file    Lack of Transportation (Non-Medical):  Not on file   Physical Activity:     Days of Exercise per Week: Not on file    Minutes of Exercise per Session: Not on file   Stress:     Feeling of Stress : Not on file   Social Connections:     Frequency of Communication with Friends and Family: Not on file    Frequency of Social Gatherings with Friends and Family: Not on file    Attends Mandaen Services: Not on file    Active Member of [address] [place] [address] Mila or Organizations: Not on file    Attends Club or Organization Meetings: Not on file    Marital Status: Not on file   Intimate Partner Violence:     Fear of Current or Ex-Partner: Not on file    Emotionally Abused: Not on file    Physically Abused: Not on file    Sexually Abused: Not on file   Housing Stability:     Unable to Pay for Housing in the Last Year: Not on file    Number of [place] in the Last Year: Not on file    Unstable Housing in the Last Year: Not on file       SCREENINGS   Dg Coma Scale  Eye Opening: Spontaneous  Best Verbal Response: Oriented  Best Motor Response: Obeys commands  [place] Coma Scale Score: 15  Ebola Virus Disease (EVD) Screening   Temp: 98.5 °F (36.9 °C)  CIWA Assessment  BP: 138/82  Pulse: 77    PHYSICAL EXAM    (up to 7 for level 4, 8 or more for level 5)     ED Triage Vitals [05/13/22 1547]   BP Temp Temp Source Pulse Resp SpO2 Height Weight   (!) 207/108 98.5 °F (36.9 °C) Oral 97 16 96 % 5' 3\" (1.6 m) 178 lb (80.7 kg)       Physical Exam  Vitals and nursing note reviewed. Constitutional:       General: She is not in acute distress. Appearance: She is well-developed. HENT:      Head: Normocephalic and atraumatic. Right Ear: External ear normal.      Left Ear: External ear normal.      Nose: Nose normal.      Mouth/Throat:      Mouth: Mucous membranes are moist.      Comments: Dental caries without drainable fluid collection noted right molars./ rear  Eyes:      General:         Right eye: No discharge. Left eye: No discharge. Pupils: Pupils are equal, round, and reactive to light. Cardiovascular:      Rate and Rhythm: Normal rate and regular rhythm. Pulses: Normal pulses. Heart sounds: Normal heart sounds. Pulmonary:      Effort: Pulmonary effort is normal. No respiratory distress. Breath sounds: Normal breath sounds. No stridor. Abdominal:      General: Bowel sounds are normal. There is no distension. Palpations: Abdomen is soft. Musculoskeletal:         General: Normal range of motion. Cervical back: Normal range of motion and neck supple. Skin:     General: Skin is warm. Findings: No erythema. Neurological:      Mental Status: She is alert and oriented to person, place, and time. Cranial Nerves: No cranial nerve deficit. Sensory: No sensory deficit. Motor: No weakness. Coordination: Coordination normal.   Psychiatric:         Mood and Affect: Mood normal.         RESULTS     EKG: All EKG's are interpreted by the Emergency Department Physician who either signs or Co-signsthis chart in the absence of a cardiologist.    EKG normal sinus rhythm rate 84 negative ST segment elevation.     RADIOLOGY:   Non-plain filmimages such as CT, Ultrasound and MRI are read by the radiologist. Plain radiographic images are visualized and preliminarily interpreted by the emergency physician with the below findings:         Interpretation per the Radiologist below, if available at the time ofthis note:    CT Head WO Contrast   Final Result      No acute intracranial process or significant interval change from prior. ED BEDSIDE ULTRASOUND:   Performed by ED Physician - none    LABS:  Labs Reviewed   COMPREHENSIVE METABOLIC PANEL - Abnormal; Notable for the following components:       Result Value    Potassium 2.9 (*)     Glucose 343 (*)     CREATININE 0.32 (*)     Alkaline Phosphatase 164 (*)     All other components within normal limits    Narrative:     CALL  [facility]  [facility] tel. [phone],  K+ results called to and read back by Heriberto Day, 05/13/2022 17:19, by Sharon Dominguez   MAGNESIUM - Abnormal; Notable for the following components:    Magnesium 1.6 (*)     All other components within normal limits    Narrative:     CALL  [facility]  [facility] tel. [phone],  K+ results called to and read back by Heriberto Day, 05/13/2022 17:19, by Malathi 115 - Abnormal; Notable for the following components:    Glucose, Ur >=1000 (*)     Ketones, Urine TRACE (*)     Leukocyte Esterase, Urine SMALL (*)     All other components within normal limits   C-REACTIVE PROTEIN - Abnormal; Notable for the following components:    CRP 13.9 (*)     All other components within normal limits   MICROSCOPIC URINALYSIS - Abnormal; Notable for the following components:    Bacteria, UA FEW (*)     WBC, UA 20-50 (*)     All other components within normal limits   CULTURE, URINE   CBC WITH AUTO DIFFERENTIAL   SEDIMENTATION RATE       All other labs were within normal range or not returned as of this dictation.     EMERGENCY DEPARTMENT COURSE and DIFFERENTIAL DIAGNOSIS/MDM:   Vitals:    Vitals:    05/13/22 1547 05/13/22 1645 05/13/22 1730 05/13/22 1830   BP: (!) 207/108 (!) 185/95 (!) 151/85 138/82   Pulse: 97 84 75 77   Resp: 16 23 29 14   Temp: 98.5 °F (36.9 °C)      TempSrc: Oral      SpO2: 96% 94% 94% 94%   Weight: 178 lb (80.7 kg)      Height: 5' 3\" (1.6 m)               MDM  Number of Diagnoses or Management Options  Acute nonintractable headache, unspecified headache type  Dental caries  Essential hypertension  Hypokalemia  Type 2 diabetes mellitus with hyperglycemia, unspecified whether long term insulin use ([facility])  Urinary tract infection without hematuria, site unspecified  Diagnosis management comments:     Negative CT patient's blood pressure has improved without blood pressure medication she has an appointment with primary care next week she has endocrinology available. Patient states that she has had Toradol ibuprofen and Naprosyn in the past without reaction states she had a rash with Relafen and does not take that medication. We will treat for UTI with Keflex the intent to ensure that there is no dental infection we repleted potassium recommended a multivitamin the patient. Amount and/or Complexity of Data Reviewed  Clinical lab tests: reviewed and ordered  Tests in the radiology section of CPT®: reviewed and ordered  Tests in the medicine section of CPT®: reviewed        CRITICAL CARE TIME          CONSULTS:  None    PROCEDURES:  Unless otherwise noted below, none     Procedures    FINAL IMPRESSION      1. Acute nonintractable headache, unspecified headache type    2. Essential hypertension    3. Type 2 diabetes mellitus with hyperglycemia, unspecified whether long term insulin use ([facility] [address]    4. Urinary tract infection without hematuria, site unspecified    5. Hypokalemia    6. Dental caries          DISPOSITION/PLAN   DISPOSITION        PATIENT REFERRED TO:  Florida Randolph MD  [facility] [phone]    Call in 2 days      [facility]) ED  [address] [address]  [phone]  Go to   If symptoms worsen    MD Patricia Campos[address]  [phone]    Call in 1 day      your dentist    Call in 2 days        DISCHARGE MEDICATIONS:  Discharge Medication List as of 5/13/2022  6:39 PM      START taking these medications    Details   cephALEXin (KEFLEX) 500 MG capsule Take 1 capsule by mouth 4 times daily for 7 days, Disp-28 capsule, R-0Print      ketorolac (TORADOL) 10 MG tablet Take 1 tablet by mouth every 6 hours as needed for Pain, Disp-20 tablet, R-0Print                (Please note that portions of this note were completed with a voice recognition program.  Efforts were made to edit the dictations but occasionally words are mis-transcribed.)    Femi Cuevas PA-C (electronically signed)  Attending Emergency Physician       Femi Cuevas PA-C  05/13/22 [address] [place], [place] [id number]  05/13/22 [address] [place], [place] [id number]  05/13/22 [id number]

## 2022-05-13 NOTE — PROGRESS NOTES
Subjective:      Patient ID: Jeffry Khan is a 49 y.o. female who presents today for:  Chief Complaint   Patient presents with    Otalgia     right ear pain, x 3days tx: OTC medication     Dental Pain     possible dental infection or ear infection        HPI pt denies any changes in vision, no neuro deficits- but she does have a right sided face and head pain- that normal Tylenol and motrin are not helping. she tough that maybe her teeth are getting infected- but she is chewing gum and able to bite down with no significant increase in pain   her ear is clear and she does not have any significantly enlarged nodes or glands on right   her ear does not hurt but the head hear behind it does    Past Medical History:   Diagnosis Date    Depression     Environmental allergies     Gastroparesis     GERD (gastroesophageal reflux disease)     Headache     Dr. Panfilo Alford HPV in female     Hyperlipidemia     Hypertension     Leukocytosis     Migraines     Type II or unspecified type diabetes mellitus without mention of complication, not stated as uncontrolled      Past Surgical History:   Procedure Laterality Date    [facility]    [facility]     [facility]    CARDIAC CATHETERIZATION      (-)[facility]    CHOLECYSTECTOMY      ENDOMETRIAL ABLATION  ?     GALLBLADDER SURGERY       Family History   Problem Relation Age of Onset    Heart Disease Mother     Diabetes Father     Heart Disease Father     Cancer Father         thyroid    No Known Problems Sister     No Known Problems Paternal Grandfather     No Known Problems Paternal Grandmother     No Known Problems Maternal Grandmother     No Known Problems Maternal Grandfather     No Known Problems Brother     No Known Problems Other     Breast Cancer Neg Hx     Colon Cancer Neg Hx     Eclampsia Neg Hx     Hypertension Neg Hx     Ovarian Cancer Neg Hx      Labor Neg Hx     Spont Abortions Neg Hx     Stroke Neg Hx      Social History     Socioeconomic History    Marital status:      Spouse name: Not on file    Number of children: Not on file    Years of education: Not on file    Highest education level: Not on file   Occupational History    Not on file   Tobacco Use    Smoking status: Never Smoker    Smokeless tobacco: Never Used   Vaping Use    Vaping Use: Never used   Substance and Sexual Activity    Alcohol use: Yes     Alcohol/week: 0.0 standard drinks     Comment: ocassionally    Drug use: No    Sexual activity: Not Currently     Partners: Male   Other Topics Concern    Not on file   Social History Narrative    Not on file     Social Determinants of Health     Financial Resource Strain: High Risk    Difficulty of Paying Living Expenses: Hard   Food Insecurity: Food Insecurity Present    Worried About Running Out of Food in the Last Year: Often true    Gayle of Food in the Last Year: Often true   Transportation Needs:     Lack of Transportation (Medical): Not on file    Lack of Transportation (Non-Medical):  Not on file   Physical Activity:     Days of Exercise per Week: Not on file    Minutes of Exercise per Session: Not on file   Stress:     Feeling of Stress : Not on file   Social Connections:     Frequency of Communication with Friends and Family: Not on file    Frequency of Social Gatherings with Friends and Family: Not on file    Attends Quaker Services: Not on file    Active Member of Clubs or Organizations: Not on file    Attends Club or Organization Meetings: Not on file    Marital Status: Not on file   Intimate Partner Violence:     Fear of Current or Ex-Partner: Not on file    Emotionally Abused: Not on file    Physically Abused: Not on file    Sexually Abused: Not on file   Housing Stability:     Unable to Pay for Housing in the Last Year: Not on file    Number of [place] in the Last Year: Not on file    Unstable Housing in the Last Year: Not on file     Current Outpatient Medications on File Prior to Visit   Medication Sig Dispense Refill    Dulaglutide (TRULICITY) 1.86 QX/4.0JU SOPN Inject 0.75 mg into the skin once a week 4 pen 3    pioglitazone (ACTOS) 15 MG tablet Take 1 tablet by mouth daily 30 tablet 3    fenofibrate (TRICOR) 145 MG tablet Take 1 tablet by mouth daily 90 tablet 3    atorvastatin (LIPITOR) 20 MG tablet Take 1 tablet by mouth daily 90 tablet 3    fluconazole (DIFLUCAN) 150 MG tablet TAKE 1 TABLET BY MOUTH DAILY AS NEEDED FOR DISCHARGE 4 tablet 0    insulin lispro (HUMALOG KWIKPEN) 100 UNIT/ML pen Inject 20 Units into the skin 3 times daily (before meals) 5 pen 3    propranolol (INDERAL) 20 MG tablet TAKE 1 TABLET BY MOUTH TWICE DAILY (Patient taking differently: 20 mg daily ) 180 tablet 3    escitalopram (LEXAPRO) 20 MG tablet TK 1 T PO QD  1    traZODone (DESYREL) 50 MG tablet Take 100 mg by mouth nightly       potassium chloride (K-TAB) 10 MEQ extended release tablet Take 1 tablet by mouth daily (Patient taking differently: Take 10 mEq by mouth 3 times daily ) 180 tablet 3    omeprazole (PRILOSEC) 20 MG delayed release capsule TAKE 2 CAPSULES BY MOUTH TWICE DAILY 360 capsule 1    VRAYLAR 3 MG CAPS TK ONE C PO  QHS  1    metoclopramide (REGLAN) 10 MG tablet Take 1 tablet by mouth 3 times daily (with meals) (Patient not taking: Reported on 5/13/2022) 120 tablet 3    Insulin Degludec (TRESIBA FLEXTOUCH) 100 UNIT/ML SOPN Inject 50 Units into the skin nightly (Patient not taking: Reported on 5/13/2022) 5 pen 3    ramipril (ALTACE) 5 MG capsule Take 1 capsule by mouth daily (Patient not taking: Reported on 5/13/2022) 90 capsule 3    augmented betamethasone dipropionate (DIPROLENE-AF) 0.05 % cream SONDRA EXT AA BID (Patient not taking: Reported on 5/13/2022)  3    B-D UF III MINI PEN NEEDLES 31G X 5 MM MISC Use 1 daily to inject insulin (Patient not taking: Reported on 5/13/2022) 100 each 1     No current facility-administered medications on file prior to visit.     :  Relafen [nabumetone]    Review of Systems   Constitutional: Negative for chills, diaphoresis and fever. HENT: Negative for congestion, ear pain, sore throat and tinnitus. Eyes: Negative for photophobia, pain and redness. Respiratory: Negative for cough, shortness of breath and wheezing. Cardiovascular: Negative for chest pain, palpitations and leg swelling. Gastrointestinal: Negative for abdominal pain, diarrhea, nausea and vomiting. Genitourinary: Negative for dysuria, flank pain, frequency and urgency. Musculoskeletal: Negative for back pain and myalgias. Skin: Negative for rash. Neurological: Positive for headaches. Negative for dizziness, tremors, seizures and weakness. Hematological: Does not bruise/bleed easily. Psychiatric/Behavioral: The patient is not nervous/anxious. Objective:   BP (!) 188/100 (Site: Right Upper Arm, Position: Sitting, Cuff Size: Large Adult)   Pulse 90   Temp 96.1 °F (35.6 °C) (Temporal)   Ht 5' 3\" (1.6 m) Comment: per pt  Wt 178 lb (80.7 kg) Comment: per pt  SpO2 96%   BMI 31.53 kg/m²     Physical Exam  Constitutional:       General: She is not in acute distress. Appearance: She is not diaphoretic. HENT:      Head: Normocephalic and atraumatic. Eyes:      General: No scleral icterus. Conjunctiva/sclera: Conjunctivae normal.      Pupils: Pupils are equal, round, and reactive to light. Neck:      Thyroid: No thyromegaly. Trachea: No tracheal deviation. Cardiovascular:      Rate and Rhythm: Normal rate and regular rhythm. Heart sounds: Normal heart sounds. No murmur heard. No gallop. Pulmonary:      Effort: Pulmonary effort is normal.      Breath sounds: Normal breath sounds. No wheezing or rales. Abdominal:      General: Bowel sounds are normal. There is no distension. Palpations: Abdomen is soft. Tenderness:  There is no abdominal tenderness. There is no guarding. Musculoskeletal:         General: Normal range of motion. Cervical back: Normal range of motion and neck supple. Lymphadenopathy:      Cervical: No cervical adenopathy. Skin:     General: Skin is warm and dry. Coloration: Skin is not pale. Findings: No erythema or rash. Neurological:      Mental Status: She is alert and oriented to person, place, and time. Cranial Nerves: No cranial nerve deficit. Coordination: Coordination normal.         Procedures   :       Diagnosis Orders   1. Elevated BP without diagnosis of hypertension         :      No orders of the defined types were placed in this encounter. No orders of the defined types were placed in this encounter. spoke with pt about BP   she normally runs about 140/80   we have checked her BP multiple times and it is consistently 180/100- in both arm and with sitting   her headache does not respond for normal OTC meds   we spoke with her that at this time   she may want to go to ED- to make sure it's not the pressures causing the head pain   pt denies any dizziness, no nausea or vomiting      there is no ear infection noted and there is no tooth abscess or any other reasons to explain pt's increased pressures and the rt sided headache    Return today (on 5/13/2022), or referred to ED-to adccess to additional testing from here. Reviewed with the patient: current clinicalstatus, medications, activities and diet. Side effects, adverse effects of the medication prescribedtoday, as well as treatment plan/ rationale and result expectations have been discussedwith the patient who expresses understanding and desires to proceed. Close follow upto evaluate treatment results and for coordination of care. I have reviewedthe patient's medical history in detail and updated the computerized patient record.     PAOLO Marinelli - CNP

## 2022-05-13 NOTE — ED TRIAGE NOTES
Pt to the ED via walk into triage after being sent from clinic. Pt hypertensive and states that she has hx of essential HTN.   Pt 207/108, alert and oriented and states she also has a headache 9/10

## 2022-05-14 LAB — URINE CULTURE, ROUTINE: NORMAL

## 2022-05-16 LAB
EKG ATRIAL RATE: 84 BPM
EKG P AXIS: 43 DEGREES
EKG P-R INTERVAL: 170 MS
EKG Q-T INTERVAL: 370 MS
EKG QRS DURATION: 68 MS
EKG QTC CALCULATION (BAZETT): 437 MS
EKG R AXIS: 4 DEGREES
EKG T AXIS: 32 DEGREES
EKG VENTRICULAR RATE: 84 BPM

## 2022-05-16 PROCEDURE — 93010 ELECTROCARDIOGRAM REPORT: CPT | Performed by: INTERNAL MEDICINE

## 2022-05-18 ENCOUNTER — OFFICE VISIT (OUTPATIENT)
Dept: ENDOCRINOLOGY | Age: 52
End: 2022-05-18
Payer: COMMERCIAL

## 2022-05-18 VITALS
SYSTOLIC BLOOD PRESSURE: 157 MMHG | WEIGHT: 189 LBS | DIASTOLIC BLOOD PRESSURE: 85 MMHG | HEIGHT: 63 IN | OXYGEN SATURATION: 94 % | HEART RATE: 71 BPM | BODY MASS INDEX: 33.49 KG/M2

## 2022-05-18 DIAGNOSIS — E11.65 UNCONTROLLED TYPE 2 DIABETES MELLITUS WITH HYPERGLYCEMIA (HCC): Primary | ICD-10-CM

## 2022-05-18 LAB
CHP ED QC CHECK: NORMAL
GLUCOSE BLD-MCNC: 286 MG/DL
HBA1C MFR BLD: 12 %

## 2022-05-18 PROCEDURE — 3046F HEMOGLOBIN A1C LEVEL >9.0%: CPT | Performed by: INTERNAL MEDICINE

## 2022-05-18 PROCEDURE — 82962 GLUCOSE BLOOD TEST: CPT | Performed by: INTERNAL MEDICINE

## 2022-05-18 PROCEDURE — 99213 OFFICE O/P EST LOW 20 MIN: CPT | Performed by: INTERNAL MEDICINE

## 2022-05-18 PROCEDURE — 3017F COLORECTAL CA SCREEN DOC REV: CPT | Performed by: INTERNAL MEDICINE

## 2022-05-18 PROCEDURE — 1036F TOBACCO NON-USER: CPT | Performed by: INTERNAL MEDICINE

## 2022-05-18 PROCEDURE — 83036 HEMOGLOBIN GLYCOSYLATED A1C: CPT | Performed by: INTERNAL MEDICINE

## 2022-05-18 PROCEDURE — 2022F DILAT RTA XM EVC RTNOPTHY: CPT | Performed by: INTERNAL MEDICINE

## 2022-05-18 PROCEDURE — G8417 CALC BMI ABV UP PARAM F/U: HCPCS | Performed by: INTERNAL MEDICINE

## 2022-05-18 PROCEDURE — G8427 DOCREV CUR MEDS BY ELIG CLIN: HCPCS | Performed by: INTERNAL MEDICINE

## 2022-05-18 RX ORDER — LISINOPRIL 10 MG/1
TABLET ORAL
COMMUNITY
Start: 2022-05-16 | End: 2022-06-24 | Stop reason: SDUPTHER

## 2022-05-18 RX ORDER — INSULIN LISPRO 200 [IU]/ML
INJECTION, SOLUTION SUBCUTANEOUS
COMMUNITY
End: 2022-09-08 | Stop reason: SDUPTHER

## 2022-05-18 ASSESSMENT — ENCOUNTER SYMPTOMS
GASTROINTESTINAL NEGATIVE: 1
RESPIRATORY NEGATIVE: 1
COLOR CHANGE: 1
EYES NEGATIVE: 1

## 2022-05-18 NOTE — PROGRESS NOTES
5/18/2022    Assessment:       Diagnosis Orders   1. Uncontrolled type 2 diabetes mellitus with hyperglycemia (HCC)           PLAN:     Orders Placed This Encounter   Procedures    Hemoglobin A1C     Standing Status:   Future     Standing Expiration Date:   5/18/2023    Basic Metabolic Panel, Fasting     Standing Status:   Future     Standing Expiration Date:   5/18/2023    POCT Glucose    POCT glycosylated hemoglobin (Hb A1C)     Continue patient on insulin OmniPod pump as per pump setting patient to follow-up in 3 months time advised compliance with diet increase activity lose weight A1c goal of 7 or lower  Continue current dose of Trulicity    Orders Placed This Encounter   Procedures    POCT Glucose    POCT glycosylated hemoglobin (Hb A1C)       Subjective:     Chief Complaint   Patient presents with    Diabetes     Vitals:    05/18/22 1405 05/18/22 1408   BP: (!) 157/85 (!) 157/85   Pulse: 71    SpO2: 94%    Weight: 189 lb (85.7 kg)    Height: 5' 3\" (1.6 m)      Wt Readings from Last 3 Encounters:   05/18/22 189 lb (85.7 kg)   05/13/22 178 lb (80.7 kg)   05/13/22 178 lb (80.7 kg)     BP Readings from Last 3 Encounters:   05/18/22 (!) 157/85   05/13/22 138/82   05/13/22 (!) 188/100     Follow-up on type 2 diabetes patient using Omni pod insulin pump did not bring her PDM reader was not able to download 8 patient was also not using the insulin pump for some time A1c is still high at 12 multiple reasons including compliance with boluses as well as stress  Patient unable to switch to [employer] pump because of cost no change in the insulin pump setting otherwise  Seen in the ER recently for high blood pressure  UTI hypertension  Patient also on Trulicity  Had eye exam done recently no retinopathy as per patient    Diabetes  She presents for her follow-up diabetic visit. She has type 2 diabetes mellitus. Her disease course has been fluctuating.  Pertinent negatives for diabetes include no polydipsia and no polyuria. There are no hypoglycemic complications. Symptoms are worsening. Risk factors for coronary artery disease include stress and obesity. Current diabetic treatment includes insulin pump. She participates in exercise intermittently. Her overall blood glucose range is >200 mg/dl. (Lab Results       Component                Value               Date                       LABA1C                   12.0                05/18/2022              )     Past Medical History:   Diagnosis Date    Depression     Environmental allergies     Gastroparesis     GERD (gastroesophageal reflux disease)     Headache     Dr. Saenz Amen HPV in female     Hyperlipidemia     Hypertension     Leukocytosis     Migraines     Type II or unspecified type diabetes mellitus without mention of complication, not stated as uncontrolled      Past Surgical History:   Procedure Laterality Date    [facility] MARR[facility]SY  2012    (-[facility]    BREAST REDUCTION SURGERY  1999   [facility] CARDIAC CATHETERIZATION  2009    [facility]    CHOLECYSTECTOMY      ENDOMETRIAL ABLATION  2012?  GALLBLADDER SURGERY  1999     Social History     Socioeconomic History    Marital status:      Spouse name: Not on file    Number of children: Not on file    Years of education: Not on file    Highest education level: Not on file   Occupational History    Not on file   Tobacco Use    Smoking status: Never Smoker    Smokeless tobacco: Never Used   Vaping Use    Vaping Use: Never used   Substance and Sexual Activity    Alcohol use:  Yes     Alcohol/week: 0.0 standard drinks     Comment: ocassionally    Drug use: No    Sexual activity: Not Currently     Partners: Male   Other Topics Concern    Not on file   Social History Narrative    Not on file     Social Determinants of Health     Financial Resource Strain: High Risk    Difficulty of Paying Living Expenses: Hard   Food Insecurity: Food Insecurity Present    Worried About Running Out of Food in the Last Year: Often true    Ran Out of Food in the Last Year: Often true   Transportation Needs:     Lack of Transportation (Medical): Not on file    Lack of Transportation (Non-Medical):  Not on file   Physical Activity:     Days of Exercise per Week: Not on file    Minutes of Exercise per Session: Not on file   Stress:     Feeling of Stress : Not on file   Social Connections:     Frequency of Communication with Friends and Family: Not on file    Frequency of Social Gatherings with Friends and Family: Not on file    Attends Zoroastrian Services: Not on file    Active Member of Clubs or Organizations: Not on file    Attends Club or Organization Meetings: Not on file    Marital Status: Not on file   Intimate Partner Violence:     Fear of Current or Ex-Partner: Not on file    Emotionally Abused: Not on file    Physically Abused: Not on file    Sexually Abused: Not on file   Housing Stability:     Unable to Pay for Housing in the Last Year: Not on file    Number of Places Lived in the Last Year: Not on file    Unstable Housing in the Last Year: Not on file     Family History   Problem Relation Age of Onset    Heart Disease Mother     Diabetes Father     Heart Disease Father     Cancer Father         thyroid    No Known Problems Sister     No Known Problems Paternal Grandfather     No Known Problems Paternal Grandmother     No Known Problems Maternal Grandmother     No Known Problems Maternal Grandfather     No Known Problems Brother     No Known Problems Other     Breast Cancer Neg Hx     Colon Cancer Neg Hx     Eclampsia Neg Hx     Hypertension Neg Hx     Ovarian Cancer Neg Hx      Labor Neg Hx     Spont Abortions Neg Hx     Stroke Neg Hx      Allergies   Allergen Reactions    Relafen [Nabumetone] Rash       Current Outpatient Medications:     insulin lispro (HUMALOG KWIKPEN) 200 UNIT/ML SOPN pen, Inject into the skin 3 times daily (with meals), Disp: , Rfl:     cephALEXin (KEFLEX) 500 MG capsule, Take 1 capsule by mouth 4 times daily for 7 days, Disp: 28 capsule, Rfl: 0    ketorolac (TORADOL) 10 MG tablet, Take 1 tablet by mouth every 6 hours as needed for Pain, Disp: 20 tablet, Rfl: 0    metoclopramide (REGLAN) 10 MG tablet, Take 1 tablet by mouth 3 times daily (with meals), Disp: 120 tablet, Rfl: 3    Dulaglutide (TRULICITY) 2.64 UD/0.8WI SOPN, Inject 0.75 mg into the skin once a week, Disp: 4 pen, Rfl: 3    pioglitazone (ACTOS) 15 MG tablet, Take 1 tablet by mouth daily, Disp: 30 tablet, Rfl: 3    fenofibrate (TRICOR) 145 MG tablet, Take 1 tablet by mouth daily, Disp: 90 tablet, Rfl: 3    atorvastatin (LIPITOR) 20 MG tablet, Take 1 tablet by mouth daily, Disp: 90 tablet, Rfl: 3    fluconazole (DIFLUCAN) 150 MG tablet, TAKE 1 TABLET BY MOUTH DAILY AS NEEDED FOR DISCHARGE, Disp: 4 tablet, Rfl: 0    propranolol (INDERAL) 20 MG tablet, TAKE 1 TABLET BY MOUTH TWICE DAILY (Patient taking differently: 20 mg daily ), Disp: 180 tablet, Rfl: 3    ramipril (ALTACE) 5 MG capsule, Take 1 capsule by mouth daily, Disp: 90 capsule, Rfl: 3    escitalopram (LEXAPRO) 20 MG tablet, TK 1 T PO QD, Disp: , Rfl: 1    traZODone (DESYREL) 50 MG tablet, Take 100 mg by mouth nightly , Disp: , Rfl:     omeprazole (PRILOSEC) 20 MG delayed release capsule, TAKE 2 CAPSULES BY MOUTH TWICE DAILY, Disp: 360 capsule, Rfl: 1    augmented betamethasone dipropionate (DIPROLENE-AF) 0.05 % cream, SONDRA EXT AA BID, Disp: , Rfl: 3    B-D UF III MINI PEN NEEDLES 31G X 5 MM MISC, Use 1 daily to inject insulin, Disp: 100 each, Rfl: 1    VRAYLAR 3 MG CAPS, TK ONE C PO  QHS, Disp: , Rfl: 1    lisinopril (PRINIVIL;ZESTRIL) 10 MG tablet, , Disp: , Rfl:   Lab Results   Component Value Date     05/13/2022    K 2.9 (LL) 05/13/2022    CL 96 05/13/2022    CO2 27 05/13/2022    BUN 10 05/13/2022    CREATININE 0.32 (L) 05/13/2022    GLUCOSE 286 05/18/2022    CALCIUM 8.8 05/13/2022    PROT 7.1 05/13/2022    LABALBU 3.8 05/13/2022    BILITOT <0.2 05/13/2022    ALKPHOS 164 (H) 05/13/2022    AST 9 05/13/2022    ALT 8 05/13/2022    LABGLOM >60.0 05/13/2022    GFRAA >60.0 05/13/2022    GLOB 3.3 05/13/2022     Lab Results   Component Value Date    WBC 10.3 05/13/2022    HGB 13.0 05/13/2022    HCT 39.4 05/13/2022    MCV 89.5 05/13/2022     05/13/2022     Lab Results   Component Value Date    LABA1C 12.0 05/18/2022    LABA1C 11.9 02/16/2022    LABA1C 11.2 10/01/2021     Lab Results   Component Value Date    HDL 29 (L) 03/09/2019    HDL 30 (L) 09/01/2018    HDL 28 (L) 11/16/2016    LDLCALC 153 (H) 03/09/2019    LDLCALC 194 (H) 09/01/2018    LDLCALC 119 11/16/2016    CHOL 244 (H) 03/09/2019    CHOL 272 (H) 09/01/2018    CHOL 179 11/16/2016    TRIG 312 (H) 03/09/2019    TRIG 241 (H) 09/01/2018    TRIG 161 11/16/2016     No results found for: TESTM  Lab Results   Component Value Date    TSH 1.050 09/01/2018    TSH 0.785 01/23/2018    TSH 0.898 11/16/2016    T4FREE 1.30 01/23/2018    T4FREE 1.23 11/19/2014     No results found for: TPOABS    Review of Systems   Eyes: Negative. Respiratory: Negative. Gastrointestinal: Negative. Endocrine: Negative for polydipsia and polyuria. Skin: Positive for color change and rash. All other systems reviewed and are negative. Objective:   Physical Exam  Vitals reviewed. Constitutional:       General: She is not in acute distress. Appearance: Normal appearance. She is obese. HENT:      Head: Normocephalic and atraumatic. Right Ear: External ear normal.      Left Ear: External ear normal.      Nose: Nose normal.   Eyes:      General: No scleral icterus. Right eye: No discharge. Left eye: No discharge. Extraocular Movements: Extraocular movements intact. Conjunctiva/sclera: Conjunctivae normal.   Cardiovascular:      Rate and Rhythm: Normal rate.    Pulmonary:      Effort: Pulmonary effort is normal.   Musculoskeletal: General: Normal range of motion. Cervical back: Normal range of motion and neck supple. Neurological:      General: No focal deficit present. Mental Status: She is alert and oriented to person, place, and time.    Psychiatric:         Mood and Affect: Mood normal.         Behavior: Behavior normal.

## 2022-06-24 ENCOUNTER — HOSPITAL ENCOUNTER (OUTPATIENT)
Age: 52
Setting detail: OBSERVATION
Discharge: HOME | End: 2022-06-26
Attending: STUDENT IN AN ORGANIZED HEALTH CARE EDUCATION/TRAINING PROGRAM | Admitting: INTERNAL MEDICINE
Payer: COMMERCIAL

## 2022-06-24 ENCOUNTER — APPOINTMENT (OUTPATIENT)
Dept: GENERAL RADIOLOGY | Age: 52
End: 2022-06-24
Payer: COMMERCIAL

## 2022-06-24 DIAGNOSIS — R73.9 HYPERGLYCEMIA: ICD-10-CM

## 2022-06-24 DIAGNOSIS — Z76.0 ENCOUNTER FOR MEDICATION REFILL: ICD-10-CM

## 2022-06-24 DIAGNOSIS — Z91.14 DRUG NONCOMPLIANCE: ICD-10-CM

## 2022-06-24 DIAGNOSIS — E87.6 HYPOKALEMIA: ICD-10-CM

## 2022-06-24 DIAGNOSIS — I10 ESSENTIAL HYPERTENSION: Primary | ICD-10-CM

## 2022-06-24 DIAGNOSIS — R07.9 CHEST PAIN, UNSPECIFIED TYPE: ICD-10-CM

## 2022-06-24 DIAGNOSIS — E83.42 HYPOMAGNESEMIA: ICD-10-CM

## 2022-06-24 DIAGNOSIS — R00.2 PALPITATIONS: ICD-10-CM

## 2022-06-24 DIAGNOSIS — E11.65 UNCONTROLLED TYPE 2 DIABETES MELLITUS WITH HYPERGLYCEMIA (HCC): ICD-10-CM

## 2022-06-24 LAB
ALBUMIN SERPL-MCNC: 4 G/DL (ref 3.5–4.6)
ALP BLD-CCNC: 140 U/L (ref 40–130)
ALT SERPL-CCNC: 16 U/L (ref 0–33)
ANION GAP SERPL CALCULATED.3IONS-SCNC: 12 MEQ/L (ref 9–15)
AST SERPL-CCNC: 16 U/L (ref 0–35)
BASE EXCESS VENOUS: 6 (ref -3–3)
BASOPHILS ABSOLUTE: 0.1 K/UL (ref 0–0.2)
BASOPHILS RELATIVE PERCENT: 1 %
BILIRUB SERPL-MCNC: 0.3 MG/DL (ref 0.2–0.7)
BUN BLDV-MCNC: 7 MG/DL (ref 6–20)
CALCIUM IONIZED: 1.09 MMOL/L (ref 1.12–1.32)
CALCIUM SERPL-MCNC: 9.2 MG/DL (ref 8.5–9.9)
CHLORIDE BLD-SCNC: 90 MEQ/L (ref 95–107)
CO2: 33 MEQ/L (ref 20–31)
CREAT SERPL-MCNC: 0.32 MG/DL (ref 0.5–0.9)
EOSINOPHILS ABSOLUTE: 0.2 K/UL (ref 0–0.7)
EOSINOPHILS RELATIVE PERCENT: 1.5 %
GFR AFRICAN AMERICAN: >60
GFR AFRICAN AMERICAN: >60
GFR NON-AFRICAN AMERICAN: >60
GFR NON-AFRICAN AMERICAN: >60
GLOBULIN: 3.5 G/DL (ref 2.3–3.5)
GLUCOSE BLD-MCNC: 322 MG/DL (ref 70–99)
GLUCOSE BLD-MCNC: 345 MG/DL (ref 70–99)
HCO3 VENOUS: 30.5 MMOL/L (ref 23–29)
HCT VFR BLD CALC: 38.9 % (ref 37–47)
HEMOGLOBIN: 13 GM/DL (ref 12–16)
HEMOGLOBIN: 13.6 G/DL (ref 12–16)
LACTATE: 1.28 MMOL/L (ref 0.4–2)
LYMPHOCYTES ABSOLUTE: 4.1 K/UL (ref 1–4.8)
LYMPHOCYTES RELATIVE PERCENT: 34.5 %
MAGNESIUM: 1.6 MG/DL (ref 1.7–2.4)
MCH RBC QN AUTO: 30.5 PG (ref 27–31.3)
MCHC RBC AUTO-ENTMCNC: 34.9 % (ref 33–37)
MCV RBC AUTO: 87.4 FL (ref 82–100)
MONOCYTES ABSOLUTE: 0.9 K/UL (ref 0.2–0.8)
MONOCYTES RELATIVE PERCENT: 7.8 %
NEUTROPHILS ABSOLUTE: 6.5 K/UL (ref 1.4–6.5)
NEUTROPHILS RELATIVE PERCENT: 55.2 %
O2 SAT, VEN: 82 %
PCO2, VEN: 44.5 MM HG (ref 40–50)
PDW BLD-RTO: 13.7 % (ref 11.5–14.5)
PERFORMED ON: ABNORMAL
PH VENOUS: 7.44 (ref 7.32–7.42)
PLATELET # BLD: 316 K/UL (ref 130–400)
PO2, VEN: 45 MM HG
POC CHLORIDE: 94 MEQ/L (ref 99–110)
POC CREATININE: 0.3 MG/DL (ref 0.6–1.2)
POC FIO2: 21
POC HEMATOCRIT: 38 % (ref 36–48)
POC POTASSIUM: 2.3 MEQ/L (ref 3.5–5.1)
POC SAMPLE TYPE: ABNORMAL
POC SODIUM: 137 MEQ/L (ref 136–145)
POTASSIUM SERPL-SCNC: 2.5 MEQ/L (ref 3.4–4.9)
RBC # BLD: 4.45 M/UL (ref 4.2–5.4)
SODIUM BLD-SCNC: 135 MEQ/L (ref 135–144)
TCO2 CALC VENOUS: 32 MMOL/L
TOTAL PROTEIN: 7.5 G/DL (ref 6.3–8)
TROPONIN: <0.01 NG/ML (ref 0–0.01)
TROPONIN: <0.01 NG/ML (ref 0–0.01)
TSH SERPL DL<=0.05 MIU/L-ACNC: 0.75 UIU/ML (ref 0.44–3.86)
WBC # BLD: 11.7 K/UL (ref 4.8–10.8)

## 2022-06-24 PROCEDURE — 36415 COLL VENOUS BLD VENIPUNCTURE: CPT

## 2022-06-24 PROCEDURE — 6370000000 HC RX 637 (ALT 250 FOR IP): Performed by: PERSONAL EMERGENCY RESPONSE ATTENDANT

## 2022-06-24 PROCEDURE — 84443 ASSAY THYROID STIM HORMONE: CPT

## 2022-06-24 PROCEDURE — 74022 RADEX COMPL AQT ABD SERIES: CPT

## 2022-06-24 PROCEDURE — 82565 ASSAY OF CREATININE: CPT

## 2022-06-24 PROCEDURE — 84132 ASSAY OF SERUM POTASSIUM: CPT

## 2022-06-24 PROCEDURE — 85014 HEMATOCRIT: CPT

## 2022-06-24 PROCEDURE — 99285 EMERGENCY DEPT VISIT HI MDM: CPT

## 2022-06-24 PROCEDURE — 96368 THER/DIAG CONCURRENT INF: CPT

## 2022-06-24 PROCEDURE — 96365 THER/PROPH/DIAG IV INF INIT: CPT

## 2022-06-24 PROCEDURE — 36600 WITHDRAWAL OF ARTERIAL BLOOD: CPT

## 2022-06-24 PROCEDURE — 6360000002 HC RX W HCPCS: Performed by: PERSONAL EMERGENCY RESPONSE ATTENDANT

## 2022-06-24 PROCEDURE — 93005 ELECTROCARDIOGRAM TRACING: CPT | Performed by: STUDENT IN AN ORGANIZED HEALTH CARE EDUCATION/TRAINING PROGRAM

## 2022-06-24 PROCEDURE — 96366 THER/PROPH/DIAG IV INF ADDON: CPT

## 2022-06-24 PROCEDURE — 80053 COMPREHEN METABOLIC PANEL: CPT

## 2022-06-24 PROCEDURE — 83735 ASSAY OF MAGNESIUM: CPT

## 2022-06-24 PROCEDURE — 2580000003 HC RX 258: Performed by: PERSONAL EMERGENCY RESPONSE ATTENDANT

## 2022-06-24 PROCEDURE — 82803 BLOOD GASES ANY COMBINATION: CPT

## 2022-06-24 PROCEDURE — 84484 ASSAY OF TROPONIN QUANT: CPT

## 2022-06-24 PROCEDURE — 83605 ASSAY OF LACTIC ACID: CPT

## 2022-06-24 PROCEDURE — 93005 ELECTROCARDIOGRAM TRACING: CPT | Performed by: PERSONAL EMERGENCY RESPONSE ATTENDANT

## 2022-06-24 PROCEDURE — 82435 ASSAY OF BLOOD CHLORIDE: CPT

## 2022-06-24 PROCEDURE — 85025 COMPLETE CBC W/AUTO DIFF WBC: CPT

## 2022-06-24 PROCEDURE — 82330 ASSAY OF CALCIUM: CPT

## 2022-06-24 PROCEDURE — 84295 ASSAY OF SERUM SODIUM: CPT

## 2022-06-24 RX ORDER — LISINOPRIL 10 MG/1
10 TABLET ORAL ONCE
Status: COMPLETED | OUTPATIENT
Start: 2022-06-24 | End: 2022-06-24

## 2022-06-24 RX ORDER — POTASSIUM CHLORIDE 7.45 MG/ML
10 INJECTION INTRAVENOUS ONCE
Status: COMPLETED | OUTPATIENT
Start: 2022-06-24 | End: 2022-06-24

## 2022-06-24 RX ORDER — POTASSIUM CHLORIDE 7.45 MG/ML
10 INJECTION INTRAVENOUS ONCE
Status: COMPLETED | OUTPATIENT
Start: 2022-06-24 | End: 2022-06-25

## 2022-06-24 RX ORDER — POTASSIUM CHLORIDE 20 MEQ/1
40 TABLET, EXTENDED RELEASE ORAL ONCE
Status: COMPLETED | OUTPATIENT
Start: 2022-06-24 | End: 2022-06-24

## 2022-06-24 RX ORDER — LISINOPRIL 10 MG/1
10 TABLET ORAL DAILY
Qty: 30 TABLET | Refills: 1 | Status: SHIPPED | OUTPATIENT
Start: 2022-06-24

## 2022-06-24 RX ORDER — SODIUM CHLORIDE, SODIUM LACTATE, POTASSIUM CHLORIDE, AND CALCIUM CHLORIDE .6; .31; .03; .02 G/100ML; G/100ML; G/100ML; G/100ML
1000 INJECTION, SOLUTION INTRAVENOUS ONCE
Status: COMPLETED | OUTPATIENT
Start: 2022-06-24 | End: 2022-06-24

## 2022-06-24 RX ORDER — MAGNESIUM SULFATE IN WATER 40 MG/ML
2000 INJECTION, SOLUTION INTRAVENOUS ONCE
Status: COMPLETED | OUTPATIENT
Start: 2022-06-24 | End: 2022-06-24

## 2022-06-24 RX ORDER — POTASSIUM CHLORIDE 20 MEQ/1
20 TABLET, EXTENDED RELEASE ORAL DAILY
Qty: 7 TABLET | Refills: 0 | Status: SHIPPED | OUTPATIENT
Start: 2022-06-24 | End: 2022-06-24

## 2022-06-24 RX ADMIN — MAGNESIUM SULFATE HEPTAHYDRATE 2000 MG: 40 INJECTION, SOLUTION INTRAVENOUS at 20:45

## 2022-06-24 RX ADMIN — POTASSIUM CHLORIDE 10 MEQ: 7.46 INJECTION, SOLUTION INTRAVENOUS at 23:54

## 2022-06-24 RX ADMIN — SODIUM CHLORIDE, POTASSIUM CHLORIDE, SODIUM LACTATE AND CALCIUM CHLORIDE 1000 ML: 600; 310; 30; 20 INJECTION, SOLUTION INTRAVENOUS at 20:42

## 2022-06-24 RX ADMIN — POTASSIUM CHLORIDE 40 MEQ: 1500 TABLET, EXTENDED RELEASE ORAL at 19:36

## 2022-06-24 RX ADMIN — POTASSIUM CHLORIDE 10 MEQ: 7.46 INJECTION, SOLUTION INTRAVENOUS at 21:15

## 2022-06-24 RX ADMIN — LISINOPRIL 10 MG: 10 TABLET ORAL at 20:44

## 2022-06-24 ASSESSMENT — ENCOUNTER SYMPTOMS
DIARRHEA: 1
VOMITING: 0
SORE THROAT: 0
SHORTNESS OF BREATH: 1
CONSTIPATION: 1
COLOR CHANGE: 0
COUGH: 0
ABDOMINAL PAIN: 0
BLOOD IN STOOL: 0
RHINORRHEA: 0
NAUSEA: 0

## 2022-06-24 ASSESSMENT — PAIN SCALES - GENERAL: PAINLEVEL_OUTOF10: 3

## 2022-06-24 ASSESSMENT — PAIN DESCRIPTION - LOCATION: LOCATION: CHEST

## 2022-06-24 ASSESSMENT — PAIN DESCRIPTION - DESCRIPTORS: DESCRIPTORS: TIGHTNESS

## 2022-06-24 ASSESSMENT — PAIN - FUNCTIONAL ASSESSMENT
PAIN_FUNCTIONAL_ASSESSMENT: 0-10
PAIN_FUNCTIONAL_ASSESSMENT: NONE - DENIES PAIN

## 2022-06-24 ASSESSMENT — PAIN DESCRIPTION - FREQUENCY: FREQUENCY: INTERMITTENT

## 2022-06-24 ASSESSMENT — PAIN DESCRIPTION - ORIENTATION: ORIENTATION: MID

## 2022-06-24 ASSESSMENT — PAIN DESCRIPTION - PAIN TYPE: TYPE: ACUTE PAIN

## 2022-06-24 NOTE — ED TRIAGE NOTES
Pt presents to ED from home with c/o chest pain. Pt reports that mid-sternal chest pain has been present since last night. Pt describes pain as both a \"spasm\" and \"tightness\" that extends outwards towards her upper back. Pt denies SOB, n/v, or becoming diaphoretic at onset. Upon assessment, pt is A/Ox4, skin p/w/d, respirations even and unlabored, msp's intact. Pt denies SOB, n/v/d, fever, and chills during triage.

## 2022-06-24 NOTE — ED NOTES
EKG completed and given to Dr. Janet Solorzano. Pt OK to return to waiting room.       Gerardo Hartman RN  06/24/22 [id number]

## 2022-06-25 PROBLEM — Z79.4 TYPE 2 DIABETES MELLITUS, WITH LONG-TERM CURRENT USE OF INSULIN (HCC): Status: ACTIVE | Noted: 2018-06-27

## 2022-06-25 PROBLEM — E11.9 TYPE 2 DIABETES MELLITUS, WITH LONG-TERM CURRENT USE OF INSULIN (HCC): Status: ACTIVE | Noted: 2018-06-27

## 2022-06-25 PROBLEM — E11.9 TYPE 2 DIABETES MELLITUS, WITH LONG-TERM CURRENT USE OF INSULIN (HCC): Chronic | Status: ACTIVE | Noted: 2018-06-27

## 2022-06-25 PROBLEM — E87.6 HYPOKALEMIA: Status: ACTIVE | Noted: 2022-06-25

## 2022-06-25 PROBLEM — I25.10 CAD (CORONARY ARTERY DISEASE): Chronic | Status: ACTIVE | Noted: 2022-06-25

## 2022-06-25 PROBLEM — Z79.4 TYPE 2 DIABETES MELLITUS, WITH LONG-TERM CURRENT USE OF INSULIN (HCC): Chronic | Status: ACTIVE | Noted: 2018-06-27

## 2022-06-25 LAB
ALBUMIN SERPL-MCNC: 3.7 G/DL (ref 3.5–4.6)
ALP BLD-CCNC: 117 U/L (ref 40–130)
ALT SERPL-CCNC: 13 U/L (ref 0–33)
ANION GAP SERPL CALCULATED.3IONS-SCNC: 12 MEQ/L (ref 9–15)
ANION GAP SERPL CALCULATED.3IONS-SCNC: 16 MEQ/L (ref 9–15)
ANION GAP SERPL CALCULATED.3IONS-SCNC: 9 MEQ/L (ref 9–15)
AST SERPL-CCNC: 12 U/L (ref 0–35)
BASOPHILS ABSOLUTE: 0.1 K/UL (ref 0–0.2)
BASOPHILS RELATIVE PERCENT: 0.5 %
BILIRUB SERPL-MCNC: 0.4 MG/DL (ref 0.2–0.7)
BUN BLDV-MCNC: 6 MG/DL (ref 6–20)
BUN BLDV-MCNC: 7 MG/DL (ref 6–20)
BUN BLDV-MCNC: 7 MG/DL (ref 6–20)
CALCIUM SERPL-MCNC: 8.4 MG/DL (ref 8.5–9.9)
CALCIUM SERPL-MCNC: 8.6 MG/DL (ref 8.5–9.9)
CALCIUM SERPL-MCNC: 9.2 MG/DL (ref 8.5–9.9)
CHLORIDE BLD-SCNC: 96 MEQ/L (ref 95–107)
CHLORIDE BLD-SCNC: 97 MEQ/L (ref 95–107)
CHLORIDE BLD-SCNC: 98 MEQ/L (ref 95–107)
CO2: 28 MEQ/L (ref 20–31)
CO2: 30 MEQ/L (ref 20–31)
CO2: 31 MEQ/L (ref 20–31)
CREAT SERPL-MCNC: 0.32 MG/DL (ref 0.5–0.9)
CREAT SERPL-MCNC: 0.32 MG/DL (ref 0.5–0.9)
CREAT SERPL-MCNC: 0.35 MG/DL (ref 0.5–0.9)
EOSINOPHILS ABSOLUTE: 0.1 K/UL (ref 0–0.7)
EOSINOPHILS RELATIVE PERCENT: 1.3 %
GFR AFRICAN AMERICAN: >60
GFR NON-AFRICAN AMERICAN: >60
GLOBULIN: 2.9 G/DL (ref 2.3–3.5)
GLUCOSE BLD-MCNC: 263 MG/DL (ref 70–99)
GLUCOSE BLD-MCNC: 274 MG/DL (ref 70–99)
GLUCOSE BLD-MCNC: 276 MG/DL (ref 70–99)
GLUCOSE BLD-MCNC: 320 MG/DL (ref 70–99)
GLUCOSE BLD-MCNC: 378 MG/DL (ref 70–99)
GLUCOSE BLD-MCNC: 387 MG/DL (ref 70–99)
GLUCOSE BLD-MCNC: 390 MG/DL (ref 70–99)
HBA1C MFR BLD: 12.7 % (ref 4.8–5.9)
HCT VFR BLD CALC: 36.4 % (ref 37–47)
HEMOGLOBIN: 12.3 G/DL (ref 12–16)
LYMPHOCYTES ABSOLUTE: 3 K/UL (ref 1–4.8)
LYMPHOCYTES RELATIVE PERCENT: 28.4 %
MAGNESIUM: 1.9 MG/DL (ref 1.7–2.4)
MAGNESIUM: 2 MG/DL (ref 1.7–2.4)
MCH RBC QN AUTO: 30.1 PG (ref 27–31.3)
MCHC RBC AUTO-ENTMCNC: 33.8 % (ref 33–37)
MCV RBC AUTO: 89.3 FL (ref 82–100)
MONOCYTES ABSOLUTE: 0.8 K/UL (ref 0.2–0.8)
MONOCYTES RELATIVE PERCENT: 7.7 %
NEUTROPHILS ABSOLUTE: 6.5 K/UL (ref 1.4–6.5)
NEUTROPHILS RELATIVE PERCENT: 62.1 %
PDW BLD-RTO: 13.4 % (ref 11.5–14.5)
PERFORMED ON: ABNORMAL
PLATELET # BLD: 266 K/UL (ref 130–400)
POTASSIUM REFLEX MAGNESIUM: 2.9 MEQ/L (ref 3.4–4.9)
POTASSIUM REFLEX MAGNESIUM: 3.3 MEQ/L (ref 3.4–4.9)
POTASSIUM SERPL-SCNC: 2.9 MEQ/L (ref 3.4–4.9)
RBC # BLD: 4.07 M/UL (ref 4.2–5.4)
SODIUM BLD-SCNC: 137 MEQ/L (ref 135–144)
SODIUM BLD-SCNC: 139 MEQ/L (ref 135–144)
SODIUM BLD-SCNC: 141 MEQ/L (ref 135–144)
TOTAL PROTEIN: 6.6 G/DL (ref 6.3–8)
WBC # BLD: 10.5 K/UL (ref 4.8–10.8)

## 2022-06-25 PROCEDURE — 80053 COMPREHEN METABOLIC PANEL: CPT

## 2022-06-25 PROCEDURE — 6360000002 HC RX W HCPCS

## 2022-06-25 PROCEDURE — G0378 HOSPITAL OBSERVATION PER HR: HCPCS

## 2022-06-25 PROCEDURE — 83036 HEMOGLOBIN GLYCOSYLATED A1C: CPT

## 2022-06-25 PROCEDURE — 6370000000 HC RX 637 (ALT 250 FOR IP): Performed by: INTERNAL MEDICINE

## 2022-06-25 PROCEDURE — 6370000000 HC RX 637 (ALT 250 FOR IP)

## 2022-06-25 PROCEDURE — 96372 THER/PROPH/DIAG INJ SC/IM: CPT

## 2022-06-25 PROCEDURE — 85025 COMPLETE CBC W/AUTO DIFF WBC: CPT

## 2022-06-25 PROCEDURE — 83735 ASSAY OF MAGNESIUM: CPT

## 2022-06-25 PROCEDURE — 2580000003 HC RX 258

## 2022-06-25 PROCEDURE — 96366 THER/PROPH/DIAG IV INF ADDON: CPT

## 2022-06-25 PROCEDURE — 36415 COLL VENOUS BLD VENIPUNCTURE: CPT

## 2022-06-25 PROCEDURE — 99243 OFF/OP CNSLTJ NEW/EST LOW 30: CPT | Performed by: INTERNAL MEDICINE

## 2022-06-25 PROCEDURE — 6360000002 HC RX W HCPCS: Performed by: INTERNAL MEDICINE

## 2022-06-25 PROCEDURE — 93005 ELECTROCARDIOGRAM TRACING: CPT

## 2022-06-25 RX ORDER — ONDANSETRON 4 MG/1
4 TABLET, ORALLY DISINTEGRATING ORAL EVERY 8 HOURS PRN
Status: DISCONTINUED | OUTPATIENT
Start: 2022-06-25 | End: 2022-06-26 | Stop reason: HOSPADM

## 2022-06-25 RX ORDER — SODIUM CHLORIDE 0.9 % (FLUSH) 0.9 %
5-40 SYRINGE (ML) INJECTION PRN
Status: DISCONTINUED | OUTPATIENT
Start: 2022-06-25 | End: 2022-06-26 | Stop reason: HOSPADM

## 2022-06-25 RX ORDER — INSULIN LISPRO 100 [IU]/ML
0-6 INJECTION, SOLUTION INTRAVENOUS; SUBCUTANEOUS NIGHTLY
Status: DISCONTINUED | OUTPATIENT
Start: 2022-06-25 | End: 2022-06-26

## 2022-06-25 RX ORDER — TRAZODONE HYDROCHLORIDE 100 MG/1
100 TABLET ORAL NIGHTLY
Status: DISCONTINUED | OUTPATIENT
Start: 2022-06-25 | End: 2022-06-26 | Stop reason: HOSPADM

## 2022-06-25 RX ORDER — DEXTROSE MONOHYDRATE 50 MG/ML
100 INJECTION, SOLUTION INTRAVENOUS PRN
Status: DISCONTINUED | OUTPATIENT
Start: 2022-06-25 | End: 2022-06-26 | Stop reason: HOSPADM

## 2022-06-25 RX ORDER — ACETAMINOPHEN 325 MG/1
650 TABLET ORAL EVERY 6 HOURS PRN
Status: DISCONTINUED | OUTPATIENT
Start: 2022-06-25 | End: 2022-06-26 | Stop reason: HOSPADM

## 2022-06-25 RX ORDER — PROPRANOLOL HYDROCHLORIDE 10 MG/1
20 TABLET ORAL NIGHTLY
Status: DISCONTINUED | OUTPATIENT
Start: 2022-06-25 | End: 2022-06-26 | Stop reason: HOSPADM

## 2022-06-25 RX ORDER — ACETAMINOPHEN 650 MG/1
650 SUPPOSITORY RECTAL EVERY 6 HOURS PRN
Status: DISCONTINUED | OUTPATIENT
Start: 2022-06-25 | End: 2022-06-26 | Stop reason: HOSPADM

## 2022-06-25 RX ORDER — LISINOPRIL 10 MG/1
10 TABLET ORAL DAILY
Status: DISCONTINUED | OUTPATIENT
Start: 2022-06-25 | End: 2022-06-26 | Stop reason: HOSPADM

## 2022-06-25 RX ORDER — ONDANSETRON 2 MG/ML
4 INJECTION INTRAMUSCULAR; INTRAVENOUS EVERY 6 HOURS PRN
Status: DISCONTINUED | OUTPATIENT
Start: 2022-06-25 | End: 2022-06-26 | Stop reason: HOSPADM

## 2022-06-25 RX ORDER — ATORVASTATIN CALCIUM 20 MG/1
20 TABLET, FILM COATED ORAL DAILY
Status: DISCONTINUED | OUTPATIENT
Start: 2022-06-25 | End: 2022-06-26 | Stop reason: HOSPADM

## 2022-06-25 RX ORDER — POTASSIUM CHLORIDE 20 MEQ/1
40 TABLET, EXTENDED RELEASE ORAL ONCE
Status: COMPLETED | OUTPATIENT
Start: 2022-06-25 | End: 2022-06-25

## 2022-06-25 RX ORDER — POTASSIUM CHLORIDE 20 MEQ/1
40 TABLET, EXTENDED RELEASE ORAL 2 TIMES DAILY WITH MEALS
Status: COMPLETED | OUTPATIENT
Start: 2022-06-25 | End: 2022-06-26

## 2022-06-25 RX ORDER — ENOXAPARIN SODIUM 100 MG/ML
40 INJECTION SUBCUTANEOUS DAILY
Status: DISCONTINUED | OUTPATIENT
Start: 2022-06-25 | End: 2022-06-26 | Stop reason: HOSPADM

## 2022-06-25 RX ORDER — MAGNESIUM SULFATE IN WATER 40 MG/ML
2000 INJECTION, SOLUTION INTRAVENOUS ONCE
Status: COMPLETED | OUTPATIENT
Start: 2022-06-25 | End: 2022-06-25

## 2022-06-25 RX ORDER — INSULIN LISPRO 100 [IU]/ML
0-12 INJECTION, SOLUTION INTRAVENOUS; SUBCUTANEOUS
Status: DISCONTINUED | OUTPATIENT
Start: 2022-06-25 | End: 2022-06-26

## 2022-06-25 RX ORDER — ESCITALOPRAM OXALATE 20 MG/1
20 TABLET ORAL DAILY
Status: DISCONTINUED | OUTPATIENT
Start: 2022-06-25 | End: 2022-06-26 | Stop reason: HOSPADM

## 2022-06-25 RX ORDER — SODIUM CHLORIDE 9 MG/ML
INJECTION, SOLUTION INTRAVENOUS PRN
Status: DISCONTINUED | OUTPATIENT
Start: 2022-06-25 | End: 2022-06-26 | Stop reason: HOSPADM

## 2022-06-25 RX ORDER — SODIUM CHLORIDE 0.9 % (FLUSH) 0.9 %
5-40 SYRINGE (ML) INJECTION EVERY 12 HOURS SCHEDULED
Status: DISCONTINUED | OUTPATIENT
Start: 2022-06-25 | End: 2022-06-26 | Stop reason: HOSPADM

## 2022-06-25 RX ORDER — INSULIN GLARGINE 100 [IU]/ML
20 INJECTION, SOLUTION SUBCUTANEOUS NIGHTLY
Status: DISCONTINUED | OUTPATIENT
Start: 2022-06-25 | End: 2022-06-26

## 2022-06-25 RX ORDER — POLYETHYLENE GLYCOL 3350 17 G/17G
17 POWDER, FOR SOLUTION ORAL DAILY PRN
Status: DISCONTINUED | OUTPATIENT
Start: 2022-06-25 | End: 2022-06-26 | Stop reason: HOSPADM

## 2022-06-25 RX ADMIN — CARIPRAZINE 3 MG: 1.5 CAPSULE, GELATIN COATED ORAL at 08:02

## 2022-06-25 RX ADMIN — INSULIN LISPRO 8 UNITS: 100 INJECTION, SOLUTION INTRAVENOUS; SUBCUTANEOUS at 11:32

## 2022-06-25 RX ADMIN — MAGNESIUM SULFATE HEPTAHYDRATE 2000 MG: 40 INJECTION, SOLUTION INTRAVENOUS at 10:16

## 2022-06-25 RX ADMIN — LISINOPRIL 10 MG: 10 TABLET ORAL at 20:56

## 2022-06-25 RX ADMIN — INSULIN LISPRO 10 UNITS: 100 INJECTION, SOLUTION INTRAVENOUS; SUBCUTANEOUS at 07:46

## 2022-06-25 RX ADMIN — Medication 10 ML: at 08:03

## 2022-06-25 RX ADMIN — INSULIN GLARGINE 20 UNITS: 100 INJECTION, SOLUTION SUBCUTANEOUS at 21:35

## 2022-06-25 RX ADMIN — TRAZODONE HYDROCHLORIDE 100 MG: 100 TABLET ORAL at 20:56

## 2022-06-25 RX ADMIN — ENOXAPARIN SODIUM 40 MG: 100 INJECTION SUBCUTANEOUS at 08:03

## 2022-06-25 RX ADMIN — POTASSIUM CHLORIDE 40 MEQ: 1500 TABLET, EXTENDED RELEASE ORAL at 16:19

## 2022-06-25 RX ADMIN — POTASSIUM CHLORIDE 40 MEQ: 1500 TABLET, EXTENDED RELEASE ORAL at 13:26

## 2022-06-25 RX ADMIN — INSULIN LISPRO 2 UNITS: 100 INJECTION, SOLUTION INTRAVENOUS; SUBCUTANEOUS at 21:36

## 2022-06-25 RX ADMIN — PROPRANOLOL HYDROCHLORIDE 20 MG: 10 TABLET ORAL at 04:53

## 2022-06-25 RX ADMIN — ESCITALOPRAM OXALATE 20 MG: 20 TABLET ORAL at 08:02

## 2022-06-25 RX ADMIN — POTASSIUM CHLORIDE 40 MEQ: 1500 TABLET, EXTENDED RELEASE ORAL at 09:14

## 2022-06-25 RX ADMIN — INSULIN LISPRO 6 UNITS: 100 INJECTION, SOLUTION INTRAVENOUS; SUBCUTANEOUS at 16:19

## 2022-06-25 RX ADMIN — Medication 10 ML: at 21:00

## 2022-06-25 ASSESSMENT — ENCOUNTER SYMPTOMS
SHORTNESS OF BREATH: 0
WHEEZING: 0
COLOR CHANGE: 0
BACK PAIN: 1
CHEST TIGHTNESS: 0
COUGH: 0
PHOTOPHOBIA: 0
RESPIRATORY NEGATIVE: 1
SORE THROAT: 0
NAUSEA: 0
RHINORRHEA: 0
ABDOMINAL DISTENTION: 0
VOMITING: 0
STRIDOR: 0
DIARRHEA: 0
ABDOMINAL PAIN: 0
BLOOD IN STOOL: 0
EYES NEGATIVE: 1
GASTROINTESTINAL NEGATIVE: 1
CONSTIPATION: 0
TROUBLE SWALLOWING: 0

## 2022-06-25 ASSESSMENT — PAIN SCALES - GENERAL: PAINLEVEL_OUTOF10: 0

## 2022-06-25 NOTE — ED NOTES
Patient transported to [address] [place] [address] via wheelchair. Patient stable at time of transport. VSS. Belongings taken with patient on wheelchair.       Dorlene Pallas, RN  06/25/22 [id number]

## 2022-06-25 NOTE — ED PROVIDER NOTES
[address] [facility] ED  eMERGENCY dEPARTMENT eNCOUnter      Pt Name: Jeffry Khan  MRN: [id number]  [place] 1970  Date of evaluation: 6/24/2022  Provider: MANDO Ibarra      HISTORY OF PRESENT ILLNESS    Jeffry Khan is a 49 y.o. female with PMHx of depression, gastroparesis, GERD, hyperlipidemia, hypertension, DM 2 presents to the emergency department with chest pain. Patient states last night while laying in bed on her side she started with midsternal chest pain which radiates to left and right chest.  At times she does get twinges of pain and also gets midsternal chest tightness. Symptoms seem to be worse with truncal rotation, not exertional.  She has been short of breath with exertion for the past few weeks, improved with sitting. Chest pain last a couple seconds before going away. Today she started with palpitations. She only ate once today. She has been off her lisinopril for 1 week. She does have history of hypokalemia, was taking potassium at home, but was taken off this 1 month ago when she was placed on lisinopril. She denies fevers, cough, lightheaded or dizziness, nausea, vomiting. She has been more constipated lately but today had diarrhea without blood. She denies urinary symptoms or leg swelling. States had cardiac cath 4 years ago which was unremarkable, stress test last year which was unremarkable. Patient not taking her diabetes medications. HPI    Nursing Notes were reviewed. REVIEW OF SYSTEMS       Review of Systems   Constitutional: Negative for appetite change, chills and fever. HENT: Negative for congestion, rhinorrhea and sore throat. Respiratory: Positive for shortness of breath. Negative for cough. Cardiovascular: Positive for chest pain and palpitations. Gastrointestinal: Positive for constipation and diarrhea. Negative for abdominal pain, blood in stool, nausea and vomiting. Genitourinary: Negative for difficulty urinating. Musculoskeletal: Negative for neck stiffness. Skin: Negative for color change and rash. Neurological: Negative for dizziness, syncope, weakness, light-headedness, numbness and headaches. All other systems reviewed and are negative. PAST MEDICAL HISTORY     Past Medical History:   Diagnosis Date    Depression     Environmental allergies     Gastroparesis     GERD (gastroesophageal reflux disease)     Headache     Dr. Paty Salazar HPV in female     Hyperlipidemia     Hypertension     Leukocytosis     Migraines     Type II or unspecified type diabetes mellitus without mention of complication, not stated as uncontrolled          SURGICAL HISTORY       Past Surgical History:   Procedure Laterality Date    BONE MARROW BIOPSY  2012    (-[facility]    BREAST REDUCTION SURGERY  1999    BREAST SURGERY  1999    CARDIAC CATHETERIZATION  2009    (-)[facility]    CHOLECYSTECTOMY      ENDOMETRIAL ABLATION  2012?    Carl[address]       Current Discharge Medication List      CONTINUE these medications which have NOT CHANGED    Details   insulin lispro (HUMALOG KWIKPEN) 200 UNIT/ML SOPN pen Inject into the skin 3 times daily (with meals)    Associated Diagnoses: Uncontrolled type 2 diabetes mellitus with hyperglycemia (HCC)      ketorolac (TORADOL) 10 MG tablet Take 1 tablet by mouth every 6 hours as needed for Pain  Qty: 20 tablet, Refills: 0      metoclopramide (REGLAN) 10 MG tablet Take 1 tablet by mouth 3 times daily (with meals)  Qty: 120 tablet, Refills: 3      Dulaglutide (TRULICITY) 1.46 TD/5.2OL SOPN Inject 0.75 mg into the skin once a week  Qty: 4 pen, Refills: 3      pioglitazone (ACTOS) 15 MG tablet Take 1 tablet by mouth daily  Qty: 30 tablet, Refills: 3      fenofibrate (TRICOR) 145 MG tablet Take 1 tablet by mouth daily  Qty: 90 tablet, Refills: 3      atorvastatin (LIPITOR) 20 MG tablet Take 1 tablet by mouth daily  Qty: 90 tablet, Refills: 3 fluconazole (DIFLUCAN) 150 MG tablet TAKE 1 TABLET BY MOUTH DAILY AS NEEDED FOR DISCHARGE  Qty: 4 tablet, Refills: 0    Comments: No more refills until seen in office.       propranolol (INDERAL) 20 MG tablet TAKE 1 TABLET BY MOUTH TWICE DAILY  Qty: 180 tablet, Refills: 3    Comments: **Patient requests 90 days supply**      ramipril (ALTACE) 5 MG capsule Take 1 capsule by mouth daily  Qty: 90 capsule, Refills: 3    Associated Diagnoses: Essential hypertension      escitalopram (LEXAPRO) 20 MG tablet TK 1 T PO QD  Refills: 1      traZODone (DESYREL) 50 MG tablet Take 100 mg by mouth nightly       omeprazole (PRILOSEC) 20 MG delayed release capsule TAKE 2 CAPSULES BY MOUTH TWICE DAILY  Qty: 360 capsule, Refills: 1    Comments: **Patient requests 90 days supply**      augmented betamethasone dipropionate (DIPROLENE-AF) 0.05 % cream SONDRA EXT AA BID  Refills: 3      B-D UF III MINI PEN NEEDLES 31G X 5 MM MISC Use 1 daily to inject insulin  Qty: 100 each, Refills: 1      VRAYLAR 3 MG CAPS TK ONE C PO  QHS  Refills: 1             ALLERGIES     Relafen [nabumetone]    FAMILY HISTORY       Family History   Problem Relation Age of Onset    Heart Disease Mother     Diabetes Father     Heart Disease Father     Cancer Father         thyroid    No Known Problems Sister     No Known Problems Paternal Grandfather     No Known Problems Paternal Grandmother     No Known Problems Maternal Grandmother     No Known Problems Maternal Grandfather     No Known Problems Brother     No Known Problems Other     Breast Cancer Neg Hx     Colon Cancer Neg Hx     Eclampsia Neg Hx     Hypertension Neg Hx     Ovarian Cancer Neg Hx      Labor Neg Hx     Spont Abortions Neg Hx     Stroke Neg Hx           SOCIAL HISTORY       Social History     Socioeconomic History    Marital status:      Spouse name: None    Number of children: None    Years of education: None    Highest education level: None   Occupational History    None   Tobacco Use    Smoking status: Never Smoker    Smokeless tobacco: Never Used   Vaping Use    Vaping Use: Never used   Substance and Sexual Activity    Alcohol use: Yes     Alcohol/week: 0.0 standard drinks     Comment: ocassionally    Drug use: No    Sexual activity: Not Currently     Partners: Male   Other Topics Concern    None   Social History Narrative    None     Social Determinants of Health     Financial Resource Strain: High Risk    Difficulty of Paying Living Expenses: Hard   Food Insecurity: Food Insecurity Present    Worried About Running Out of Food in the Last Year: Often true    Gayle of Food in the Last Year: Often true   Transportation Needs:     Lack of Transportation (Medical): Not on file    Lack of Transportation (Non-Medical):  Not on file   Physical Activity:     Days of Exercise per Week: Not on file    Minutes of Exercise per Session: Not on file   Stress:     Feeling of Stress : Not on file   Social Connections:     Frequency of Communication with Friends and Family: Not on file    Frequency of Social Gatherings with Friends and Family: Not on file    Attends Scientologist Services: Not on file    Active Member of [address] [place] [address] or Organizations: Not on file    Attends Club or Organization Meetings: Not on file    Marital Status: Not on file   Intimate Partner Violence:     Fear of Current or Ex-Partner: Not on file    Emotionally Abused: Not on file    Physically Abused: Not on file    Sexually Abused: Not on file   Housing Stability:     Unable to Pay for Housing in the Last Year: Not on file    Number of [place] in the Last Year: Not on file    Unstable Housing in the Last Year: Not on file         PHYSICAL EXAM         ED Triage Vitals [06/24/22 1832]   BP Temp Temp Source Heart Rate Resp SpO2 Height Weight   (!) 181/100 97.1 °F (36.2 °C) Temporal 86 19 94 % 5' 3\" (1.6 m) 178 lb (80.7 kg)       Physical Exam  Constitutional:       Appearance: She is well-developed. HENT:      Head: Normocephalic and atraumatic. Eyes:      Conjunctiva/sclera: Conjunctivae normal.      Pupils: Pupils are equal, round, and reactive to light. Neck:      Trachea: No tracheal deviation. Cardiovascular:      Heart sounds: Normal heart sounds. Pulmonary:      Effort: Pulmonary effort is normal. No respiratory distress. Breath sounds: Normal breath sounds. No stridor. Abdominal:      General: Bowel sounds are normal. There is no distension. Palpations: Abdomen is soft. There is no mass. Tenderness: There is abdominal tenderness. There is no guarding or rebound. Comments: Minimal generalized abdominal tenderness to palpation, abdomen soft and distended, no rebound or rigidity, no pulsatile mass or bruit, no ecchymosis   Musculoskeletal:         General: Normal range of motion. Cervical back: Normal range of motion and neck supple. Skin:     General: Skin is warm and dry. Capillary Refill: Capillary refill takes less than 2 seconds. Findings: No rash. Neurological:      Mental Status: She is alert and oriented to person, place, and time. Deep Tendon Reflexes: Reflexes are normal and symmetric. Psychiatric:         Behavior: Behavior normal.         Thought Content: Thought content normal.         Judgment: Judgment normal.         DIAGNOSTIC RESULTS     EKG:All EKG's are interpreted by the Emergency Department Physician who either signs or Co-signs this chart in the absence of a cardiologist.    Normal sinus rhythm, rate 88, normal intervals, no ST segment changes. EKG #2: Normal sinus rhythm, rate 76, normal intervals, no ST segment changes.   T wave improvement from previous    RADIOLOGY:   Non-plain film images such as CT, Ultrasound and MRI are read by theradiologist. Plain radiographic images are visualized and preliminarily interpreted by the emergency physician with the below findings:    Interpretation per theRadiologist below, if available at the time of this note:    XR ACUTE ABD SERIES CHEST 1 VW    (Results Pending)           LABS:  Labs Reviewed   CBC WITH AUTO DIFFERENTIAL - Abnormal; Notable for the following components:       Result Value    WBC 11.7 (*)     Monocytes Absolute 0.9 (*)     All other components within normal limits   COMPREHENSIVE METABOLIC PANEL - Abnormal; Notable for the following components:    Potassium 2.5 (*)     Chloride 90 (*)     CO2 33 (*)     Glucose 345 (*)     CREATININE 0.32 (*)     Alkaline Phosphatase 140 (*)     All other components within normal limits    Narrative:     CALL  [facility]  ED tel. [phone],  POTASSIUM results called to and read back by Liliana GILMORE, 06/24/2022 19:32,  by Fannie Sandhoff   MAGNESIUM - Abnormal; Notable for the following components:    Magnesium 1.6 (*)     All other components within normal limits    Narrative:     CALL  [facility]  ED tel. [phone],  POTASSIUM results called to and read back by Liliana GILMORE, 06/24/2022 19:32,  by Fannie Sandhoff   POCT VENOUS - Abnormal; Notable for the following components:    POC Potassium 2.3 (*)     POC Chloride 94 (*)     POC Glucose 322 (*)     POC Creatinine 0.3 (*)     Calcium, Ion 1.09 (*)     pH, Maikel 7.444 (*)     HCO3, Venous 30.5 (*)     Base Excess, Maikel 6 (*)     All other components within normal limits   TROPONIN   TSH   TROPONIN   POCT EPOC BLOOD GAS, LACTIC ACID, ICA       All other labs were within normal range or not returned as of this dictation. EMERGENCY DEPARTMENT COURSE and DIFFERENTIAL DIAGNOSIS/MDM:   Vitals:    Vitals:    06/24/22 2130 06/24/22 2226 06/24/22 2230 06/24/22 2300   BP: (!) 145/87 (!) 160/85 (!) 157/75 132/71   Pulse: 80 79 80 78   Resp: 18 18 18 18   Temp:       TempSrc:       SpO2: 92% 95% 96% 96%   Weight:       Height:             MDM    Potassium 2.5, CO2 33, magnesium 1.6, glucose 345, WBC 11.7.   Patient given potassium 40meq PO, potassium 10meq IV, LR, Mg 2G IV and her at-home lisinopril. Repeat troponin negative. Chest x-ray shows no acute process. Patient on reassessment states that she started getting the magnesium potassium infusions, she experiencing less palpitations and twinges of chest pain. 2 negative troponins. VBG obtained showing potassium now 2.3 and glucose 322. Patient started on 10meq IV x2 and admitted. She seems to be having hypokalemia since 2019 with no known workup. CRITICAL CARE TIME   Total Critical Caretime was 0 minutes, excluding separately reportable procedures. There was a high probability of clinically significant/life threatening deterioration in the patient's condition which required my urgent intervention. Procedures    FINAL IMPRESSION      1. Essential hypertension    2. Encounter for medication refill    3. Chest pain, unspecified type    4. Palpitations    5. Hypokalemia    6. Hypomagnesemia    7. Hyperglycemia    8. Drug noncompliance    9. Uncontrolled type 2 diabetes mellitus with hyperglycemia [facility])          DISPOSITION/PLAN   DISPOSITION Decision To Admit 06/24/2022 11:22:17 PM      PATIENT REFERRED TO:  Rosita Peterson MD  [place] [address]  [phone]      follow up in 1 week for potassium recheck    Brynn Carroll MD  [address] [phone]            DISCHARGE MEDICATIONS:  Current Discharge Medication List             (Please notethat portions of this note were completed with a voice recognition program.  Efforts were made to edit the dictations but occasionally words are mis-transcribed. )    MANDO Lucero (electronically signed)  Emergency Physician Assistant         Stephen Barrosma  06/24/22 9570

## 2022-06-25 NOTE — PROGRESS NOTES
Hospitalist Progress Note      PCP: Rosa Brown MD    Date of Admission: 6/24/2022    Chief Complaint:    Chief Complaint   Patient presents with    Chest Pain     pt c/o mid-sternal chest pain since yesterday, that radiates outwards towards upper back     Subjective:  Patient is doing well; denies fevers, chills,s weats; no longer having CP. 12 point ROS negative other than mentioned above     Medications:  Reviewed    Infusion Medications    sodium chloride      dextrose       Scheduled Medications    sodium chloride flush  5-40 mL IntraVENous 2 times per day    enoxaparin  40 mg SubCUTAneous Daily    insulin lispro  0-12 Units SubCUTAneous TID WC    insulin lispro  0-6 Units SubCUTAneous Nightly    atorvastatin  20 mg Oral Daily    escitalopram  20 mg Oral Daily    propranolol  20 mg Oral Nightly    cariprazine hcl  3 mg Oral Daily    traZODone  100 mg Oral Nightly    lisinopril  10 mg Oral Daily    insulin glargine  20 Units SubCUTAneous Nightly    magnesium sulfate  2,000 mg IntraVENous Once    potassium chloride  40 mEq Oral BID WC    potassium chloride  40 mEq Oral Once     PRN Meds: sodium chloride flush, sodium chloride, ondansetron **OR** ondansetron, polyethylene glycol, acetaminophen **OR** acetaminophen, glucose, dextrose bolus **OR** dextrose bolus, glucagon (rDNA), dextrose      Intake/Output Summary (Last 24 hours) at 6/25/2022 1034  Last data filed at 6/24/2022 2255  Gross per 24 hour   Intake 1150 ml   Output    Net 1150 ml     Exam:    /64   Pulse 86   Temp 97.7 °F (36.5 °C) (Oral)   Resp 16   Ht 5' 3\" (1.6 m)   Wt 182 lb 3.2 oz (82.6 kg)   SpO2 98%   BMI 32.28 kg/m²     General appearance: No apparent distress, appears stated age and cooperative. HEENT: Conjunctivae/corneas clear. Neck:  Trachea midline. Respiratory:  Normal respiratory effort.  Clear to auscultation  Cardiovascular: Regular rate and rhythm  Abdomen: Soft, non-tender, non-distended with normal bowel sounds. Musculoskeletal: No clubbing, cyanosis or edema bilaterally. Neuro: Non Focal.  Capillary Refill: Brisk,< 3 seconds   Peripheral Pulses: +2 palpable, equal bilaterally     Labs:   Recent Labs     06/24/22 1845 06/24/22 2301 06/25/22  0536   WBC 11.7*  --  10.5   HGB 13.6 13.0 12.3   HCT 38.9  --  36.4*     --  266     Recent Labs     06/24/22 1845 06/24/22 2301 06/25/22  0536     --  141   K 2.5*  --  2.9*   CL 90*  --  97   CO2 33*  --  28   BUN 7  --  7   CREATININE 0.32* 0.3* 0.35*   CALCIUM 9.2  --  9.2     Recent Labs     06/24/22 1845 06/25/22  0536   AST 16 12   ALT 16 13   BILITOT 0.3 0.4   ALKPHOS 140* 117     No results for input(s): INR in the last 72 hours. Recent Labs     06/24/22 1845 06/24/22 2030   TROPONINI <0.010 <0.010     Urinalysis:      Lab Results   Component Value Date    NITRU Negative 05/13/2022    WBCUA 20-50 05/13/2022    BACTERIA FEW 05/13/2022    RBCUA 0-2 05/13/2022    BLOODU Negative 05/13/2022    SPECGRAV 1.038 05/13/2022    GLUCOSEU >=1000 05/13/2022     Radiology:  XR ACUTE ABD SERIES CHEST 1 VW   Final Result   No radiographic evidence for an acute cardiopulmonary process. The bowel gas pattern is within normal limits. Assessment/Plan:    #CP :  Resolved; seen by caridology; ok for d/c  #Hypokalemia:  Was taken off of potassium supplements; will likely d/c tomorrow on PO kdur  #HTN:  Continue laury emeds  #DMII:  Add lantus    Active Hospital Problems    Diagnosis Date Noted    Hypokalemia [E87.6] 06/25/2022     Priority: Medium    Hypomagnesemia [E83.42]      Priority: Medium    Type 2 diabetes mellitus, with long-term current use of [facility] [E11.9, Z79.4] 06/27/2018    Cardiac disease [I51.9] 06/16/2015    Mixed hyperlipidemia [E78.2]     Essential hypertension [I10]      Additional work up or/and treatment plan may be added today or then after based on clinical progression. I am managing a portion of pt care.  Some medical issues are handled by other specialists. Additional work up and treatment should be done in out pt setting by pt PCP and other out pt providers. In addition to examining and evaluating pt, I spent additional time explaining care, normal and abnormal findings, and treatment plan. All of pt questions were answered. Counseling, diet and education were  provided. Case will be discussed with nursing staff when appropriate. Family will be updated if and when appropriate. Diet: ADULT DIET;  Regular; 5 carb choices (75 gm/meal)    Code Status: Full Code    Electronically signed by Ginny Evans MD on 6/25/2022 at 10:34 AM

## 2022-06-25 NOTE — H&P
[facility] MED[facility]INE    HISTORY AND PHYSICAL EXAM    PATIENT NAME:  Chuck Monahan    MRN:  [id number]  SERVICE DATE:  6/25/2022   SERVICE TIME:  1:33 AM    Primary Care Physician: Giovanni Duff MD     SUBJECTIVE  CHIEF COMPLAINT:  Chest Pain (pt c/o mid-sternal chest pain since yesterday, that radiates outwards towards upper back)       HPI: Chuck Monahan is a 49 y.o. female with a past medical history of with past medical history significant for PVC's, HTN, HLD, DM II, back pain. that is being admitted for Chest Pain (pt c/o mid-sternal chest pain since yesterday, that radiates outwards towards upper back)  . Emile Patten was having chest pain x 2 days. She has a strong family history of cardiac disease. Her chest pain was midepigastric and radiates to the left and right. She describes it as a spasm. It lasted a few seconds. It was intermittent and then when she arrived to the emergency room she began to feel tight. She rates the pain a 3 out of 10. She states that she thought it was indigestion. She started to get upset and got worked up while she was at home and came in to be evaluated. She has a history of low potassium, and PVCs, she was unable to take large potassium supplements daily so she was put on lisinopril.    she is alert to A & O x3.  she lives alone and is independent. she is Never smoker, Alcohol: socially and Recreational drug use: none.      HPI    PAST MEDICAL HISTORY:    Past Medical History:   Diagnosis Date    Depression     Environmental allergies     Gastroparesis     GERD (gastroesophageal reflux disease)     Headache     Dr. Alycia Reyes HPV in female     Hyperlipidemia     Hypertension     Leukocytosis     Migraines     Type II or unspecified type diabetes mellitus without mention of complication, not stated as uncontrolled      PAST SURGICAL HISTORY:    Past Surgical History:   Procedure Laterality Date    BONE MARROW BIOPSY  2012    (-)[facility]    BREAST REDUCTION SURGERY      BREAST SURGERY      CARDIAC CATHETERIZATION  2009    (-)[facility]KA    CHOLECYSTECTOMY      ENDOMETRIAL ABLATION  2012?  GALLBLADDER SURGERY       FAMILY HISTORY:    Family History   Problem Relation Age of Onset    Heart Disease Mother     Diabetes Father     Heart Disease Father     Cancer Father         thyroid    No Known Problems Sister     No Known Problems Paternal Grandfather     No Known Problems Paternal Grandmother     No Known Problems Maternal Grandmother     No Known Problems Maternal Grandfather     No Known Problems Brother     No Known Problems Other     Breast Cancer Neg Hx     Colon Cancer Neg Hx     Eclampsia Neg Hx     Hypertension Neg Hx     Ovarian Cancer Neg Hx      Labor Neg Hx     Spont Abortions Neg Hx     Stroke Neg Hx      SOCIAL HISTORY:    Social History     Socioeconomic History    Marital status:      Spouse name: Not on file    Number of children: Not on file    Years of education: Not on file    Highest education level: Not on file   Occupational History    Not on file   Tobacco Use    Smoking status: Never Smoker    Smokeless tobacco: Never Used   Vaping Use    Vaping Use: Never used   Substance and Sexual Activity    Alcohol use: Yes     Alcohol/week: 0.0 standard drinks     Comment: ocassionally    Drug use: No    Sexual activity: Not Currently     Partners: Male   Other Topics Concern    Not on file   Social History Narrative    Not on file     Social Determinants of Health     Financial Resource Strain: High Risk    Difficulty of Paying Living Expenses: Hard   Food Insecurity: Food Insecurity Present    Worried About Running Out of Food in the Last Year: Often true    Gayle of Food in the Last Year: Often true   Transportation Needs:     Lack of Transportation (Medical): Not on file    Lack of Transportation (Non-Medical):  Not on file   Physical Activity:     Days of Exercise per Week: Not on file    Minutes of Exercise per Session: Not on file   Stress:     Feeling of Stress : Not on file   Social Connections:     Frequency of Communication with Friends and Family: Not on file    Frequency of Social Gatherings with Friends and Family: Not on file    Attends Jew Services: Not on file    Active Member of [address] [place] [address] or Organizations: Not on file    Attends Club or Organization Meetings: Not on file    Marital Status: Not on file   Intimate Partner Violence:     Fear of Current or Ex-Partner: Not on file    Emotionally Abused: Not on file    Physically Abused: Not on file    Sexually Abused: Not on file   Housing Stability:     Unable to Pay for Housing in the Last Year: Not on file    Number of [place] in the Last Year: Not on file    Unstable Housing in the Last Year: Not on file     MEDICATIONS:   Prior to Admission medications    Medication Sig Start Date End Date Taking?  Authorizing Provider   lisinopril (PRINIVIL;ZESTRIL) 10 MG tablet Take 1 tablet by mouth daily 6/24/22  Yes MANDO Wilson   potassium chloride (KLOR-CON M) 20 MEQ extended release tablet Take 1 tablet by mouth daily 6/24/22 6/24/22  MANDO Wilson   insulin lispro (HUMALOG KWIKPEN) 200 UNIT/ML SOPN pen Inject into the skin 3 times daily (with meals)    Historical Provider, MD   ketorolac (TORADOL) 10 MG tablet Take 1 tablet by mouth every 6 hours as needed for Pain  Patient not taking: Reported on 6/25/2022 5/13/22   Kirsten Sandoval PA-C   metoclopramide (REGLAN) 10 MG tablet Take 1 tablet by mouth 3 times daily (with meals)  Patient not taking: Reported on 6/25/2022 2/16/22   Paul Greenfield MD   Dulaglutide (TRULICITY) 9.49 TQ/6.9TM SOPN Inject 0.75 mg into the skin once a week  Patient not taking: Reported on 6/25/2022 10/1/21   Paul Greenfield MD   pioglitazone (ACTOS) 15 MG tablet Take 1 tablet by mouth daily 10/1/21   Paul Greenfield MD   fenofibrate (TRICOR) 145 MG tablet Take 1 tablet by mouth daily 1/11/21   Ruben Bailey MD   atorvastatin (LIPITOR) 20 MG tablet Take 1 tablet by mouth daily 1/11/21   Ruben Bailey MD   fluconazole (DIFLUCAN) 150 MG tablet TAKE 1 TABLET BY MOUTH DAILY AS NEEDED FOR DISCHARGE 2/10/20   Isabela Phipps MD   propranolol (INDERAL) 20 MG tablet TAKE 1 TABLET BY MOUTH TWICE DAILY  Patient taking differently: 20 mg daily  5/22/19   Isabela Phipps MD   ramipril (ALTACE) 5 MG capsule Take 1 capsule by mouth daily  Patient not taking: Reported on 6/25/2022 5/22/19   Isabela Phipps MD   escitalopram (LEXAPRO) 20 MG tablet TK 1 T PO QD 1/4/19   Historical Provider, MD   traZODone (DESYREL) 50 MG tablet Take 100 mg by mouth nightly  2/4/19   Historical Provider, MD   omeprazole (PRILOSEC) 20 MG delayed release capsule TAKE 2 CAPSULES BY MOUTH TWICE DAILY 6/27/18   Rios Gauthier MD   augmented betamethasone dipropionate (DIPROLENE-AF) 0.05 % cream SONDRA EXT AA BID  Patient not taking: Reported on 6/25/2022 5/3/18   Historical Provider, MD SERRANO UF III MINI PEN NEEDLES 31G X 5 MM MISC Use 1 daily to inject insulin 7/18/16   Rios Gauthier MD   VRAYLAR 3 MG CAPS TK ONE C PO  QHS 6/16/16   Historical Provider, MD       ALLERGIES: Relafen [nabumetone]    REVIEW OF SYSTEM:   Review of Systems   Constitutional: Negative for chills, fatigue and fever. HENT: Negative for congestion, rhinorrhea, sore throat and trouble swallowing. Eyes: Negative for photophobia and visual disturbance. Respiratory: Negative for cough, chest tightness and shortness of breath. Cardiovascular: Positive for chest pain. Negative for palpitations and leg swelling. Gastrointestinal: Negative for abdominal distention, abdominal pain, constipation, diarrhea, nausea and vomiting. Genitourinary: Negative for difficulty urinating, flank pain and hematuria. Musculoskeletal: Positive for back pain. Negative for gait problem. Skin: Negative for color change and wound.    Neurological: Negative for dizziness, syncope, speech difficulty, weakness, light-headedness, numbness and headaches. Psychiatric/Behavioral: Negative for behavioral problems and confusion. OBJECTIVE  PHYSICAL EXAM: BP (!) 145/84   Pulse 68   Temp 97.1 °F (36.2 °C) (Temporal)   Resp 20   Ht 5' 3\" (1.6 m)   Wt 178 lb (80.7 kg)   SpO2 94%   BMI 31.53 kg/m²   Physical Exam  Vitals and nursing note reviewed. Constitutional:       General: She is awake. She is not in acute distress. Appearance: Normal appearance. She is well-developed. She is obese. She is not ill-appearing, toxic-appearing or diaphoretic. HENT:      Head: Normocephalic and atraumatic. Nose: Nose normal. No congestion or rhinorrhea. Mouth/Throat:      Lips: Pink. Mouth: Mucous membranes are moist.      Tongue: Tongue does not deviate from midline. Pharynx: Oropharynx is clear. No oropharyngeal exudate or posterior oropharyngeal erythema. Eyes:      General: Lids are normal. No visual field deficit or scleral icterus. Extraocular Movements: Extraocular movements intact. Right eye: No nystagmus. Left eye: No nystagmus. Conjunctiva/sclera: Conjunctivae normal.   Neck:      Thyroid: No thyromegaly. Vascular: No JVD. Trachea: Trachea and phonation normal.   Cardiovascular:      Rate and Rhythm: Normal rate and regular rhythm. Pulses: Normal pulses. Radial pulses are 2+ on the right side and 2+ on the left side. Dorsalis pedis pulses are 2+ on the right side and 2+ on the left side. Heart sounds: Normal heart sounds, S1 normal and S2 normal. No murmur heard. Pulmonary:      Effort: Pulmonary effort is normal. No respiratory distress. Breath sounds: Normal breath sounds and air entry. No stridor or decreased air movement. No decreased breath sounds, wheezing, rhonchi or rales. Chest:      Chest wall: No tenderness.    Abdominal:      General: Bowel sounds are normal. There is no distension. Palpations: Abdomen is soft. Tenderness: There is no abdominal tenderness. There is no guarding. Musculoskeletal:         General: No swelling, tenderness, deformity or signs of injury. Normal range of motion. Cervical back: Normal range of motion and neck supple. No rigidity or tenderness. Right lower leg: No edema. Left lower leg: No edema. Feet:      Right foot:      Skin integrity: Skin integrity normal.      Left foot:      Skin integrity: Skin integrity normal.   Skin:     General: Skin is warm and dry. Capillary Refill: Capillary refill takes less than 2 seconds. Coloration: Skin is not jaundiced or pale. Findings: No bruising, erythema, lesion or rash. Nails: There is no clubbing. Neurological:      General: No focal deficit present. Mental Status: She is alert and oriented to person, place, and time. Mental status is at baseline. Cranial Nerves: Cranial nerves are intact. Sensory: Sensation is intact. Motor: Motor function is intact. No weakness. Psychiatric:         Attention and Perception: Attention and perception normal.         Mood and Affect: Mood and affect normal.         Speech: Speech normal.         Behavior: Behavior normal. Behavior is cooperative. Thought Content: Thought content normal.         Cognition and Memory: Cognition and memory normal.         Judgment: Judgment normal.       Neurologic Exam     Mental Status   Oriented to person, place, and time. Speech: speech is normal        DATA:     Diagnostic tests reviewed for today's visit:    Most recent labs and imaging results reviewed.      LABS:    Recent Results (from the past 24 hour(s))   CBC with Auto Differential    Collection Time: 06/24/22  6:45 PM   Result Value Ref Range    WBC 11.7 (H) 4.8 - 10.8 K/uL    RBC 4.45 4.20 - 5.40 M/uL    Hemoglobin 13.6 12.0 - 16.0 g/dL    Hematocrit 38.9 37.0 - 47.0 %    MCV 87.4 82.0 - 100.0 fL    MCH 30.5 27.0 - 31.3 pg    MCHC 34.9 33.0 - 37.0 %    RDW 13.7 11.5 - 14.5 %    Platelets 136 392 - 671 K/uL    Neutrophils % 55.2 %    Lymphocytes % 34.5 %    Monocytes % 7.8 %    Eosinophils % 1.5 %    Basophils % 1.0 %    Neutrophils Absolute 6.5 1.4 - 6.5 K/uL    Lymphocytes Absolute 4.1 1.0 - 4.8 K/uL    Monocytes Absolute 0.9 (H) 0.2 - 0.8 K/uL    Eosinophils Absolute 0.2 0.0 - 0.7 K/uL    Basophils Absolute 0.1 0.0 - 0.2 K/uL   Comprehensive Metabolic Panel    Collection Time: 06/24/22  6:45 PM   Result Value Ref Range    Sodium 135 135 - 144 mEq/L    Potassium 2.5 (LL) 3.4 - 4.9 mEq/L    Chloride 90 (L) 95 - 107 mEq/L    CO2 33 (H) 20 - 31 mEq/L    Anion Gap 12 9 - 15 mEq/L    Glucose 345 (H) 70 - 99 mg/dL    BUN 7 6 - 20 mg/dL    CREATININE 0.32 (L) 0.50 - 0.90 mg/dL    GFR Non-African American >60.0 >60    GFR  >60.0 >60    Calcium 9.2 8.5 - 9.9 mg/dL    Total Protein 7.5 6.3 - 8.0 g/dL    Albumin 4.0 3.5 - 4.6 g/dL    Total Bilirubin 0.3 0.2 - 0.7 mg/dL    Alkaline Phosphatase 140 (H) 40 - 130 U/L    ALT 16 0 - 33 U/L    AST 16 0 - 35 U/L    Globulin 3.5 2.3 - 3.5 g/dL   Magnesium    Collection Time: 06/24/22  6:45 PM   Result Value Ref Range    Magnesium 1.6 (L) 1.7 - 2.4 mg/dL   Troponin    Collection Time: 06/24/22  6:45 PM   Result Value Ref Range    Troponin <0.010 0.000 - 0.010 ng/mL   TSH    Collection Time: 06/24/22  6:45 PM   Result Value Ref Range    TSH 0.753 0.440 - 3.860 uIU/mL   Troponin    Collection Time: 06/24/22  8:30 PM   Result Value Ref Range    Troponin <0.010 0.000 - 0.010 ng/mL   EKG 12 Lead - Chest Pain    Collection Time: 06/24/22 10:54 PM   Result Value Ref Range    Ventricular Rate 76 BPM    Atrial Rate 76 BPM    P-R Interval 166 ms    QRS Duration 86 ms    Q-T Interval 398 ms    QTc Calculation (Bazett) 447 ms    P Axis 47 degrees    R Axis 1 degrees    T Axis 16 degrees   POCT Venous    Collection Time: 06/24/22 11:01 PM   Result Value Ref Range POC Sodium 137 136 - 145 mEq/L    POC Potassium 2.3 (LL) 3.5 - 5.1 mEq/L    POC Chloride 94 (L) 99 - 110 mEq/L    POC Glucose 322 (H) 70 - 99 mg/dl    POC Creatinine 0.3 (L) 0.6 - 1.2 mg/dL    GFR Non-African American >60 >60    GFR African American >60 >60    Calcium, Ion 1.09 (L) 1.12 - 1.32 mmol/L    pH, [place] 7.444 (H) 7.320 - 7.420    pCO2, [place] 44.5 40.0 - 50.0 mm Hg    pO2, [place] 45 Not Established mm Hg    HCO3, Venous 30.5 (H) 23.0 - 29.0 mmol/L    Base Excess, [place] 6 (H) -3 - 3    O2 Sat, [place] 82 Not Established %    TC02 (Calc), [place] 32 Not Established mmol/L    Lactate 1.28 0.40 - 2.00 mmol/L    Hemoglobin 13.0 12.0 - 16.0 gm/dL    POC Hematocrit 38 36 - 48 %    FIO2 21.000     Sample Type MILAN     Performed on SEE BELOW        IMAGING:   No results found. VTE Prophylaxis: low molecular weight heparin -  start     ASSESSMENT AND PLAN  Principal Problem:      Hypokalemia - potassium chronically low, takes ACE and has been out of meds x 1 week. Denies cramping. K 2.5 in ED, recheck 2.3, received 60 mEq of potassium in ED. Plan: Admit, daily labs, every 6 BMP, replenish potassium to maintain above 3.5. Active Problems:      Hypomagnesemia -patient had chest pain x2 days, magnesium 1.6 in ED, 2 g of mag IV given in ED. Plan: Monitor magnesium, replace to maintain greater than than 2.0. Mixed hyperlipidemia -takes fenofibrate, statin. Plan: Will resume home meds, as tolerated, when home med list is completed by nursing. Essential hypertension -blood pressure 181/100 in ED, denies blurred vision, endorses chest pain. Takes at home has been out for 1 week additionally takes propranolol at night. Plan: Will resume home meds, as tolerated, when home med list is completed by nursing. Cardiac disease -recent negative heart cath recent negative stress test, followed by Dr. Bob Ontiveros. Takes days, Tricor, statin, propranolol for palpitations. History of PVCs. Plan:  Will resume home meds, as tolerated, when home med list is completed by nursing, telemetry monitoring. Type 2 diabetes mellitus, with long-term current use of insulin ([facility]) -last A1c was 12.3, patient on insulin sliding scale at home with p.o. Plan: Hold p.o. antidiabetic medication, Medium dose sliding scale coverage, Accu-Cheks AC at bedtime, check A1c, diabetic diet, hypoglycemia rescue meds as needed.       Plan of care discussed with: patient    SIGNATURE: PAOLO Tuttle - CNP  DATE: June 25, 2022  TIME: 1:33 AM     Misti Burger DO - supervising physician

## 2022-06-25 NOTE — CONSULTS
Consults    Patient Name: Poli Agudelo Date: 2022  8:10 PM  MR #: [id number]  : 1970    Attending Physician: Volodymyr Escobar MD  Reason for consult: CP    History of Presenting Illness:      Lisseth Gipson is a 49 y.o. female on hospital day 0 with a history of . History Obtained From:  patient, electronic medical record    Admitted with 2 days of CP at rest. CP twinging sensation not severe mid sternal area and radiates to both shoulder area and neck. No associated SOB. ECG SR no injury. Trops negative. She has chronic Hypokalemia likely from chronic Diarrhea 2x/ week on average. Recently as her K supplement was stopped by PCP and added ACEI for HTN. K 2.5 on admission. Being repalced  Mag 1.6 replaced f/u 1.9  Recent stress and echo negative  History:      EKG:SR  Past Medical History:   Diagnosis Date    CAD (coronary artery disease) 2022    CAD (coronary artery disease) 2022    Depression     Environmental allergies     Gastroparesis     GERD (gastroesophageal reflux disease)     Headache     Dr. Lolis Larios HPV in female     Hyperlipidemia     Hypertension     Leukocytosis     Migraines     Type II or unspecified type diabetes mellitus without mention of complication, not stated as uncontrolled      Past Surgical History:   Procedure Laterality Date    BONE MARROW BIOPSY      (-[facility]    BREAST REDUCTION SURGERY     [address] [place] [address]      (-)[facility]    CHOLECYSTECTOMY      ENDOMETRIAL ABLATION  ?     GALLBLADDER SURGERY         Family History  Family History   Problem Relation Age of Onset    Heart Disease Mother     Diabetes Father     Heart Disease Father     Cancer Father         thyroid    No Known Problems Sister     No Known Problems Paternal Grandfather     No Known Problems Paternal Grandmother     No Known Problems Maternal Grandmother     No Known Problems Maternal Grandfather  No Known Problems Brother     No Known Problems Other     Breast Cancer Neg Hx     Colon Cancer Neg Hx     Eclampsia Neg Hx     Hypertension Neg Hx     Ovarian Cancer Neg Hx      Labor Neg Hx     Spont Abortions Neg Hx     Stroke Neg Hx      [] Unable to obtain due to ventilated and/ or neurologic status    Social History     Socioeconomic History    Marital status:      Spouse name: Not on file    Number of children: Not on file    Years of education: Not on file    Highest education level: Not on file   Occupational History    Not on file   Tobacco Use    Smoking status: Never Smoker    Smokeless tobacco: Never Used   Vaping Use    Vaping Use: Never used   Substance and Sexual Activity    Alcohol use: Yes     Alcohol/week: 0.0 standard drinks     Comment: ocassionally    Drug use: No    Sexual activity: Not Currently     Partners: Male   Other Topics Concern    Not on file   Social History Narrative    Not on file     Social Determinants of Health     Financial Resource Strain: High Risk    Difficulty of Paying Living Expenses: Hard   Food Insecurity: Food Insecurity Present    Worried About Running Out of Food in the Last Year: Often true    Gayle of Food in the Last Year: Often true   Transportation Needs:     Lack of Transportation (Medical): Not on file    Lack of Transportation (Non-Medical):  Not on file   Physical Activity:     Days of Exercise per Week: Not on file    Minutes of Exercise per Session: Not on file   Stress:     Feeling of Stress : Not on file   Social Connections:     Frequency of Communication with Friends and Family: Not on file    Frequency of Social Gatherings with Friends and Family: Not on file    Attends Scientologist Services: Not on file    Active Member of Clubs or Organizations: Not on file    Attends Club or Organization Meetings: Not on file    Marital Status: Not on file   Intimate Partner Violence:     Fear of Current or Ex-Partner: Not on file    Emotionally Abused: Not on file    Physically Abused: Not on file    Sexually Abused: Not on file   Housing Stability:     Unable to Pay for Housing in the Last Year: Not on file    Number of Places Lived in the Last Year: Not on file    Unstable Housing in the Last Year: Not on file      [] Unable to obtain due to ventilated and/ or neurologic status      Home Medications:      Medications Prior to Admission: insulin lispro (HUMALOG KWIKPEN) 200 UNIT/ML SOPN pen, Inject into the skin 3 times daily (with meals)  [DISCONTINUED] ketorolac (TORADOL) 10 MG tablet, Take 1 tablet by mouth every 6 hours as needed for Pain (Patient not taking: Reported on 6/25/2022)  [DISCONTINUED] metoclopramide (REGLAN) 10 MG tablet, Take 1 tablet by mouth 3 times daily (with meals) (Patient not taking: Reported on 6/25/2022)  pioglitazone (ACTOS) 15 MG tablet, Take 1 tablet by mouth daily  [DISCONTINUED] Dulaglutide (TRULICITY) 4.61 EM/8.3OX SOPN, Inject 0.75 mg into the skin once a week (Patient not taking: Reported on 6/25/2022)  fenofibrate (TRICOR) 145 MG tablet, Take 1 tablet by mouth daily  atorvastatin (LIPITOR) 20 MG tablet, Take 1 tablet by mouth daily  fluconazole (DIFLUCAN) 150 MG tablet, TAKE 1 TABLET BY MOUTH DAILY AS NEEDED FOR DISCHARGE  propranolol (INDERAL) 20 MG tablet, TAKE 1 TABLET BY MOUTH TWICE DAILY (Patient taking differently: 20 mg daily )  [DISCONTINUED] ramipril (ALTACE) 5 MG capsule, Take 1 capsule by mouth daily (Patient not taking: Reported on 6/25/2022)  escitalopram (LEXAPRO) 20 MG tablet, TK 1 T PO QD  traZODone (DESYREL) 50 MG tablet, Take 100 mg by mouth nightly   omeprazole (PRILOSEC) 20 MG delayed release capsule, TAKE 2 CAPSULES BY MOUTH TWICE DAILY  [DISCONTINUED] augmented betamethasone dipropionate (DIPROLENE-AF) 0.05 % cream, SONDRA EXT AA BID (Patient not taking: Reported on 6/25/2022)  B-D UF III MINI PEN NEEDLES 31G X 5 MM MISC, Use 1 daily to inject insulin  VRAYLAR 3 MG CAPS, TK ONE C PO  QHS    Current Hospital Medications:     Scheduled Meds:   sodium chloride flush  5-40 mL IntraVENous 2 times per day    enoxaparin  40 mg SubCUTAneous Daily    insulin lispro  0-12 Units SubCUTAneous TID WC    insulin lispro  0-6 Units SubCUTAneous Nightly    atorvastatin  20 mg Oral Daily    escitalopram  20 mg Oral Daily    propranolol  20 mg Oral Nightly    cariprazine hcl  3 mg Oral Daily    traZODone  100 mg Oral Nightly    lisinopril  10 mg Oral Daily    insulin glargine  20 Units SubCUTAneous Nightly    magnesium sulfate  2,000 mg IntraVENous Once    potassium chloride  40 mEq Oral BID     potassium chloride  40 mEq Oral Once     Continuous Infusions:   sodium chloride      dextrose       PRN Meds:.sodium chloride flush, sodium chloride, ondansetron **OR** ondansetron, polyethylene glycol, acetaminophen **OR** acetaminophen, glucose, dextrose bolus **OR** dextrose bolus, glucagon (rDNA), dextrose  .  sodium chloride      dextrose          Allergies: Allergies   Allergen Reactions    Relafen [Nabumetone] Rash        Review of Systems:       Review of Systems   Constitutional: Negative. Negative for diaphoresis and fatigue. HENT: Negative. Eyes: Negative. Respiratory: Negative. Negative for cough, chest tightness, shortness of breath, wheezing and stridor. Cardiovascular: Positive for chest pain. Negative for palpitations and leg swelling. Gastrointestinal: Negative. Negative for blood in stool and nausea. Genitourinary: Negative. Musculoskeletal: Negative. Skin: Negative. Neurological: Negative. Negative for dizziness, syncope, weakness and light-headedness. Hematological: Negative. Psychiatric/Behavioral: Negative.           Objective Findings:     Vitals:/64   Pulse 86   Temp 97.7 °F (36.5 °C) (Oral)   Resp 16   Ht 5' 3\" (1.6 m)   Wt 182 lb 3.2 oz (82.6 kg)   SpO2 98%   BMI 32.28 kg/m² Physical Examination:    Physical Exam   Constitutional: She appears healthy. No distress. HENT:   Normal cephalic and Atraumatic   Eyes: Pupils are equal, round, and reactive to light. Neck: Thyroid normal. No JVD present. No neck adenopathy. No thyromegaly present. Cardiovascular: Normal rate, regular rhythm, normal heart sounds, intact distal pulses and normal pulses. Pulmonary/Chest: Effort normal and breath sounds normal. She has no wheezes. She has no rales. She exhibits no tenderness. Abdominal: Soft. Bowel sounds are normal. There is no abdominal tenderness. Musculoskeletal:         General: No tenderness or edema. Normal range of motion. Cervical back: Normal range of motion and neck supple. Neurological: She is alert and oriented to person, place, and time. Skin: Skin is warm. No cyanosis. Nails show no clubbing.          Results/ Medications reviewed 6/25/2022, 9:26 AM     Laboratory, Microbiology, Pathology, Radiology, Cardiology, Medications and Transcriptions reviewed  Scheduled Meds:   sodium chloride flush  5-40 mL IntraVENous 2 times per day    enoxaparin  40 mg SubCUTAneous Daily    insulin lispro  0-12 Units SubCUTAneous TID WC    insulin lispro  0-6 Units SubCUTAneous Nightly    atorvastatin  20 mg Oral Daily    escitalopram  20 mg Oral Daily    propranolol  20 mg Oral Nightly    cariprazine hcl  3 mg Oral Daily    traZODone  100 mg Oral Nightly    lisinopril  10 mg Oral Daily    insulin glargine  20 Units SubCUTAneous Nightly    magnesium sulfate  2,000 mg IntraVENous Once    potassium chloride  40 mEq Oral BID WC    potassium chloride  40 mEq Oral Once     Continuous Infusions:   sodium chloride      dextrose         Recent Labs     06/24/22  1845 06/24/22  2301 06/25/22  0536   WBC 11.7*  --  10.5   HGB 13.6 13.0 12.3   HCT 38.9  --  36.4*   MCV 87.4  --  89.3     --  266     Recent Labs     06/24/22  1845 06/24/22  2301 06/25/22  0536    --  141   K 2.5*  --  2.9*   CL 90*  --  97   CO2 33*  --  28   BUN 7  --  7   CREATININE 0.32* 0.3* 0.35*     Recent Labs     06/24/22  1845 06/25/22  0536   AST 16 12   ALT 16 13   BILITOT 0.3 0.4   ALKPHOS 140* 117     No results for input(s): LIPASE, AMYLASE in the last 72 hours. Recent Labs     06/24/22 1845 06/25/22  0536   PROT 7.5 6.6     XR ACUTE ABD SERIES CHEST 1 VW    Result Date: 6/25/2022  CLINICAL HISTORY: chest and abd pain XR ACUTE ABD SERIES CHEST 1 VW COMPARISON:  None TECHNIQUE:  Anterior x-ray  views of the chest and abdomen were obtained. FINDINGS:  No focal areas of consolidations are noted. No effusion or pneumothorax is seen. The bowel gas pattern is without evidence of obstruction or distended bowel. There are cholecystectomy clips in the right upper quadrant. No abnormal calcifications. The soft tissues are within normal limits. There are no radiopaque foreign bodies. No acute osseous findings. No radiographic evidence for an acute cardiopulmonary process. The bowel gas pattern is within normal limits. Active Hospital Problems    Diagnosis Date Noted    Hypokalemia [E87.6] 06/25/2022     Priority: Medium    Hypomagnesemia [E83.42]      Priority: Medium    Type 2 diabetes mellitus, with long-term current use of insulin ([facility] [address] [E11.9, Z79.4] 06/27/2018     Priority: Low    Cardiac disease [I51.9] 06/16/2015     Priority: Low    Mixed hyperlipidemia [E78.2]      Priority: Low    Essential hypertension [I10]      Priority: Low         Impression/Plan:   1. CP- does not appear to be cardiac. 2. HTN - stable low salt diet. Continue meds  3. HypoK- likely secondary to Chronic Diarrhea but can not rule out primary Hyperaldosteronism. replace K. And Mag  4. LVEF 55%       Thank you for allowing us to participate in the care of this patient. Will continue to follow. Please call if questions or concerns arise.     Electronically signed by Cici Mustafa MD on 6/25/2022 at 9:26 AM

## 2022-06-25 NOTE — PROGRESS NOTES
0750 Assessment complete. Patient sitting up in bed. Denies pain, nausea, SOB. NSR on telemetry. 9853 Critical potassium reported to Dr. Camila Pope and orders received. Patient educated on importance of maintaining electrolytes and need to continue monitoring after discharge. Also educated on importance of managing blood glucose. 1630 Continuing to replace potassium per MAR. Patient continues to denies pain, SOB, nausea. Labs re-timed for after potassium replacement complete.

## 2022-06-26 VITALS
OXYGEN SATURATION: 94 % | DIASTOLIC BLOOD PRESSURE: 94 MMHG | SYSTOLIC BLOOD PRESSURE: 147 MMHG | BODY MASS INDEX: 31.89 KG/M2 | HEART RATE: 93 BPM | RESPIRATION RATE: 16 BRPM | WEIGHT: 180 LBS | TEMPERATURE: 97.5 F | HEIGHT: 63 IN

## 2022-06-26 LAB
ANION GAP SERPL CALCULATED.3IONS-SCNC: 10 MEQ/L (ref 9–15)
ANION GAP SERPL CALCULATED.3IONS-SCNC: 11 MEQ/L (ref 9–15)
BASOPHILS ABSOLUTE: 0.1 K/UL (ref 0–0.2)
BASOPHILS RELATIVE PERCENT: 0.7 %
BUN BLDV-MCNC: 5 MG/DL (ref 6–20)
BUN BLDV-MCNC: 7 MG/DL (ref 6–20)
CALCIUM SERPL-MCNC: 8.8 MG/DL (ref 8.5–9.9)
CALCIUM SERPL-MCNC: 8.8 MG/DL (ref 8.5–9.9)
CHLORIDE BLD-SCNC: 101 MEQ/L (ref 95–107)
CHLORIDE BLD-SCNC: 99 MEQ/L (ref 95–107)
CO2: 28 MEQ/L (ref 20–31)
CO2: 31 MEQ/L (ref 20–31)
CREAT SERPL-MCNC: 0.29 MG/DL (ref 0.5–0.9)
CREAT SERPL-MCNC: 0.36 MG/DL (ref 0.5–0.9)
EOSINOPHILS ABSOLUTE: 0.2 K/UL (ref 0–0.7)
EOSINOPHILS RELATIVE PERCENT: 1.7 %
GFR AFRICAN AMERICAN: >60
GFR AFRICAN AMERICAN: >60
GFR NON-AFRICAN AMERICAN: >60
GFR NON-AFRICAN AMERICAN: >60
GLUCOSE BLD-MCNC: 267 MG/DL (ref 70–99)
GLUCOSE BLD-MCNC: 310 MG/DL (ref 70–99)
GLUCOSE BLD-MCNC: 314 MG/DL (ref 70–99)
GLUCOSE BLD-MCNC: 316 MG/DL (ref 70–99)
HCT VFR BLD CALC: 38.7 % (ref 37–47)
HEMOGLOBIN: 13 G/DL (ref 12–16)
LYMPHOCYTES ABSOLUTE: 2.8 K/UL (ref 1–4.8)
LYMPHOCYTES RELATIVE PERCENT: 29.2 %
MAGNESIUM: 2 MG/DL (ref 1.7–2.4)
MAGNESIUM: 2.1 MG/DL (ref 1.7–2.4)
MCH RBC QN AUTO: 31 PG (ref 27–31.3)
MCHC RBC AUTO-ENTMCNC: 33.7 % (ref 33–37)
MCV RBC AUTO: 92.1 FL (ref 82–100)
MONOCYTES ABSOLUTE: 0.7 K/UL (ref 0.2–0.8)
MONOCYTES RELATIVE PERCENT: 7.2 %
NEUTROPHILS ABSOLUTE: 5.8 K/UL (ref 1.4–6.5)
NEUTROPHILS RELATIVE PERCENT: 61.2 %
PDW BLD-RTO: 13.5 % (ref 11.5–14.5)
PERFORMED ON: ABNORMAL
PERFORMED ON: ABNORMAL
PLATELET # BLD: 273 K/UL (ref 130–400)
POTASSIUM REFLEX MAGNESIUM: 3.4 MEQ/L (ref 3.4–4.9)
POTASSIUM REFLEX MAGNESIUM: 3.5 MEQ/L (ref 3.4–4.9)
RBC # BLD: 4.2 M/UL (ref 4.2–5.4)
SODIUM BLD-SCNC: 138 MEQ/L (ref 135–144)
SODIUM BLD-SCNC: 142 MEQ/L (ref 135–144)
WBC # BLD: 9.5 K/UL (ref 4.8–10.8)

## 2022-06-26 PROCEDURE — 85025 COMPLETE CBC W/AUTO DIFF WBC: CPT

## 2022-06-26 PROCEDURE — 6370000000 HC RX 637 (ALT 250 FOR IP): Performed by: INTERNAL MEDICINE

## 2022-06-26 PROCEDURE — 99226 PR SBSQ OBSERVATION CARE/DAY 35 MINUTES: CPT | Performed by: INTERNAL MEDICINE

## 2022-06-26 PROCEDURE — 36415 COLL VENOUS BLD VENIPUNCTURE: CPT

## 2022-06-26 PROCEDURE — 83735 ASSAY OF MAGNESIUM: CPT

## 2022-06-26 PROCEDURE — 6370000000 HC RX 637 (ALT 250 FOR IP)

## 2022-06-26 PROCEDURE — 2580000003 HC RX 258

## 2022-06-26 PROCEDURE — 80048 BASIC METABOLIC PNL TOTAL CA: CPT

## 2022-06-26 PROCEDURE — G0378 HOSPITAL OBSERVATION PER HR: HCPCS

## 2022-06-26 RX ORDER — LANOLIN ALCOHOL/MO/W.PET/CERES
400 CREAM (GRAM) TOPICAL 2 TIMES DAILY
Qty: 30 TABLET | Refills: 3 | Status: SHIPPED | OUTPATIENT
Start: 2022-06-26

## 2022-06-26 RX ORDER — INSULIN LISPRO 100 [IU]/ML
0-9 INJECTION, SOLUTION INTRAVENOUS; SUBCUTANEOUS NIGHTLY
Status: DISCONTINUED | OUTPATIENT
Start: 2022-06-26 | End: 2022-06-26 | Stop reason: HOSPADM

## 2022-06-26 RX ORDER — INSULIN LISPRO 100 [IU]/ML
0-18 INJECTION, SOLUTION INTRAVENOUS; SUBCUTANEOUS
Status: DISCONTINUED | OUTPATIENT
Start: 2022-06-26 | End: 2022-06-26 | Stop reason: HOSPADM

## 2022-06-26 RX ORDER — LANOLIN ALCOHOL/MO/W.PET/CERES
400 CREAM (GRAM) TOPICAL 2 TIMES DAILY
Status: DISCONTINUED | OUTPATIENT
Start: 2022-06-26 | End: 2022-06-26 | Stop reason: HOSPADM

## 2022-06-26 RX ORDER — INSULIN GLARGINE 100 [IU]/ML
50 INJECTION, SOLUTION SUBCUTANEOUS NIGHTLY
Status: DISCONTINUED | OUTPATIENT
Start: 2022-06-26 | End: 2022-06-26 | Stop reason: HOSPADM

## 2022-06-26 RX ORDER — INSULIN LISPRO 100 [IU]/ML
10 INJECTION, SOLUTION INTRAVENOUS; SUBCUTANEOUS
Status: DISCONTINUED | OUTPATIENT
Start: 2022-06-26 | End: 2022-06-26 | Stop reason: HOSPADM

## 2022-06-26 RX ORDER — POTASSIUM CHLORIDE 20 MEQ/1
40 TABLET, EXTENDED RELEASE ORAL 2 TIMES DAILY WITH MEALS
Qty: 60 TABLET | Refills: 3 | Status: SHIPPED | OUTPATIENT
Start: 2022-06-26

## 2022-06-26 RX ORDER — POTASSIUM CHLORIDE 20 MEQ/1
40 TABLET, EXTENDED RELEASE ORAL 2 TIMES DAILY WITH MEALS
Status: DISCONTINUED | OUTPATIENT
Start: 2022-06-26 | End: 2022-06-26 | Stop reason: HOSPADM

## 2022-06-26 RX ADMIN — INSULIN LISPRO 6 UNITS: 100 INJECTION, SOLUTION INTRAVENOUS; SUBCUTANEOUS at 11:09

## 2022-06-26 RX ADMIN — Medication 400 MG: at 11:13

## 2022-06-26 RX ADMIN — POTASSIUM CHLORIDE 40 MEQ: 1500 TABLET, EXTENDED RELEASE ORAL at 08:50

## 2022-06-26 RX ADMIN — Medication 10 ML: at 08:53

## 2022-06-26 RX ADMIN — CARIPRAZINE 3 MG: 1.5 CAPSULE, GELATIN COATED ORAL at 08:50

## 2022-06-26 RX ADMIN — INSULIN LISPRO 8 UNITS: 100 INJECTION, SOLUTION INTRAVENOUS; SUBCUTANEOUS at 08:58

## 2022-06-26 RX ADMIN — ESCITALOPRAM OXALATE 20 MG: 20 TABLET ORAL at 08:50

## 2022-06-26 ASSESSMENT — ENCOUNTER SYMPTOMS
BLOOD IN STOOL: 0
CHEST TIGHTNESS: 0
COUGH: 0
RESPIRATORY NEGATIVE: 1
STRIDOR: 0
DIARRHEA: 1
EYES NEGATIVE: 1
WHEEZING: 0
SHORTNESS OF BREATH: 0
NAUSEA: 0

## 2022-06-26 NOTE — DISCHARGE SUMMARY
Hospital Medicine Discharge Summary    Sunitha Lozano  :  1970  MRN:  [id number]    Admit date:  2022  Discharge date:  2022    Admitting Physician:  Maegan Frye DO  Primary Care Physician:  Lizz Kincaid MD    Discharge Diagnoses:    Hypokalemia; chest pain    Chief Complaint   Patient presents with    Chest Pain     pt c/o mid-sternal chest pain since yesterday, that radiates outwards towards upper back     Hospital Course:   Patient prestned with chest pain and was found to have hypokalemia. Evaluated by cardiology and chest pain resolved; her potassium was supplemented and she will be discharged on PO potassium per cardiology recommendations    Exam on discharge:   BP (!) 147/94   Pulse 93   Temp 97.5 °F (36.4 °C) (Oral)   Resp 16   Ht 5' 3\" (1.6 m)   Wt 180 lb (81.6 kg)   SpO2 94%   BMI 31.89 kg/m²   General appearance: No apparent distress, appears stated age and cooperative. HEENT: Conjunctivae/corneas clear. Neck:  Trachea midline. Respiratory:  Normal respiratory effort. Clear to auscultation  Cardiovascular: Regular rate and rhythm  Abdomen: Soft, non-tender, non-distended with normal bowel sounds. Musculoskeletal: No clubbing, cyanosis or edema bilaterally. Neuro: Non Focal.  Capillary Refill: Brisk,< 3 seconds   Peripheral Pulses: +2 palpable, equal bilaterally     Patient was seen by the following consultants   Consults:  IP CONSULT TO CARDIOLOGY  IP CONSULT TO ENDOCRINOLOGY    Significant Diagnostic Studies:    Refer to chart     Please refer to chart if no studies are shown here    XR ACUTE ABD SERIES CHEST 1 VW    Result Date: 2022  CLINICAL HISTORY: chest and abd pain XR ACUTE ABD SERIES CHEST 1 VW COMPARISON:  None TECHNIQUE:  Anterior x-ray  views of the chest and abdomen were obtained. FINDINGS:  No focal areas of consolidations are noted. No effusion or pneumothorax is seen.  The bowel gas pattern is without evidence of obstruction or distended bowel. There are cholecystectomy clips in the right upper quadrant. No abnormal calcifications. The soft tissues are within normal limits. There are no radiopaque foreign bodies. No acute osseous findings. No radiographic evidence for an acute cardiopulmonary process. The bowel gas pattern is within normal limits.       Discharge Medications:         Medication List      CHANGE how you take these medications    lisinopril 10 MG tablet  Commonly known as: PRINIVIL;ZESTRIL  Take 1 tablet by mouth daily  What changed:   · how much to take  · how to take this  · when to take this     propranolol 20 MG tablet  Commonly known as: INDERAL  TAKE 1 TABLET BY MOUTH TWICE DAILY  What changed:   · how much to take  · when to take this  · additional instructions        CONTINUE taking these medications    atorvastatin 20 MG tablet  Commonly known as: Lipitor  Take 1 tablet by mouth daily     B-D UF III MINI PEN NEEDLES 31G X 5 MM Misc  Generic drug: Insulin Pen Needle  Use 1 daily to inject insulin     escitalopram 20 MG tablet  Commonly known as: LEXAPRO     fenofibrate 145 MG tablet  Commonly known as: TRICOR  Take 1 tablet by mouth daily     fluconazole 150 MG tablet  Commonly known as: DIFLUCAN  TAKE 1 TABLET BY MOUTH DAILY AS NEEDED FOR DISCHARGE     HumaLOG KwikPen 200 UNIT/ML Sopn pen  Generic drug: insulin lispro     omeprazole 20 MG delayed release capsule  Commonly known as: PRILOSEC  TAKE 2 CAPSULES BY MOUTH TWICE DAILY     pioglitazone 15 MG tablet  Commonly known as: Actos  Take 1 tablet by mouth daily     traZODone 50 MG tablet  Commonly known as: DESYREL     Vraylar 3 MG Caps capsule  Generic drug: cariprazine hcl           Where to Get Your Medications      You can get these medications from any pharmacy    Bring a paper prescription for each of these medications  · lisinopril 10 MG tablet         Disposition:   If discharged to Home, Any [place] [address] needs that were indicated and/or required as been addressed and set up by Social Work. Condition at discharge: good     Activity: activity as tolerated    Total time taken for discharging this patient: 40 minutes. Greater than 70% of time was spent focused exclusively on this patient. Time was taken to review chart, discuss plans with consultants, reconciling medications, discussing plan answering questions with patient.      Signed:  Ila Fox MD  6/26/2022, 11:02 AM  ----------------------------------------------------------------------------------------------------------------------    Debbie Parra

## 2022-06-27 LAB
EKG ATRIAL RATE: 76 BPM
EKG ATRIAL RATE: 85 BPM
EKG ATRIAL RATE: 88 BPM
EKG P AXIS: 34 DEGREES
EKG P AXIS: 36 DEGREES
EKG P AXIS: 47 DEGREES
EKG P-R INTERVAL: 164 MS
EKG P-R INTERVAL: 166 MS
EKG P-R INTERVAL: 166 MS
EKG Q-T INTERVAL: 374 MS
EKG Q-T INTERVAL: 388 MS
EKG Q-T INTERVAL: 398 MS
EKG QRS DURATION: 82 MS
EKG QRS DURATION: 86 MS
EKG QRS DURATION: 88 MS
EKG QTC CALCULATION (BAZETT): 447 MS
EKG QTC CALCULATION (BAZETT): 452 MS
EKG QTC CALCULATION (BAZETT): 461 MS
EKG R AXIS: -3 DEGREES
EKG R AXIS: -4 DEGREES
EKG R AXIS: 1 DEGREES
EKG T AXIS: -4 DEGREES
EKG T AXIS: 15 DEGREES
EKG T AXIS: 16 DEGREES
EKG VENTRICULAR RATE: 76 BPM
EKG VENTRICULAR RATE: 85 BPM
EKG VENTRICULAR RATE: 88 BPM

## 2022-06-27 PROCEDURE — 93010 ELECTROCARDIOGRAM REPORT: CPT | Performed by: INTERNAL MEDICINE

## 2022-07-06 ENCOUNTER — TELEPHONE (OUTPATIENT)
Dept: CARDIOLOGY CLINIC | Age: 52
End: 2022-07-06

## 2022-07-06 RX ORDER — POTASSIUM CHLORIDE 750 MG/1
40 TABLET, EXTENDED RELEASE ORAL 2 TIMES DAILY
Qty: 180 TABLET | Refills: 3 | Status: SHIPPED | OUTPATIENT
Start: 2022-07-06

## 2022-07-06 NOTE — TELEPHONE ENCOUNTER
Patient states potassium medication is far too big for her to take, even while cutting it. Wants to see If she is able to take 10 MEQ instead of the 20 MEQ. I advise to cut med in half, she stated she wanted it changed as it still had a \"sharp edge\". Please advise, thanks.        Call [phone]

## 2022-07-27 ENCOUNTER — OFFICE VISIT (OUTPATIENT)
Dept: FAMILY MEDICINE CLINIC | Age: 52
End: 2022-07-27
Payer: COMMERCIAL

## 2022-07-27 VITALS
OXYGEN SATURATION: 98 % | DIASTOLIC BLOOD PRESSURE: 78 MMHG | HEART RATE: 90 BPM | WEIGHT: 180 LBS | BODY MASS INDEX: 31.89 KG/M2 | SYSTOLIC BLOOD PRESSURE: 122 MMHG | HEIGHT: 63 IN | TEMPERATURE: 96.8 F

## 2022-07-27 DIAGNOSIS — Z79.4 TYPE 2 DIABETES MELLITUS WITH DIABETIC POLYNEUROPATHY, WITH LONG-TERM CURRENT USE OF INSULIN (HCC): Chronic | ICD-10-CM

## 2022-07-27 DIAGNOSIS — E11.42 TYPE 2 DIABETES MELLITUS WITH DIABETIC POLYNEUROPATHY, WITH LONG-TERM CURRENT USE OF INSULIN (HCC): Chronic | ICD-10-CM

## 2022-07-27 DIAGNOSIS — L03.032 CELLULITIS OF TOE OF LEFT FOOT: Primary | ICD-10-CM

## 2022-07-27 PROCEDURE — 99213 OFFICE O/P EST LOW 20 MIN: CPT

## 2022-07-27 PROCEDURE — 3046F HEMOGLOBIN A1C LEVEL >9.0%: CPT

## 2022-07-27 RX ORDER — SULFAMETHOXAZOLE AND TRIMETHOPRIM 800; 160 MG/1; MG/1
1 TABLET ORAL 2 TIMES DAILY
Qty: 14 TABLET | Refills: 0 | Status: SHIPPED | OUTPATIENT
Start: 2022-07-27 | End: 2022-08-03

## 2022-07-27 SDOH — ECONOMIC STABILITY: FOOD INSECURITY: WITHIN THE PAST 12 MONTHS, THE FOOD YOU BOUGHT JUST DIDN'T LAST AND YOU DIDN'T HAVE MONEY TO GET MORE.: NEVER TRUE

## 2022-07-27 SDOH — ECONOMIC STABILITY: FOOD INSECURITY: WITHIN THE PAST 12 MONTHS, YOU WORRIED THAT YOUR FOOD WOULD RUN OUT BEFORE YOU GOT MONEY TO BUY MORE.: NEVER TRUE

## 2022-07-27 ASSESSMENT — ENCOUNTER SYMPTOMS
COLOR CHANGE: 1
EYE DISCHARGE: 0
COUGH: 0
ABDOMINAL PAIN: 0
VOMITING: 0
NAUSEA: 0
SORE THROAT: 0
SHORTNESS OF BREATH: 0
RHINORRHEA: 0

## 2022-07-27 ASSESSMENT — SOCIAL DETERMINANTS OF HEALTH (SDOH): HOW HARD IS IT FOR YOU TO PAY FOR THE VERY BASICS LIKE FOOD, HOUSING, MEDICAL CARE, AND HEATING?: NOT VERY HARD

## 2022-07-27 NOTE — PROGRESS NOTES
[address] Encounter    SUBJECTIVE    CHIEF COMPLAINT:   Chief Complaint   Patient presents with    Wound Infection       HPI  Marian Beach is a 49 y.o. female who presents to the walk-in clinic today for:     Concern for infection to L big toe. [email or web address] with closed toe shoes and when she took her shoes off she had a blister at the top of her toe and another open blister inferior to nailbed. She has decreased sensation in her toes due to DM. Scant drainage to opened blister, erythema of distal portion of big toe, and warmth. Has tried antibiotic ointment. Review of Systems   Constitutional:  Negative for chills, fatigue and fever. HENT:  Negative for ear pain, rhinorrhea, sneezing and sore throat. Eyes:  Negative for discharge. Respiratory:  Negative for cough and shortness of breath. Cardiovascular:  Negative for chest pain and palpitations. Gastrointestinal:  Negative for abdominal pain, nausea and vomiting. Musculoskeletal:  Negative for arthralgias and myalgias. Skin:  Positive for color change and wound.      Past Medical History:   Diagnosis Date    CAD (coronary artery disease) 6/25/2022    CAD (coronary artery disease) 6/25/2022    Depression     Environmental allergies     Gastroparesis     GERD (gastroesophageal reflux disease)     Headache     Dr. Sudhakar Chauhan    HPV in female     Hyperlipidemia     Hypertension     Leukocytosis     Migraines     Type II or unspecified type diabetes mellitus without mention of complication, not stated as uncontrolled        Family History   Problem Relation Age of Onset    Heart Disease Mother     Diabetes Father     Heart Disease Father     Cancer Father         thyroid    No Known Problems Sister     No Known Problems Paternal Grandfather     No Known Problems Paternal Grandmother     No Known Problems Maternal Grandmother     No Known Problems Maternal Grandfather     No Known Problems Brother     No Known Problems Other     Breast Cancer Neg Hx     Colon Cancer Neg Hx     Eclampsia Neg Hx     Hypertension Neg Hx     Ovarian Cancer Neg Hx      Labor Neg Hx     Spont Abortions Neg Hx     Stroke Neg Hx        Social History     Socioeconomic History    Marital status:      Spouse name: Not on file    Number of children: Not on file    Years of education: Not on file    Highest education level: Not on file   Occupational History    Not on file   Tobacco Use    Smoking status: Never    Smokeless tobacco: Never   Vaping Use    Vaping Use: Never used   Substance and Sexual Activity    Alcohol use: Yes     Alcohol/week: 0.0 standard drinks     Comment: ocassionally    Drug use: No    Sexual activity: Not Currently     Partners: Male   Other Topics Concern    Not on file   Social History Narrative    Not on file     Social Determinants of Health     Financial Resource Strain: Low Risk     Difficulty of Paying Living Expenses: Not very hard   Food Insecurity: No Food Insecurity    Worried About Running Out of Food in the Last Year: Never true    Ran Out of Food in the Last Year: Never true   Transportation Needs: Not on file   Physical Activity: Not on file   Stress: Not on file   Social Connections: Not on file   Intimate Partner Violence: Not on file   Housing Stability: Not on file       Allergies and medications have been reviewed. OBJECTIVE:  Vitals:  /78   Pulse 90   Temp 96.8 °F (36 °C) (Temporal)   Ht 5' 3\" (1.6 m) Comment: per pt  Wt 180 lb (81.6 kg) Comment: per pt  SpO2 98%   BMI 31.89 kg/m²     Physical Exam  Vitals reviewed. Constitutional:       General: She is not in acute distress. Appearance: Normal appearance. She is obese. HENT:      Head: Normocephalic. Musculoskeletal:        Feet:    Feet:      Right foot:      Skin integrity: Skin integrity normal.      Left foot:      Skin integrity: Blister and erythema present.       Comments: Closed blister above nail. Open blister with scant serous drainage inferior to nailbed. Mild erythema, warmth, and swelling noted to distal L toe. Skin:     General: Skin is warm and dry. Findings: Wound (See foot documentation) present. Nails: There is no clubbing. Neurological:      Mental Status: She is alert. ASSESSMENT/PLAN:      1. Cellulitis of toe of left foot  - sulfamethoxazole-trimethoprim (BACTRIM DS;SEPTRA DS) 800-160 MG per tablet; Take 1 tablet by mouth in the morning and 1 tablet before bedtime. Do all this for 7 days. Dispense: 14 tablet; Refill: 0  - Monitor area of redness   - Keep feet clean and dry, wear proper shoes    2. Type 2 diabetes mellitus with diabetic polyneuropathy, with long-term current use of insulin ([address]  - Managed by endocrinology  - Keep feet clean and dry, wear proper shoes      Return in about 2 weeks (around 8/10/2022) for reassessment with PCP. All prescribed medication today including purpose, proper use, and side effects discussed. Treatment plan/rationale and result expectations have been discussed with the patient who expresses understanding and desires to proceed. An electronic signature was used to authenticate this note.   PAOLO Box - CNP

## 2022-07-27 NOTE — PATIENT INSTRUCTIONS
-Monitor area of redness to make sure not spreading  - Keep toes clean and dry  - Follow up with PCP in 2 weeks

## 2022-09-08 ENCOUNTER — TELEPHONE (OUTPATIENT)
Dept: ENDOCRINOLOGY | Age: 52
End: 2022-09-08

## 2022-09-08 DIAGNOSIS — E11.65 UNCONTROLLED TYPE 2 DIABETES MELLITUS WITH HYPERGLYCEMIA (HCC): ICD-10-CM

## 2022-09-08 RX ORDER — INSULIN LISPRO 200 [IU]/ML
INJECTION, SOLUTION SUBCUTANEOUS
Qty: 30 ADJUSTABLE DOSE PRE-FILLED PEN SYRINGE | Refills: 3 | Status: SHIPPED | OUTPATIENT
Start: 2022-09-08

## 2022-09-08 RX ORDER — INSULIN LISPRO 200 [IU]/ML
INJECTION, SOLUTION SUBCUTANEOUS
Qty: 2 ADJUSTABLE DOSE PRE-FILLED PEN SYRINGE | Refills: 3 | Status: SHIPPED | OUTPATIENT
Start: 2022-09-08

## 2022-09-08 NOTE — TELEPHONE ENCOUNTER
Patient requesting medication refill.  Please approve or deny this request.    Rx requested:  Requested Prescriptions     Pending Prescriptions Disp Refills    insulin lispro (HUMALOG KWIKPEN) 200 UNIT/ML SOPN pen 2 Adjustable Dose Pre-filled Pen Syringe 3     Sig: Inject into the skin 3 times daily (with meals)         Last Office Visit:   5/18/2022      Next Visit Date:  Future Appointments   Date Time Provider Mindy Coles   9/28/2022  9:15 AM Kenroy Herrera MD [facility]

## 2022-09-08 NOTE — TELEPHONE ENCOUNTER
Shanti Alejo needs clarification on the Rx for humalog. Please send a new prescription with an updated quantity and directions to [facility]. Thanks!

## 2022-09-09 RX ORDER — PIOGLITAZONEHYDROCHLORIDE 15 MG/1
15 TABLET ORAL DAILY
Qty: 90 TABLET | Refills: 1 | Status: SHIPPED | OUTPATIENT
Start: 2022-09-09

## 2022-09-28 ENCOUNTER — OFFICE VISIT (OUTPATIENT)
Dept: FAMILY MEDICINE CLINIC | Age: 52
End: 2022-09-28
Payer: COMMERCIAL

## 2022-09-28 VITALS
OXYGEN SATURATION: 90 % | SYSTOLIC BLOOD PRESSURE: 158 MMHG | WEIGHT: 178 LBS | TEMPERATURE: 97 F | HEART RATE: 104 BPM | HEIGHT: 63 IN | DIASTOLIC BLOOD PRESSURE: 76 MMHG | BODY MASS INDEX: 31.54 KG/M2

## 2022-09-28 DIAGNOSIS — M25.562 ACUTE PAIN OF LEFT KNEE: Primary | ICD-10-CM

## 2022-09-28 PROCEDURE — 99213 OFFICE O/P EST LOW 20 MIN: CPT

## 2022-09-28 ASSESSMENT — ENCOUNTER SYMPTOMS
COLOR CHANGE: 0
COUGH: 0
SHORTNESS OF BREATH: 0

## 2022-09-28 NOTE — PROGRESS NOTES
Subjective  Radha Mina, 49 y.o. female presents today with:  Chief Complaint   Patient presents with    Knee Injury     Plane exiting twisted wrong way, pain surrounding knee, unable to bear weight, patient states buckling inward of right knee x2days tx: knee brace, otc nsaids, rest       Knee Pain   The incident occurred 2 days ago. [place] airport [place]. The injury mechanism was a twisting injury (Pt twisted when turning to get off the plane and felt a pop in her left knee followed by intense pain. ). The pain is present in the left knee. The quality of the pain is described as shooting and aching. The pain is at a severity of 7/10. The pain is moderate. The pain has been Fluctuating since onset. Associated symptoms include an inability to bear weight. Pertinent negatives include no loss of motion, loss of sensation, muscle weakness, numbness or tingling. The symptoms are aggravated by weight bearing and movement. She has tried NSAIDs, immobilization, non-weight bearing and rest for the symptoms. The treatment provided mild relief. Review of Systems   Constitutional:  Negative for chills, fatigue and fever. Respiratory:  Negative for cough and shortness of breath. Cardiovascular:  Negative for chest pain and palpitations. Musculoskeletal:  Positive for gait problem (pt limping from pain in left knee) and myalgias. Negative for arthralgias and joint swelling. Skin:  Negative for color change, pallor and wound. Neurological:  Negative for dizziness, tingling, syncope, weakness, light-headedness, numbness and headaches. PMH, Surgical Hx, Family Hx, and Social Hx reviewed and updated.       Objective  Vitals:    09/28/22 1229 09/28/22 1237   BP: (!) 160/82 (!) 158/76   Site: Left Upper Arm    Position: Sitting    Cuff Size: Medium Adult    Pulse: (!) 104    Temp: 97 °F (36.1 °C)    TempSrc: Temporal    SpO2: 90%    Weight: 178 lb (80.7 kg)    Height: 5' 3\" (1.6 m)      BP Readings from Last 3 Encounters:   09/28/22 (!) 158/76   07/27/22 122/78   06/26/22 (!) 147/94     Wt Readings from Last 3 Encounters:   09/28/22 178 lb (80.7 kg)   07/27/22 180 lb (81.6 kg)   06/26/22 180 lb (81.6 kg)     Physical Exam  Vitals reviewed. Constitutional:       General: She is not in acute distress. Appearance: Normal appearance. HENT:      Head: Normocephalic and atraumatic. Eyes:      General: Lids are normal.   Cardiovascular:      Rate and Rhythm: Normal rate and regular rhythm. Pulmonary:      Effort: Pulmonary effort is normal. No respiratory distress. Breath sounds: Normal air entry. Musculoskeletal:      Cervical back: Normal range of motion. Right knee: Normal.      Left knee: No swelling, erythema, lacerations, bony tenderness or crepitus. Normal range of motion. Tenderness present over the lateral joint line and LCL. Normal alignment. Normal pulse. Right lower leg: Normal.      Left lower leg: Normal.        Legs:    Skin:     General: Skin is warm and dry. Neurological:      Mental Status: She is alert and oriented to person, place, and time. Mental status is at baseline. Psychiatric:         Mood and Affect: Mood normal.         Behavior: Behavior is cooperative. Assessment & Plan   1. Acute pain of left knee   -Referral placed for [facility] orthopedics and appointment made. Pt declined referral and order for knee xray. States she is going to go to another orthopedic walk-in for evaluation due to insurance reasons. -OTC medication for symptom control such as tylenol  -Ice  -Rest  -Elevation  -Immobilization such as splinting for comfort  -Discussed signs and symptoms for when to seek care in ER     No orders of the defined types were placed in this encounter. No orders of the defined types were placed in this encounter. Return for follow up with orthopedics.     Reviewed with the patient: current clinical status,medications, activities and diet. Side effects, adverse effects of themedication prescribed today, as well as treatment plan/ rationale and result expectations have been discussed with the patient who expresses understanding and desires to proceed. Close follow up to evaluate treatment results and for coordination of care. I have reviewed the patient's medical history in detail and updated the computerized patient record.     PAOLO Matthews - NP

## 2023-02-04 PROBLEM — L08.9 DIABETIC INFECTION OF LEFT FOOT (MULTI): Status: ACTIVE | Noted: 2023-02-04

## 2023-02-04 PROBLEM — E04.2 MULTINODULAR GOITER: Status: ACTIVE | Noted: 2023-02-04

## 2023-02-04 PROBLEM — L03.032 CELLULITIS OF TOE OF LEFT FOOT: Status: ACTIVE | Noted: 2023-02-04

## 2023-02-04 PROBLEM — E66.811 CLASS 1 OBESITY WITH BODY MASS INDEX (BMI) OF 32.0 TO 32.9 IN ADULT: Status: ACTIVE | Noted: 2023-02-04

## 2023-02-04 PROBLEM — I10 ESSENTIAL HYPERTENSION: Status: ACTIVE | Noted: 2023-02-04

## 2023-02-04 PROBLEM — L03.119 CELLULITIS OF FOOT: Status: ACTIVE | Noted: 2023-02-04

## 2023-02-04 PROBLEM — M25.569 KNEE PAIN: Status: ACTIVE | Noted: 2023-02-04

## 2023-02-04 PROBLEM — E87.6 HYPOKALEMIA: Status: ACTIVE | Noted: 2023-02-04

## 2023-02-04 PROBLEM — R11.2 NAUSEA WITH VOMITING: Status: ACTIVE | Noted: 2023-02-04

## 2023-02-04 PROBLEM — G43.909 MIGRAINE: Status: ACTIVE | Noted: 2023-02-04

## 2023-02-04 PROBLEM — E66.9 CLASS 1 OBESITY WITH BODY MASS INDEX (BMI) OF 32.0 TO 32.9 IN ADULT: Status: ACTIVE | Noted: 2023-02-04

## 2023-02-04 PROBLEM — E11.628 DIABETIC INFECTION OF LEFT FOOT (MULTI): Status: ACTIVE | Noted: 2023-02-04

## 2023-02-04 PROBLEM — E66.9 CLASS 1 OBESITY WITH BODY MASS INDEX (BMI) OF 31.0 TO 31.9 IN ADULT: Status: ACTIVE | Noted: 2023-02-04

## 2023-02-04 PROBLEM — G47.33 OBSTRUCTIVE SLEEP APNEA: Status: ACTIVE | Noted: 2023-02-04

## 2023-02-04 PROBLEM — E11.65 TYPE 2 DIABETES MELLITUS WITH HYPERGLYCEMIA, WITH LONG-TERM CURRENT USE OF INSULIN (MULTI): Status: ACTIVE | Noted: 2023-02-04

## 2023-02-04 PROBLEM — B97.7 HPV IN FEMALE: Status: ACTIVE | Noted: 2023-02-04

## 2023-02-04 PROBLEM — E66.811 CLASS 1 OBESITY WITH BODY MASS INDEX (BMI) OF 30.0 TO 30.9 IN ADULT: Status: ACTIVE | Noted: 2023-02-04

## 2023-02-04 PROBLEM — L03.032 PARONYCHIA OF TOE OF LEFT FOOT: Status: ACTIVE | Noted: 2023-02-04

## 2023-02-04 PROBLEM — F31.9 BIPOLAR DEPRESSION (MULTI): Status: ACTIVE | Noted: 2023-02-04

## 2023-02-04 PROBLEM — E78.5 DYSLIPIDEMIA: Status: ACTIVE | Noted: 2023-02-04

## 2023-02-04 PROBLEM — E66.811 CLASS 1 OBESITY WITH BODY MASS INDEX (BMI) OF 31.0 TO 31.9 IN ADULT: Status: ACTIVE | Noted: 2023-02-04

## 2023-02-04 PROBLEM — R42 VERTIGO: Status: ACTIVE | Noted: 2023-02-04

## 2023-02-04 PROBLEM — Z79.4 TYPE 2 DIABETES MELLITUS WITH HYPERGLYCEMIA, WITH LONG-TERM CURRENT USE OF INSULIN (MULTI): Status: ACTIVE | Noted: 2023-02-04

## 2023-02-04 PROBLEM — E66.9 CLASS 1 OBESITY WITH BODY MASS INDEX (BMI) OF 30.0 TO 30.9 IN ADULT: Status: ACTIVE | Noted: 2023-02-04

## 2023-03-04 LAB
ALANINE AMINOTRANSFERASE (SGPT) (U/L) IN SER/PLAS: 14 U/L (ref 7–45)
ALBUMIN (G/DL) IN SER/PLAS: 3.9 G/DL (ref 3.4–5)
ALKALINE PHOSPHATASE (U/L) IN SER/PLAS: 102 U/L (ref 33–110)
ANION GAP IN SER/PLAS: 10 MMOL/L (ref 10–20)
ASPARTATE AMINOTRANSFERASE (SGOT) (U/L) IN SER/PLAS: 13 U/L (ref 9–39)
BILIRUBIN TOTAL (MG/DL) IN SER/PLAS: 0.3 MG/DL (ref 0–1.2)
CALCIUM (MG/DL) IN SER/PLAS: 9.7 MG/DL (ref 8.6–10.3)
CARBON DIOXIDE, TOTAL (MMOL/L) IN SER/PLAS: 31 MMOL/L (ref 21–32)
CHLORIDE (MMOL/L) IN SER/PLAS: 104 MMOL/L (ref 98–107)
CHOLESTEROL (MG/DL) IN SER/PLAS: 270 MG/DL (ref 0–199)
CHOLESTEROL IN HDL (MG/DL) IN SER/PLAS: 37.9 MG/DL
CHOLESTEROL/HDL RATIO: 7.1
CREATININE (MG/DL) IN SER/PLAS: 0.47 MG/DL (ref 0.5–1.05)
ERYTHROCYTE DISTRIBUTION WIDTH (RATIO) BY AUTOMATED COUNT: 13.2 % (ref 11.5–14.5)
ERYTHROCYTE MEAN CORPUSCULAR HEMOGLOBIN CONCENTRATION (G/DL) BY AUTOMATED: 32.6 G/DL (ref 32–36)
ERYTHROCYTE MEAN CORPUSCULAR VOLUME (FL) BY AUTOMATED COUNT: 93 FL (ref 80–100)
ERYTHROCYTES (10*6/UL) IN BLOOD BY AUTOMATED COUNT: 4.23 X10E12/L (ref 4–5.2)
ESTIMATED AVERAGE GLUCOSE FOR HBA1C: 200 MG/DL
GFR FEMALE: >90 ML/MIN/1.73M2
GLUCOSE (MG/DL) IN SER/PLAS: 82 MG/DL (ref 74–99)
HEMATOCRIT (%) IN BLOOD BY AUTOMATED COUNT: 39.3 % (ref 36–46)
HEMOGLOBIN (G/DL) IN BLOOD: 12.8 G/DL (ref 12–16)
HEMOGLOBIN A1C/HEMOGLOBIN TOTAL IN BLOOD: 8.6 %
LDL: 206 MG/DL (ref 0–99)
LEUKOCYTES (10*3/UL) IN BLOOD BY AUTOMATED COUNT: 11.1 X10E9/L (ref 4.4–11.3)
PLATELETS (10*3/UL) IN BLOOD AUTOMATED COUNT: 308 X10E9/L (ref 150–450)
POTASSIUM (MMOL/L) IN SER/PLAS: 3.6 MMOL/L (ref 3.5–5.3)
PROTEIN TOTAL: 7.3 G/DL (ref 6.4–8.2)
SODIUM (MMOL/L) IN SER/PLAS: 141 MMOL/L (ref 136–145)
THYROTROPIN (MIU/L) IN SER/PLAS BY DETECTION LIMIT <= 0.05 MIU/L: 0.6 MIU/L (ref 0.44–3.98)
THYROXINE (T4) FREE (NG/DL) IN SER/PLAS: 0.9 NG/DL (ref 0.61–1.12)
TRIGLYCERIDE (MG/DL) IN SER/PLAS: 132 MG/DL (ref 0–149)
UREA NITROGEN (MG/DL) IN SER/PLAS: 11 MG/DL (ref 6–23)
VLDL: 26 MG/DL (ref 0–40)

## 2023-03-12 ENCOUNTER — HOSPITAL ENCOUNTER (EMERGENCY)
Age: 53
Discharge: HOME | End: 2023-03-13

## 2023-03-12 DIAGNOSIS — R19.7 NAUSEA VOMITING AND DIARRHEA: ICD-10-CM

## 2023-03-12 DIAGNOSIS — K52.9 ENTERITIS: Primary | ICD-10-CM

## 2023-03-12 DIAGNOSIS — R10.10 PAIN OF UPPER ABDOMEN: ICD-10-CM

## 2023-03-12 DIAGNOSIS — R11.2 NAUSEA VOMITING AND DIARRHEA: ICD-10-CM

## 2023-03-12 LAB
ALBUMIN SERPL-MCNC: 3.7 G/DL (ref 3.5–4.6)
ALP BLD-CCNC: 118 U/L (ref 40–130)
ALT SERPL-CCNC: 16 U/L (ref 0–33)
ANION GAP SERPL CALCULATED.3IONS-SCNC: 8 MEQ/L (ref 9–15)
AST SERPL-CCNC: 16 U/L (ref 0–35)
BACTERIA: ABNORMAL /HPF
BASOPHILS ABSOLUTE: 0 K/UL (ref 0–0.2)
BASOPHILS RELATIVE PERCENT: 0.3 %
BILIRUB SERPL-MCNC: <0.2 MG/DL (ref 0.2–0.7)
BILIRUBIN URINE: NEGATIVE
BLOOD, URINE: NEGATIVE
BUN BLDV-MCNC: 11 MG/DL (ref 6–20)
CALCIUM SERPL-MCNC: 9 MG/DL (ref 8.5–9.9)
CHLORIDE BLD-SCNC: 98 MEQ/L (ref 95–107)
CLARITY: CLEAR
CO2: 29 MEQ/L (ref 20–31)
COLOR: YELLOW
CREAT SERPL-MCNC: 0.43 MG/DL (ref 0.5–0.9)
EOSINOPHILS ABSOLUTE: 0.2 K/UL (ref 0–0.7)
EOSINOPHILS RELATIVE PERCENT: 1.4 %
EPITHELIAL CELLS, UA: ABNORMAL /HPF (ref 0–5)
GFR SERPL CREATININE-BSD FRML MDRD: >60 ML/MIN/{1.73_M2}
GLOBULIN: 3.6 G/DL (ref 2.3–3.5)
GLUCOSE BLD-MCNC: 81 MG/DL (ref 70–99)
GLUCOSE URINE: NEGATIVE MG/DL
HCT VFR BLD CALC: 38.3 % (ref 37–47)
HEMOGLOBIN: 13 G/DL (ref 12–16)
HYALINE CASTS: ABNORMAL /HPF (ref 0–5)
KETONES, URINE: NEGATIVE MG/DL
LACTIC ACID: 1.5 MMOL/L (ref 0.5–2.2)
LEUKOCYTE ESTERASE, URINE: ABNORMAL
LIPASE: 7 U/L (ref 12–95)
LYMPHOCYTES ABSOLUTE: 1.9 K/UL (ref 1–4.8)
LYMPHOCYTES RELATIVE PERCENT: 17 %
MAGNESIUM: 1.9 MG/DL (ref 1.7–2.4)
MCH RBC QN AUTO: 30.5 PG (ref 27–31.3)
MCHC RBC AUTO-ENTMCNC: 33.9 % (ref 33–37)
MCV RBC AUTO: 89.9 FL (ref 79.4–94.8)
MONOCYTES ABSOLUTE: 1.1 K/UL (ref 0.2–0.8)
MONOCYTES RELATIVE PERCENT: 9.8 %
NEUTROPHILS ABSOLUTE: 7.8 K/UL (ref 1.4–6.5)
NEUTROPHILS RELATIVE PERCENT: 71.5 %
NITRITE, URINE: NEGATIVE
PDW BLD-RTO: 13.9 % (ref 11.5–14.5)
PH UA: 5.5 (ref 5–9)
PLATELET # BLD: 274 K/UL (ref 130–400)
POC CREATININE WHOLE BLOOD: 0.5
POTASSIUM SERPL-SCNC: 3 MEQ/L (ref 3.4–4.9)
PROTEIN UA: NEGATIVE MG/DL
RBC # BLD: 4.26 M/UL (ref 4.2–5.4)
RBC UA: ABNORMAL /HPF (ref 0–5)
SARS-COV-2, NAAT: NOT DETECTED
SODIUM BLD-SCNC: 135 MEQ/L (ref 135–144)
SPECIFIC GRAVITY UA: 1.01 (ref 1–1.03)
TOTAL PROTEIN: 7.3 G/DL (ref 6.3–8)
TROPONIN: <0.01 NG/ML (ref 0–0.01)
UROBILINOGEN, URINE: 1 E.U./DL
WBC # BLD: 10.9 K/UL (ref 4.8–10.8)
WBC UA: ABNORMAL /HPF (ref 0–5)

## 2023-03-12 PROCEDURE — 85025 COMPLETE CBC W/AUTO DIFF WBC: CPT

## 2023-03-12 PROCEDURE — 2500000003 HC RX 250 WO HCPCS

## 2023-03-12 PROCEDURE — 81001 URINALYSIS AUTO W/SCOPE: CPT

## 2023-03-12 PROCEDURE — 84484 ASSAY OF TROPONIN QUANT: CPT

## 2023-03-12 PROCEDURE — 99285 EMERGENCY DEPT VISIT HI MDM: CPT

## 2023-03-12 PROCEDURE — 83690 ASSAY OF LIPASE: CPT

## 2023-03-12 PROCEDURE — 87635 SARS-COV-2 COVID-19 AMP PRB: CPT

## 2023-03-12 PROCEDURE — 36415 COLL VENOUS BLD VENIPUNCTURE: CPT

## 2023-03-12 PROCEDURE — 83735 ASSAY OF MAGNESIUM: CPT

## 2023-03-12 PROCEDURE — 96375 TX/PRO/DX INJ NEW DRUG ADDON: CPT

## 2023-03-12 PROCEDURE — 6370000000 HC RX 637 (ALT 250 FOR IP)

## 2023-03-12 PROCEDURE — 93005 ELECTROCARDIOGRAM TRACING: CPT

## 2023-03-12 PROCEDURE — 83605 ASSAY OF LACTIC ACID: CPT

## 2023-03-12 PROCEDURE — 96374 THER/PROPH/DIAG INJ IV PUSH: CPT

## 2023-03-12 PROCEDURE — 80053 COMPREHEN METABOLIC PANEL: CPT

## 2023-03-12 PROCEDURE — A4216 STERILE WATER/SALINE, 10 ML: HCPCS

## 2023-03-12 PROCEDURE — 96361 HYDRATE IV INFUSION ADD-ON: CPT

## 2023-03-12 PROCEDURE — 2580000003 HC RX 258

## 2023-03-12 PROCEDURE — 6360000002 HC RX W HCPCS

## 2023-03-12 RX ORDER — POTASSIUM CHLORIDE 20 MEQ/1
20 TABLET, EXTENDED RELEASE ORAL ONCE
Status: COMPLETED | OUTPATIENT
Start: 2023-03-12 | End: 2023-03-13

## 2023-03-12 RX ORDER — 0.9 % SODIUM CHLORIDE 0.9 %
500 INTRAVENOUS SOLUTION INTRAVENOUS ONCE
Status: COMPLETED | OUTPATIENT
Start: 2023-03-12 | End: 2023-03-13

## 2023-03-12 RX ORDER — ONDANSETRON 2 MG/ML
4 INJECTION INTRAMUSCULAR; INTRAVENOUS ONCE
Status: COMPLETED | OUTPATIENT
Start: 2023-03-12 | End: 2023-03-12

## 2023-03-12 RX ADMIN — FAMOTIDINE 20 MG: 10 INJECTION, SOLUTION INTRAVENOUS at 23:34

## 2023-03-12 RX ADMIN — SODIUM CHLORIDE 500 ML: 9 INJECTION, SOLUTION INTRAVENOUS at 23:36

## 2023-03-12 RX ADMIN — ONDANSETRON 4 MG: 2 INJECTION INTRAMUSCULAR; INTRAVENOUS at 23:33

## 2023-03-12 RX ADMIN — Medication: at 23:32

## 2023-03-12 ASSESSMENT — ENCOUNTER SYMPTOMS
ABDOMINAL PAIN: 1
VOMITING: 1
PHOTOPHOBIA: 0
CONSTIPATION: 0
COUGH: 0
NAUSEA: 1
SHORTNESS OF BREATH: 0
BLOOD IN STOOL: 0
DIARRHEA: 1

## 2023-03-12 ASSESSMENT — PAIN DESCRIPTION - PAIN TYPE: TYPE: ACUTE PAIN

## 2023-03-12 ASSESSMENT — PAIN SCALES - GENERAL: PAINLEVEL_OUTOF10: 7

## 2023-03-12 ASSESSMENT — PAIN DESCRIPTION - LOCATION: LOCATION: ABDOMEN

## 2023-03-12 ASSESSMENT — PAIN - FUNCTIONAL ASSESSMENT: PAIN_FUNCTIONAL_ASSESSMENT: 0-10

## 2023-03-13 ENCOUNTER — APPOINTMENT (OUTPATIENT)
Dept: CT IMAGING | Age: 53
End: 2023-03-13

## 2023-03-13 VITALS
WEIGHT: 185 LBS | BODY MASS INDEX: 32.78 KG/M2 | DIASTOLIC BLOOD PRESSURE: 56 MMHG | TEMPERATURE: 98.1 F | OXYGEN SATURATION: 96 % | SYSTOLIC BLOOD PRESSURE: 103 MMHG | HEART RATE: 93 BPM | RESPIRATION RATE: 18 BRPM | HEIGHT: 63 IN

## 2023-03-13 LAB
EKG ATRIAL RATE: 90 BPM
EKG P AXIS: 21 DEGREES
EKG P-R INTERVAL: 142 MS
EKG Q-T INTERVAL: 364 MS
EKG QRS DURATION: 84 MS
EKG QTC CALCULATION (BAZETT): 445 MS
EKG R AXIS: 1 DEGREES
EKG T AXIS: 23 DEGREES
EKG VENTRICULAR RATE: 90 BPM
GFR SERPL CREATININE-BSD FRML MDRD: >60 ML/MIN/{1.73_M2}
PERFORMED ON: ABNORMAL
POC CREATININE: 0.5 MG/DL (ref 0.6–1.2)
POC SAMPLE TYPE: ABNORMAL

## 2023-03-13 PROCEDURE — 6370000000 HC RX 637 (ALT 250 FOR IP)

## 2023-03-13 PROCEDURE — 74177 CT ABD & PELVIS W/CONTRAST: CPT

## 2023-03-13 PROCEDURE — 6360000002 HC RX W HCPCS

## 2023-03-13 PROCEDURE — 6360000004 HC RX CONTRAST MEDICATION

## 2023-03-13 RX ORDER — ONDANSETRON 4 MG/1
4 TABLET, ORALLY DISINTEGRATING ORAL 3 TIMES DAILY PRN
Qty: 21 TABLET | Refills: 0 | Status: ON HOLD | OUTPATIENT
Start: 2023-03-13 | End: 2023-03-18

## 2023-03-13 RX ORDER — KETOROLAC TROMETHAMINE 15 MG/ML
15 INJECTION, SOLUTION INTRAMUSCULAR; INTRAVENOUS ONCE
Status: COMPLETED | OUTPATIENT
Start: 2023-03-13 | End: 2023-03-13

## 2023-03-13 RX ORDER — NAPROXEN 500 MG/1
500 TABLET ORAL 2 TIMES DAILY WITH MEALS
Qty: 60 TABLET | Refills: 0 | Status: ON HOLD | OUTPATIENT
Start: 2023-03-13 | End: 2023-03-18

## 2023-03-13 RX ADMIN — KETOROLAC TROMETHAMINE 15 MG: 15 INJECTION, SOLUTION INTRAMUSCULAR; INTRAVENOUS at 01:23

## 2023-03-13 RX ADMIN — POTASSIUM CHLORIDE 20 MEQ: 1500 TABLET, EXTENDED RELEASE ORAL at 00:16

## 2023-03-13 RX ADMIN — IOPAMIDOL 50 ML: 612 INJECTION, SOLUTION INTRAVENOUS at 00:45

## 2023-03-13 ASSESSMENT — PAIN - FUNCTIONAL ASSESSMENT
PAIN_FUNCTIONAL_ASSESSMENT: NONE - DENIES PAIN
PAIN_FUNCTIONAL_ASSESSMENT: 0-10
PAIN_FUNCTIONAL_ASSESSMENT: 0-10
PAIN_FUNCTIONAL_ASSESSMENT: NONE - DENIES PAIN

## 2023-03-13 ASSESSMENT — PAIN SCALES - GENERAL: PAINLEVEL_OUTOF10: 3

## 2023-03-13 NOTE — ED TRIAGE NOTES
Pt to ED due to abdominal pain since Friday. Pt states she was having nausea, vomiting, and diarrhea. Pt is alert and oriented x4. Skin is warm, dry, intact.  Resp are regular and equal.

## 2023-03-13 NOTE — ED PROVIDER NOTES
[address] [facility] ED  eMERGENCY dEPARTMENT eNCOUnter      Pt Name: Carolina Hernandez  MRN: [id number]  [place] 1970  Date of evaluation: 3/12/2023  Provider: MANDO Ramirez        HISTORY OF PRESENT ILLNESS    Carolina Hernandez is a 47 y.o. female per chart review has ah/o CAD, hypertension, GERD, anxiety, depression, type 2 diabetes, thyroid nodules. Patient presents to the emergency department for 5-day history of diffuse upper abdominal pain. Patient states she has had similar pain in the past with a viral hepatitis that was over that resolved and she does not have longstanding abdominal issues. She does have history of acid reflux. No ulcers. She takes medicine for this. Patient states that her pain was initially relieved by Tylenol but now seems to be worsened. Patient reports she has had 1 episode of emesis and 1 episode of diarrhea both nonbloody. These were days ago. She also had what felt like a fever days ago. She states she has sick contacts with similar and/V/D/abdominal pain symptoms but they seem to improve while she did not. Patient states she has had a hysteroscopy as well as cholecystectomy, no other abdominal surgeries. She denies chest pain or shortness of breath complaints. Denies radiation of her abdominal pain through to her back. Denies pancreatitis issues. Denies tobacco, marijuana, heavy alcohol use. States she does have soreness to her bilateral flanks. No genitourinary complaints. REVIEW OF SYSTEMS       Review of Systems   Constitutional:  Positive for appetite change. Negative for chills and fever. HENT:  Negative for congestion. Eyes:  Negative for photophobia. Respiratory:  Negative for cough and shortness of breath. Cardiovascular:  Negative for chest pain and palpitations. Gastrointestinal:  Positive for abdominal pain, diarrhea (one episode, resolved), nausea and vomiting (one episode, resolved).  Negative for blood in stool and constipation. Genitourinary:  Positive for flank pain (b/l). Negative for decreased urine volume, difficulty urinating, dysuria, frequency, hematuria, pelvic pain, urgency, vaginal bleeding and vaginal discharge. Musculoskeletal:  Negative for myalgias. Neurological:  Negative for weakness and headaches. Psychiatric/Behavioral:  Negative for confusion. Except as noted above the remainder of the review of systems was reviewed and negative. PAST MEDICAL HISTORY     Past Medical History:   Diagnosis Date    CAD (coronary artery disease) 6/25/2022    CAD (coronary artery disease) 6/25/2022    Depression     Environmental allergies     Gastroparesis     GERD (gastroesophageal reflux disease)     Headache     Dr. Kaitlin Tanner    HPV in female     Hyperlipidemia     Hypertension     Leukocytosis     Migraines     Type II or unspecified type diabetes mellitus without mention of complication, not stated as uncontrolled          SURGICAL HISTORY       Past Surgical History:   Procedure Laterality Date    BONE MARROW BIOPSY  2012    (-)[facility] [address] [facility]    CARDIAC CATHETERIZATION  2009    (-)[facility]  2012?     [address] [facility] MEDICATIONS       Discharge Medication List as of 3/13/2023  2:10 AM        CONTINUE these medications which have NOT CHANGED    Details   pioglitazone (ACTOS) 15 MG tablet Take 1 tablet by mouth daily, Disp-90 tablet, R-1Normal      !! insulin lispro (HUMALOG KWIKPEN) 200 UNIT/ML SOPN pen Inject into the skin 3 times daily (with meals), Disp-2 Adjustable Dose Pre-filled Pen Syringe, R-3Normal      !! insulin lispro (HUMALOG KWIKPEN) 200 UNIT/ML SOPN pen Use 3 times a day max dose 200 units/day, Disp-30 Adjustable Dose Pre-filled Pen Syringe, R-3Normal      !! potassium chloride (KLOR-CON M) 10 MEQ extended release tablet Take 4 tablets by mouth 2 times daily, Disp-180 tablet, R-3Normal      magnesium oxide (MAG-OX) 400 (240 Mg) MG tablet Take 1 tablet by mouth 2 times daily, Disp-30 tablet, R-3Normal      !! potassium chloride (KLOR-CON M) 20 MEQ extended release tablet Take 2 tablets by mouth 2 times daily (with meals), Disp-60 tablet, R-3Normal      lisinopril (PRINIVIL;ZESTRIL) 10 MG tablet Take 1 tablet by mouth daily, Disp-30 tablet, R-1Print      fenofibrate (TRICOR) 145 MG tablet Take 1 tablet by mouth daily, Disp-90 tablet, R-3Normal      atorvastatin (LIPITOR) 20 MG tablet Take 1 tablet by mouth daily, Disp-90 tablet, R-3Normal      fluconazole (DIFLUCAN) 150 MG tablet TAKE 1 TABLET BY MOUTH DAILY AS NEEDED FOR DISCHARGE, Disp-4 tablet, R-0No more refills until seen in office. Normal      propranolol (INDERAL) 20 MG tablet TAKE 1 TABLET BY MOUTH TWICE DAILY, Disp-180 tablet, R-3**Patient requests 90 days supply**Normal      escitalopram (LEXAPRO) 20 MG tablet TK 1 T PO QD, R-1Historical Med      traZODone (DESYREL) 50 MG tablet Take 100 mg by mouth nightly Historical Med      omeprazole (PRILOSEC) 20 MG delayed release capsule TAKE 2 CAPSULES BY MOUTH TWICE DAILY, Disp-360 capsule, R-1**Patient requests 90 days supply**Normal      B-D UF III MINI PEN NEEDLES 31G X 5 MM MISC Disp-100 each, R-1, VENUS, NormalUse 1 daily to inject insulin      VRAYLAR 3 MG CAPS TK ONE C PO  QHS, R-1, VENUS       !! - Potential duplicate medications found. Please discuss with provider.           ALLERGIES     Relafen [nabumetone]    FAMILY HISTORY       Family History   Problem Relation Age of Onset    Heart Disease Mother     Diabetes Father     Heart Disease Father     Cancer Father         thyroid    No Known Problems Sister     No Known Problems Paternal Grandfather     No Known Problems Paternal Grandmother     No Known Problems Maternal Grandmother     No Known Problems Maternal Grandfather     No Known Problems Brother     No Known Problems Other     Breast Cancer Neg Hx     Colon Cancer Neg Hx     Eclampsia Neg Hx     Hypertension Neg Hx     Ovarian Cancer Neg Hx      Labor Neg Hx     Spont Abortions Neg Hx     Stroke Neg Hx           SOCIAL HISTORY       Social History     Socioeconomic History    Marital status:      Spouse name: None    Number of children: None    Years of education: None    Highest education level: None   Tobacco Use    Smoking status: Never    Smokeless tobacco: Never   Vaping Use    Vaping Use: Never used   Substance and Sexual Activity    Alcohol use: Yes     Alcohol/week: 0.0 standard drinks     Comment: ocassionally    Drug use: No    Sexual activity: Not Currently     Partners: Male     Social Determinants of Health     Financial Resource Strain: Low Risk     Difficulty of Paying Living Expenses: Not very hard   Food Insecurity: No Food Insecurity    Worried About [employer] in the Last Year: Never true    Ran Out of Food in the Last Year: Never true         PHYSICAL EXAM        ED Triage Vitals [23 2115]   BP Temp Temp Source Heart Rate Resp SpO2 Height Weight   131/84 98.1 °F (36.7 °C) Oral (!) 101 16 95 % 5' 3\" (1.6 m) 185 lb (83.9 kg)       Physical Exam  Constitutional:       General: She is not in acute distress. Appearance: Normal appearance. She is not ill-appearing, toxic-appearing or diaphoretic. HENT:      Head: Normocephalic and atraumatic. Right Ear: External ear normal.      Left Ear: External ear normal.      Nose: Nose normal.      Mouth/Throat:      Mouth: Mucous membranes are moist.      Pharynx: Oropharynx is clear. No pharyngeal swelling or oropharyngeal exudate. Eyes:      Extraocular Movements: Extraocular movements intact. Cardiovascular:      Rate and Rhythm: Normal rate and regular rhythm. Pulmonary:      Effort: Pulmonary effort is normal. No respiratory distress. Breath sounds: Normal breath sounds. Chest:      Chest wall: No tenderness.    Abdominal:      General: Bowel sounds are normal. Palpations: Abdomen is soft. There is no mass. Tenderness: There is abdominal tenderness in the right upper quadrant, epigastric area and left upper quadrant. There is right CVA tenderness, left CVA tenderness and guarding. There is no rebound. Hernia: No hernia is present. Musculoskeletal:         General: Normal range of motion. Cervical back: Normal range of motion. Skin:     General: Skin is warm. Capillary Refill: Capillary refill takes less than 2 seconds. Neurological:      Mental Status: She is alert and oriented to person, place, and time.    Psychiatric:         Mood and Affect: Mood normal.         Behavior: Behavior normal.         LABS:  Labs Reviewed   CBC WITH AUTO DIFFERENTIAL - Abnormal; Notable for the following components:       Result Value    WBC 10.9 (*)     Neutrophils Absolute 7.8 (*)     Monocytes Absolute 1.1 (*)     All other components within normal limits   COMPREHENSIVE METABOLIC PANEL - Abnormal; Notable for the following components:    Potassium 3.0 (*)     Anion Gap 8 (*)     Creatinine 0.43 (*)     Globulin 3.6 (*)     All other components within normal limits    Narrative:     Perry Livingston tel. [phone],  Chemistry results called to and read back by sandhya, 03/12/2023 23:54, by  CRISSY   LIPASE - Abnormal; Notable for the following components:    Lipase 7 (*)     All other components within normal limits    Narrative:     Perry Livingston tel. [phone],  Chemistry results called to and read back by sandhya, 03/12/2023 23:54, by  [place] Apo - Abnormal; Notable for the following components:    Leukocyte Esterase, Urine MODERATE (*)     All other components within normal limits   MICROSCOPIC URINALYSIS - Abnormal; Notable for the following components:    Bacteria, UA FEW (*)     WBC, UA 20-50 (*)     All other components within normal limits   POCT VENOUS - Abnormal; Notable for the following components:    POC Creatinine 0.5 (*)     All other components within normal limits   POCT CREATININE - Normal   COVID-19, RAPID   LACTIC ACID   MAGNESIUM    Narrative:     Torey Gordon tel. [phone],  Chemistry results called to and read back by sandhya, 03/12/2023 23:54, by  [address]   TROPONIN       EKG NSR HR 98 QT/QTc 360/445 no axis deviation no acute ischemic changes    MDM:   Vitals:    Vitals:    03/12/23 2115 03/13/23 0017 03/13/23 0212   BP: 131/84 108/70 (!) 103/56   Pulse: (!) 101 90 93   Resp: 16 16 18   Temp: 98.1 °F (36.7 °C)     TempSrc: Oral     SpO2: 95% 96% 96%   Weight: 185 lb (83.9 kg)     Height: 5' 3\" (1.6 m)         47 y.o. female patient presents emergency department for upper abdominal pain, 5-day history of. Intermittent emesis and diarrhea episodes. Neither are present today. Patient is afebrile, vital signs stable. CT A/P demonstrates nonspecific enterocolitis changes. No other acute contributing pathology. This correlates with patient's historical presentation today. She had been given IV NS bolus, IV Pepcid, GI cocktail, IV Zofran, IV Toradol in the emergency department with relief of symptoms. Laboratory studies remarkable for slight hypokalemia at 3.0 this is repleted orally. No transaminitis. No evidence of dehydration. With age, comorbidities, atypical ACS is evaluated, nonischemic EKG, troponin negative. Patient is feeling improved. Discussed continued supportive management of enterocolitis. Rx for naproxen, Zofran for as needed use. Tolerating PO intake. Discussed f/u and return precautions. CRITICAL CARE TIME   Total CriticalCare time was 0 minutes, excluding separately reportable procedures. There was a high probability of clinically significant/life threatening deterioration in the patient's condition which required my urgent intervention. PROCEDURES:  Unlessotherwise noted below, none      Procedures      FINAL IMPRESSION      1. Enteritis    2. Nausea vomiting and diarrhea    3.  Pain of upper abdomen          DISPOSITION/PLAN   DISPOSITION Decision To Discharge 03/13/2023 02:07:33 AM          MANDO Nicole (electronically signed)  Attending Emergency Physician          Evans Nicole  03/15/23 9745

## 2023-03-18 ENCOUNTER — HOSPITAL ENCOUNTER (OUTPATIENT)
Age: 53
Setting detail: OBSERVATION
Discharge: HOME | End: 2023-03-19
Attending: EMERGENCY MEDICINE | Admitting: INTERNAL MEDICINE
Payer: COMMERCIAL

## 2023-03-18 ENCOUNTER — APPOINTMENT (OUTPATIENT)
Dept: GENERAL RADIOLOGY | Age: 53
End: 2023-03-18
Payer: COMMERCIAL

## 2023-03-18 DIAGNOSIS — R07.9 CHEST PAIN, UNSPECIFIED TYPE: Primary | ICD-10-CM

## 2023-03-18 LAB
ALBUMIN SERPL-MCNC: 3.6 G/DL (ref 3.5–4.6)
ALP SERPL-CCNC: 109 U/L (ref 40–130)
ALT SERPL-CCNC: 21 U/L (ref 0–33)
ANION GAP SERPL CALCULATED.3IONS-SCNC: 10 MEQ/L (ref 9–15)
APTT PPP: 28.5 SEC (ref 24.4–36.8)
AST SERPL-CCNC: 16 U/L (ref 0–35)
BASOPHILS # BLD: 0 K/UL (ref 0–0.2)
BASOPHILS NFR BLD: 0.3 %
BILIRUB SERPL-MCNC: <0.2 MG/DL (ref 0.2–0.7)
BNP BLD-MCNC: 176 PG/ML
BUN SERPL-MCNC: 11 MG/DL (ref 6–20)
CALCIUM SERPL-MCNC: 9.1 MG/DL (ref 8.5–9.9)
CHLORIDE SERPL-SCNC: 100 MEQ/L (ref 95–107)
CK SERPL-CCNC: 36 U/L (ref 0–170)
CO2 SERPL-SCNC: 33 MEQ/L (ref 20–31)
CREAT SERPL-MCNC: 0.48 MG/DL (ref 0.5–0.9)
EOSINOPHIL # BLD: 0.2 K/UL (ref 0–0.7)
EOSINOPHIL NFR BLD: 2 %
ERYTHROCYTE [DISTWIDTH] IN BLOOD BY AUTOMATED COUNT: 13.6 % (ref 11.5–14.5)
GLOBULIN SER CALC-MCNC: 3.2 G/DL (ref 2.3–3.5)
GLUCOSE BLD-MCNC: 62 MG/DL (ref 70–99)
GLUCOSE BLD-MCNC: 87 MG/DL (ref 70–99)
GLUCOSE SERPL-MCNC: 139 MG/DL (ref 70–99)
HCT VFR BLD AUTO: 33.2 % (ref 37–47)
HGB BLD-MCNC: 11.3 G/DL (ref 12–16)
INR PPP: 1
LYMPHOCYTES # BLD: 3.9 K/UL (ref 1–4.8)
LYMPHOCYTES NFR BLD: 33.3 %
MCH RBC QN AUTO: 30.7 PG (ref 27–31.3)
MCHC RBC AUTO-ENTMCNC: 34.2 % (ref 33–37)
MCV RBC AUTO: 89.9 FL (ref 79.4–94.8)
MONOCYTES # BLD: 0.9 K/UL (ref 0.2–0.8)
MONOCYTES NFR BLD: 7.8 %
NEUTROPHILS # BLD: 6.6 K/UL (ref 1.4–6.5)
NEUTS SEG NFR BLD: 56.6 %
PERFORMED ON: ABNORMAL
PERFORMED ON: NORMAL
PLATELET # BLD AUTO: 322 K/UL (ref 130–400)
POTASSIUM SERPL-SCNC: 3.1 MEQ/L (ref 3.4–4.9)
PROT SERPL-MCNC: 6.8 G/DL (ref 6.3–8)
PROTHROMBIN TIME: 13.5 SEC (ref 12.3–14.9)
RBC # BLD AUTO: 3.69 M/UL (ref 4.2–5.4)
SODIUM SERPL-SCNC: 143 MEQ/L (ref 135–144)
TROPONIN T SERPL-MCNC: <0.01 NG/ML (ref 0–0.01)
TROPONIN T SERPL-MCNC: <0.01 NG/ML (ref 0–0.01)
WBC # BLD AUTO: 11.6 K/UL (ref 4.8–10.8)

## 2023-03-18 PROCEDURE — 85025 COMPLETE CBC W/AUTO DIFF WBC: CPT

## 2023-03-18 PROCEDURE — 2580000003 HC RX 258: Performed by: INTERNAL MEDICINE

## 2023-03-18 PROCEDURE — G0378 HOSPITAL OBSERVATION PER HR: HCPCS

## 2023-03-18 PROCEDURE — 6360000002 HC RX W HCPCS: Performed by: EMERGENCY MEDICINE

## 2023-03-18 PROCEDURE — 82550 ASSAY OF CK (CPK): CPT

## 2023-03-18 PROCEDURE — 84484 ASSAY OF TROPONIN QUANT: CPT

## 2023-03-18 PROCEDURE — 83880 ASSAY OF NATRIURETIC PEPTIDE: CPT

## 2023-03-18 PROCEDURE — 85730 THROMBOPLASTIN TIME PARTIAL: CPT

## 2023-03-18 PROCEDURE — 96372 THER/PROPH/DIAG INJ SC/IM: CPT

## 2023-03-18 PROCEDURE — 6370000000 HC RX 637 (ALT 250 FOR IP): Performed by: EMERGENCY MEDICINE

## 2023-03-18 PROCEDURE — 83036 HEMOGLOBIN GLYCOSYLATED A1C: CPT

## 2023-03-18 PROCEDURE — 80053 COMPREHEN METABOLIC PANEL: CPT

## 2023-03-18 PROCEDURE — 99285 EMERGENCY DEPT VISIT HI MDM: CPT

## 2023-03-18 PROCEDURE — 71045 X-RAY EXAM CHEST 1 VIEW: CPT

## 2023-03-18 PROCEDURE — 36415 COLL VENOUS BLD VENIPUNCTURE: CPT

## 2023-03-18 PROCEDURE — 93005 ELECTROCARDIOGRAM TRACING: CPT | Performed by: EMERGENCY MEDICINE

## 2023-03-18 PROCEDURE — 6360000002 HC RX W HCPCS: Performed by: INTERNAL MEDICINE

## 2023-03-18 PROCEDURE — 85610 PROTHROMBIN TIME: CPT

## 2023-03-18 PROCEDURE — 6370000000 HC RX 637 (ALT 250 FOR IP): Performed by: INTERNAL MEDICINE

## 2023-03-18 RX ORDER — ENOXAPARIN SODIUM 100 MG/ML
1 INJECTION SUBCUTANEOUS 2 TIMES DAILY
Status: DISCONTINUED | OUTPATIENT
Start: 2023-03-18 | End: 2023-03-19 | Stop reason: HOSPADM

## 2023-03-18 RX ORDER — ATORVASTATIN CALCIUM 40 MG/1
40 TABLET, FILM COATED ORAL DAILY
Status: DISCONTINUED | OUTPATIENT
Start: 2023-03-18 | End: 2023-03-19 | Stop reason: HOSPADM

## 2023-03-18 RX ORDER — ENOXAPARIN SODIUM 100 MG/ML
1 INJECTION SUBCUTANEOUS ONCE
Status: COMPLETED | OUTPATIENT
Start: 2023-03-18 | End: 2023-03-18

## 2023-03-18 RX ORDER — ONDANSETRON 4 MG/1
4 TABLET, ORALLY DISINTEGRATING ORAL EVERY 8 HOURS PRN
Status: DISCONTINUED | OUTPATIENT
Start: 2023-03-18 | End: 2023-03-19 | Stop reason: HOSPADM

## 2023-03-18 RX ORDER — TRAZODONE HYDROCHLORIDE 100 MG/1
100 TABLET ORAL NIGHTLY
Status: DISCONTINUED | OUTPATIENT
Start: 2023-03-18 | End: 2023-03-19 | Stop reason: HOSPADM

## 2023-03-18 RX ORDER — SODIUM CHLORIDE 0.9 % (FLUSH) 0.9 %
5-40 SYRINGE (ML) INJECTION PRN
Status: DISCONTINUED | OUTPATIENT
Start: 2023-03-18 | End: 2023-03-19 | Stop reason: HOSPADM

## 2023-03-18 RX ORDER — LISINOPRIL 10 MG/1
10 TABLET ORAL DAILY
Status: DISCONTINUED | OUTPATIENT
Start: 2023-03-18 | End: 2023-03-19 | Stop reason: HOSPADM

## 2023-03-18 RX ORDER — POLYETHYLENE GLYCOL 3350 17 G/17G
17 POWDER, FOR SOLUTION ORAL DAILY PRN
Status: DISCONTINUED | OUTPATIENT
Start: 2023-03-18 | End: 2023-03-19 | Stop reason: HOSPADM

## 2023-03-18 RX ORDER — ACETAMINOPHEN 650 MG/1
650 SUPPOSITORY RECTAL EVERY 6 HOURS PRN
Status: DISCONTINUED | OUTPATIENT
Start: 2023-03-18 | End: 2023-03-19 | Stop reason: HOSPADM

## 2023-03-18 RX ORDER — GABAPENTIN 300 MG/1
300 CAPSULE ORAL 2 TIMES DAILY
COMMUNITY

## 2023-03-18 RX ORDER — ESCITALOPRAM OXALATE 20 MG/1
20 TABLET ORAL DAILY
Status: DISCONTINUED | OUTPATIENT
Start: 2023-03-18 | End: 2023-03-19 | Stop reason: HOSPADM

## 2023-03-18 RX ORDER — ENOXAPARIN SODIUM 100 MG/ML
40 INJECTION SUBCUTANEOUS DAILY
Status: DISCONTINUED | OUTPATIENT
Start: 2023-03-18 | End: 2023-03-18

## 2023-03-18 RX ORDER — INSULIN LISPRO 100 [IU]/ML
0-4 INJECTION, SOLUTION INTRAVENOUS; SUBCUTANEOUS NIGHTLY
Status: DISCONTINUED | OUTPATIENT
Start: 2023-03-18 | End: 2023-03-19 | Stop reason: HOSPADM

## 2023-03-18 RX ORDER — PIOGLITAZONEHYDROCHLORIDE 15 MG/1
15 TABLET ORAL DAILY
Status: DISCONTINUED | OUTPATIENT
Start: 2023-03-18 | End: 2023-03-19 | Stop reason: HOSPADM

## 2023-03-18 RX ORDER — ATORVASTATIN CALCIUM 80 MG/1
80 TABLET, FILM COATED ORAL NIGHTLY
Status: DISCONTINUED | OUTPATIENT
Start: 2023-03-18 | End: 2023-03-18

## 2023-03-18 RX ORDER — POTASSIUM CHLORIDE 20 MEQ/1
40 TABLET, EXTENDED RELEASE ORAL 2 TIMES DAILY
Status: DISCONTINUED | OUTPATIENT
Start: 2023-03-18 | End: 2023-03-19 | Stop reason: HOSPADM

## 2023-03-18 RX ORDER — ASPIRIN 81 MG/1
81 TABLET, CHEWABLE ORAL DAILY
Status: DISCONTINUED | OUTPATIENT
Start: 2023-03-19 | End: 2023-03-18

## 2023-03-18 RX ORDER — NITROGLYCERIN 0.4 MG/1
0.4 TABLET SUBLINGUAL EVERY 5 MIN PRN
Status: DISCONTINUED | OUTPATIENT
Start: 2023-03-18 | End: 2023-03-19 | Stop reason: HOSPADM

## 2023-03-18 RX ORDER — SODIUM CHLORIDE 0.9 % (FLUSH) 0.9 %
5-40 SYRINGE (ML) INJECTION EVERY 12 HOURS SCHEDULED
Status: DISCONTINUED | OUTPATIENT
Start: 2023-03-18 | End: 2023-03-19 | Stop reason: HOSPADM

## 2023-03-18 RX ORDER — GABAPENTIN 300 MG/1
300 CAPSULE ORAL 2 TIMES DAILY
Status: DISCONTINUED | OUTPATIENT
Start: 2023-03-18 | End: 2023-03-19 | Stop reason: HOSPADM

## 2023-03-18 RX ORDER — POTASSIUM CHLORIDE 20 MEQ/1
20 TABLET, EXTENDED RELEASE ORAL ONCE
Status: COMPLETED | OUTPATIENT
Start: 2023-03-18 | End: 2023-03-18

## 2023-03-18 RX ORDER — SODIUM CHLORIDE 9 MG/ML
INJECTION, SOLUTION INTRAVENOUS PRN
Status: DISCONTINUED | OUTPATIENT
Start: 2023-03-18 | End: 2023-03-19 | Stop reason: HOSPADM

## 2023-03-18 RX ORDER — ACETAMINOPHEN 325 MG/1
650 TABLET ORAL EVERY 6 HOURS PRN
Status: DISCONTINUED | OUTPATIENT
Start: 2023-03-18 | End: 2023-03-19 | Stop reason: HOSPADM

## 2023-03-18 RX ORDER — DEXTROSE MONOHYDRATE 100 MG/ML
INJECTION, SOLUTION INTRAVENOUS CONTINUOUS PRN
Status: DISCONTINUED | OUTPATIENT
Start: 2023-03-18 | End: 2023-03-19 | Stop reason: HOSPADM

## 2023-03-18 RX ORDER — PROPRANOLOL HYDROCHLORIDE 40 MG/1
20 TABLET ORAL NIGHTLY
Status: DISCONTINUED | OUTPATIENT
Start: 2023-03-18 | End: 2023-03-19 | Stop reason: HOSPADM

## 2023-03-18 RX ORDER — ASPIRIN 81 MG/1
81 TABLET, CHEWABLE ORAL DAILY
Status: DISCONTINUED | OUTPATIENT
Start: 2023-03-19 | End: 2023-03-19 | Stop reason: HOSPADM

## 2023-03-18 RX ORDER — ONDANSETRON 2 MG/ML
4 INJECTION INTRAMUSCULAR; INTRAVENOUS EVERY 6 HOURS PRN
Status: DISCONTINUED | OUTPATIENT
Start: 2023-03-18 | End: 2023-03-19 | Stop reason: HOSPADM

## 2023-03-18 RX ORDER — INSULIN LISPRO 100 [IU]/ML
0-8 INJECTION, SOLUTION INTRAVENOUS; SUBCUTANEOUS
Status: DISCONTINUED | OUTPATIENT
Start: 2023-03-18 | End: 2023-03-19 | Stop reason: HOSPADM

## 2023-03-18 RX ORDER — ASPIRIN 325 MG
325 TABLET ORAL ONCE
Status: COMPLETED | OUTPATIENT
Start: 2023-03-18 | End: 2023-03-18

## 2023-03-18 RX ORDER — LANOLIN ALCOHOL/MO/W.PET/CERES
400 CREAM (GRAM) TOPICAL DAILY
Status: DISCONTINUED | OUTPATIENT
Start: 2023-03-18 | End: 2023-03-19 | Stop reason: HOSPADM

## 2023-03-18 RX ADMIN — POTASSIUM CHLORIDE 20 MEQ: 1500 TABLET, EXTENDED RELEASE ORAL at 14:18

## 2023-03-18 RX ADMIN — PROPRANOLOL HYDROCHLORIDE 20 MG: 40 TABLET ORAL at 20:17

## 2023-03-18 RX ADMIN — POTASSIUM CHLORIDE 40 MEQ: 1500 TABLET, EXTENDED RELEASE ORAL at 20:17

## 2023-03-18 RX ADMIN — GABAPENTIN 300 MG: 300 CAPSULE ORAL at 20:17

## 2023-03-18 RX ADMIN — ENOXAPARIN SODIUM 90 MG: 100 INJECTION SUBCUTANEOUS at 20:17

## 2023-03-18 RX ADMIN — ASPIRIN 325 MG: 325 TABLET ORAL at 13:27

## 2023-03-18 RX ADMIN — ENOXAPARIN SODIUM 80 MG: 80 INJECTION SUBCUTANEOUS at 13:28

## 2023-03-18 RX ADMIN — ACETAMINOPHEN 650 MG: 325 TABLET ORAL at 20:17

## 2023-03-18 RX ADMIN — TRAZODONE HYDROCHLORIDE 100 MG: 100 TABLET ORAL at 20:17

## 2023-03-18 RX ADMIN — Medication 10 ML: at 21:00

## 2023-03-18 RX ADMIN — NITROGLYCERIN 0.5 INCH: 20 OINTMENT TOPICAL at 13:27

## 2023-03-18 ASSESSMENT — PAIN DESCRIPTION - ORIENTATION: ORIENTATION: LEFT

## 2023-03-18 ASSESSMENT — PAIN - FUNCTIONAL ASSESSMENT: PAIN_FUNCTIONAL_ASSESSMENT: 0-10

## 2023-03-18 ASSESSMENT — PAIN DESCRIPTION - PAIN TYPE: TYPE: ACUTE PAIN

## 2023-03-18 ASSESSMENT — ENCOUNTER SYMPTOMS
CHEST TIGHTNESS: 1
WHEEZING: 0
VOMITING: 0
TROUBLE SWALLOWING: 0
ALLERGIC/IMMUNOLOGIC NEGATIVE: 1
EYES NEGATIVE: 1
NAUSEA: 0
RHINORRHEA: 0
ABDOMINAL PAIN: 0
SHORTNESS OF BREATH: 0

## 2023-03-18 ASSESSMENT — LIFESTYLE VARIABLES
HOW OFTEN DO YOU HAVE A DRINK CONTAINING ALCOHOL: NEVER
HOW OFTEN DO YOU HAVE A DRINK CONTAINING ALCOHOL: NEVER
HOW MANY STANDARD DRINKS CONTAINING ALCOHOL DO YOU HAVE ON A TYPICAL DAY: PATIENT DOES NOT DRINK

## 2023-03-18 ASSESSMENT — PAIN DESCRIPTION - DESCRIPTORS: DESCRIPTORS: ACHING

## 2023-03-18 ASSESSMENT — PAIN DESCRIPTION - FREQUENCY: FREQUENCY: INTERMITTENT

## 2023-03-18 ASSESSMENT — PAIN SCALES - GENERAL: PAINLEVEL_OUTOF10: 1

## 2023-03-18 ASSESSMENT — PAIN DESCRIPTION - ONSET: ONSET: ON-GOING

## 2023-03-18 NOTE — ED TRIAGE NOTES
pT ARRIVED WITH CHEST PAIN THAT STARTED YESTERDAY   PT STATES THAT SHE HAS \"FLUTTERING\" IN CHEST AND STATES THAT WORSENS WITH AMBULATION  PT STATES THAT DEVELOPS PAIN AFTER THE FLUTTERING  PT IS ALERT AND ORIENTED TIMES 4  PT STATES ACHING PAIN AT THIS TIME IN CHEST 1/10

## 2023-03-18 NOTE — ED PROVIDER NOTES
Pulse: 85 86 85 85   Resp:  23 20 21   Temp:       TempSrc:       SpO2:  90% 96% 93%   Weight:       Height:           Patient with significant risk factors for underlying coronary disease. Trigger relations, known diagnosis of hypercholesterolemia, known diagnosis of insulin-dependent diabetes. Patient also has descriptors consistent with unstable angina. Patient resting comfortably in the room. Patient is established with Dr. Alice Campbell. Will call for cardiac evaluation and admission. Medical Decision Making  Amount and/or Complexity of Data Reviewed  Labs: ordered. Radiology: ordered. Risk  OTC drugs. Prescription drug management. Coding     CONSULTS:  None    PROCEDURES:  Unless otherwise noted below, none     Procedures    FINAL IMPRESSION      1. Chest pain, unspecified type        DISPOSITION/PLAN   DISPOSITION Decision To Admit 03/18/2023 02:12:32 PM      PATIENT REFERRED TO:  No follow-up provider specified.     DISCHARGE MEDICATIONS:  New Prescriptions    No medications on file          (Please note that portions of this note were completed with a voice recognitionprogram.  Efforts were made to edit the dictations but occasionally words are mis-transcribed.)    Tomasa Tovar MD (electronically signed)  Attending Emergency Physician          Tomasa Tovar MD  03/18/23 2066

## 2023-03-18 NOTE — CARE COORDINATION
03/18/23    From:HOME ALONE, INDEPENDENT WORKS FT     Admit:CHEST PAIN     PMH: CAD HLP, HTN, DM, (JAMES) MIGRANES, GASTROPARESIS     Anticipated Discharge Disposition:HOME ALONE    Patient Mobility or PT/OT ordered:N/A INDEPENDENT    Consults: CHO (ADMITTING)    Clinical:-- SR--RA--K+-3.1-    Barriers to Discharge:  CARDIOLOGY NEEDS TO SEE  TROP NEG X 1 Q3'    Assessments: CMI DONE
[facility], [place] - [address] [facility] [address]  Phone: [phone] Fax: [phone]    [facility] ([address] [facility]) - [address]aus Mcque[address] [facility] [phone] - F [phone]  [address] [address]  Phone: [phone] Fax: [phone] [facility] [address] [facility]  [address] [facility] [address]  Phone: [phone] Fax: [phone] Vicky Rook [phone]  [address] [facility] [address]  Phone: [phone] Fax: [phone]              Notes:    Factors facilitating achievement of predicted outcomes: Family support, Motivated, Cooperative, and Pleasant    Barriers to discharge: CARDIOLOGY TESTING    Additional Case Management Notes: RETURN HOME ALONE NO NEEDS EXCEPT MONITOR FOR DOAC ON D/C                The Plan for Transition of Care is related to the following treatment goals of Chest pain [F74.8]    IF APPLICABLE: The Patient and/or patient representative Edilberto Sullvian and her family were provided with a choice of provider and agrees with the discharge plan. Freedom of choice list with basic dialogue that supports the patient's individualized plan of care/goals and shares the quality data associated with the providers was provided to: Patient   Patient Representative Name:   [facility]    The Patient and/or Patient Representative Agree with the Discharge Plan?  Yes    David Temple RN  Case Management Department

## 2023-03-18 NOTE — FLOWSHEET NOTE
32 61 16- patient arrived to room 180 via stretcher from ER. Ambulated into room independently. Oriented to room and call light. Denies any chest pain at this time. States it felt like she had fluttering in her chest at home along with the pain. Remains NSR on the monitor. No edema noted. Denies any shortness of breath at this time. Lungs are clear but diminished bilaterally. SATS 94% on RA. She is A/Ox3. Denies any pain with elimination. Skin remains warm, dry and intact. #20g SL to left AC, flushed and patent. Vital signs stable. Call light remains in reach. 1600- Perfect serve message sent to Dr Aicha Johnson letting him know her admission was completed. Received call back with orders. Patient is allowed to use her own insulin pump. 1800- patient resting in bed at this time. No signs of any acute distress noted. Call light remains in reach.      Electronically signed by Davida Minor RN on 3/18/2023 at 6:44 PM

## 2023-03-19 VITALS
OXYGEN SATURATION: 96 % | BODY MASS INDEX: 34.71 KG/M2 | RESPIRATION RATE: 20 BRPM | SYSTOLIC BLOOD PRESSURE: 144 MMHG | WEIGHT: 195.9 LBS | HEIGHT: 63 IN | HEART RATE: 75 BPM | DIASTOLIC BLOOD PRESSURE: 84 MMHG | TEMPERATURE: 98.4 F

## 2023-03-19 LAB
GLUCOSE BLD-MCNC: 103 MG/DL (ref 70–99)
GLUCOSE BLD-MCNC: 50 MG/DL (ref 70–99)
GLUCOSE BLD-MCNC: 75 MG/DL (ref 70–99)
HBA1C MFR BLD: 7.7 % (ref 4.8–5.9)
PERFORMED ON: ABNORMAL
PERFORMED ON: ABNORMAL
PERFORMED ON: NORMAL
TROPONIN T SERPL-MCNC: <0.01 NG/ML (ref 0–0.01)

## 2023-03-19 PROCEDURE — 99222 1ST HOSP IP/OBS MODERATE 55: CPT | Performed by: INTERNAL MEDICINE

## 2023-03-19 PROCEDURE — G0378 HOSPITAL OBSERVATION PER HR: HCPCS

## 2023-03-19 PROCEDURE — 36415 COLL VENOUS BLD VENIPUNCTURE: CPT

## 2023-03-19 PROCEDURE — 6370000000 HC RX 637 (ALT 250 FOR IP): Performed by: INTERNAL MEDICINE

## 2023-03-19 PROCEDURE — 93005 ELECTROCARDIOGRAM TRACING: CPT | Performed by: INTERNAL MEDICINE

## 2023-03-19 PROCEDURE — 84484 ASSAY OF TROPONIN QUANT: CPT

## 2023-03-19 RX ORDER — ATORVASTATIN CALCIUM 20 MG/1
40 TABLET, FILM COATED ORAL DAILY
Qty: 90 TABLET | Refills: 3 | Status: SHIPPED | OUTPATIENT
Start: 2023-03-19

## 2023-03-19 RX ORDER — LANOLIN ALCOHOL/MO/W.PET/CERES
400 CREAM (GRAM) TOPICAL DAILY
Qty: 90 TABLET | Refills: 3 | Status: SHIPPED | OUTPATIENT
Start: 2023-03-19

## 2023-03-19 RX ADMIN — ACETAMINOPHEN 650 MG: 325 TABLET ORAL at 05:46

## 2023-03-19 RX ADMIN — Medication 4 TABLET: at 03:33

## 2023-03-19 ASSESSMENT — ENCOUNTER SYMPTOMS
RESPIRATORY NEGATIVE: 1
COUGH: 0
CHEST TIGHTNESS: 0
STRIDOR: 0
WHEEZING: 0
GASTROINTESTINAL NEGATIVE: 1
SHORTNESS OF BREATH: 0
NAUSEA: 0
BLOOD IN STOOL: 0
EYES NEGATIVE: 1

## 2023-03-19 NOTE — DISCHARGE INSTR - DIET
Good nutrition is important when healing from an illness, injury, or surgery. Follow any nutrition recommendations given to you during your hospital stay. If you were given an oral nutrition supplement while in the hospital, continue to take this supplement at home. You can take it with meals, in-between meals, and/or before bedtime. These supplements can be purchased at most local grocery stores, pharmacies, and chain [employer]}-stores. If you have any questions about your diet or nutrition, call the hospital and ask for the dietitian.       A low fat, low cholesterol, diabetic diet is recommended

## 2023-03-19 NOTE — PROGRESS NOTES
Nursing report received from first shift nurse. Pt in bed resting. Pt C/O headache given PRN tylenol. Bed in lowest position. Side rails x2, call light in reach. Nurse will monitor Pt throughout the night     0330 pt Accu check 50. Pt was given robert crackers. Pt also given glucose tabs per protocol.  Perfect serve sent to Dr. Herlinda Tomas Recheck accu check 103

## 2023-03-19 NOTE — H&P
Medium        Assessment/Plan:  CP - no ACS  Fluttering - negative Telemetry  HTN [place]  HPL - statin low fat diet f/u labs  Will plan for out tp Event monitoring via [employer]     Electronically signed by Freddy Meléndez MD on 3/19/2023 at 8:33 AM

## 2023-03-19 NOTE — FLOWSHEET NOTE
0930- Dr Jennifer Bernardo was in to see her earlier this AM. She is being discharged to home. Assessment remains unchanged and all labs, EKG's and telemetry monitoring have been WNL. She stated she would take her AM medications when she got home. Telemetry and SL removed at this time. 8779- reviewed discharge instructions with patient. Discussed home going instructions and medication prescriptions. Questions answered. Taken to main entrance via wheelchair for discharge to home.      Electronically signed by Klaudia Moreno RN on 3/19/2023 at 10:02 AM

## 2023-03-20 ENCOUNTER — TELEMEDICINE (OUTPATIENT)
Dept: PRIMARY CARE | Facility: CLINIC | Age: 53
End: 2023-03-20
Payer: COMMERCIAL

## 2023-03-20 VITALS — WEIGHT: 190 LBS | BODY MASS INDEX: 33.66 KG/M2 | HEIGHT: 63 IN

## 2023-03-20 DIAGNOSIS — E87.6 HYPOKALEMIA: ICD-10-CM

## 2023-03-20 DIAGNOSIS — E66.09 CLASS 1 OBESITY DUE TO EXCESS CALORIES WITH SERIOUS COMORBIDITY AND BODY MASS INDEX (BMI) OF 30.0 TO 30.9 IN ADULT: ICD-10-CM

## 2023-03-20 DIAGNOSIS — F31.9 BIPOLAR DEPRESSION (MULTI): ICD-10-CM

## 2023-03-20 DIAGNOSIS — E78.5 DYSLIPIDEMIA: ICD-10-CM

## 2023-03-20 DIAGNOSIS — E11.65 TYPE 2 DIABETES MELLITUS WITH HYPERGLYCEMIA, WITH LONG-TERM CURRENT USE OF INSULIN (MULTI): ICD-10-CM

## 2023-03-20 DIAGNOSIS — G47.33 OBSTRUCTIVE SLEEP APNEA: Primary | ICD-10-CM

## 2023-03-20 DIAGNOSIS — E04.2 MULTINODULAR GOITER: ICD-10-CM

## 2023-03-20 DIAGNOSIS — Z79.4 TYPE 2 DIABETES MELLITUS WITH HYPERGLYCEMIA, WITH LONG-TERM CURRENT USE OF INSULIN (MULTI): ICD-10-CM

## 2023-03-20 DIAGNOSIS — I10 ESSENTIAL HYPERTENSION: ICD-10-CM

## 2023-03-20 LAB
EKG ATRIAL RATE: 79 BPM
EKG P AXIS: 39 DEGREES
EKG P-R INTERVAL: 154 MS
EKG Q-T INTERVAL: 396 MS
EKG QRS DURATION: 84 MS
EKG QTC CALCULATION (BAZETT): 454 MS
EKG R AXIS: 8 DEGREES
EKG T AXIS: 15 DEGREES
EKG VENTRICULAR RATE: 79 BPM

## 2023-03-20 PROCEDURE — 99213 OFFICE O/P EST LOW 20 MIN: CPT | Performed by: FAMILY MEDICINE

## 2023-03-20 PROCEDURE — 93010 ELECTROCARDIOGRAM REPORT: CPT | Performed by: INTERNAL MEDICINE

## 2023-03-20 RX ORDER — TIRZEPATIDE 5 MG/.5ML
0.5 INJECTION, SOLUTION SUBCUTANEOUS
Qty: 2 ML | Refills: 3 | Status: SHIPPED | OUTPATIENT
Start: 2023-03-20 | End: 2023-05-10 | Stop reason: DRUGHIGH

## 2023-03-20 NOTE — PROGRESS NOTES
"Subjective   Patient ID: Ruth Zambrano is a 52 y.o. female who presents and consented for VIRTUAL VISIT ---     VIDEO  C19: x3 doses  Flu: UTD  Pap: Gyn  Lmp: Ablation  Hba1c 7.7 was 8.6  Hb 11.3  K-3.1  Was in er for cp  Ldl 206-just started lipitor    HPI  Patient Active Problem List   Diagnosis    Bipolar depression (CMS/HCC)    Cellulitis of foot    Cellulitis of toe of left foot    Diabetic infection of left foot (CMS/HCC)    Dyslipidemia    Essential hypertension    HPV in female    Hypokalemia    Knee pain    Migraine    Multinodular goiter    Nausea with vomiting    Obstructive sleep apnea    Paronychia of toe of left foot    Vertigo    Type 2 diabetes mellitus with hyperglycemia, with long-term current use of insulin (CMS/HCC)    Class 1 obesity with body mass index (BMI) of 30.0 to 30.9 in adult    Class 1 obesity with body mass index (BMI) of 31.0 to 31.9 in adult    Class 1 obesity with body mass index (BMI) of 32.0 to 32.9 in adult       Past Surgical History:   Procedure Laterality Date    BREAST SURGERY Bilateral 1999    reduction    CHOLECYSTECTOMY  1999    COLONOSCOPY  2013    ENDOMETRIAL ABLATION  2013       Review of Systems  This patient has   NO history of recent Covid nor flu symptoms,      NO Fever nor chills,  NO Chest pain, shortness of breath nor paroxysmal nocturnal dyspnea,  NO Nausea, vomiting, nor diarrhea,  NO Hematochezia nor melena,  NO Dysuria, hematuria, nor new incontinence issues  NO new severe headaches nor neurological complaints,  NO new issues with anxiety nor depression nor new psychiatric complaints,  NO suicidal nor homicidal ideations.     OBJECTIVE:  Ht 1.6 m (5' 3\")   Wt 86.2 kg (190 lb)   BMI 33.66 kg/m²      General:  alert, oriented, no acute distress.  Mood is pleasant, not tearful, no signs of emotional distress.  Not appearing intoxicated or altered.   No voiced delusions,   Normal, appropriate behavior.    HEENT: Normocephalic, atraumatic,   Pupils " round, reactive to light  Extraocular motions intact and wnl    Conversing sans difficulty does NOT appear to be short of breath-in severe pain or toxic appearing    Appears to be in baseline health      Orders Only on 03/04/2023   Component Date Value Ref Range Status    Hemoglobin A1C 03/04/2023 8.6 (A)  % Final    Comment:      Diagnosis of Diabetes-Adults   Non-Diabetic: < or = 5.6%   Increased risk for developing diabetes: 5.7-6.4%   Diagnostic of diabetes: > or = 6.5%  .       Monitoring of Diabetes                Age (y)     Therapeutic Goal (%)   Adults:          >18           <7.0   Pediatrics:    13-18           <7.5                   7-12           <8.0                   0- 6            7.5-8.5   American Diabetes Association. Diabetes Care 33(S1), Jan 2010.      Estimated Average Glucose 03/04/2023 200  MG/DL Final   Orders Only on 03/04/2023   Component Date Value Ref Range Status    Glucose 03/04/2023 82  74 - 99 mg/dL Final    Sodium 03/04/2023 141  136 - 145 mmol/L Final    Potassium 03/04/2023 3.6  3.5 - 5.3 mmol/L Final    Chloride 03/04/2023 104  98 - 107 mmol/L Final    Bicarbonate 03/04/2023 31  21 - 32 mmol/L Final    Anion Gap 03/04/2023 10  10 - 20 mmol/L Final    Urea Nitrogen 03/04/2023 11  6 - 23 mg/dL Final    Creatinine 03/04/2023 0.47 (L)  0.50 - 1.05 mg/dL Final    GFR Female 03/04/2023 >90  >90 mL/min/1.73m2 Final    Comment:  CALCULATIONS OF ESTIMATED GFR ARE PERFORMED   USING THE 2021 CKD-EPI STUDY REFIT EQUATION   WITHOUT THE RACE VARIABLE FOR THE IDMS-TRACEABLE   CREATININE METHODS.    https://jasn.asnjournals.org/content/early/2021/09/22/ASN.[email or web address]      Calcium 03/04/2023 9.7  8.6 - 10.3 mg/dL Final    Albumin 03/04/2023 3.9  3.4 - 5.0 g/dL Final    Alkaline Phosphatase 03/04/2023 102  33 - 110 U/L Final    Total Protein 03/04/2023 7.3  6.4 - 8.2 g/dL Final    AST 03/04/2023 13  9 - 39 U/L Final    Total Bilirubin 03/04/2023 0.3  0.0 - 1.2 mg/dL Final    ALT (SGPT) 03/04/2023  14  7 - 45 U/L Final    Comment:  Patients treated with Sulfasalazine may generate    falsely decreased results for ALT.     Orders Only on 03/04/2023   Component Date Value Ref Range Status    Free T4 03/04/2023 0.90  0.61 - 1.12 ng/dL Final    Comment:  Thyroxine Free testing is performed using different testing    methodology at [facility] than at other    [facility]. Direct result comparisons should    only be made within the same method.  .   Biotin can cause falsely elevated free T4 results. Patients   taking a Biotin dose of up to 10 mg/day should refrain from   taking Biotin for 24 hours before sample collection. Patient   taking a Biotin dose of >10 mg/day should consult with their   physician or the laboratory before the blood draw.     Orders Only on 03/04/2023   Component Date Value Ref Range Status    TSH 03/04/2023 0.60  0.44 - 3.98 mIU/L Final    Comment:  TSH testing is performed using different testing    methodology at [facility] than at other    [facility]. Direct result comparisons should    only be made within the same method.     Orders Only on 03/04/2023   Component Date Value Ref Range Status    Cholesterol 03/04/2023 270 (H)  0 - 199 mg/dL Final    Comment: .      AGE      DESIRABLE   BORDERLINE HIGH   HIGH     0-19 Y     0 - 169       170 - 199     >/= 200    20-24 Y     0 - 189       190 - 224     >/= 225         >24 Y     0 - 199       200 - 239     >/= 240   **All ranges are based on fasting samples. Specific   therapeutic targets will vary based on patient-specific   cardiac risk.  .   Pediatric guidelines reference:Pediatrics 2011, 128(S5).   Adult guidelines reference: NCEP ATPIII Guidelines,     NORRIS 2001, 258:2486-97  .   Venipuncture immediately after or during the    administration of Metamizole may lead to falsely   low results. Testing should be performed immediately   prior to Metamizole dosing.      HDL 03/04/2023 37.9 (A)  mg/dL Final     Comment: .      AGE      VERY LOW   LOW     NORMAL    HIGH       0-19 Y       < 35   < 40     40-45     ----    20-24 Y       ----   < 40       >45     ----      >24 Y       ----   < 40     40-60      >60  .      Cholesterol/HDL Ratio 03/04/2023 7.1 (A)   Final    Comment: REF VALUES  DESIRABLE  < 3.4  HIGH RISK  > 5.0      LDL 03/04/2023 206 (H)  0 - 99 mg/dL Final    Comment: .                           NEAR      BORD      AGE      DESIRABLE  OPTIMAL    HIGH     HIGH     VERY HIGH     0-19 Y     0 - 109     ---    110-129   >/= 130     ----    20-24 Y     0 - 119     ---    120-159   >/= 160     ----      >24 Y     0 -  99   100-129  130-159   160-189     >/=190  .      VLDL 03/04/2023 26  0 - 40 mg/dL Final    Triglycerides 03/04/2023 132  0 - 149 mg/dL Final    Comment: .      AGE      DESIRABLE   BORDERLINE HIGH   HIGH     VERY HIGH   0 D-90 D    19 - 174         ----         ----        ----  91 D- 9 Y     0 -  74        75 -  99     >/= 100      ----    10-19 Y     0 -  89        90 - 129     >/= 130      ----    20-24 Y     0 - 114       115 - 149     >/= 150      ----         >24 Y     0 - 149       150 - 199    200- 499    >/= 500  .   Venipuncture immediately after or during the    administration of Metamizole may lead to falsely   low results. Testing should be performed immediately   prior to Metamizole dosing.     Orders Only on 03/04/2023   Component Date Value Ref Range Status    WBC 03/04/2023 11.1  4.4 - 11.3 x10E9/L Final    RBC 03/04/2023 4.23  4.00 - 5.20 x10E12/L Final    Hemoglobin 03/04/2023 12.8  12.0 - 16.0 g/dL Final    Hematocrit 03/04/2023 39.3  36.0 - 46.0 % Final    MCV 03/04/2023 93  80 - 100 fL Final    MCHC 03/04/2023 32.6  32.0 - 36.0 g/dL Final    Platelets 03/04/2023 308  150 - 450 x10E9/L Final    RDW 03/04/2023 13.2  11.5 - 14.5 % Final   [facility] Encounter on 01/23/2023   Component Date Value Ref Range Status    DRUG SCREEN COMMENT URINE 01/23/2023 SEE BELOW   Final     Comment: Drug screen results are presumptive and should not be used to assess   compliance with prescribed medication. Definitive confirmatory drug testing   has been added to this sample for any positive screen result and will be   reported separately.   .  Toxicology screening results are reported qualitatively. The concentration   must be greater than or equal to the cutoff to be reported as positive. The   concentration at which the screening test can detect an individual drug or   metabolite varies. The absence of expected drug(s) and/or drug metabolite(s)   may indicate non-compliance, inappropriate timing of specimen collection   relative to drug administration, poor drug absorption, diluted/adulterated   urine, or limitations of testing. For medical purposes only; not valid for   forensic use.   .  Interpretive questions should be directed to the laboratory medical   directors.        Creatine, Urine 01/23/2023 21.0  mg/dL Final    Comment: A urine creatinine result >= 20 mg/dL is considered valid without suspicion   of dilution. Samples with results below this range will automatically reflex   to specific gravity testing to verify specimen integrity.       Amphetamine Screen, Urine 01/23/2023 PRESUMPTIVE NEGATIVE  NEGATIVE Final    Comment:  CUTOFF LEVEL:  500 NG/ML   Cross-reactivity has been reported with high concentrations   of the following drugs: buproprion, chloroquine, chlorpromazine,   ephedrine, mephentermine, fenfluramine, phentermine,   phenylpropanolamine, pseudoephedrine, and propranolol.      Barbiturate Screen, Urine 01/23/2023 PRESUMPTIVE NEGATIVE  NEGATIVE Final     CUTOFF LEVEL: 200 NG/ML    Cannabinoid Screen, Urine 01/23/2023 PRESUMPTIVE NEGATIVE  NEGATIVE Final     CUTOFF LEVEL: 50 NG/ML    Cocaine Screen, Urine 01/23/2023 PRESUMPTIVE NEGATIVE  NEGATIVE Final     CUTOFF LEVEL: 150 NG/ML    PCP Screen, Urine 01/23/2023 PRESUMPTIVE NEGATIVE  NEGATIVE Final    Comment:  CUTOFF LEVEL:  25  NG/ML   Cross-reactivity has been reported with dextromethorphan.      7-Aminoclonazepam 01/23/2023 <25  Cutoff <25 ng/mL Final    Alpha-Hydroxyalprazolam 01/23/2023 <25  Cutoff <25 ng/mL Final    Alpha-Hydroxymidazolam 01/23/2023 <25  Cutoff <25 ng/mL Final    Alprazolam 01/23/2023 <25  Cutoff <25 ng/mL Final    Chlordiazepoxide 01/23/2023 <25  Cutoff <25 ng/mL Final    Clonazepam 01/23/2023 <25  Cutoff <25 ng/mL Final    Diazepam 01/23/2023 <25  Cutoff <25 ng/mL Final    Lorazepam 01/23/2023 <25  Cutoff <25 ng/mL Final    Midazolam 01/23/2023 <25  Cutoff <25 ng/mL Final    Nordiazepam 01/23/2023 <25  Cutoff <25 ng/mL Final    Oxazepam 01/23/2023 <25  Cutoff <25 ng/mL Final    Temazepam 01/23/2023 <25  Cutoff <25 ng/mL Final    Comment:  The performance characteristics of the Benzodiazepine Confirmation,   Urine has been validated by the individual  laboratory site   where testing is performed. It has not been cleared or approved   by the FDA. However the FDA has determined that such clearance   or approval is not necessary. Our Laboratory is certified under   the Clinical Laboratory Improvement Amendments of 1988 (CLIA)   as qualified to perform high complexity clinical laboratory testing.      Zolpidem 01/23/2023 <25  Cutoff <25 ng/mL Final    Zolpidem Metabolite (ZCA) 01/23/2023 <25  Cutoff <25 ng/mL Final    Comment:  The performance characteristics of the Zolpidem Confirmation,   Urine has been validated by the individual  laboratory site   where testing is performed. It has not been cleared or approved   by the FDA. However the FDA has determined that such clearance   or approval is not necessary. Our Laboratory is certified under   the Clinical Laboratory Improvement Amendments of 1988 (CLIA)   as qualified to perform high complexity clinical laboratory testing.      6-Acetylmorphine 01/23/2023 <25  Cutoff <25 ng/mL Final    Codeine 01/23/2023 <50  Cutoff <50 ng/mL Final    Hydrocodone 01/23/2023 <25   Cutoff <25 ng/mL Final    Hydromorphone 01/23/2023 <25  Cutoff <25 ng/mL Final    Morphine Urine 01/23/2023 <50  Cutoff <50 ng/mL Final    Norhydrocodone 01/23/2023 <25  Cutoff <25 ng/mL Final    Noroxycodone 01/23/2023 <25  Cutoff <25 ng/mL Final    Oxycodone 01/23/2023 <25  Cutoff <25 ng/mL Final    Oxymorphone 01/23/2023 <25  Cutoff <25 ng/mL Final    Comment:  The performance characteristics of the Opiate Confirmation,   Urine has been validated by the individual  laboratory site   where testing is performed. It has not been cleared or approved   by the FDA. However the FDA has determined that such clearance   or approval is not necessary. Our Laboratory is certified under   the Clinical Laboratory Improvement Amendments of 1988 (CLIA)   as qualified to perform high complexity clinical laboratory testing.      Tramadol 01/23/2023 <50  Cutoff <50 ng/mL Final    O-Desmethyltramadol 01/23/2023 <50  Cutoff <50 ng/mL Final    Comment:  The performance characteristics of the Tramadol Confirmation, Urine   has been validated by the individual  laboratory site where   testing is performed. It has not been cleared or approved by the   FDA.  However the FDA has determined that such clearance or approval   is not necessary. Our Laboratory is certified under the Clinical   Laboratory Improvement Amendments of 1988 (CLIA) as qualified    to perform high complexity clinical laboratory testing.      Fentanyl 01/23/2023 <2.5  Cutoff<2.5 ng/mL Final    Norfentanyl 01/23/2023 <2.5  Cutoff<2.5 ng/mL Final    Comment:  The performance characteristics of the Fentanyl Confirmation,   Urine has been validated by the individual  laboratory site   where testing is performed. It has not been cleared or approved   by the FDA. However the FDA has determined that such clearance   or approval is not necessary. Our Laboratory is certified under   the Clinical Laboratory Improvement Amendments of 1988 (CLIA)   as qualified to perform  high complexity clinical laboratory testing.      METHADONE CONFIRMATION,URINE 01/23/2023 <25  Cutoff <25 ng/mL Final    EDDP 01/23/2023 <25  Cutoff <25 ng/mL Final    Comment:  The performance characteristics of the Methadone Confirmation,   Urine has been validated by the individual  laboratory site   where testing is performed. It has not been cleared or approved   by the FDA. However the FDA has determined that such clearance   or approval is not necessary. Our Laboratory is certified under   the Clinical Laboratory Improvement Amendments of 1988 (CLIA)   as qualified to perform high complexity clinical laboratory testing.          Assessment/Plan     Problem List Items Addressed This Visit          Nervous    Obstructive sleep apnea - Primary       Circulatory    Essential hypertension       Endocrine/Metabolic    Multinodular goiter    Type 2 diabetes mellitus with hyperglycemia, with long-term current use of insulin (CMS/[facility])    Class 1 obesity with body mass index (BMI) of 30.0 to 30.9 in adult       Other    Bipolar depression (CMS/[facility])    Dyslipidemia    Hypokalemia   Sees dr cabrera  And again had a lengthy discussion w pt  about risks of poorly controlled diabetes including micro and macrovascular complications of DM2 including blindness,MI,CVA and death among other possibilities. Pt aware and agrees to better compliance and adherance to instructions such as regular eye exams q 1-2 y, foot exams,and f/u regularly for hba1c with a goal of 6.5.    NO evidence of neuropathy or nephropathy at this point    Follow up as planned for hba1c and BP checks REGULARLY.  Labs in 3mo  Seeing cardio for cp-now gone  Holter planned      Patient is aware of LIMITATIONS of VIRTUAL VISITS and how-of course-an IN PERSON VISIT IS always ideal or preferred. IF condition deteriorates from here-ER IS DEFINITELY ADVISED.    Is back on K-at least 3d/wk  Hba1c cmp cbc lipids  Want mounjaro upped to 5mg  (From 2.5)  Gave up soda  -doing well

## 2023-03-21 LAB
EKG ATRIAL RATE: 81 BPM
EKG P AXIS: 32 DEGREES
EKG P-R INTERVAL: 162 MS
EKG Q-T INTERVAL: 380 MS
EKG QRS DURATION: 84 MS
EKG QTC CALCULATION (BAZETT): 441 MS
EKG R AXIS: 3 DEGREES
EKG T AXIS: 20 DEGREES
EKG VENTRICULAR RATE: 81 BPM

## 2023-03-21 PROCEDURE — 93010 ELECTROCARDIOGRAM REPORT: CPT | Performed by: INTERNAL MEDICINE

## 2023-03-25 DIAGNOSIS — E11.65 TYPE 2 DIABETES MELLITUS WITH HYPERGLYCEMIA, WITH LONG-TERM CURRENT USE OF INSULIN (MULTI): ICD-10-CM

## 2023-03-25 DIAGNOSIS — Z79.4 TYPE 2 DIABETES MELLITUS WITH HYPERGLYCEMIA, WITH LONG-TERM CURRENT USE OF INSULIN (MULTI): ICD-10-CM

## 2023-03-27 RX ORDER — GABAPENTIN 300 MG/1
CAPSULE ORAL
Qty: 60 CAPSULE | Refills: 2 | Status: SHIPPED | OUTPATIENT
Start: 2023-03-27 | End: 2023-11-09 | Stop reason: WASHOUT

## 2023-04-25 ENCOUNTER — TELEPHONE (OUTPATIENT)
Dept: CARDIOLOGY CLINIC | Age: 53
End: 2023-04-25

## 2023-04-25 NOTE — TELEPHONE ENCOUNTER
"-- DO NOT REPLY / DO NOT REPLY ALL --  -- Message is from the [employer]--    General Patient Message      Reason for Call: Patient called in returning call from Dr. Cynthia Becker office from Ariadna Hernandez . Please contact patient back regarding missed call at your earliest [facility] Phone    02/14/2020 04:58 PM Phone (Incoming) Mode Zavala (Self) [phone] (M)          Alternative phone number: none     Turnaround time given to caller: ""This message will be sent to [facility] Provider's name]. The clinical team will fulfill your request as soon as they review your message. \""}    " Reason for Cancellation: Other

## 2023-04-25 NOTE — TELEPHONE ENCOUNTER
Appointment canceled for Leroy Rodriguez ([id number])   Visit Type: HOSPITAL FOLLOW-UP   Date        Time      Length    Provider                  Department   4/26/2023    2:15 PM  15 mins. MD Mimi Thapa 93      Reason for Cancellation: Other     Upcoming appointment at [place] [address] on 4/26/23  (Newest Message First)  Leroy Rodriguez  Patient Appointment Cancel Request Pool 1 hour ago (10:23 AM)       Appointment canceled for Leroy Rodriguez ([id number])  Visit Type: HOSPITAL FOLLOW-UP  Date        Time      Length    Provider                  Department  4/26/2023    2:15 PM  15 mins. MD Mimi Thapa 93     Reason for Cancellation: Other       Batch Job User Claudette  Kuldip Armenta (9:09 AM)     Kristen Noel MD  Leroy Rodriguez 2 days ago     DC  This is a reminder for your upcoming appointment. Location of Appointment: [place] [address]  Provider: Jessica Rodrigues MD  Date: 4/26/23  Time: 2:15 PM     Please arrive 15 minutes prior to appointment, bring insurance card and photo ID. There is 1 questionnaire available for your appointment. [email or web address][View your available questionnaires]      Please click [email or web address]] to view more details about your appointment. This [employer] message has not been read.      Batch Job User Claudette  Al TURCIOS 2 weeks ago

## 2023-05-10 ENCOUNTER — TELEPHONE (OUTPATIENT)
Dept: PRIMARY CARE | Facility: CLINIC | Age: 53
End: 2023-05-10
Payer: COMMERCIAL

## 2023-05-10 DIAGNOSIS — Z79.4 TYPE 2 DIABETES MELLITUS WITH HYPERGLYCEMIA, WITH LONG-TERM CURRENT USE OF INSULIN (MULTI): Primary | ICD-10-CM

## 2023-05-10 DIAGNOSIS — E11.65 TYPE 2 DIABETES MELLITUS WITH HYPERGLYCEMIA, WITH LONG-TERM CURRENT USE OF INSULIN (MULTI): Primary | ICD-10-CM

## 2023-05-10 RX ORDER — TIRZEPATIDE 7.5 MG/.5ML
7.5 INJECTION, SOLUTION SUBCUTANEOUS
Qty: 2 ML | Refills: 3 | Status: SHIPPED | OUTPATIENT
Start: 2023-05-10

## 2023-06-01 ENCOUNTER — HOSPITAL ENCOUNTER (OUTPATIENT)
Dept: GENERAL RADIOLOGY | Age: 53
Discharge: HOME | End: 2023-06-03
Payer: COMMERCIAL

## 2023-06-01 ENCOUNTER — TELEPHONE (OUTPATIENT)
Dept: FAMILY MEDICINE CLINIC | Age: 53
End: 2023-06-01

## 2023-06-01 ENCOUNTER — OFFICE VISIT (OUTPATIENT)
Dept: FAMILY MEDICINE CLINIC | Age: 53
End: 2023-06-01
Payer: COMMERCIAL

## 2023-06-01 VITALS
HEART RATE: 84 BPM | BODY MASS INDEX: 35.25 KG/M2 | OXYGEN SATURATION: 96 % | SYSTOLIC BLOOD PRESSURE: 114 MMHG | WEIGHT: 199 LBS | DIASTOLIC BLOOD PRESSURE: 80 MMHG

## 2023-06-01 DIAGNOSIS — S76.011A HIP STRAIN, RIGHT, INITIAL ENCOUNTER: ICD-10-CM

## 2023-06-01 DIAGNOSIS — S76.011A HIP STRAIN, RIGHT, INITIAL ENCOUNTER: Primary | ICD-10-CM

## 2023-06-01 PROCEDURE — 99213 OFFICE O/P EST LOW 20 MIN: CPT | Performed by: NURSE PRACTITIONER

## 2023-06-01 PROCEDURE — G8427 DOCREV CUR MEDS BY ELIG CLIN: HCPCS | Performed by: NURSE PRACTITIONER

## 2023-06-01 PROCEDURE — 3079F DIAST BP 80-89 MM HG: CPT | Performed by: NURSE PRACTITIONER

## 2023-06-01 PROCEDURE — 3017F COLORECTAL CA SCREEN DOC REV: CPT | Performed by: NURSE PRACTITIONER

## 2023-06-01 PROCEDURE — 3074F SYST BP LT 130 MM HG: CPT | Performed by: NURSE PRACTITIONER

## 2023-06-01 PROCEDURE — G8417 CALC BMI ABV UP PARAM F/U: HCPCS | Performed by: NURSE PRACTITIONER

## 2023-06-01 PROCEDURE — 1036F TOBACCO NON-USER: CPT | Performed by: NURSE PRACTITIONER

## 2023-06-01 PROCEDURE — 73502 X-RAY EXAM HIP UNI 2-3 VIEWS: CPT

## 2023-06-01 ASSESSMENT — ENCOUNTER SYMPTOMS
TROUBLE SWALLOWING: 0
EYE DISCHARGE: 0
SORE THROAT: 0
EYE ITCHING: 0
BACK PAIN: 0
NAUSEA: 0
DIARRHEA: 0
WHEEZING: 0
PHOTOPHOBIA: 0
CONSTIPATION: 0
VOMITING: 0
EYE REDNESS: 0
EYE PAIN: 0
CHEST TIGHTNESS: 0
COUGH: 0
RHINORRHEA: 0
ABDOMINAL PAIN: 0
SHORTNESS OF BREATH: 0

## 2023-06-01 ASSESSMENT — VISUAL ACUITY: OU: 1

## 2023-06-01 NOTE — TELEPHONE ENCOUNTER
Called and spoke with patient. Informed her of xray results. Encouraged her to schedule an appointment with ortho as discussed. All questions answered. Contact information provided.       Vivi Taylor, CNP

## 2023-06-01 NOTE — PROGRESS NOTES
Subjective:      Patient ID: Rafi Davis is a 47 y.o. female who present today with:      Chief Complaint   Patient presents with    Hip Pain     RT hip pain that started yesterday evening. Pain started while laying down, when pt tried to get up this morning the pain was worse and couldn't move.       Patients symptoms started last night while lying in bed  No injury or trauma  No pop or moment where she thought she pulled or broke something  Able to walk excruciating pain with weight bearing  Tylenol  Constant pain  Walking 9/10  1/10 sitting  Baby step just a little pain  Started last 5-6PM  Yesterday holding foot still and pivoting knee  Felt it in the knee not the hip  No bruising or swelling noted    Past Medical History:   Diagnosis Date    CAD (coronary artery disease) 6/25/2022    CAD (coronary artery disease) 6/25/2022    Depression     Environmental allergies     Gastroparesis     GERD (gastroesophageal reflux disease)     Headache     Dr. Jake Bingham    HPV in female     Hyperlipidemia     Hypertension     Leukocytosis     Migraines     Type II or unspecified type diabetes mellitus without mention of complication, not stated as uncontrolled      Patient Active Problem List    Diagnosis Date Noted    Chest pain 03/18/2023    Hypokalemia 06/25/2022    CAD (coronary artery disease) 06/25/2022    Hypomagnesemia     Elevated BP without diagnosis of hypertension 05/13/2022    Breast mass, right 05/14/2021    Obesity, Class I, BMI 30-34.9 05/14/2021    Acute diffuse otitis externa of left ear 09/04/2019    Headache     Type 2 diabetes mellitus, with long-term current use of insulin [facility] 06/27/2018    Mixed anxiety and depressive disorder 01/18/2016    Cardiac disease 06/16/2015    Leukocytosis     Mixed hyperlipidemia     Essential hypertension     GERD (gastroesophageal reflux disease)      Past Surgical History:   Procedure Laterality Date    BONE MARROW BIOPSY  2012    (-)[facility]    BREAST REDUCTION

## 2023-06-06 PROBLEM — I25.10 CAD (CORONARY ARTERY DISEASE): Chronic | Status: ACTIVE | Noted: 2022-06-25

## 2023-06-08 ENCOUNTER — APPOINTMENT (OUTPATIENT)
Dept: GENERAL RADIOLOGY | Age: 53
DRG: 194 | End: 2023-06-08
Payer: COMMERCIAL

## 2023-06-08 ENCOUNTER — HOSPITAL ENCOUNTER (INPATIENT)
Age: 53
LOS: 2 days | Discharge: HOME | DRG: 194 | End: 2023-06-12
Attending: STUDENT IN AN ORGANIZED HEALTH CARE EDUCATION/TRAINING PROGRAM | Admitting: INTERNAL MEDICINE
Payer: COMMERCIAL

## 2023-06-08 ENCOUNTER — APPOINTMENT (OUTPATIENT)
Dept: CT IMAGING | Age: 53
DRG: 194 | End: 2023-06-08
Payer: COMMERCIAL

## 2023-06-08 DIAGNOSIS — R11.2 INTRACTABLE VOMITING WITH NAUSEA: ICD-10-CM

## 2023-06-08 DIAGNOSIS — E86.0 DEHYDRATION: Primary | ICD-10-CM

## 2023-06-08 DIAGNOSIS — E87.6 HYPOKALEMIA: ICD-10-CM

## 2023-06-08 LAB
ALBUMIN SERPL-MCNC: 4.1 G/DL (ref 3.5–4.6)
ALP SERPL-CCNC: 134 U/L (ref 40–130)
ALT SERPL-CCNC: 20 U/L (ref 0–33)
ANION GAP SERPL CALCULATED.3IONS-SCNC: 11 MEQ/L (ref 9–15)
ANION GAP SERPL CALCULATED.3IONS-SCNC: 14 MEQ/L (ref 9–15)
AST SERPL-CCNC: 22 U/L (ref 0–35)
B-OH-BUTYR SERPL-SCNC: 1.1 MG/DL (ref 0.2–2.8)
BACTERIA URNS QL MICRO: ABNORMAL /HPF
BASOPHILS # BLD: 0.1 K/UL (ref 0–0.2)
BASOPHILS NFR BLD: 0.5 %
BILIRUB SERPL-MCNC: 0.6 MG/DL (ref 0.2–0.7)
BILIRUB UR QL STRIP: NEGATIVE
BUN SERPL-MCNC: 8 MG/DL (ref 6–20)
BUN SERPL-MCNC: 8 MG/DL (ref 6–20)
CALCIUM SERPL-MCNC: 8.2 MG/DL (ref 8.5–9.9)
CALCIUM SERPL-MCNC: 9.3 MG/DL (ref 8.5–9.9)
CHLORIDE SERPL-SCNC: 91 MEQ/L (ref 95–107)
CHLORIDE SERPL-SCNC: 99 MEQ/L (ref 95–107)
CK SERPL-CCNC: 50 U/L (ref 0–170)
CLARITY UR: ABNORMAL
CO2 SERPL-SCNC: 26 MEQ/L (ref 20–31)
CO2 SERPL-SCNC: 29 MEQ/L (ref 20–31)
COLOR UR: YELLOW
CREAT SERPL-MCNC: 0.56 MG/DL (ref 0.5–0.9)
CREAT SERPL-MCNC: 0.81 MG/DL (ref 0.5–0.9)
EOSINOPHIL # BLD: 0 K/UL (ref 0–0.7)
EOSINOPHIL NFR BLD: 0 %
EPI CELLS #/AREA URNS AUTO: ABNORMAL /HPF (ref 0–5)
ERYTHROCYTE [DISTWIDTH] IN BLOOD BY AUTOMATED COUNT: 13.2 % (ref 11.5–14.5)
GLOBULIN SER CALC-MCNC: 4.2 G/DL (ref 2.3–3.5)
GLUCOSE SERPL-MCNC: 214 MG/DL (ref 70–99)
GLUCOSE SERPL-MCNC: 242 MG/DL (ref 70–99)
GLUCOSE UR STRIP-MCNC: 250 MG/DL
HCT VFR BLD AUTO: 39.2 % (ref 37–47)
HGB BLD-MCNC: 13.2 G/DL (ref 12–16)
HGB UR QL STRIP: NEGATIVE
KETONES UR STRIP-MCNC: ABNORMAL MG/DL
LACTATE BLDV-SCNC: 1.3 MMOL/L (ref 0.5–2.2)
LACTIC ACID, SEPSIS: 2.5 MMOL/L (ref 0.5–1.9)
LEUKOCYTE ESTERASE UR QL STRIP: ABNORMAL
LIPASE SERPL-CCNC: 7 U/L (ref 12–95)
LYMPHOCYTES # BLD: 1.9 K/UL (ref 1–4.8)
LYMPHOCYTES NFR BLD: 8.5 %
MAGNESIUM SERPL-MCNC: 1.7 MG/DL (ref 1.7–2.4)
MAGNESIUM SERPL-MCNC: 2.2 MG/DL (ref 1.7–2.4)
MCH RBC QN AUTO: 30 PG (ref 27–31.3)
MCHC RBC AUTO-ENTMCNC: 33.7 % (ref 33–37)
MCV RBC AUTO: 89.1 FL (ref 79.4–94.8)
MONOCYTES # BLD: 1.5 K/UL (ref 0.2–0.8)
MONOCYTES NFR BLD: 6.7 %
NEUTROPHILS # BLD: 18.7 K/UL (ref 1.4–6.5)
NEUTS SEG NFR BLD: 84.3 %
NITRITE UR QL STRIP: NEGATIVE
PH UR STRIP: 6.5 [PH] (ref 5–9)
PLATELET # BLD AUTO: 304 K/UL (ref 130–400)
POTASSIUM SERPL-SCNC: 2.7 MEQ/L (ref 3.4–4.9)
POTASSIUM SERPL-SCNC: 2.8 MEQ/L (ref 3.4–4.9)
PROCALCITONIN SERPL IA-MCNC: 0.25 NG/ML (ref 0–0.15)
PROT SERPL-MCNC: 8.3 G/DL (ref 6.3–8)
PROT UR STRIP-MCNC: 100 MG/DL
RBC # BLD AUTO: 4.4 M/UL (ref 4.2–5.4)
RBC #/AREA URNS HPF: ABNORMAL /HPF (ref 0–2)
SARS-COV-2 RDRP RESP QL NAA+PROBE: NOT DETECTED
SODIUM SERPL-SCNC: 134 MEQ/L (ref 135–144)
SODIUM SERPL-SCNC: 136 MEQ/L (ref 135–144)
SP GR UR STRIP: 1.06 (ref 1–1.03)
TROPONIN T SERPL-MCNC: <0.01 NG/ML (ref 0–0.01)
TSH REFLEX: 1.22 UIU/ML (ref 0.44–3.86)
URINE REFLEX TO CULTURE: YES
UROBILINOGEN UR STRIP-ACNC: 1 E.U./DL
WBC # BLD AUTO: 22.2 K/UL (ref 4.8–10.8)
WBC #/AREA URNS AUTO: ABNORMAL /HPF (ref 0–5)

## 2023-06-08 PROCEDURE — 2580000003 HC RX 258

## 2023-06-08 PROCEDURE — G0378 HOSPITAL OBSERVATION PER HR: HCPCS

## 2023-06-08 PROCEDURE — 83735 ASSAY OF MAGNESIUM: CPT

## 2023-06-08 PROCEDURE — 99285 EMERGENCY DEPT VISIT HI MDM: CPT

## 2023-06-08 PROCEDURE — 84484 ASSAY OF TROPONIN QUANT: CPT

## 2023-06-08 PROCEDURE — 96367 TX/PROPH/DG ADDL SEQ IV INF: CPT

## 2023-06-08 PROCEDURE — 82550 ASSAY OF CK (CPK): CPT

## 2023-06-08 PROCEDURE — 83690 ASSAY OF LIPASE: CPT

## 2023-06-08 PROCEDURE — 87040 BLOOD CULTURE FOR BACTERIA: CPT

## 2023-06-08 PROCEDURE — 80053 COMPREHEN METABOLIC PANEL: CPT

## 2023-06-08 PROCEDURE — 82010 KETONE BODYS QUAN: CPT

## 2023-06-08 PROCEDURE — 83605 ASSAY OF LACTIC ACID: CPT

## 2023-06-08 PROCEDURE — 93005 ELECTROCARDIOGRAM TRACING: CPT | Performed by: STUDENT IN AN ORGANIZED HEALTH CARE EDUCATION/TRAINING PROGRAM

## 2023-06-08 PROCEDURE — 71045 X-RAY EXAM CHEST 1 VIEW: CPT

## 2023-06-08 PROCEDURE — 81001 URINALYSIS AUTO W/SCOPE: CPT

## 2023-06-08 PROCEDURE — 2500000003 HC RX 250 WO HCPCS: Performed by: STUDENT IN AN ORGANIZED HEALTH CARE EDUCATION/TRAINING PROGRAM

## 2023-06-08 PROCEDURE — 2580000003 HC RX 258: Performed by: STUDENT IN AN ORGANIZED HEALTH CARE EDUCATION/TRAINING PROGRAM

## 2023-06-08 PROCEDURE — 74177 CT ABD & PELVIS W/CONTRAST: CPT

## 2023-06-08 PROCEDURE — 36415 COLL VENOUS BLD VENIPUNCTURE: CPT

## 2023-06-08 PROCEDURE — 87086 URINE CULTURE/COLONY COUNT: CPT

## 2023-06-08 PROCEDURE — 6360000004 HC RX CONTRAST MEDICATION: Performed by: STUDENT IN AN ORGANIZED HEALTH CARE EDUCATION/TRAINING PROGRAM

## 2023-06-08 PROCEDURE — 96365 THER/PROPH/DIAG IV INF INIT: CPT

## 2023-06-08 PROCEDURE — 96375 TX/PRO/DX INJ NEW DRUG ADDON: CPT

## 2023-06-08 PROCEDURE — A4216 STERILE WATER/SALINE, 10 ML: HCPCS | Performed by: STUDENT IN AN ORGANIZED HEALTH CARE EDUCATION/TRAINING PROGRAM

## 2023-06-08 PROCEDURE — 96361 HYDRATE IV INFUSION ADD-ON: CPT

## 2023-06-08 PROCEDURE — 84145 PROCALCITONIN (PCT): CPT

## 2023-06-08 PROCEDURE — 84443 ASSAY THYROID STIM HORMONE: CPT

## 2023-06-08 PROCEDURE — 6360000002 HC RX W HCPCS: Performed by: STUDENT IN AN ORGANIZED HEALTH CARE EDUCATION/TRAINING PROGRAM

## 2023-06-08 PROCEDURE — 85025 COMPLETE CBC W/AUTO DIFF WBC: CPT

## 2023-06-08 PROCEDURE — 6370000000 HC RX 637 (ALT 250 FOR IP): Performed by: STUDENT IN AN ORGANIZED HEALTH CARE EDUCATION/TRAINING PROGRAM

## 2023-06-08 RX ORDER — LANOLIN ALCOHOL/MO/W.PET/CERES
400 CREAM (GRAM) TOPICAL DAILY
Status: DISCONTINUED | OUTPATIENT
Start: 2023-06-09 | End: 2023-06-12 | Stop reason: HOSPADM

## 2023-06-08 RX ORDER — TRAZODONE HYDROCHLORIDE 100 MG/1
100 TABLET ORAL NIGHTLY
Status: DISCONTINUED | OUTPATIENT
Start: 2023-06-08 | End: 2023-06-12 | Stop reason: HOSPADM

## 2023-06-08 RX ORDER — ACETAMINOPHEN 325 MG/1
650 TABLET ORAL EVERY 6 HOURS PRN
Status: DISCONTINUED | OUTPATIENT
Start: 2023-06-08 | End: 2023-06-12 | Stop reason: HOSPADM

## 2023-06-08 RX ORDER — POTASSIUM CHLORIDE 20 MEQ/1
40 TABLET, EXTENDED RELEASE ORAL PRN
Status: DISCONTINUED | OUTPATIENT
Start: 2023-06-08 | End: 2023-06-12 | Stop reason: HOSPADM

## 2023-06-08 RX ORDER — INSULIN LISPRO 100 [IU]/ML
0-4 INJECTION, SOLUTION INTRAVENOUS; SUBCUTANEOUS NIGHTLY
Status: DISCONTINUED | OUTPATIENT
Start: 2023-06-08 | End: 2023-06-12 | Stop reason: HOSPADM

## 2023-06-08 RX ORDER — POTASSIUM CHLORIDE 7.45 MG/ML
10 INJECTION INTRAVENOUS PRN
Status: DISCONTINUED | OUTPATIENT
Start: 2023-06-08 | End: 2023-06-12 | Stop reason: HOSPADM

## 2023-06-08 RX ORDER — PIOGLITAZONEHYDROCHLORIDE 15 MG/1
15 TABLET ORAL DAILY
Status: DISCONTINUED | OUTPATIENT
Start: 2023-06-09 | End: 2023-06-12 | Stop reason: HOSPADM

## 2023-06-08 RX ORDER — ESCITALOPRAM OXALATE 20 MG/1
20 TABLET ORAL DAILY
Status: DISCONTINUED | OUTPATIENT
Start: 2023-06-09 | End: 2023-06-12 | Stop reason: HOSPADM

## 2023-06-08 RX ORDER — DEXTROSE MONOHYDRATE 100 MG/ML
INJECTION, SOLUTION INTRAVENOUS CONTINUOUS PRN
Status: DISCONTINUED | OUTPATIENT
Start: 2023-06-08 | End: 2023-06-12 | Stop reason: HOSPADM

## 2023-06-08 RX ORDER — 0.9 % SODIUM CHLORIDE 0.9 %
250 INTRAVENOUS SOLUTION INTRAVENOUS ONCE
Status: COMPLETED | OUTPATIENT
Start: 2023-06-08 | End: 2023-06-09

## 2023-06-08 RX ORDER — PROPRANOLOL HYDROCHLORIDE 20 MG/1
20 TABLET ORAL NIGHTLY
Status: DISCONTINUED | OUTPATIENT
Start: 2023-06-08 | End: 2023-06-12 | Stop reason: HOSPADM

## 2023-06-08 RX ORDER — TIRZEPATIDE 5 MG/.5ML
7.5 INJECTION, SOLUTION SUBCUTANEOUS WEEKLY
COMMUNITY
Start: 2023-03-22

## 2023-06-08 RX ORDER — SODIUM CHLORIDE 9 MG/ML
INJECTION, SOLUTION INTRAVENOUS CONTINUOUS
Status: DISCONTINUED | OUTPATIENT
Start: 2023-06-08 | End: 2023-06-10

## 2023-06-08 RX ORDER — ATORVASTATIN CALCIUM 40 MG/1
40 TABLET, FILM COATED ORAL DAILY
Status: DISCONTINUED | OUTPATIENT
Start: 2023-06-09 | End: 2023-06-12 | Stop reason: HOSPADM

## 2023-06-08 RX ORDER — INSULIN LISPRO 100 [IU]/ML
0-8 INJECTION, SOLUTION INTRAVENOUS; SUBCUTANEOUS
Status: DISCONTINUED | OUTPATIENT
Start: 2023-06-09 | End: 2023-06-12 | Stop reason: HOSPADM

## 2023-06-08 RX ORDER — TIRZEPATIDE 2.5 MG/.5ML
INJECTION, SOLUTION SUBCUTANEOUS
COMMUNITY
Start: 2022-06-29

## 2023-06-08 RX ORDER — ENOXAPARIN SODIUM 100 MG/ML
40 INJECTION SUBCUTANEOUS DAILY
Status: DISCONTINUED | OUTPATIENT
Start: 2023-06-09 | End: 2023-06-12 | Stop reason: HOSPADM

## 2023-06-08 RX ORDER — POTASSIUM CHLORIDE 20 MEQ/1
40 TABLET, EXTENDED RELEASE ORAL 2 TIMES DAILY
Status: DISCONTINUED | OUTPATIENT
Start: 2023-06-08 | End: 2023-06-12 | Stop reason: HOSPADM

## 2023-06-08 RX ORDER — MAGNESIUM SULFATE IN WATER 40 MG/ML
2000 INJECTION, SOLUTION INTRAVENOUS ONCE
Status: COMPLETED | OUTPATIENT
Start: 2023-06-08 | End: 2023-06-08

## 2023-06-08 RX ORDER — LISINOPRIL 5 MG/1
10 TABLET ORAL DAILY
Status: DISCONTINUED | OUTPATIENT
Start: 2023-06-09 | End: 2023-06-12 | Stop reason: HOSPADM

## 2023-06-08 RX ORDER — ACETAMINOPHEN 650 MG/1
650 SUPPOSITORY RECTAL EVERY 6 HOURS PRN
Status: DISCONTINUED | OUTPATIENT
Start: 2023-06-08 | End: 2023-06-12 | Stop reason: HOSPADM

## 2023-06-08 RX ORDER — ONDANSETRON 2 MG/ML
4 INJECTION INTRAMUSCULAR; INTRAVENOUS ONCE
Status: COMPLETED | OUTPATIENT
Start: 2023-06-08 | End: 2023-06-08

## 2023-06-08 RX ORDER — POTASSIUM CHLORIDE 20 MEQ/1
40 TABLET, EXTENDED RELEASE ORAL ONCE
Status: COMPLETED | OUTPATIENT
Start: 2023-06-08 | End: 2023-06-08

## 2023-06-08 RX ORDER — POTASSIUM CHLORIDE 7.45 MG/ML
10 INJECTION INTRAVENOUS
Status: DISPENSED | OUTPATIENT
Start: 2023-06-08 | End: 2023-06-08

## 2023-06-08 RX ORDER — SODIUM CHLORIDE 9 MG/ML
INJECTION, SOLUTION INTRAVENOUS
Status: COMPLETED
Start: 2023-06-08 | End: 2023-06-09

## 2023-06-08 RX ORDER — GABAPENTIN 300 MG/1
300 CAPSULE ORAL 2 TIMES DAILY
Status: DISCONTINUED | OUTPATIENT
Start: 2023-06-08 | End: 2023-06-12 | Stop reason: HOSPADM

## 2023-06-08 RX ADMIN — POTASSIUM CHLORIDE 10 MEQ: 7.46 INJECTION, SOLUTION INTRAVENOUS at 21:19

## 2023-06-08 RX ADMIN — FAMOTIDINE 20 MG: 10 INJECTION, SOLUTION INTRAVENOUS at 18:28

## 2023-06-08 RX ADMIN — SODIUM CHLORIDE 1572 ML: 9 INJECTION, SOLUTION INTRAVENOUS at 18:33

## 2023-06-08 RX ADMIN — PIPERACILLIN AND TAZOBACTAM 3375 MG: 3; .375 INJECTION, POWDER, LYOPHILIZED, FOR SOLUTION INTRAVENOUS at 19:53

## 2023-06-08 RX ADMIN — ONDANSETRON 4 MG: 2 INJECTION INTRAMUSCULAR; INTRAVENOUS at 18:27

## 2023-06-08 RX ADMIN — Medication: at 18:25

## 2023-06-08 RX ADMIN — Medication: at 22:12

## 2023-06-08 RX ADMIN — POTASSIUM CHLORIDE 40 MEQ: 1500 TABLET, EXTENDED RELEASE ORAL at 20:39

## 2023-06-08 RX ADMIN — MAGNESIUM SULFATE HEPTAHYDRATE 2000 MG: 40 INJECTION, SOLUTION INTRAVENOUS at 20:37

## 2023-06-08 RX ADMIN — IOPAMIDOL 50 ML: 612 INJECTION, SOLUTION INTRAVENOUS at 19:31

## 2023-06-08 RX ADMIN — SODIUM CHLORIDE: 9 INJECTION, SOLUTION INTRAVENOUS at 22:12

## 2023-06-08 ASSESSMENT — ENCOUNTER SYMPTOMS
ABDOMINAL PAIN: 0
NAUSEA: 1
DIARRHEA: 0
BLOOD IN STOOL: 0
COLOR CHANGE: 0
CONSTIPATION: 0
BACK PAIN: 0
ABDOMINAL DISTENTION: 0
TROUBLE SWALLOWING: 1
VOMITING: 1
SORE THROAT: 1
SHORTNESS OF BREATH: 0
DIARRHEA: 1
EYE PAIN: 0
PHOTOPHOBIA: 0
COUGH: 0
ABDOMINAL PAIN: 1
CHEST TIGHTNESS: 0
RHINORRHEA: 0

## 2023-06-08 ASSESSMENT — PAIN DESCRIPTION - DESCRIPTORS: DESCRIPTORS: ACHING

## 2023-06-08 ASSESSMENT — PAIN - FUNCTIONAL ASSESSMENT: PAIN_FUNCTIONAL_ASSESSMENT: 0-10

## 2023-06-08 ASSESSMENT — PAIN SCALES - GENERAL
PAINLEVEL_OUTOF10: 2
PAINLEVEL_OUTOF10: 4

## 2023-06-08 ASSESSMENT — PAIN DESCRIPTION - PAIN TYPE: TYPE: ACUTE PAIN

## 2023-06-08 ASSESSMENT — PAIN DESCRIPTION - LOCATION: LOCATION: ABDOMEN;CHEST;THROAT

## 2023-06-09 LAB
ANION GAP SERPL CALCULATED.3IONS-SCNC: 10 MEQ/L (ref 9–15)
B PARAP IS1001 DNA NPH QL NAA+NON-PROBE: NOT DETECTED
B PERT.PT PRMT NPH QL NAA+NON-PROBE: NOT DETECTED
BASOPHILS # BLD: 0 K/UL (ref 0–0.2)
BASOPHILS NFR BLD: 0 %
BUN SERPL-MCNC: 7 MG/DL (ref 6–20)
C PNEUM DNA NPH QL NAA+NON-PROBE: NOT DETECTED
CALCIUM SERPL-MCNC: 8.3 MG/DL (ref 8.5–9.9)
CHLORIDE SERPL-SCNC: 101 MEQ/L (ref 95–107)
CO2 SERPL-SCNC: 26 MEQ/L (ref 20–31)
CREAT SERPL-MCNC: 0.43 MG/DL (ref 0.5–0.9)
EKG ATRIAL RATE: 113 BPM
EKG P AXIS: 40 DEGREES
EKG P-R INTERVAL: 144 MS
EKG Q-T INTERVAL: 362 MS
EKG QRS DURATION: 84 MS
EKG QTC CALCULATION (BAZETT): 496 MS
EKG R AXIS: -9 DEGREES
EKG T AXIS: 70 DEGREES
EKG VENTRICULAR RATE: 113 BPM
EOSINOPHIL # BLD: 0 K/UL (ref 0–0.7)
EOSINOPHIL NFR BLD: 0 %
ERYTHROCYTE [DISTWIDTH] IN BLOOD BY AUTOMATED COUNT: 13.6 % (ref 11.5–14.5)
FLUAV RNA NPH QL NAA+NON-PROBE: NOT DETECTED
FLUBV RNA NPH QL NAA+NON-PROBE: NOT DETECTED
GLUCOSE BLD-MCNC: 171 MG/DL (ref 70–99)
GLUCOSE BLD-MCNC: 201 MG/DL (ref 70–99)
GLUCOSE BLD-MCNC: 224 MG/DL (ref 70–99)
GLUCOSE BLD-MCNC: 230 MG/DL (ref 70–99)
GLUCOSE BLD-MCNC: 235 MG/DL (ref 70–99)
GLUCOSE SERPL-MCNC: 232 MG/DL (ref 70–99)
HADV DNA NPH QL NAA+NON-PROBE: NOT DETECTED
HCOV 229E RNA NPH QL NAA+NON-PROBE: NOT DETECTED
HCOV HKU1 RNA NPH QL NAA+NON-PROBE: NOT DETECTED
HCOV NL63 RNA NPH QL NAA+NON-PROBE: NOT DETECTED
HCOV OC43 RNA NPH QL NAA+NON-PROBE: NOT DETECTED
HCT VFR BLD AUTO: 33.4 % (ref 37–47)
HGB BLD-MCNC: 11.2 G/DL (ref 12–16)
HMPV RNA NPH QL NAA+NON-PROBE: NOT DETECTED
HPIV1 RNA NPH QL NAA+NON-PROBE: NOT DETECTED
HPIV2 RNA NPH QL NAA+NON-PROBE: NOT DETECTED
HPIV3 RNA NPH QL NAA+NON-PROBE: NOT DETECTED
HPIV4 RNA NPH QL NAA+NON-PROBE: NOT DETECTED
LACTIC ACID, SEPSIS: 0.9 MMOL/L (ref 0.5–1.9)
LYMPHOCYTES # BLD: 1.4 K/UL (ref 1–4.8)
LYMPHOCYTES NFR BLD: 8 %
M PNEUMO DNA NPH QL NAA+NON-PROBE: NOT DETECTED
MAGNESIUM SERPL-MCNC: 2.3 MG/DL (ref 1.7–2.4)
MCH RBC QN AUTO: 29.9 PG (ref 27–31.3)
MCHC RBC AUTO-ENTMCNC: 33.4 % (ref 33–37)
MCV RBC AUTO: 89.4 FL (ref 79.4–94.8)
MONOCYTES # BLD: 1.8 K/UL (ref 0.2–0.8)
MONOCYTES NFR BLD: 10 %
NEUTROPHILS # BLD: 14.5 K/UL (ref 1.4–6.5)
NEUTS BAND NFR BLD MANUAL: 7 % (ref 5–11)
NEUTS SEG NFR BLD: 75 %
PERFORMED ON: ABNORMAL
PERFORMED ON: NORMAL
PLATELET # BLD AUTO: 249 K/UL (ref 130–400)
PLATELET BLD QL SMEAR: ADEQUATE
POC CREATININE: 0.8 MG/DL (ref 0.6–1.2)
POC SAMPLE TYPE: NORMAL
POTASSIUM SERPL-SCNC: 3.5 MEQ/L (ref 3.4–4.9)
POTASSIUM SERPL-SCNC: 3.9 MEQ/L (ref 3.4–4.9)
PROCALCITONIN SERPL IA-MCNC: 0.17 NG/ML (ref 0–0.15)
RBC # BLD AUTO: 3.73 M/UL (ref 4.2–5.4)
RBC MORPH BLD: NORMAL
RSV RNA NPH QL NAA+NON-PROBE: NOT DETECTED
RV+EV RNA NPH QL NAA+NON-PROBE: NOT DETECTED
SARS-COV-2 RNA NPH QL NAA+NON-PROBE: NOT DETECTED
SODIUM SERPL-SCNC: 137 MEQ/L (ref 135–144)
STREP GRP A PCR: NEGATIVE
WBC # BLD AUTO: 17.7 K/UL (ref 4.8–10.8)

## 2023-06-09 PROCEDURE — 6360000002 HC RX W HCPCS: Performed by: INTERNAL MEDICINE

## 2023-06-09 PROCEDURE — 6370000000 HC RX 637 (ALT 250 FOR IP)

## 2023-06-09 PROCEDURE — 6360000002 HC RX W HCPCS

## 2023-06-09 PROCEDURE — 36415 COLL VENOUS BLD VENIPUNCTURE: CPT

## 2023-06-09 PROCEDURE — 96367 TX/PROPH/DG ADDL SEQ IV INF: CPT

## 2023-06-09 PROCEDURE — 96372 THER/PROPH/DIAG INJ SC/IM: CPT

## 2023-06-09 PROCEDURE — 83605 ASSAY OF LACTIC ACID: CPT

## 2023-06-09 PROCEDURE — 85025 COMPLETE CBC W/AUTO DIFF WBC: CPT

## 2023-06-09 PROCEDURE — 96361 HYDRATE IV INFUSION ADD-ON: CPT

## 2023-06-09 PROCEDURE — 2580000003 HC RX 258

## 2023-06-09 PROCEDURE — 80048 BASIC METABOLIC PNL TOTAL CA: CPT

## 2023-06-09 PROCEDURE — 2580000003 HC RX 258: Performed by: INTERNAL MEDICINE

## 2023-06-09 PROCEDURE — 84145 PROCALCITONIN (PCT): CPT

## 2023-06-09 PROCEDURE — 93010 ELECTROCARDIOGRAM REPORT: CPT | Performed by: INTERNAL MEDICINE

## 2023-06-09 PROCEDURE — 2700000000 HC OXYGEN THERAPY PER DAY

## 2023-06-09 PROCEDURE — 87651 STREP A DNA AMP PROBE: CPT

## 2023-06-09 PROCEDURE — G0378 HOSPITAL OBSERVATION PER HR: HCPCS

## 2023-06-09 PROCEDURE — 83735 ASSAY OF MAGNESIUM: CPT

## 2023-06-09 PROCEDURE — 96366 THER/PROPH/DIAG IV INF ADDON: CPT

## 2023-06-09 PROCEDURE — 0202U NFCT DS 22 TRGT SARS-COV-2: CPT

## 2023-06-09 PROCEDURE — 84132 ASSAY OF SERUM POTASSIUM: CPT

## 2023-06-09 RX ADMIN — POTASSIUM CHLORIDE 10 MEQ: 7.46 INJECTION, SOLUTION INTRAVENOUS at 00:38

## 2023-06-09 RX ADMIN — PIOGLITAZONE 15 MG: 15 TABLET ORAL at 08:37

## 2023-06-09 RX ADMIN — LISINOPRIL 10 MG: 10 TABLET ORAL at 08:37

## 2023-06-09 RX ADMIN — ENOXAPARIN SODIUM 40 MG: 100 INJECTION SUBCUTANEOUS at 08:37

## 2023-06-09 RX ADMIN — ACETAMINOPHEN 650 MG: 325 TABLET ORAL at 00:10

## 2023-06-09 RX ADMIN — GABAPENTIN 300 MG: 300 CAPSULE ORAL at 08:37

## 2023-06-09 RX ADMIN — TRAZODONE HYDROCHLORIDE 100 MG: 100 TABLET ORAL at 00:10

## 2023-06-09 RX ADMIN — CARIPRAZINE 3 MG: 1.5 CAPSULE, GELATIN COATED ORAL at 08:37

## 2023-06-09 RX ADMIN — POTASSIUM CHLORIDE 10 MEQ: 7.46 INJECTION, SOLUTION INTRAVENOUS at 03:09

## 2023-06-09 RX ADMIN — Medication 400 MG: at 08:37

## 2023-06-09 RX ADMIN — POTASSIUM CHLORIDE 10 MEQ: 7.46 INJECTION, SOLUTION INTRAVENOUS at 05:02

## 2023-06-09 RX ADMIN — INSULIN LISPRO 2 UNITS: 100 INJECTION, SOLUTION INTRAVENOUS; SUBCUTANEOUS at 08:37

## 2023-06-09 RX ADMIN — SODIUM CHLORIDE: 9 INJECTION, SOLUTION INTRAVENOUS at 14:30

## 2023-06-09 RX ADMIN — ESCITALOPRAM OXALATE 20 MG: 20 TABLET ORAL at 08:37

## 2023-06-09 RX ADMIN — SODIUM CHLORIDE: 9 INJECTION, SOLUTION INTRAVENOUS at 00:10

## 2023-06-09 RX ADMIN — PROPRANOLOL HYDROCHLORIDE 20 MG: 20 TABLET ORAL at 21:37

## 2023-06-09 RX ADMIN — POTASSIUM CHLORIDE 40 MEQ: 1500 TABLET, EXTENDED RELEASE ORAL at 11:11

## 2023-06-09 RX ADMIN — INSULIN LISPRO 2 UNITS: 100 INJECTION, SOLUTION INTRAVENOUS; SUBCUTANEOUS at 17:18

## 2023-06-09 RX ADMIN — POTASSIUM CHLORIDE 10 MEQ: 7.46 INJECTION, SOLUTION INTRAVENOUS at 06:51

## 2023-06-09 RX ADMIN — POTASSIUM CHLORIDE 10 MEQ: 7.46 INJECTION, SOLUTION INTRAVENOUS at 04:05

## 2023-06-09 RX ADMIN — SODIUM CHLORIDE: 9 INJECTION, SOLUTION INTRAVENOUS at 06:50

## 2023-06-09 RX ADMIN — GABAPENTIN 300 MG: 300 CAPSULE ORAL at 21:37

## 2023-06-09 RX ADMIN — PROPRANOLOL HYDROCHLORIDE 20 MG: 20 TABLET ORAL at 00:39

## 2023-06-09 RX ADMIN — SODIUM CHLORIDE: 9 INJECTION, SOLUTION INTRAVENOUS at 21:34

## 2023-06-09 RX ADMIN — INSULIN LISPRO 2 UNITS: 100 INJECTION, SOLUTION INTRAVENOUS; SUBCUTANEOUS at 12:15

## 2023-06-09 RX ADMIN — ACETAMINOPHEN 650 MG: 325 TABLET ORAL at 21:36

## 2023-06-09 RX ADMIN — POTASSIUM CHLORIDE 40 MEQ: 1500 TABLET, EXTENDED RELEASE ORAL at 00:10

## 2023-06-09 RX ADMIN — CEFTRIAXONE SODIUM 1000 MG: 1 INJECTION, POWDER, FOR SOLUTION INTRAMUSCULAR; INTRAVENOUS at 11:11

## 2023-06-09 RX ADMIN — POTASSIUM CHLORIDE 40 MEQ: 1500 TABLET, EXTENDED RELEASE ORAL at 21:37

## 2023-06-09 RX ADMIN — ATORVASTATIN CALCIUM 40 MG: 40 TABLET, FILM COATED ORAL at 08:37

## 2023-06-09 RX ADMIN — POTASSIUM CHLORIDE 10 MEQ: 7.46 INJECTION, SOLUTION INTRAVENOUS at 02:06

## 2023-06-09 RX ADMIN — GABAPENTIN 300 MG: 300 CAPSULE ORAL at 00:10

## 2023-06-09 RX ADMIN — TRAZODONE HYDROCHLORIDE 100 MG: 100 TABLET ORAL at 21:37

## 2023-06-09 ASSESSMENT — PAIN DESCRIPTION - LOCATION
LOCATION: HEAD
LOCATION: HEAD

## 2023-06-09 ASSESSMENT — PAIN DESCRIPTION - DESCRIPTORS
DESCRIPTORS: ACHING
DESCRIPTORS: ACHING

## 2023-06-09 ASSESSMENT — PAIN DESCRIPTION - ORIENTATION
ORIENTATION: MID;DISTAL
ORIENTATION: DISTAL

## 2023-06-09 ASSESSMENT — PAIN SCALES - GENERAL
PAINLEVEL_OUTOF10: 0
PAINLEVEL_OUTOF10: 2
PAINLEVEL_OUTOF10: 2

## 2023-06-10 ENCOUNTER — APPOINTMENT (OUTPATIENT)
Dept: GENERAL RADIOLOGY | Age: 53
DRG: 194 | End: 2023-06-10
Payer: COMMERCIAL

## 2023-06-10 PROBLEM — R06.00 DYSPNEA: Status: ACTIVE | Noted: 2023-06-10

## 2023-06-10 LAB
ANION GAP SERPL CALCULATED.3IONS-SCNC: 11 MEQ/L (ref 9–15)
BACTERIA UR CULT: NORMAL
BASOPHILS # BLD: 0.1 K/UL (ref 0–0.2)
BASOPHILS NFR BLD: 0.4 %
BUN SERPL-MCNC: 7 MG/DL (ref 6–20)
CALCIUM SERPL-MCNC: 8.6 MG/DL (ref 8.5–9.9)
CHLORIDE SERPL-SCNC: 107 MEQ/L (ref 95–107)
CO2 SERPL-SCNC: 23 MEQ/L (ref 20–31)
CREAT SERPL-MCNC: 0.32 MG/DL (ref 0.5–0.9)
EOSINOPHIL # BLD: 0.2 K/UL (ref 0–0.7)
EOSINOPHIL NFR BLD: 1.3 %
ERYTHROCYTE [DISTWIDTH] IN BLOOD BY AUTOMATED COUNT: 13.6 % (ref 11.5–14.5)
GLUCOSE BLD-MCNC: 172 MG/DL (ref 70–99)
GLUCOSE BLD-MCNC: 193 MG/DL (ref 70–99)
GLUCOSE BLD-MCNC: 211 MG/DL (ref 70–99)
GLUCOSE BLD-MCNC: 233 MG/DL (ref 70–99)
GLUCOSE SERPL-MCNC: 225 MG/DL (ref 70–99)
HCT VFR BLD AUTO: 33.1 % (ref 37–47)
HGB BLD-MCNC: 10.8 G/DL (ref 12–16)
LV EF: 60 %
LVEF MODALITY: NORMAL
LYMPHOCYTES # BLD: 2.4 K/UL (ref 1–4.8)
LYMPHOCYTES NFR BLD: 16.3 %
MAGNESIUM SERPL-MCNC: 1.9 MG/DL (ref 1.7–2.4)
MCH RBC QN AUTO: 30.3 PG (ref 27–31.3)
MCHC RBC AUTO-ENTMCNC: 32.8 % (ref 33–37)
MCV RBC AUTO: 92.3 FL (ref 79.4–94.8)
MONOCYTES # BLD: 1.1 K/UL (ref 0.2–0.8)
MONOCYTES NFR BLD: 7.4 %
NEUTROPHILS # BLD: 11 K/UL (ref 1.4–6.5)
NEUTS SEG NFR BLD: 74.6 %
PERFORMED ON: ABNORMAL
PLATELET # BLD AUTO: 246 K/UL (ref 130–400)
POTASSIUM SERPL-SCNC: 4 MEQ/L (ref 3.4–4.9)
RBC # BLD AUTO: 3.58 M/UL (ref 4.2–5.4)
SODIUM SERPL-SCNC: 141 MEQ/L (ref 135–144)
WBC # BLD AUTO: 14.7 K/UL (ref 4.8–10.8)

## 2023-06-10 PROCEDURE — 1210000000 HC MED SURG R&B

## 2023-06-10 PROCEDURE — G0378 HOSPITAL OBSERVATION PER HR: HCPCS

## 2023-06-10 PROCEDURE — 80048 BASIC METABOLIC PNL TOTAL CA: CPT

## 2023-06-10 PROCEDURE — 83735 ASSAY OF MAGNESIUM: CPT

## 2023-06-10 PROCEDURE — 6360000002 HC RX W HCPCS

## 2023-06-10 PROCEDURE — 96372 THER/PROPH/DIAG INJ SC/IM: CPT

## 2023-06-10 PROCEDURE — 6370000000 HC RX 637 (ALT 250 FOR IP)

## 2023-06-10 PROCEDURE — 2580000003 HC RX 258: Performed by: INTERNAL MEDICINE

## 2023-06-10 PROCEDURE — 99222 1ST HOSP IP/OBS MODERATE 55: CPT | Performed by: INTERNAL MEDICINE

## 2023-06-10 PROCEDURE — 71046 X-RAY EXAM CHEST 2 VIEWS: CPT

## 2023-06-10 PROCEDURE — 96361 HYDRATE IV INFUSION ADD-ON: CPT

## 2023-06-10 PROCEDURE — 93306 TTE W/DOPPLER COMPLETE: CPT

## 2023-06-10 PROCEDURE — 2580000003 HC RX 258

## 2023-06-10 PROCEDURE — 36415 COLL VENOUS BLD VENIPUNCTURE: CPT

## 2023-06-10 PROCEDURE — 6360000002 HC RX W HCPCS: Performed by: INTERNAL MEDICINE

## 2023-06-10 PROCEDURE — 96366 THER/PROPH/DIAG IV INF ADDON: CPT

## 2023-06-10 PROCEDURE — 85025 COMPLETE CBC W/AUTO DIFF WBC: CPT

## 2023-06-10 RX ORDER — FUROSEMIDE 10 MG/ML
20 INJECTION INTRAMUSCULAR; INTRAVENOUS ONCE
Status: COMPLETED | OUTPATIENT
Start: 2023-06-10 | End: 2023-06-10

## 2023-06-10 RX ORDER — CEFUROXIME AXETIL 500 MG/1
500 TABLET ORAL 2 TIMES DAILY
Qty: 10 TABLET | Refills: 0 | Status: SHIPPED | OUTPATIENT
Start: 2023-06-10 | End: 2023-06-12 | Stop reason: HOSPADM

## 2023-06-10 RX ORDER — MORPHINE SULFATE 2 MG/ML
2 INJECTION, SOLUTION INTRAMUSCULAR; INTRAVENOUS ONCE
Status: DISCONTINUED | OUTPATIENT
Start: 2023-06-10 | End: 2023-06-12 | Stop reason: HOSPADM

## 2023-06-10 RX ADMIN — PROPRANOLOL HYDROCHLORIDE 20 MG: 20 TABLET ORAL at 21:00

## 2023-06-10 RX ADMIN — SODIUM CHLORIDE: 9 INJECTION, SOLUTION INTRAVENOUS at 04:41

## 2023-06-10 RX ADMIN — TRAZODONE HYDROCHLORIDE 100 MG: 100 TABLET ORAL at 21:00

## 2023-06-10 RX ADMIN — Medication 400 MG: at 08:50

## 2023-06-10 RX ADMIN — INSULIN LISPRO 2 UNITS: 100 INJECTION, SOLUTION INTRAVENOUS; SUBCUTANEOUS at 11:46

## 2023-06-10 RX ADMIN — FUROSEMIDE 20 MG: 10 INJECTION, SOLUTION INTRAMUSCULAR; INTRAVENOUS at 13:32

## 2023-06-10 RX ADMIN — LISINOPRIL 10 MG: 10 TABLET ORAL at 08:50

## 2023-06-10 RX ADMIN — ESCITALOPRAM OXALATE 20 MG: 20 TABLET ORAL at 08:50

## 2023-06-10 RX ADMIN — GABAPENTIN 300 MG: 300 CAPSULE ORAL at 08:50

## 2023-06-10 RX ADMIN — ATORVASTATIN CALCIUM 40 MG: 40 TABLET, FILM COATED ORAL at 08:50

## 2023-06-10 RX ADMIN — POTASSIUM CHLORIDE 40 MEQ: 1500 TABLET, EXTENDED RELEASE ORAL at 08:49

## 2023-06-10 RX ADMIN — GABAPENTIN 300 MG: 300 CAPSULE ORAL at 21:00

## 2023-06-10 RX ADMIN — CARIPRAZINE 3 MG: 1.5 CAPSULE, GELATIN COATED ORAL at 08:49

## 2023-06-10 RX ADMIN — INSULIN LISPRO 2 UNITS: 100 INJECTION, SOLUTION INTRAVENOUS; SUBCUTANEOUS at 08:51

## 2023-06-10 RX ADMIN — ENOXAPARIN SODIUM 40 MG: 100 INJECTION SUBCUTANEOUS at 08:51

## 2023-06-10 RX ADMIN — CEFTRIAXONE SODIUM 1000 MG: 1 INJECTION, POWDER, FOR SOLUTION INTRAMUSCULAR; INTRAVENOUS at 08:59

## 2023-06-10 RX ADMIN — PIOGLITAZONE 15 MG: 15 TABLET ORAL at 08:50

## 2023-06-10 RX ADMIN — ACETAMINOPHEN 650 MG: 325 TABLET ORAL at 17:23

## 2023-06-10 RX ADMIN — POTASSIUM CHLORIDE 40 MEQ: 1500 TABLET, EXTENDED RELEASE ORAL at 21:00

## 2023-06-10 ASSESSMENT — ENCOUNTER SYMPTOMS
EYES NEGATIVE: 1
NAUSEA: 0
CHEST TIGHTNESS: 0
GASTROINTESTINAL NEGATIVE: 1
WHEEZING: 0
SHORTNESS OF BREATH: 1
BLOOD IN STOOL: 0
COUGH: 0
STRIDOR: 0

## 2023-06-10 ASSESSMENT — PAIN SCALES - GENERAL: PAINLEVEL_OUTOF10: 0

## 2023-06-11 ENCOUNTER — APPOINTMENT (OUTPATIENT)
Dept: CT IMAGING | Age: 53
DRG: 194 | End: 2023-06-11
Payer: COMMERCIAL

## 2023-06-11 LAB
ANION GAP SERPL CALCULATED.3IONS-SCNC: 12 MEQ/L (ref 9–15)
BASOPHILS # BLD: 0.1 K/UL (ref 0–0.2)
BASOPHILS NFR BLD: 0.6 %
BNP BLD-MCNC: 249 PG/ML
BUN SERPL-MCNC: 7 MG/DL (ref 6–20)
CALCIUM SERPL-MCNC: 9.3 MG/DL (ref 8.5–9.9)
CHLORIDE SERPL-SCNC: 98 MEQ/L (ref 95–107)
CO2 SERPL-SCNC: 28 MEQ/L (ref 20–31)
CREAT SERPL-MCNC: 0.42 MG/DL (ref 0.5–0.9)
D DIMER PPP FEU-MCNC: 0.83 MG/L FEU (ref 0–0.5)
EOSINOPHIL # BLD: 0.2 K/UL (ref 0–0.7)
EOSINOPHIL NFR BLD: 1.7 %
ERYTHROCYTE [DISTWIDTH] IN BLOOD BY AUTOMATED COUNT: 13.4 % (ref 11.5–14.5)
GLUCOSE BLD-MCNC: 197 MG/DL (ref 70–99)
GLUCOSE BLD-MCNC: 228 MG/DL (ref 70–99)
GLUCOSE SERPL-MCNC: 245 MG/DL (ref 70–99)
HCT VFR BLD AUTO: 34 % (ref 37–47)
HGB BLD-MCNC: 11.4 G/DL (ref 12–16)
LYMPHOCYTES # BLD: 1.9 K/UL (ref 1–4.8)
LYMPHOCYTES NFR BLD: 13.8 %
MAGNESIUM SERPL-MCNC: 1.7 MG/DL (ref 1.7–2.4)
MCH RBC QN AUTO: 29.9 PG (ref 27–31.3)
MCHC RBC AUTO-ENTMCNC: 33.4 % (ref 33–37)
MCV RBC AUTO: 89.5 FL (ref 79.4–94.8)
MONOCYTES # BLD: 1.1 K/UL (ref 0.2–0.8)
MONOCYTES NFR BLD: 8.1 %
NEUTROPHILS # BLD: 10.4 K/UL (ref 1.4–6.5)
NEUTS SEG NFR BLD: 75.8 %
PERFORMED ON: ABNORMAL
PERFORMED ON: ABNORMAL
PLATELET # BLD AUTO: 297 K/UL (ref 130–400)
POTASSIUM SERPL-SCNC: 3.6 MEQ/L (ref 3.4–4.9)
PROCALCITONIN SERPL IA-MCNC: 0.07 NG/ML (ref 0–0.15)
RBC # BLD AUTO: 3.79 M/UL (ref 4.2–5.4)
SODIUM SERPL-SCNC: 138 MEQ/L (ref 135–144)
WBC # BLD AUTO: 13.7 K/UL (ref 4.8–10.8)

## 2023-06-11 PROCEDURE — 2700000000 HC OXYGEN THERAPY PER DAY

## 2023-06-11 PROCEDURE — 80048 BASIC METABOLIC PNL TOTAL CA: CPT

## 2023-06-11 PROCEDURE — 36415 COLL VENOUS BLD VENIPUNCTURE: CPT

## 2023-06-11 PROCEDURE — 6360000002 HC RX W HCPCS

## 2023-06-11 PROCEDURE — 1210000000 HC MED SURG R&B

## 2023-06-11 PROCEDURE — 85379 FIBRIN DEGRADATION QUANT: CPT

## 2023-06-11 PROCEDURE — 6370000000 HC RX 637 (ALT 250 FOR IP)

## 2023-06-11 PROCEDURE — 85025 COMPLETE CBC W/AUTO DIFF WBC: CPT

## 2023-06-11 PROCEDURE — 2580000003 HC RX 258: Performed by: INTERNAL MEDICINE

## 2023-06-11 PROCEDURE — 84145 PROCALCITONIN (PCT): CPT

## 2023-06-11 PROCEDURE — 83735 ASSAY OF MAGNESIUM: CPT

## 2023-06-11 PROCEDURE — 83880 ASSAY OF NATRIURETIC PEPTIDE: CPT

## 2023-06-11 PROCEDURE — 71275 CT ANGIOGRAPHY CHEST: CPT

## 2023-06-11 PROCEDURE — 99232 SBSQ HOSP IP/OBS MODERATE 35: CPT | Performed by: INTERNAL MEDICINE

## 2023-06-11 PROCEDURE — 6360000004 HC RX CONTRAST MEDICATION: Performed by: INTERNAL MEDICINE

## 2023-06-11 PROCEDURE — 6360000002 HC RX W HCPCS: Performed by: INTERNAL MEDICINE

## 2023-06-11 PROCEDURE — 2500000003 HC RX 250 WO HCPCS: Performed by: INTERNAL MEDICINE

## 2023-06-11 RX ORDER — FUROSEMIDE 10 MG/ML
20 INJECTION INTRAMUSCULAR; INTRAVENOUS ONCE
Status: COMPLETED | OUTPATIENT
Start: 2023-06-11 | End: 2023-06-11

## 2023-06-11 RX ADMIN — Medication 400 MG: at 09:35

## 2023-06-11 RX ADMIN — IOPAMIDOL 75 ML: 612 INJECTION, SOLUTION INTRAVENOUS at 15:53

## 2023-06-11 RX ADMIN — ATORVASTATIN CALCIUM 40 MG: 40 TABLET, FILM COATED ORAL at 09:49

## 2023-06-11 RX ADMIN — GABAPENTIN 300 MG: 300 CAPSULE ORAL at 21:46

## 2023-06-11 RX ADMIN — CEFTRIAXONE SODIUM 1000 MG: 1 INJECTION, POWDER, FOR SOLUTION INTRAMUSCULAR; INTRAVENOUS at 09:48

## 2023-06-11 RX ADMIN — POTASSIUM CHLORIDE 40 MEQ: 1500 TABLET, EXTENDED RELEASE ORAL at 09:34

## 2023-06-11 RX ADMIN — GABAPENTIN 300 MG: 300 CAPSULE ORAL at 09:35

## 2023-06-11 RX ADMIN — ESCITALOPRAM OXALATE 20 MG: 20 TABLET ORAL at 09:35

## 2023-06-11 RX ADMIN — DOXYCYCLINE 100 MG: 100 INJECTION, POWDER, LYOPHILIZED, FOR SOLUTION INTRAVENOUS at 11:52

## 2023-06-11 RX ADMIN — INSULIN LISPRO 2 UNITS: 100 INJECTION, SOLUTION INTRAVENOUS; SUBCUTANEOUS at 09:34

## 2023-06-11 RX ADMIN — POTASSIUM CHLORIDE 40 MEQ: 1500 TABLET, EXTENDED RELEASE ORAL at 21:46

## 2023-06-11 RX ADMIN — PIOGLITAZONE 15 MG: 15 TABLET ORAL at 09:34

## 2023-06-11 RX ADMIN — CARIPRAZINE 3 MG: 1.5 CAPSULE, GELATIN COATED ORAL at 09:47

## 2023-06-11 RX ADMIN — FUROSEMIDE 20 MG: 10 INJECTION, SOLUTION INTRAMUSCULAR; INTRAVENOUS at 12:32

## 2023-06-11 RX ADMIN — ENOXAPARIN SODIUM 40 MG: 100 INJECTION SUBCUTANEOUS at 09:34

## 2023-06-11 RX ADMIN — DOXYCYCLINE 100 MG: 100 INJECTION, POWDER, LYOPHILIZED, FOR SOLUTION INTRAVENOUS at 23:55

## 2023-06-11 RX ADMIN — PROPRANOLOL HYDROCHLORIDE 20 MG: 20 TABLET ORAL at 21:46

## 2023-06-11 RX ADMIN — TRAZODONE HYDROCHLORIDE 100 MG: 100 TABLET ORAL at 21:46

## 2023-06-11 ASSESSMENT — ENCOUNTER SYMPTOMS
EYES NEGATIVE: 1
COUGH: 0
NAUSEA: 0
STRIDOR: 0
WHEEZING: 0
GASTROINTESTINAL NEGATIVE: 1
CHEST TIGHTNESS: 0
BLOOD IN STOOL: 0
SHORTNESS OF BREATH: 1

## 2023-06-11 ASSESSMENT — PAIN SCALES - GENERAL: PAINLEVEL_OUTOF10: 0

## 2023-06-12 VITALS
HEART RATE: 81 BPM | DIASTOLIC BLOOD PRESSURE: 65 MMHG | BODY MASS INDEX: 35.19 KG/M2 | WEIGHT: 198.6 LBS | OXYGEN SATURATION: 97 % | TEMPERATURE: 97.9 F | HEIGHT: 63 IN | SYSTOLIC BLOOD PRESSURE: 116 MMHG | RESPIRATION RATE: 18 BRPM

## 2023-06-12 PROBLEM — J18.9 PNEUMONIA DUE TO INFECTIOUS ORGANISM: Status: ACTIVE | Noted: 2023-06-12

## 2023-06-12 LAB
ANION GAP SERPL CALCULATED.3IONS-SCNC: 16 MEQ/L (ref 9–15)
BASOPHILS # BLD: 0.1 K/UL (ref 0–0.2)
BASOPHILS NFR BLD: 0.7 %
BUN SERPL-MCNC: 9 MG/DL (ref 6–20)
CALCIUM SERPL-MCNC: 9.3 MG/DL (ref 8.5–9.9)
CHLORIDE SERPL-SCNC: 100 MEQ/L (ref 95–107)
CO2 SERPL-SCNC: 25 MEQ/L (ref 20–31)
CREAT SERPL-MCNC: 0.41 MG/DL (ref 0.5–0.9)
EOSINOPHIL # BLD: 0.2 K/UL (ref 0–0.7)
EOSINOPHIL NFR BLD: 1.7 %
ERYTHROCYTE [DISTWIDTH] IN BLOOD BY AUTOMATED COUNT: 13.4 % (ref 11.5–14.5)
GLUCOSE BLD-MCNC: 201 MG/DL (ref 70–99)
GLUCOSE BLD-MCNC: 209 MG/DL (ref 70–99)
GLUCOSE BLD-MCNC: 224 MG/DL (ref 70–99)
GLUCOSE SERPL-MCNC: 242 MG/DL (ref 70–99)
HCT VFR BLD AUTO: 36.3 % (ref 37–47)
HGB BLD-MCNC: 12 G/DL (ref 12–16)
LYMPHOCYTES # BLD: 2.9 K/UL (ref 1–4.8)
LYMPHOCYTES NFR BLD: 21.6 %
MAGNESIUM SERPL-MCNC: 1.8 MG/DL (ref 1.7–2.4)
MCH RBC QN AUTO: 29.8 PG (ref 27–31.3)
MCHC RBC AUTO-ENTMCNC: 33 % (ref 33–37)
MCV RBC AUTO: 90.3 FL (ref 79.4–94.8)
MONOCYTES # BLD: 1.3 K/UL (ref 0.2–0.8)
MONOCYTES NFR BLD: 9.4 %
NEUTROPHILS # BLD: 8.9 K/UL (ref 1.4–6.5)
NEUTS SEG NFR BLD: 66.6 %
PERFORMED ON: ABNORMAL
PLATELET # BLD AUTO: 324 K/UL (ref 130–400)
POTASSIUM SERPL-SCNC: 4.2 MEQ/L (ref 3.4–4.9)
RBC # BLD AUTO: 4.02 M/UL (ref 4.2–5.4)
SODIUM SERPL-SCNC: 141 MEQ/L (ref 135–144)
WBC # BLD AUTO: 13.3 K/UL (ref 4.8–10.8)

## 2023-06-12 PROCEDURE — 36415 COLL VENOUS BLD VENIPUNCTURE: CPT

## 2023-06-12 PROCEDURE — 94618 PULMONARY STRESS TESTING: CPT

## 2023-06-12 PROCEDURE — 85025 COMPLETE CBC W/AUTO DIFF WBC: CPT

## 2023-06-12 PROCEDURE — 6370000000 HC RX 637 (ALT 250 FOR IP)

## 2023-06-12 PROCEDURE — 6360000002 HC RX W HCPCS: Performed by: INTERNAL MEDICINE

## 2023-06-12 PROCEDURE — 80048 BASIC METABOLIC PNL TOTAL CA: CPT

## 2023-06-12 PROCEDURE — 83735 ASSAY OF MAGNESIUM: CPT

## 2023-06-12 PROCEDURE — 6360000002 HC RX W HCPCS

## 2023-06-12 PROCEDURE — 2700000000 HC OXYGEN THERAPY PER DAY

## 2023-06-12 PROCEDURE — 2500000003 HC RX 250 WO HCPCS: Performed by: INTERNAL MEDICINE

## 2023-06-12 PROCEDURE — 99232 SBSQ HOSP IP/OBS MODERATE 35: CPT | Performed by: INTERNAL MEDICINE

## 2023-06-12 PROCEDURE — 2580000003 HC RX 258: Performed by: INTERNAL MEDICINE

## 2023-06-12 RX ORDER — DOXYCYCLINE HYCLATE 100 MG
100 TABLET ORAL 2 TIMES DAILY
Qty: 14 TABLET | Refills: 0 | Status: SHIPPED | OUTPATIENT
Start: 2023-06-12 | End: 2023-06-19

## 2023-06-12 RX ADMIN — Medication 400 MG: at 07:35

## 2023-06-12 RX ADMIN — GABAPENTIN 300 MG: 300 CAPSULE ORAL at 07:35

## 2023-06-12 RX ADMIN — ENOXAPARIN SODIUM 40 MG: 100 INJECTION SUBCUTANEOUS at 07:36

## 2023-06-12 RX ADMIN — CARIPRAZINE 3 MG: 1.5 CAPSULE, GELATIN COATED ORAL at 07:49

## 2023-06-12 RX ADMIN — ACETAMINOPHEN 650 MG: 325 TABLET ORAL at 15:32

## 2023-06-12 RX ADMIN — POTASSIUM CHLORIDE 40 MEQ: 1500 TABLET, EXTENDED RELEASE ORAL at 07:35

## 2023-06-12 RX ADMIN — ATORVASTATIN CALCIUM 40 MG: 40 TABLET, FILM COATED ORAL at 07:35

## 2023-06-12 RX ADMIN — ESCITALOPRAM OXALATE 20 MG: 20 TABLET ORAL at 07:35

## 2023-06-12 RX ADMIN — INSULIN LISPRO 2 UNITS: 100 INJECTION, SOLUTION INTRAVENOUS; SUBCUTANEOUS at 07:36

## 2023-06-12 RX ADMIN — CEFTRIAXONE SODIUM 1000 MG: 1 INJECTION, POWDER, FOR SOLUTION INTRAMUSCULAR; INTRAVENOUS at 07:54

## 2023-06-12 RX ADMIN — PIOGLITAZONE 15 MG: 15 TABLET ORAL at 07:35

## 2023-06-12 RX ADMIN — ACETAMINOPHEN 650 MG: 325 TABLET ORAL at 07:56

## 2023-06-12 RX ADMIN — DOXYCYCLINE 100 MG: 100 INJECTION, POWDER, LYOPHILIZED, FOR SOLUTION INTRAVENOUS at 11:52

## 2023-06-12 ASSESSMENT — ENCOUNTER SYMPTOMS
WHEEZING: 0
CHEST TIGHTNESS: 0
EYES NEGATIVE: 1
GASTROINTESTINAL NEGATIVE: 1
COUGH: 0
SHORTNESS OF BREATH: 1
NAUSEA: 0
BLOOD IN STOOL: 0
STRIDOR: 0

## 2023-06-12 NOTE — DISCHARGE SUMMARY
requests 90 days supply**      escitalopram (LEXAPRO) 20 MG tablet TK 1 T PO QD  Refills: 1      traZODone (DESYREL) 50 MG tablet Take 2 tablets by mouth nightly      VRAYLAR 3 MG CAPS Take 1 capsule by mouth daily  Refills: 1           STOP taking these medications       fluconazole (DIFLUCAN) 150 MG tablet Comments:   Reason for Stopping:             Activity: activity as tolerated  Diet: regular diet  Wound Care: none needed    Follow-up with PCP 1-2 weeks.     DC time 35 minutes    Signed:  Electronically signed by Bhupinder Zimmerman MD on 6/12/2023 at 4:08 PM

## 2023-06-14 ENCOUNTER — APPOINTMENT (OUTPATIENT)
Dept: PRIMARY CARE | Facility: CLINIC | Age: 53
End: 2023-06-14
Payer: COMMERCIAL

## 2023-06-14 LAB
BACTERIA BLD CULT ORG #2: NORMAL
BACTERIA BLD CULT: NORMAL

## 2023-06-16 NOTE — PROGRESS NOTES
Physician Progress Note      PATIENT:               Cami Evans  CSN #:                  [id number]  :                       1970  ADMIT DATE:       2023 4:51 PM  [address] DATE:        2023 5:30 PM  RESPONDING  PROVIDER #: Ji Abdalla MD          QUERY TEXT:    Patient admitted with pneumonia. Noted documentation of acute respiratory   failure in the DS 23. In order to support the diagnosis of acute   respiratory failure, please include additional clinical indicators in your   documentation. Or please document if the diagnosis of acute respiratory   failure has been ruled out after further study. The medical record reflects the following:  Risk Factors:  age 47  T2DM  HTN  HLD  depression  Clinical Indicators: no work of breathing documented    on 23: SpO2 94% -   96% RA  SpO2 95% 2LNC   on 23 1101 O2 Sat \"92 pt was 87% on room air:   placed O2 @ 3L NC\"  ED: \"Denies any associated chest pain, shortness of breath, cough,   hemoptysis. \"  \"Effort: Pulmonary effort is normal.\"   Dr Stiven Haley: Tura Sly- LLL. [employer] Hypoxia- due to above, rule out other etiology,   likely fluid overload but will rule out PE\" \"CXR noted to be overloaded,   received ivf as she was not tolerating po intake, echo no CHF, seen by   cardiology. \"  23 Dr Nicolas Ast: Anastasiia Johnston has had SOB recently. She is on 2L O2 and desaturated to   89%. \"   [facility] \"Pt SPO2 at rest on room air was 96%. Pt SPO2 while walking   on room air was 94%. Pt does not qualify for home oxygen. \"  DS Dr Raul Mathews:  \"Discharge Diagnoses: PNA LLL, abd pain, acute hypoxic   respiratory failure\"  Treatment: CXRs  CTA   O2 NC  IV Rocephin  IV Vibramycin  RT  cardiology    Acute Respiratory Failure Clinical Indicators per [employer]-DRG Training Guide and   Quick Reference Guide:  pO2 < 60 mmHg or SpO2 (pulse oximetry) < 91% breathing room air  pCO2 > 50 and pH < 7.35  P/F ratio (pO2 / FIO2) < 300  pO2 decrease or pCO2 increase by 10 mmHg
Physician Progress Note      PATIENT:               Peter Harden  CSN #:                  [id number]  :                       1970  ADMIT DATE:       2023 4:51 PM  DISCH DATE:  RESPONDING  PROVIDER #: Connor Claire MD          QUERY TEXT:    Patient admitted with \"Intractable nausea and emesis\". Per 23 attending   PN \"PNA- LLL. UTI ruled out. \"  If possible, please document in progress notes   and discharge summary the present on admission status of pneumonia:    The medical record reflects the following:  Risk Factors: age 47  T2DM  HTN  HLD  depression  Clinical Indicators: on admission WBC 22.2   PCT 0.25   P 129   R 18   Dr Danny Harding: [facility] is down to 14.7k, resume Rocephin for PNA, added doxy   today to cover for CAP, initially only on Rocephin for suspected UTI but this   was ruled out. \"  Treatment:  CXRs  CTA   IV Rocephin  IV Vibramycin  cardiology    Jared Barnhart RN CCDS  [phone]  Options provided:  -- Yes, pneumonia was present at the time of the order to admit to the   hospital  -- No, pneumonia was not present on admission and developed during the   inpatient stay  -- Other - I will add my own diagnosis  -- Disagree - Not applicable / Not valid  -- Disagree - Clinically unable to determine / Unknown  -- Refer to Clinical Documentation Reviewer    PROVIDER RESPONSE TEXT:    Yes, pneumonia was present at the time of the order to admit to the hospital.    Query created by: Leilani Umana on 2023 11:21 AM      Electronically signed by:  Connor Claire MD 2023 3:10 PM
Pt SPO2 at rest on room air was 96%. Pt SPO2 while walking on room air was 94%. Pt does not qualify for home oxygen.
Grandmother     No Known Problems Maternal Grandmother     No Known Problems Maternal Grandfather     No Known Problems Brother     No Known Problems Other     Breast Cancer Neg Hx     Colon Cancer Neg Hx     Eclampsia Neg Hx     Hypertension Neg Hx     Ovarian Cancer Neg Hx      Labor Neg Hx     Spont Abortions Neg Hx     Stroke Neg Hx      [] Unable to obtain due to ventilated and/ or neurologic status    Social History     Socioeconomic History    Marital status:      Spouse name: Not on file    Number of children: Not on file    Years of education: Not on file    Highest education level: Not on file   Occupational History    Not on file   Tobacco Use    Smoking status: Never    Smokeless tobacco: Never   Vaping Use    Vaping Use: Never used   Substance and Sexual Activity    Alcohol use:  Yes     Alcohol/week: 0.0 standard drinks     Comment: ocassionally    Drug use: No    Sexual activity: Not Currently     Partners: Male   Other Topics Concern    Not on file   Social History Narrative    Not on file     Social Determinants of Health     Financial Resource Strain: Low Risk     Difficulty of Paying Living Expenses: Not very hard   Food Insecurity: No Food Insecurity    Worried About Running Out of Food in the Last Year: Never true    Ran Out of Food in the Last Year: Never true   Transportation Needs: Not on file   Physical Activity: Not on file   Stress: Not on file   Social Connections: Not on file   Intimate Partner Violence: Not on file   Housing Stability: Not on file      [] Unable to obtain due to ventilated and/ or neurologic status      Home Medications:      Medications Prior to Admission: Tirzepatide (MOUNJARO) 2.5 MG/0.5ML SOPN SC injection, Inject into the skin  MOUNJARO 5 MG/0.5ML SOPN SC injection, 7.5 mg once a week On   atorvastatin (LIPITOR) 20 MG tablet, Take 2 tablets by mouth daily  magnesium oxide (MAG-OX) 400 (240 Mg) MG tablet, Take 1 tablet by mouth

## 2023-06-22 ENCOUNTER — OFFICE VISIT (OUTPATIENT)
Dept: PRIMARY CARE | Facility: CLINIC | Age: 53
End: 2023-06-22
Payer: COMMERCIAL

## 2023-06-22 VITALS
DIASTOLIC BLOOD PRESSURE: 90 MMHG | BODY MASS INDEX: 35.26 KG/M2 | SYSTOLIC BLOOD PRESSURE: 128 MMHG | RESPIRATION RATE: 16 BRPM | HEIGHT: 63 IN | WEIGHT: 199 LBS | OXYGEN SATURATION: 96 % | TEMPERATURE: 97.5 F | HEART RATE: 84 BPM

## 2023-06-22 DIAGNOSIS — Z79.4 TYPE 2 DIABETES MELLITUS WITH HYPERGLYCEMIA, WITH LONG-TERM CURRENT USE OF INSULIN (MULTI): Primary | ICD-10-CM

## 2023-06-22 DIAGNOSIS — E11.65 TYPE 2 DIABETES MELLITUS WITH HYPERGLYCEMIA, WITH LONG-TERM CURRENT USE OF INSULIN (MULTI): Primary | ICD-10-CM

## 2023-06-22 DIAGNOSIS — G47.33 OBSTRUCTIVE SLEEP APNEA: ICD-10-CM

## 2023-06-22 DIAGNOSIS — J18.9 COMMUNITY ACQUIRED PNEUMONIA OF LEFT LOWER LOBE OF LUNG: ICD-10-CM

## 2023-06-22 PROCEDURE — 3008F BODY MASS INDEX DOCD: CPT | Performed by: PHYSICIAN ASSISTANT

## 2023-06-22 PROCEDURE — 3052F HG A1C>EQUAL 8.0%<EQUAL 9.0%: CPT | Performed by: PHYSICIAN ASSISTANT

## 2023-06-22 PROCEDURE — 3080F DIAST BP >= 90 MM HG: CPT | Performed by: PHYSICIAN ASSISTANT

## 2023-06-22 PROCEDURE — 3074F SYST BP LT 130 MM HG: CPT | Performed by: PHYSICIAN ASSISTANT

## 2023-06-22 PROCEDURE — 99495 TRANSJ CARE MGMT MOD F2F 14D: CPT | Performed by: PHYSICIAN ASSISTANT

## 2023-06-22 PROCEDURE — 1036F TOBACCO NON-USER: CPT | Performed by: PHYSICIAN ASSISTANT

## 2023-06-22 PROCEDURE — 4010F ACE/ARB THERAPY RXD/TAKEN: CPT | Performed by: PHYSICIAN ASSISTANT

## 2023-06-22 RX ORDER — DAPAGLIFLOZIN 10 MG/1
TABLET, FILM COATED ORAL
Qty: 90 TABLET | Refills: 0 | Status: SHIPPED | OUTPATIENT
Start: 2023-06-22 | End: 2023-06-28

## 2023-06-22 RX ORDER — AZITHROMYCIN 250 MG/1
TABLET, FILM COATED ORAL
Qty: 6 TABLET | Refills: 0 | Status: SHIPPED | OUTPATIENT
Start: 2023-06-22 | End: 2023-06-27

## 2023-06-22 NOTE — PROGRESS NOTES
"Subjective   Patient ID: Ruth Zambrano is a 52 y.o. female who presents for Hospital Follow-up (Dr Fonseca pt here today for a [facility] follow up/Admit date: 6/8/2023/Discharge date : 06/12/23/Admission Diagnoses: Dehydration, Hypokalemia, Intractable vomiting with nausea /Dyspnea/Discharge Diagnoses: PNA LLL, abd pain, acute hypoxic respiratory failure//Since being out pt is feeling better and was told she had pneumonia in left lung as well. Still SOB, feels like she is breathing heavy; had oxygen in hospital but didn't go home on any. Pt stopped taking Doxycycline due to diarrhea ).    HPI     Thought she had the flu and legs were cramping, thought her potassium is low   - ER showed pO2 dropping   - Did lots of blood work   - CXR showed cardiomegaly   - Pneumonia in left lung   - Has follow up with cardio Dr. Fry - he said no CHF   - Was taking doxycycline for 4 days and then stopped due to yeast infection and bad diarrhea   - Dr. Fry saw her snoring loud and recommended sleep study   - recommend d/c actos   - She stopped the Mounjaro - was too sick on it (7.5 mg)       Review of Systems    Objective   /90   Pulse 84   Temp 36.4 °C (97.5 °F)   Resp 16   Ht 1.6 m (5' 3\")   Wt 90.3 kg (199 lb)   SpO2 96%   BMI 35.25 kg/m²     Physical Exam  Constitutional:       Appearance: Normal appearance.   Cardiovascular:      Rate and Rhythm: Normal rate and regular rhythm.   Pulmonary:      Effort: Pulmonary effort is normal.      Breath sounds: Rales (left lower lung field) present.   Neurological:      Mental Status: She is alert.         Assessment/Plan   Problem List Items Addressed This Visit       Obstructive sleep apnea    Relevant Orders    In-Center Sleep Study (Non-Sleep Provider Only)    Type 2 diabetes mellitus with hyperglycemia, with long-term current use of insulin (CMS/[facility]) - Primary    Relevant Medications    dapagliflozin propanediol (Farxiga) 10 mg     Other Visit Diagnoses  "      Community acquired pneumonia of left lower lobe of lung        Relevant Medications    azithromycin (Zithromax) 250 mg tablet        Stop the Actos as concern for fluid retention (cardiomegaly on CXR)   Start Farxiga and continue with insulin and dietary changes. Follow up with Dr. Fonseca in 3 months for A1c/ T2DM check up.   Follow up with cardiologist, Dr. Fry as scheduled   Needs sleep study - ordered today   Zpack to complete tx for the community acquired pneumonia (she stopped the doxycycline too soon because was having too many side effects)

## 2023-06-28 ENCOUNTER — TELEPHONE (OUTPATIENT)
Dept: PRIMARY CARE | Facility: CLINIC | Age: 53
End: 2023-06-28
Payer: COMMERCIAL

## 2023-06-28 DIAGNOSIS — E11.65 TYPE 2 DIABETES MELLITUS WITH HYPERGLYCEMIA, WITH LONG-TERM CURRENT USE OF INSULIN (MULTI): ICD-10-CM

## 2023-06-28 DIAGNOSIS — Z79.4 TYPE 2 DIABETES MELLITUS WITH HYPERGLYCEMIA, WITH LONG-TERM CURRENT USE OF INSULIN (MULTI): ICD-10-CM

## 2023-07-12 PROBLEM — E86.0 DEHYDRATION: Status: RESOLVED | Noted: 2023-06-08 | Resolved: 2023-07-12

## 2023-07-13 ENCOUNTER — OFFICE VISIT (OUTPATIENT)
Dept: FAMILY MEDICINE CLINIC | Age: 53
End: 2023-07-13
Payer: COMMERCIAL

## 2023-07-13 VITALS
HEIGHT: 63 IN | TEMPERATURE: 98.7 F | WEIGHT: 202.5 LBS | BODY MASS INDEX: 35.88 KG/M2 | DIASTOLIC BLOOD PRESSURE: 80 MMHG | HEART RATE: 85 BPM | OXYGEN SATURATION: 96 % | SYSTOLIC BLOOD PRESSURE: 124 MMHG

## 2023-07-13 DIAGNOSIS — B96.89 BACTERIAL CONJUNCTIVITIS OF BOTH EYES: Primary | ICD-10-CM

## 2023-07-13 DIAGNOSIS — H10.9 BACTERIAL CONJUNCTIVITIS OF BOTH EYES: Primary | ICD-10-CM

## 2023-07-13 PROCEDURE — 3074F SYST BP LT 130 MM HG: CPT | Performed by: PHYSICIAN ASSISTANT

## 2023-07-13 PROCEDURE — 3079F DIAST BP 80-89 MM HG: CPT | Performed by: PHYSICIAN ASSISTANT

## 2023-07-13 PROCEDURE — 99213 OFFICE O/P EST LOW 20 MIN: CPT | Performed by: PHYSICIAN ASSISTANT

## 2023-07-13 RX ORDER — CIPROFLOXACIN HYDROCHLORIDE 3.5 MG/ML
SOLUTION/ DROPS TOPICAL
Qty: 10 ML | Refills: 0 | Status: SHIPPED | OUTPATIENT
Start: 2023-07-13

## 2023-07-13 SDOH — ECONOMIC STABILITY: HOUSING INSECURITY
IN THE LAST 12 MONTHS, WAS THERE A TIME WHEN YOU DID NOT HAVE A STEADY PLACE TO SLEEP OR SLEPT IN A SHELTER (INCLUDING NOW)?: NO

## 2023-07-13 SDOH — ECONOMIC STABILITY: FOOD INSECURITY: WITHIN THE PAST 12 MONTHS, THE FOOD YOU BOUGHT JUST DIDN'T LAST AND YOU DIDN'T HAVE MONEY TO GET MORE.: SOMETIMES TRUE

## 2023-07-13 SDOH — ECONOMIC STABILITY: INCOME INSECURITY: HOW HARD IS IT FOR YOU TO PAY FOR THE VERY BASICS LIKE FOOD, HOUSING, MEDICAL CARE, AND HEATING?: SOMEWHAT HARD

## 2023-07-13 SDOH — ECONOMIC STABILITY: FOOD INSECURITY: WITHIN THE PAST 12 MONTHS, YOU WORRIED THAT YOUR FOOD WOULD RUN OUT BEFORE YOU GOT MONEY TO BUY MORE.: SOMETIMES TRUE

## 2023-07-13 ASSESSMENT — PATIENT HEALTH QUESTIONNAIRE - PHQ9
8. MOVING OR SPEAKING SO SLOWLY THAT OTHER PEOPLE COULD HAVE NOTICED. OR THE OPPOSITE, BEING SO FIGETY OR RESTLESS THAT YOU HAVE BEEN MOVING AROUND A LOT MORE THAN USUAL: 0
4. FEELING TIRED OR HAVING LITTLE ENERGY: 0
SUM OF ALL RESPONSES TO PHQ QUESTIONS 1-9: 0
SUM OF ALL RESPONSES TO PHQ QUESTIONS 1-9: 0
3. TROUBLE FALLING OR STAYING ASLEEP: 0
6. FEELING BAD ABOUT YOURSELF - OR THAT YOU ARE A FAILURE OR HAVE LET YOURSELF OR YOUR FAMILY DOWN: 0
SUM OF ALL RESPONSES TO PHQ QUESTIONS 1-9: 0
9. THOUGHTS THAT YOU WOULD BE BETTER OFF DEAD, OR OF HURTING YOURSELF: 0
SUM OF ALL RESPONSES TO PHQ9 QUESTIONS 1 & 2: 0
1. LITTLE INTEREST OR PLEASURE IN DOING THINGS: 0
7. TROUBLE CONCENTRATING ON THINGS, SUCH AS READING THE NEWSPAPER OR WATCHING TELEVISION: 0
2. FEELING DOWN, DEPRESSED OR HOPELESS: 0
5. POOR APPETITE OR OVEREATING: 0
10. IF YOU CHECKED OFF ANY PROBLEMS, HOW DIFFICULT HAVE THESE PROBLEMS MADE IT FOR YOU TO DO YOUR WORK, TAKE CARE OF THINGS AT HOME, OR GET ALONG WITH OTHER PEOPLE: 0
SUM OF ALL RESPONSES TO PHQ QUESTIONS 1-9: 0

## 2023-07-13 ASSESSMENT — ENCOUNTER SYMPTOMS
EYE PAIN: 1
EYE ITCHING: 1
GASTROINTESTINAL NEGATIVE: 1
EYE REDNESS: 1
RESPIRATORY NEGATIVE: 1
EYE DISCHARGE: 1

## 2023-07-21 ENCOUNTER — OFFICE VISIT (OUTPATIENT)
Dept: CARDIOLOGY CLINIC | Age: 53
End: 2023-07-21
Payer: COMMERCIAL

## 2023-07-21 VITALS
HEIGHT: 63 IN | DIASTOLIC BLOOD PRESSURE: 72 MMHG | WEIGHT: 200.4 LBS | SYSTOLIC BLOOD PRESSURE: 116 MMHG | HEART RATE: 87 BPM | OXYGEN SATURATION: 97 % | BODY MASS INDEX: 35.51 KG/M2

## 2023-07-21 DIAGNOSIS — E78.5 DYSLIPIDEMIA: ICD-10-CM

## 2023-07-21 DIAGNOSIS — G47.33 OSA (OBSTRUCTIVE SLEEP APNEA): ICD-10-CM

## 2023-07-21 DIAGNOSIS — R06.09 DOE (DYSPNEA ON EXERTION): ICD-10-CM

## 2023-07-21 DIAGNOSIS — E78.2 MIXED HYPERLIPIDEMIA: Primary | Chronic | ICD-10-CM

## 2023-07-21 DIAGNOSIS — I10 ESSENTIAL HYPERTENSION: Chronic | ICD-10-CM

## 2023-07-21 PROCEDURE — 99214 OFFICE O/P EST MOD 30 MIN: CPT | Performed by: INTERNAL MEDICINE

## 2023-07-21 PROCEDURE — 3078F DIAST BP <80 MM HG: CPT | Performed by: INTERNAL MEDICINE

## 2023-07-21 PROCEDURE — 3074F SYST BP LT 130 MM HG: CPT | Performed by: INTERNAL MEDICINE

## 2023-07-21 PROCEDURE — 3017F COLORECTAL CA SCREEN DOC REV: CPT | Performed by: INTERNAL MEDICINE

## 2023-07-21 PROCEDURE — 1036F TOBACCO NON-USER: CPT | Performed by: INTERNAL MEDICINE

## 2023-07-21 PROCEDURE — G8427 DOCREV CUR MEDS BY ELIG CLIN: HCPCS | Performed by: INTERNAL MEDICINE

## 2023-07-21 PROCEDURE — G8417 CALC BMI ABV UP PARAM F/U: HCPCS | Performed by: INTERNAL MEDICINE

## 2023-07-21 RX ORDER — EMPAGLIFLOZIN 25 MG/1
TABLET, FILM COATED ORAL
COMMUNITY
Start: 2023-07-02

## 2023-07-21 RX ORDER — TIRZEPATIDE 7.5 MG/.5ML
INJECTION, SOLUTION SUBCUTANEOUS
COMMUNITY
Start: 2023-05-11

## 2023-07-21 ASSESSMENT — ENCOUNTER SYMPTOMS
COUGH: 0
SHORTNESS OF BREATH: 0
GASTROINTESTINAL NEGATIVE: 1
RESPIRATORY NEGATIVE: 1
WHEEZING: 0
STRIDOR: 0
NAUSEA: 0
CHEST TIGHTNESS: 0
BLOOD IN STOOL: 0
EYES NEGATIVE: 1

## 2023-07-21 NOTE — PROGRESS NOTES
Subsequent Progress Note  Patient: Cortney Pena  YOB: 1970  MRN: [id number]    Chief Complaint: HTN cp sob   Chief Complaint   Patient presents with    Follow-Up from [address]: 6/8-6/12 for hypokalemia       CV Data:  2008 Cath negative  8/2016 spect negative   1/21 spect negative  6/23 Echo EF 60      Subjective/HPI: no cp breathing ok no falls no bleed     2/3/21 TELEHEALTH EVALUATION -- Audio/Visual (During AZBWQ-00 public health emergency)    Less sob no cp no falls no bleed takes meds    7/21/23 recent hosp for N/V hypoK palps. HERNANDEZ. Had PNA. Finished ABX. Still HERNANDEZ. No cp no falls no bleed. strong FH  Nonsmoker  Work - [employer] living in [facility]       EKG:     Past Medical History:   Diagnosis Date    CAD (coronary artery disease) 6/25/2022    CAD (coronary artery disease) 6/25/2022    Depression     Environmental allergies     Gastroparesis     GERD (gastroesophageal reflux disease)     Headache     Dr. India Baugh    HPV in female     Hyperlipidemia     Hypertension     Leukocytosis     Migraines     Type II or unspecified type diabetes mellitus without mention of complication, not stated as uncontrolled        Past Surgical History:   Procedure Laterality Date    BONE MARROW BIOPSY  2012    (-)[facility]    BREAST [address]  2009    (-)[address]  2012?     GALLBLADDER SURGERY  1999       Family History   Problem Relation Age of Onset    Heart Disease Mother     Diabetes Father     Heart Disease Father     Cancer Father         thyroid    No Known Problems Sister     No Known Problems Paternal Grandfather     No Known Problems Paternal Grandmother     No Known Problems Maternal Grandmother     No Known Problems Maternal Grandfather     No Known Problems Brother     No Known Problems Other     Breast Cancer Neg Hx     Colon Cancer Neg Hx

## 2023-07-24 ENCOUNTER — TRANSCRIBE ORDERS (OUTPATIENT)
Dept: ADMINISTRATIVE | Age: 53
End: 2023-07-24

## 2023-07-24 DIAGNOSIS — R06.09 DYSPNEA ON EXERTION: Primary | ICD-10-CM

## 2023-08-01 ENCOUNTER — TELEPHONE (OUTPATIENT)
Dept: PRIMARY CARE | Facility: CLINIC | Age: 53
End: 2023-08-01
Payer: COMMERCIAL

## 2023-08-01 DIAGNOSIS — G47.33 OBSTRUCTIVE SLEEP APNEA: Primary | ICD-10-CM

## 2023-08-01 NOTE — TELEPHONE ENCOUNTER
Patient insurance denied in lab sleep study because it is not medically necessary but may approve at home sleep study.

## 2023-08-15 ENCOUNTER — APPOINTMENT (OUTPATIENT)
Dept: PRIMARY CARE | Facility: CLINIC | Age: 53
End: 2023-08-15
Payer: COMMERCIAL

## 2023-08-16 ENCOUNTER — HOSPITAL ENCOUNTER (OUTPATIENT)
Dept: NUCLEAR MEDICINE | Age: 53
Discharge: HOME | End: 2023-08-18
Attending: INTERNAL MEDICINE
Payer: COMMERCIAL

## 2023-08-16 ENCOUNTER — HOSPITAL ENCOUNTER (OUTPATIENT)
Age: 53
Discharge: HOME | End: 2023-08-18
Attending: INTERNAL MEDICINE

## 2023-08-16 DIAGNOSIS — R06.09 DYSPNEA ON EXERTION: ICD-10-CM

## 2023-08-16 LAB
NUC REST EJECTION FRACTION: 73 %
STRESS BASELINE DIAS BP: 116 MMHG
STRESS BASELINE HR: 82 BPM
STRESS BASELINE ST DEPRESSION: 0 MM
STRESS BASELINE SYS BP: 142 MMHG
STRESS ESTIMATED WORKLOAD: 1 METS
STRESS PEAK DIAS BP: 116 MMHG
STRESS PEAK SYS BP: 142 MMHG
STRESS PERCENT HR ACHIEVED: 59 %
STRESS POST PEAK HR: 100 BPM
STRESS RATE PRESSURE PRODUCT: NORMAL BPM*MMHG
STRESS ST DEPRESSION: 0 MM
STRESS TARGET HR: 169 BPM
TID: 1.12

## 2023-08-16 PROCEDURE — 3430000000 HC RX DIAGNOSTIC RADIOPHARMACEUTICAL: Performed by: INTERNAL MEDICINE

## 2023-08-16 PROCEDURE — 6360000002 HC RX W HCPCS: Performed by: INTERNAL MEDICINE

## 2023-08-16 PROCEDURE — 78452 HT MUSCLE IMAGE SPECT MULT: CPT

## 2023-08-16 PROCEDURE — A9502 TC99M TETROFOSMIN: HCPCS | Performed by: INTERNAL MEDICINE

## 2023-08-16 PROCEDURE — 93017 CV STRESS TEST TRACING ONLY: CPT

## 2023-08-16 PROCEDURE — 2580000003 HC RX 258: Performed by: INTERNAL MEDICINE

## 2023-08-16 RX ORDER — REGADENOSON 0.08 MG/ML
0.4 INJECTION, SOLUTION INTRAVENOUS
Status: COMPLETED | OUTPATIENT
Start: 2023-08-16 | End: 2023-08-16

## 2023-08-16 RX ORDER — SODIUM CHLORIDE 0.9 % (FLUSH) 0.9 %
10 SYRINGE (ML) INJECTION PRN
Status: DISCONTINUED | OUTPATIENT
Start: 2023-08-16 | End: 2023-08-19 | Stop reason: HOSPADM

## 2023-08-16 RX ADMIN — Medication 10 ML: at 10:36

## 2023-08-16 RX ADMIN — TETROFOSMIN 30.6 MILLICURIE: 1.38 INJECTION, POWDER, LYOPHILIZED, FOR SOLUTION INTRAVENOUS at 10:35

## 2023-08-16 RX ADMIN — REGADENOSON 0.4 MG: 0.08 INJECTION, SOLUTION INTRAVENOUS at 10:35

## 2023-08-16 RX ADMIN — TETROFOSMIN 11.6 MILLICURIE: 1.38 INJECTION, POWDER, LYOPHILIZED, FOR SOLUTION INTRAVENOUS at 08:41

## 2023-08-16 RX ADMIN — Medication 10 ML: at 10:35

## 2023-08-16 RX ADMIN — Medication 10 ML: at 08:41

## 2023-09-06 ENCOUNTER — OFFICE VISIT (OUTPATIENT)
Dept: FAMILY MEDICINE CLINIC | Age: 53
End: 2023-09-06
Payer: COMMERCIAL

## 2023-09-06 VITALS
TEMPERATURE: 100 F | HEART RATE: 112 BPM | WEIGHT: 198 LBS | SYSTOLIC BLOOD PRESSURE: 160 MMHG | OXYGEN SATURATION: 98 % | HEIGHT: 63 IN | BODY MASS INDEX: 35.08 KG/M2 | DIASTOLIC BLOOD PRESSURE: 90 MMHG

## 2023-09-06 DIAGNOSIS — R06.02 SOB (SHORTNESS OF BREATH): ICD-10-CM

## 2023-09-06 DIAGNOSIS — J20.9 ACUTE BRONCHITIS, UNSPECIFIED ORGANISM: Primary | ICD-10-CM

## 2023-09-06 LAB
INFLUENZA A ANTIBODY: NEGATIVE
INFLUENZA B ANTIBODY: NEGATIVE
Lab: NORMAL
PERFORMING INSTRUMENT: NORMAL
QC PASS/FAIL: NORMAL
SARS-COV-2, POC: NORMAL

## 2023-09-06 PROCEDURE — 3077F SYST BP >= 140 MM HG: CPT | Performed by: NURSE PRACTITIONER

## 2023-09-06 PROCEDURE — 99213 OFFICE O/P EST LOW 20 MIN: CPT | Performed by: NURSE PRACTITIONER

## 2023-09-06 PROCEDURE — 87426 SARSCOV CORONAVIRUS AG IA: CPT | Performed by: NURSE PRACTITIONER

## 2023-09-06 PROCEDURE — 1036F TOBACCO NON-USER: CPT | Performed by: NURSE PRACTITIONER

## 2023-09-06 PROCEDURE — 3080F DIAST BP >= 90 MM HG: CPT | Performed by: NURSE PRACTITIONER

## 2023-09-06 PROCEDURE — G8427 DOCREV CUR MEDS BY ELIG CLIN: HCPCS | Performed by: NURSE PRACTITIONER

## 2023-09-06 PROCEDURE — G8417 CALC BMI ABV UP PARAM F/U: HCPCS | Performed by: NURSE PRACTITIONER

## 2023-09-06 PROCEDURE — 87804 INFLUENZA ASSAY W/OPTIC: CPT | Performed by: NURSE PRACTITIONER

## 2023-09-06 PROCEDURE — 3017F COLORECTAL CA SCREEN DOC REV: CPT | Performed by: NURSE PRACTITIONER

## 2023-09-06 RX ORDER — ALBUTEROL SULFATE 90 UG/1
2 AEROSOL, METERED RESPIRATORY (INHALATION) EVERY 6 HOURS PRN
Qty: 1 EACH | Refills: 0 | Status: SHIPPED | OUTPATIENT
Start: 2023-09-06

## 2023-09-06 RX ORDER — PREDNISONE 10 MG/1
TABLET ORAL
Qty: 36 TABLET | Refills: 0 | Status: SHIPPED | OUTPATIENT
Start: 2023-09-06

## 2023-09-06 RX ORDER — GUAIFENESIN 600 MG/1
1200 TABLET, EXTENDED RELEASE ORAL 2 TIMES DAILY
Qty: 40 TABLET | Refills: 0 | Status: SHIPPED | OUTPATIENT
Start: 2023-09-06 | End: 2023-09-16

## 2023-09-06 RX ORDER — DEXTROMETHORPHAN HYDROBROMIDE AND PROMETHAZINE HYDROCHLORIDE 15; 6.25 MG/5ML; MG/5ML
5 SYRUP ORAL 4 TIMES DAILY PRN
Qty: 118 ML | Refills: 0 | Status: SHIPPED | OUTPATIENT
Start: 2023-09-06

## 2023-09-06 RX ORDER — BENZONATATE 200 MG/1
200 CAPSULE ORAL 3 TIMES DAILY PRN
Qty: 30 CAPSULE | Refills: 0 | Status: SHIPPED | OUTPATIENT
Start: 2023-09-06 | End: 2023-09-16

## 2023-09-06 ASSESSMENT — ENCOUNTER SYMPTOMS
NAUSEA: 0
VOMITING: 0
WHEEZING: 0
RHINORRHEA: 0
DIARRHEA: 0
COUGH: 1
SHORTNESS OF BREATH: 1
SORE THROAT: 0
CHEST TIGHTNESS: 1

## 2023-09-06 NOTE — PROGRESS NOTES
dizziness and nausea. Diagnoses and all orders for this visit:    Acute bronchitis, unspecified organism  -     POCT COVID-19, Antigen  -     POCT Influenza A/B  -     benzonatate (TESSALON) 200 MG capsule; Take 1 capsule by mouth 3 times daily as needed for Cough  -     predniSONE (DELTASONE) 10 MG tablet; Take 5 tabs daily for 2 days then 4 tabs daily for 3 days then 3 tabs daily for 3 days then 2 tabs daily for 2 days then 1 tab daily once  -     promethazine-dextromethorphan (PROMETHAZINE-DM) 6.25-15 MG/5ML syrup; Take 5 mLs by mouth 4 times daily as needed for Cough  -     guaiFENesin (MUCINEX) 600 MG extended release tablet; Take 2 tablets by mouth 2 times daily for 10 days    SOB (shortness of breath)  -     albuterol sulfate HFA (PROVENTIL;VENTOLIN;PROAIR) 108 (90 Base) MCG/ACT inhaler; Inhale 2 puffs into the lungs every 6 hours as needed for Wheezing or Shortness of Breath      Orders Placed This Encounter   Procedures    POCT COVID-19, Antigen     Order Specific Question:   Employed in healthcare setting? Answer:   Yes     Order Specific Question:   Resident in a congregate (group) care setting? Answer:   Yes     Order Specific Question:   Pregnant? Answer:   No     Order Specific Question:   Previously tested for COVID-19?      Answer:   Yes    POCT Influenza A/B     Orders Placed This Encounter   Medications    benzonatate (TESSALON) 200 MG capsule     Sig: Take 1 capsule by mouth 3 times daily as needed for Cough     Dispense:  30 capsule     Refill:  0    predniSONE (DELTASONE) 10 MG tablet     Sig: Take 5 tabs daily for 2 days then 4 tabs daily for 3 days then 3 tabs daily for 3 days then 2 tabs daily for 2 days then 1 tab daily once     Dispense:  36 tablet     Refill:  0    promethazine-dextromethorphan (PROMETHAZINE-DM) 6.25-15 MG/5ML syrup     Sig: Take 5 mLs by mouth 4 times daily as needed for Cough     Dispense:  118 mL     Refill:  0    guaiFENesin (MUCINEX) 600 MG extended

## 2023-09-08 ENCOUNTER — TELEPHONE (OUTPATIENT)
Dept: FAMILY MEDICINE CLINIC | Age: 53
End: 2023-09-08

## 2023-09-08 DIAGNOSIS — U07.1 COVID-19: Primary | ICD-10-CM

## 2023-09-08 NOTE — TELEPHONE ENCOUNTER
Patient tested positive for Covid last night (9/7/2023) and is requesting antiviral medication. Please send to [facility] on [address] in [place]. Patient states that her symptoms are improving from what was prescribed previously.     # [phone]

## 2023-09-28 ENCOUNTER — APPOINTMENT (OUTPATIENT)
Dept: PRIMARY CARE | Facility: CLINIC | Age: 53
End: 2023-09-28
Payer: COMMERCIAL

## 2023-10-05 ENCOUNTER — HOSPITAL ENCOUNTER (OUTPATIENT)
Dept: GENERAL RADIOLOGY | Age: 53
Discharge: HOME | End: 2023-10-07
Attending: PODIATRIST
Payer: COMMERCIAL

## 2023-10-05 ENCOUNTER — TRANSCRIBE ORDERS (OUTPATIENT)
Dept: GENERAL RADIOLOGY | Age: 53
End: 2023-10-05

## 2023-10-05 DIAGNOSIS — L97.519 ULCER OF RIGHT FOOT, UNSPECIFIED ULCER STAGE (HCC): Primary | ICD-10-CM

## 2023-10-05 DIAGNOSIS — L97.519 ULCER OF RIGHT FOOT, UNSPECIFIED ULCER STAGE (HCC): ICD-10-CM

## 2023-10-05 PROCEDURE — 73630 X-RAY EXAM OF FOOT: CPT

## 2023-10-19 ENCOUNTER — APPOINTMENT (OUTPATIENT)
Dept: GENERAL RADIOLOGY | Age: 53
DRG: 639 | End: 2023-10-19
Payer: COMMERCIAL

## 2023-10-19 ENCOUNTER — HOSPITAL ENCOUNTER (INPATIENT)
Age: 53
LOS: 1 days | Discharge: ANOTHER HOSPITAL | DRG: 639 | End: 2023-10-20
Attending: INTERNAL MEDICINE | Admitting: INTERNAL MEDICINE
Payer: COMMERCIAL

## 2023-10-19 DIAGNOSIS — E11.621 DIABETIC ULCER OF TOE OF RIGHT FOOT ASSOCIATED WITH TYPE 2 DIABETES MELLITUS, UNSPECIFIED ULCER STAGE (HCC): Primary | ICD-10-CM

## 2023-10-19 DIAGNOSIS — Z79.4 TYPE 2 DIABETES MELLITUS WITH HYPERGLYCEMIA, WITH LONG-TERM CURRENT USE OF INSULIN (HCC): ICD-10-CM

## 2023-10-19 DIAGNOSIS — L97.519 DIABETIC ULCER OF TOE OF RIGHT FOOT ASSOCIATED WITH TYPE 2 DIABETES MELLITUS, UNSPECIFIED ULCER STAGE (HCC): Primary | ICD-10-CM

## 2023-10-19 DIAGNOSIS — E11.65 TYPE 2 DIABETES MELLITUS WITH HYPERGLYCEMIA, WITH LONG-TERM CURRENT USE OF INSULIN (HCC): ICD-10-CM

## 2023-10-19 PROBLEM — L08.9 DIABETIC FOOT INFECTION (HCC): Status: ACTIVE | Noted: 2023-10-19

## 2023-10-19 PROBLEM — E11.628 DIABETIC FOOT INFECTION (HCC): Status: ACTIVE | Noted: 2023-10-19

## 2023-10-19 LAB
ALBUMIN SERPL-MCNC: 4 G/DL (ref 3.5–4.6)
ALP SERPL-CCNC: 154 U/L (ref 40–130)
ALT SERPL-CCNC: 15 U/L (ref 0–33)
ANION GAP SERPL CALCULATED.3IONS-SCNC: 13 MEQ/L (ref 9–15)
AST SERPL-CCNC: 20 U/L (ref 0–35)
B-OH-BUTYR SERPL-SCNC: 2.6 MG/DL (ref 0.2–2.8)
BASE EXCESS VENOUS: 3 (ref -3–3)
BASOPHILS # BLD: 0 K/UL (ref 0–0.2)
BASOPHILS NFR BLD: 0.5 %
BILIRUB SERPL-MCNC: 0.3 MG/DL (ref 0.2–0.7)
BUN SERPL-MCNC: 7 MG/DL (ref 6–20)
CALCIUM IONIZED: 1.17 MMOL/L (ref 1.12–1.32)
CALCIUM SERPL-MCNC: 9.8 MG/DL (ref 8.5–9.9)
CHLORIDE SERPL-SCNC: 90 MEQ/L (ref 95–107)
CHP ED QC CHECK: NORMAL
CHP ED QC CHECK: NORMAL
CO2 SERPL-SCNC: 26 MEQ/L (ref 20–31)
CREAT SERPL-MCNC: 0.43 MG/DL (ref 0.5–0.9)
CRP SERPL HS-MCNC: 127.4 MG/L (ref 0–5)
EOSINOPHIL # BLD: 0 K/UL (ref 0–0.7)
EOSINOPHIL NFR BLD: 0.1 %
ERYTHROCYTE [DISTWIDTH] IN BLOOD BY AUTOMATED COUNT: 12.7 % (ref 11.5–14.5)
ERYTHROCYTE [SEDIMENTATION RATE] IN BLOOD BY WESTERGREN METHOD: 62 MM (ref 0–30)
GLOBULIN SER CALC-MCNC: 4 G/DL (ref 2.3–3.5)
GLUCOSE BLD-MCNC: 345 MG/DL
GLUCOSE BLD-MCNC: 345 MG/DL (ref 70–99)
GLUCOSE BLD-MCNC: 378 MG/DL (ref 70–99)
GLUCOSE BLD-MCNC: 419 MG/DL (ref 70–99)
GLUCOSE BLD-MCNC: 458 MG/DL
GLUCOSE BLD-MCNC: 458 MG/DL (ref 70–99)
GLUCOSE SERPL-MCNC: 506 MG/DL (ref 70–99)
HCO3 VENOUS: 27.4 MMOL/L (ref 23–29)
HCT VFR BLD AUTO: 44.4 % (ref 37–47)
HCT VFR BLD AUTO: 50 % (ref 36–48)
HGB BLD CALC-MCNC: 17.1 GM/DL (ref 12–16)
HGB BLD-MCNC: 14.8 G/DL (ref 12–16)
LACTATE BLDV-SCNC: 2.9 MMOL/L (ref 0.5–2.2)
LACTATE: 1.62 MMOL/L (ref 0.4–2)
LYMPHOCYTES # BLD: 1.4 K/UL (ref 1–4.8)
LYMPHOCYTES NFR BLD: 15.9 %
MCH RBC QN AUTO: 29.8 PG (ref 27–31.3)
MCHC RBC AUTO-ENTMCNC: 33.3 % (ref 33–37)
MCV RBC AUTO: 89.5 FL (ref 79.4–94.8)
MONOCYTES # BLD: 0.7 K/UL (ref 0.2–0.8)
MONOCYTES NFR BLD: 7.8 %
NEUTROPHILS # BLD: 6.5 K/UL (ref 1.4–6.5)
NEUTS SEG NFR BLD: 75.2 %
O2 SAT, VEN: 66 %
PCO2, VEN: 42.9 MM HG (ref 40–50)
PERFORMED ON: ABNORMAL
PH VENOUS: 7.41 (ref 7.32–7.42)
PLATELET # BLD AUTO: 252 K/UL (ref 130–400)
PO2, VEN: 34 MM HG
POC CHLORIDE: 100 MEQ/L (ref 99–110)
POC CREATININE: <0.3 MG/DL (ref 0.6–1.2)
POC FIO2: 21
POC SAMPLE TYPE: ABNORMAL
POTASSIUM SERPL-SCNC: 3.1 MEQ/L (ref 3.4–4.9)
POTASSIUM SERPL-SCNC: 3.1 MEQ/L (ref 3.5–5.1)
PROT SERPL-MCNC: 8 G/DL (ref 6.3–8)
RBC # BLD AUTO: 4.96 M/UL (ref 4.2–5.4)
SODIUM BLD-SCNC: 135 MEQ/L (ref 136–145)
SODIUM SERPL-SCNC: 129 MEQ/L (ref 135–144)
TCO2 CALC VENOUS: 29 MMOL/L
WBC # BLD AUTO: 8.6 K/UL (ref 4.8–10.8)

## 2023-10-19 PROCEDURE — 2580000003 HC RX 258

## 2023-10-19 PROCEDURE — 87040 BLOOD CULTURE FOR BACTERIA: CPT

## 2023-10-19 PROCEDURE — 82330 ASSAY OF CALCIUM: CPT

## 2023-10-19 PROCEDURE — 80053 COMPREHEN METABOLIC PANEL: CPT

## 2023-10-19 PROCEDURE — 87070 CULTURE OTHR SPECIMN AEROBIC: CPT

## 2023-10-19 PROCEDURE — 85025 COMPLETE CBC W/AUTO DIFF WBC: CPT

## 2023-10-19 PROCEDURE — 96374 THER/PROPH/DIAG INJ IV PUSH: CPT

## 2023-10-19 PROCEDURE — 96375 TX/PRO/DX INJ NEW DRUG ADDON: CPT

## 2023-10-19 PROCEDURE — 86403 PARTICLE AGGLUT ANTBDY SCRN: CPT

## 2023-10-19 PROCEDURE — 83605 ASSAY OF LACTIC ACID: CPT

## 2023-10-19 PROCEDURE — 36415 COLL VENOUS BLD VENIPUNCTURE: CPT

## 2023-10-19 PROCEDURE — 6360000002 HC RX W HCPCS

## 2023-10-19 PROCEDURE — 96368 THER/DIAG CONCURRENT INF: CPT

## 2023-10-19 PROCEDURE — 82435 ASSAY OF BLOOD CHLORIDE: CPT

## 2023-10-19 PROCEDURE — 85014 HEMATOCRIT: CPT

## 2023-10-19 PROCEDURE — 1200000000 HC SEMI PRIVATE

## 2023-10-19 PROCEDURE — 6370000000 HC RX 637 (ALT 250 FOR IP)

## 2023-10-19 PROCEDURE — G0378 HOSPITAL OBSERVATION PER HR: HCPCS

## 2023-10-19 PROCEDURE — 84295 ASSAY OF SERUM SODIUM: CPT

## 2023-10-19 PROCEDURE — 85652 RBC SED RATE AUTOMATED: CPT

## 2023-10-19 PROCEDURE — 93005 ELECTROCARDIOGRAM TRACING: CPT

## 2023-10-19 PROCEDURE — 73630 X-RAY EXAM OF FOOT: CPT

## 2023-10-19 PROCEDURE — 36600 WITHDRAWAL OF ARTERIAL BLOOD: CPT

## 2023-10-19 PROCEDURE — 82803 BLOOD GASES ANY COMBINATION: CPT

## 2023-10-19 PROCEDURE — 82010 KETONE BODYS QUAN: CPT

## 2023-10-19 PROCEDURE — 84132 ASSAY OF SERUM POTASSIUM: CPT

## 2023-10-19 PROCEDURE — 6370000000 HC RX 637 (ALT 250 FOR IP): Performed by: NURSE PRACTITIONER

## 2023-10-19 PROCEDURE — 82565 ASSAY OF CREATININE: CPT

## 2023-10-19 PROCEDURE — 96365 THER/PROPH/DIAG IV INF INIT: CPT

## 2023-10-19 PROCEDURE — 96361 HYDRATE IV INFUSION ADD-ON: CPT

## 2023-10-19 PROCEDURE — 86140 C-REACTIVE PROTEIN: CPT

## 2023-10-19 PROCEDURE — 82948 REAGENT STRIP/BLOOD GLUCOSE: CPT

## 2023-10-19 PROCEDURE — 99285 EMERGENCY DEPT VISIT HI MDM: CPT

## 2023-10-19 RX ORDER — INSULIN LISPRO 100 [IU]/ML
0-8 INJECTION, SOLUTION INTRAVENOUS; SUBCUTANEOUS
Status: DISCONTINUED | OUTPATIENT
Start: 2023-10-19 | End: 2023-10-20 | Stop reason: HOSPADM

## 2023-10-19 RX ORDER — ENOXAPARIN SODIUM 100 MG/ML
40 INJECTION SUBCUTANEOUS DAILY
Status: DISCONTINUED | OUTPATIENT
Start: 2023-10-20 | End: 2023-10-20 | Stop reason: HOSPADM

## 2023-10-19 RX ORDER — ACETAMINOPHEN 500 MG
1000 TABLET ORAL ONCE
Status: COMPLETED | OUTPATIENT
Start: 2023-10-19 | End: 2023-10-19

## 2023-10-19 RX ORDER — SODIUM CHLORIDE 0.9 % (FLUSH) 0.9 %
5-40 SYRINGE (ML) INJECTION PRN
Status: DISCONTINUED | OUTPATIENT
Start: 2023-10-19 | End: 2023-10-20 | Stop reason: HOSPADM

## 2023-10-19 RX ORDER — GLUCAGON 1 MG/ML
1 KIT INJECTION PRN
Status: DISCONTINUED | OUTPATIENT
Start: 2023-10-19 | End: 2023-10-20 | Stop reason: HOSPADM

## 2023-10-19 RX ORDER — INSULIN LISPRO 100 [IU]/ML
0-4 INJECTION, SOLUTION INTRAVENOUS; SUBCUTANEOUS NIGHTLY
Status: DISCONTINUED | OUTPATIENT
Start: 2023-10-19 | End: 2023-10-19

## 2023-10-19 RX ORDER — SODIUM CHLORIDE 9 MG/ML
INJECTION, SOLUTION INTRAVENOUS PRN
Status: DISCONTINUED | OUTPATIENT
Start: 2023-10-19 | End: 2023-10-20 | Stop reason: HOSPADM

## 2023-10-19 RX ORDER — DEXTROSE MONOHYDRATE 100 MG/ML
INJECTION, SOLUTION INTRAVENOUS CONTINUOUS PRN
Status: DISCONTINUED | OUTPATIENT
Start: 2023-10-19 | End: 2023-10-20 | Stop reason: HOSPADM

## 2023-10-19 RX ORDER — SODIUM CHLORIDE 9 MG/ML
INJECTION, SOLUTION INTRAVENOUS CONTINUOUS
Status: DISCONTINUED | OUTPATIENT
Start: 2023-10-19 | End: 2023-10-20 | Stop reason: HOSPADM

## 2023-10-19 RX ORDER — 0.9 % SODIUM CHLORIDE 0.9 %
1000 INTRAVENOUS SOLUTION INTRAVENOUS ONCE
Status: COMPLETED | OUTPATIENT
Start: 2023-10-19 | End: 2023-10-19

## 2023-10-19 RX ORDER — ONDANSETRON 4 MG/1
4 TABLET, ORALLY DISINTEGRATING ORAL EVERY 8 HOURS PRN
Status: DISCONTINUED | OUTPATIENT
Start: 2023-10-19 | End: 2023-10-20 | Stop reason: HOSPADM

## 2023-10-19 RX ORDER — SODIUM CHLORIDE 0.9 % (FLUSH) 0.9 %
5-40 SYRINGE (ML) INJECTION EVERY 12 HOURS SCHEDULED
Status: DISCONTINUED | OUTPATIENT
Start: 2023-10-19 | End: 2023-10-20 | Stop reason: HOSPADM

## 2023-10-19 RX ORDER — ACETAMINOPHEN 325 MG/1
650 TABLET ORAL EVERY 6 HOURS PRN
Status: DISCONTINUED | OUTPATIENT
Start: 2023-10-19 | End: 2023-10-20 | Stop reason: HOSPADM

## 2023-10-19 RX ORDER — POLYETHYLENE GLYCOL 3350 17 G/17G
17 POWDER, FOR SOLUTION ORAL DAILY PRN
Status: DISCONTINUED | OUTPATIENT
Start: 2023-10-19 | End: 2023-10-20 | Stop reason: HOSPADM

## 2023-10-19 RX ORDER — ONDANSETRON 2 MG/ML
4 INJECTION INTRAMUSCULAR; INTRAVENOUS EVERY 6 HOURS PRN
Status: DISCONTINUED | OUTPATIENT
Start: 2023-10-19 | End: 2023-10-20 | Stop reason: HOSPADM

## 2023-10-19 RX ORDER — INSULIN LISPRO 100 [IU]/ML
0-8 INJECTION, SOLUTION INTRAVENOUS; SUBCUTANEOUS
Status: DISCONTINUED | OUTPATIENT
Start: 2023-10-20 | End: 2023-10-19

## 2023-10-19 RX ORDER — ACETAMINOPHEN 650 MG/1
650 SUPPOSITORY RECTAL EVERY 6 HOURS PRN
Status: DISCONTINUED | OUTPATIENT
Start: 2023-10-19 | End: 2023-10-20 | Stop reason: HOSPADM

## 2023-10-19 RX ADMIN — PIPERACILLIN AND TAZOBACTAM 3375 MG: 3; .375 INJECTION, POWDER, LYOPHILIZED, FOR SOLUTION INTRAVENOUS at 23:31

## 2023-10-19 RX ADMIN — POTASSIUM BICARBONATE 25 MEQ: 978 TABLET, EFFERVESCENT ORAL at 19:16

## 2023-10-19 RX ADMIN — POTASSIUM BICARBONATE 20 MEQ: 782 TABLET, EFFERVESCENT ORAL at 23:31

## 2023-10-19 RX ADMIN — VANCOMYCIN HYDROCHLORIDE 1000 MG: 1 INJECTION, POWDER, LYOPHILIZED, FOR SOLUTION INTRAVENOUS at 23:27

## 2023-10-19 RX ADMIN — INSULIN HUMAN 10 UNITS: 100 INJECTION, SOLUTION PARENTERAL at 19:14

## 2023-10-19 RX ADMIN — SODIUM CHLORIDE 1000 ML: 9 INJECTION, SOLUTION INTRAVENOUS at 18:44

## 2023-10-19 RX ADMIN — ACETAMINOPHEN 1000 MG: 500 TABLET ORAL at 19:31

## 2023-10-19 ASSESSMENT — ENCOUNTER SYMPTOMS
NAUSEA: 0
RHINORRHEA: 0
SORE THROAT: 0
BACK PAIN: 0
VOMITING: 0
PHOTOPHOBIA: 0
EYES NEGATIVE: 1
SHORTNESS OF BREATH: 0
ALLERGIC/IMMUNOLOGIC NEGATIVE: 1
COLOR CHANGE: 1
WHEEZING: 0
ABDOMINAL PAIN: 0

## 2023-10-19 ASSESSMENT — PAIN - FUNCTIONAL ASSESSMENT: PAIN_FUNCTIONAL_ASSESSMENT: NONE - DENIES PAIN

## 2023-10-19 NOTE — ED NOTES
Collected blood work, wound culture sent to lab. Pt taken to Xray via wheel chair.        Kait Valentine RN  10/19/23 [id number]

## 2023-10-19 NOTE — ED PROVIDER NOTES
[facility] ED  EMERGENCY DEPARTMENT ENCOUNTER      Pt Name: Urbano Hernandez  MRN: [id number]  [address] [place] 1970  Date of evaluation: 10/19/2023  Provider: LEI Carcamo    CHIEF COMPLAINT       Chief Complaint   Patient presents with    Wound Infection     Rt foot, 2nd toe, DM         HISTORY OF PRESENT ILLNESS   (Location/Symptom, Timing/Onset, Context/Setting, Quality, Duration, Modifying Factors, Severity)  Note limiting factors. Urbano Hernandez is a 47 y.o. female whom per chart review has a PMHx of CAD, hyperlipidemia, hypertension, type II DM, GERD, anxiety, depression, gastroparesis presents to ED for evaluation of wound to R foot, second toe. Patient reports that she has had a diabetic ulcer to her R foot, second toe for the last 6 weeks. Reports that she has seen her podiatrist, Dr. Joe Reza and reports that she was supposed to be taking doxycycline 3 times daily x2 weeks and reports that she never took the medication. States that when she awoke this a.m., she noted redness, swelling, warmth to her R foot, second toe which she reports that she noted to extend down the dorsal surface of her R foot. Patient denies discharge, drainage from wound. Denies attempting to contact her podiatrist.  Patient verbalizes no associated symptoms. Denies injury, trauma. Patient denies chest pain, shortness of breath, N/V/D, fever, chills. HPI    Nursing Notes were reviewed. REVIEW OF SYSTEMS    (2-9 systems for level 4, 10 or more for level 5)     Review of Systems   Skin:  Positive for color change and wound. All other systems reviewed and are negative. Except as noted above the remainder of the review of systems was reviewed and negative.        PAST MEDICAL HISTORY     Past Medical History:   Diagnosis Date    CAD (coronary artery disease) 6/25/2022    CAD (coronary artery disease) 6/25/2022    Depression     Environmental allergies     Gastroparesis     GERD (gastroesophageal Addressed:  Diabetic ulcer of toe of right foot associated with type 2 diabetes mellitus, unspecified ulcer stage ([address]): acute illness or injury  Type 2 diabetes mellitus with hyperglycemia, with long-term current use of insulin ([address]): acute illness or injury    Amount and/or Complexity of Data Reviewed  Labs: ordered. ECG/medicine tests: ordered. Risk  OTC drugs. Prescription drug management. Decision regarding hospitalization. REASSESSMENT      CRITICAL CARE TIME   Total Critical Care time was 0 minutes, excluding separately reportable procedures. There was a high probability of clinically significant/life threatening deterioration in the patient's condition which required my urgent intervention. CONSULTS:  IP CONSULT TO PODIATRY    PROCEDURES:  Unless otherwise noted below, none     Procedures    No LOS Charge filed     FINAL IMPRESSION      1. Diabetic ulcer of toe of right foot associated with type 2 diabetes mellitus, unspecified ulcer stage ([address])    2. Type 2 diabetes mellitus with hyperglycemia, with long-term current use of insulin ([address])          DISPOSITION/PLAN   DISPOSITION Admitted 10/19/2023 09:18:12 PM      PATIENT REFERRED TO:  No follow-up provider specified. DISCHARGE MEDICATIONS:  New Prescriptions    No medications on file     Controlled Substances Monitoring:     RX Monitoring Acute Pain Prescriptions Periodic Controlled Substance Monitoring   10/28/2020  11:59 AM Prescription exceeds daily limit for a specific reason. See comments or note. Possible medication side effects, risk of tolerance/dependence & alternative treatments discussed. (Please note that portions of this note were completed with a voice recognition program.  Efforts were made to edit the dictations but occasionally words are mis-transcribed. )    LEI Billings (electronically signed)    My attending physician is Dr. Ant Casillas.       LEI Billings  10/19/23 9007

## 2023-10-20 ENCOUNTER — HOSPITAL ENCOUNTER (INPATIENT)
Age: 53
LOS: 7 days | Discharge: ANOTHER HOSPITAL | DRG: 638 | End: 2023-10-27
Attending: INTERNAL MEDICINE | Admitting: INTERNAL MEDICINE
Payer: COMMERCIAL

## 2023-10-20 ENCOUNTER — APPOINTMENT (OUTPATIENT)
Dept: MRI IMAGING | Age: 53
DRG: 639 | End: 2023-10-20
Payer: COMMERCIAL

## 2023-10-20 VITALS
WEIGHT: 180 LBS | TEMPERATURE: 98.2 F | DIASTOLIC BLOOD PRESSURE: 88 MMHG | BODY MASS INDEX: 31.89 KG/M2 | HEART RATE: 108 BPM | SYSTOLIC BLOOD PRESSURE: 135 MMHG | HEIGHT: 63 IN | OXYGEN SATURATION: 91 % | RESPIRATION RATE: 16 BRPM

## 2023-10-20 PROBLEM — L03.119 CELLULITIS OF FOOT: Status: ACTIVE | Noted: 2023-10-20

## 2023-10-20 LAB
ALBUMIN SERPL-MCNC: 3.6 G/DL (ref 3.5–4.6)
ALP SERPL-CCNC: 131 U/L (ref 40–130)
ALT SERPL-CCNC: 18 U/L (ref 0–33)
ANION GAP SERPL CALCULATED.3IONS-SCNC: 15 MEQ/L (ref 9–15)
AST SERPL-CCNC: 27 U/L (ref 0–35)
BASOPHILS # BLD: 0 K/UL (ref 0–0.2)
BASOPHILS NFR BLD: 0.5 %
BILIRUB SERPL-MCNC: 0.3 MG/DL (ref 0.2–0.7)
BUN SERPL-MCNC: 5 MG/DL (ref 6–20)
CALCIUM SERPL-MCNC: 8.8 MG/DL (ref 8.5–9.9)
CHLORIDE SERPL-SCNC: 95 MEQ/L (ref 95–107)
CO2 SERPL-SCNC: 23 MEQ/L (ref 20–31)
CREAT SERPL-MCNC: 0.3 MG/DL (ref 0.5–0.9)
EKG ATRIAL RATE: 112 BPM
EKG P AXIS: 31 DEGREES
EKG P-R INTERVAL: 150 MS
EKG Q-T INTERVAL: 326 MS
EKG QRS DURATION: 86 MS
EKG QTC CALCULATION (BAZETT): 444 MS
EKG R AXIS: -14 DEGREES
EKG T AXIS: 14 DEGREES
EKG VENTRICULAR RATE: 112 BPM
EOSINOPHIL # BLD: 0 K/UL (ref 0–0.7)
EOSINOPHIL NFR BLD: 0.1 %
ERYTHROCYTE [DISTWIDTH] IN BLOOD BY AUTOMATED COUNT: 12.8 % (ref 11.5–14.5)
GLOBULIN SER CALC-MCNC: 3.8 G/DL (ref 2.3–3.5)
GLUCOSE BLD-MCNC: 248 MG/DL (ref 70–99)
GLUCOSE BLD-MCNC: 264 MG/DL (ref 70–99)
GLUCOSE BLD-MCNC: 306 MG/DL (ref 70–99)
GLUCOSE SERPL-MCNC: 327 MG/DL (ref 70–99)
HBA1C MFR BLD: 14 % (ref 4.8–5.9)
HCT VFR BLD AUTO: 41.3 % (ref 37–47)
HGB BLD-MCNC: 13.7 G/DL (ref 12–16)
LYMPHOCYTES # BLD: 1.3 K/UL (ref 1–4.8)
LYMPHOCYTES NFR BLD: 16.6 %
MAGNESIUM SERPL-MCNC: 1.6 MG/DL (ref 1.7–2.4)
MCH RBC QN AUTO: 29.6 PG (ref 27–31.3)
MCHC RBC AUTO-ENTMCNC: 33.2 % (ref 33–37)
MCV RBC AUTO: 89.2 FL (ref 79.4–94.8)
MONOCYTES # BLD: 0.8 K/UL (ref 0.2–0.8)
MONOCYTES NFR BLD: 9.9 %
NEUTROPHILS # BLD: 5.7 K/UL (ref 1.4–6.5)
NEUTS SEG NFR BLD: 72.5 %
PERFORMED ON: ABNORMAL
PLATELET # BLD AUTO: 200 K/UL (ref 130–400)
POTASSIUM SERPL-SCNC: 3.2 MEQ/L (ref 3.4–4.9)
PROT SERPL-MCNC: 7.4 G/DL (ref 6.3–8)
RBC # BLD AUTO: 4.63 M/UL (ref 4.2–5.4)
SODIUM SERPL-SCNC: 133 MEQ/L (ref 135–144)
WBC # BLD AUTO: 7.8 K/UL (ref 4.8–10.8)

## 2023-10-20 PROCEDURE — 6360000002 HC RX W HCPCS: Performed by: NURSE PRACTITIONER

## 2023-10-20 PROCEDURE — 83036 HEMOGLOBIN GLYCOSYLATED A1C: CPT

## 2023-10-20 PROCEDURE — 36415 COLL VENOUS BLD VENIPUNCTURE: CPT

## 2023-10-20 PROCEDURE — 85025 COMPLETE CBC W/AUTO DIFF WBC: CPT

## 2023-10-20 PROCEDURE — 1210000000 HC MED SURG R&B

## 2023-10-20 PROCEDURE — 83735 ASSAY OF MAGNESIUM: CPT

## 2023-10-20 PROCEDURE — 2580000003 HC RX 258: Performed by: INTERNAL MEDICINE

## 2023-10-20 PROCEDURE — 2580000003 HC RX 258: Performed by: NURSE PRACTITIONER

## 2023-10-20 PROCEDURE — 6360000002 HC RX W HCPCS: Performed by: INTERNAL MEDICINE

## 2023-10-20 PROCEDURE — 96368 THER/DIAG CONCURRENT INF: CPT

## 2023-10-20 PROCEDURE — G0378 HOSPITAL OBSERVATION PER HR: HCPCS

## 2023-10-20 PROCEDURE — 93010 ELECTROCARDIOGRAM REPORT: CPT | Performed by: INTERNAL MEDICINE

## 2023-10-20 PROCEDURE — 73718 MRI LOWER EXTREMITY W/O DYE: CPT

## 2023-10-20 PROCEDURE — 96366 THER/PROPH/DIAG IV INF ADDON: CPT

## 2023-10-20 PROCEDURE — 6370000000 HC RX 637 (ALT 250 FOR IP): Performed by: INTERNAL MEDICINE

## 2023-10-20 PROCEDURE — 96372 THER/PROPH/DIAG INJ SC/IM: CPT

## 2023-10-20 PROCEDURE — 96361 HYDRATE IV INFUSION ADD-ON: CPT

## 2023-10-20 PROCEDURE — 6370000000 HC RX 637 (ALT 250 FOR IP): Performed by: NURSE PRACTITIONER

## 2023-10-20 PROCEDURE — 80053 COMPREHEN METABOLIC PANEL: CPT

## 2023-10-20 RX ORDER — LISINOPRIL 10 MG/1
10 TABLET ORAL DAILY
Status: DISCONTINUED | OUTPATIENT
Start: 2023-10-20 | End: 2023-10-21

## 2023-10-20 RX ORDER — SODIUM CHLORIDE 0.9 % (FLUSH) 0.9 %
5-40 SYRINGE (ML) INJECTION EVERY 12 HOURS SCHEDULED
Status: DISCONTINUED | OUTPATIENT
Start: 2023-10-20 | End: 2023-10-27 | Stop reason: HOSPADM

## 2023-10-20 RX ORDER — ATORVASTATIN CALCIUM 40 MG/1
40 TABLET, FILM COATED ORAL DAILY
Status: DISCONTINUED | OUTPATIENT
Start: 2023-10-20 | End: 2023-10-21

## 2023-10-20 RX ORDER — ONDANSETRON 2 MG/ML
4 INJECTION INTRAMUSCULAR; INTRAVENOUS EVERY 6 HOURS PRN
Status: DISCONTINUED | OUTPATIENT
Start: 2023-10-20 | End: 2023-10-25

## 2023-10-20 RX ORDER — PROPRANOLOL HYDROCHLORIDE 10 MG/1
20 TABLET ORAL NIGHTLY
Status: DISCONTINUED | OUTPATIENT
Start: 2023-10-20 | End: 2023-10-27 | Stop reason: HOSPADM

## 2023-10-20 RX ORDER — SODIUM CHLORIDE 9 MG/ML
INJECTION, SOLUTION INTRAVENOUS PRN
Status: DISCONTINUED | OUTPATIENT
Start: 2023-10-20 | End: 2023-10-27 | Stop reason: HOSPADM

## 2023-10-20 RX ORDER — DEXTROSE MONOHYDRATE 100 MG/ML
INJECTION, SOLUTION INTRAVENOUS CONTINUOUS PRN
Status: DISCONTINUED | OUTPATIENT
Start: 2023-10-20 | End: 2023-10-27 | Stop reason: HOSPADM

## 2023-10-20 RX ORDER — ALBUTEROL SULFATE 90 UG/1
2 AEROSOL, METERED RESPIRATORY (INHALATION) EVERY 6 HOURS PRN
Status: DISCONTINUED | OUTPATIENT
Start: 2023-10-20 | End: 2023-10-27 | Stop reason: HOSPADM

## 2023-10-20 RX ORDER — MAGNESIUM SULFATE 1 G/100ML
1000 INJECTION INTRAVENOUS ONCE
Status: COMPLETED | OUTPATIENT
Start: 2023-10-20 | End: 2023-10-20

## 2023-10-20 RX ORDER — ACETAMINOPHEN 325 MG/1
650 TABLET ORAL EVERY 6 HOURS PRN
Status: DISCONTINUED | OUTPATIENT
Start: 2023-10-20 | End: 2023-10-27 | Stop reason: HOSPADM

## 2023-10-20 RX ORDER — INSULIN LISPRO 100 [IU]/ML
0-8 INJECTION, SOLUTION INTRAVENOUS; SUBCUTANEOUS
Status: DISCONTINUED | OUTPATIENT
Start: 2023-10-20 | End: 2023-10-27 | Stop reason: HOSPADM

## 2023-10-20 RX ORDER — INSULIN LISPRO 100 [IU]/ML
0-4 INJECTION, SOLUTION INTRAVENOUS; SUBCUTANEOUS NIGHTLY
Status: DISCONTINUED | OUTPATIENT
Start: 2023-10-20 | End: 2023-10-27 | Stop reason: HOSPADM

## 2023-10-20 RX ORDER — ACETAMINOPHEN 650 MG/1
650 SUPPOSITORY RECTAL EVERY 6 HOURS PRN
Status: DISCONTINUED | OUTPATIENT
Start: 2023-10-20 | End: 2023-10-27 | Stop reason: HOSPADM

## 2023-10-20 RX ORDER — SODIUM CHLORIDE 0.9 % (FLUSH) 0.9 %
5-40 SYRINGE (ML) INJECTION PRN
Status: DISCONTINUED | OUTPATIENT
Start: 2023-10-20 | End: 2023-10-27 | Stop reason: HOSPADM

## 2023-10-20 RX ORDER — TRAZODONE HYDROCHLORIDE 50 MG/1
100 TABLET ORAL NIGHTLY
Status: DISCONTINUED | OUTPATIENT
Start: 2023-10-20 | End: 2023-10-25

## 2023-10-20 RX ORDER — INSULIN GLARGINE 100 [IU]/ML
30 INJECTION, SOLUTION SUBCUTANEOUS NIGHTLY
Status: DISCONTINUED | OUTPATIENT
Start: 2023-10-20 | End: 2023-10-21

## 2023-10-20 RX ORDER — OXYCODONE HYDROCHLORIDE AND ACETAMINOPHEN 5; 325 MG/1; MG/1
1 TABLET ORAL EVERY 4 HOURS PRN
Status: DISCONTINUED | OUTPATIENT
Start: 2023-10-20 | End: 2023-10-24

## 2023-10-20 RX ORDER — POLYETHYLENE GLYCOL 3350 17 G/17G
17 POWDER, FOR SOLUTION ORAL DAILY PRN
Status: DISCONTINUED | OUTPATIENT
Start: 2023-10-20 | End: 2023-10-27 | Stop reason: HOSPADM

## 2023-10-20 RX ORDER — GABAPENTIN 300 MG/1
300 CAPSULE ORAL 2 TIMES DAILY
Status: DISCONTINUED | OUTPATIENT
Start: 2023-10-20 | End: 2023-10-25

## 2023-10-20 RX ORDER — POTASSIUM CHLORIDE 20 MEQ/1
20 TABLET, EXTENDED RELEASE ORAL ONCE
Status: COMPLETED | OUTPATIENT
Start: 2023-10-20 | End: 2023-10-20

## 2023-10-20 RX ORDER — ESCITALOPRAM OXALATE 10 MG/1
20 TABLET ORAL DAILY
Status: DISCONTINUED | OUTPATIENT
Start: 2023-10-20 | End: 2023-10-27 | Stop reason: HOSPADM

## 2023-10-20 RX ORDER — ONDANSETRON 4 MG/1
4 TABLET, ORALLY DISINTEGRATING ORAL EVERY 8 HOURS PRN
Status: DISCONTINUED | OUTPATIENT
Start: 2023-10-20 | End: 2023-10-25

## 2023-10-20 RX ORDER — SODIUM CHLORIDE 9 MG/ML
INJECTION, SOLUTION INTRAVENOUS CONTINUOUS
Status: DISCONTINUED | OUTPATIENT
Start: 2023-10-20 | End: 2023-10-21

## 2023-10-20 RX ORDER — ENOXAPARIN SODIUM 100 MG/ML
40 INJECTION SUBCUTANEOUS DAILY
Status: DISCONTINUED | OUTPATIENT
Start: 2023-10-21 | End: 2023-10-23

## 2023-10-20 RX ADMIN — LISINOPRIL 10 MG: 10 TABLET ORAL at 21:19

## 2023-10-20 RX ADMIN — POTASSIUM CHLORIDE 20 MEQ: 1500 TABLET, EXTENDED RELEASE ORAL at 22:25

## 2023-10-20 RX ADMIN — ATORVASTATIN CALCIUM 40 MG: 40 TABLET, FILM COATED ORAL at 21:20

## 2023-10-20 RX ADMIN — INSULIN LISPRO 6 UNITS: 100 INJECTION, SOLUTION INTRAVENOUS; SUBCUTANEOUS at 12:02

## 2023-10-20 RX ADMIN — ESCITALOPRAM OXALATE 20 MG: 10 TABLET ORAL at 21:19

## 2023-10-20 RX ADMIN — GABAPENTIN 300 MG: 300 CAPSULE ORAL at 21:20

## 2023-10-20 RX ADMIN — VANCOMYCIN HYDROCHLORIDE 1250 MG: 1.25 INJECTION, POWDER, LYOPHILIZED, FOR SOLUTION INTRAVENOUS at 14:40

## 2023-10-20 RX ADMIN — INSULIN LISPRO 8 UNITS: 100 INJECTION, SOLUTION INTRAVENOUS; SUBCUTANEOUS at 00:08

## 2023-10-20 RX ADMIN — INSULIN LISPRO 2 UNITS: 100 INJECTION, SOLUTION INTRAVENOUS; SUBCUTANEOUS at 18:57

## 2023-10-20 RX ADMIN — SODIUM CHLORIDE: 9 INJECTION, SOLUTION INTRAVENOUS at 13:00

## 2023-10-20 RX ADMIN — PROPRANOLOL HYDROCHLORIDE 20 MG: 10 TABLET ORAL at 21:20

## 2023-10-20 RX ADMIN — Medication 10 ML: at 21:21

## 2023-10-20 RX ADMIN — SODIUM CHLORIDE: 9 INJECTION, SOLUTION INTRAVENOUS at 18:53

## 2023-10-20 RX ADMIN — INSULIN GLARGINE 30 UNITS: 100 INJECTION, SOLUTION SUBCUTANEOUS at 22:26

## 2023-10-20 RX ADMIN — TRAZODONE HYDROCHLORIDE 100 MG: 50 TABLET ORAL at 21:19

## 2023-10-20 RX ADMIN — Medication 10 ML: at 00:17

## 2023-10-20 RX ADMIN — INSULIN LISPRO 4 UNITS: 100 INJECTION, SOLUTION INTRAVENOUS; SUBCUTANEOUS at 09:33

## 2023-10-20 RX ADMIN — PIPERACILLIN AND TAZOBACTAM 3375 MG: 3; .375 INJECTION, POWDER, LYOPHILIZED, FOR SOLUTION INTRAVENOUS at 04:24

## 2023-10-20 RX ADMIN — ONDANSETRON 4 MG: 2 INJECTION INTRAMUSCULAR; INTRAVENOUS at 21:10

## 2023-10-20 RX ADMIN — PIPERACILLIN AND TAZOBACTAM 3375 MG: 3; .375 INJECTION, POWDER, LYOPHILIZED, FOR SOLUTION INTRAVENOUS at 21:16

## 2023-10-20 RX ADMIN — ACETAMINOPHEN 650 MG: 325 TABLET ORAL at 04:25

## 2023-10-20 RX ADMIN — ENOXAPARIN SODIUM 40 MG: 100 INJECTION SUBCUTANEOUS at 09:33

## 2023-10-20 RX ADMIN — PIPERACILLIN AND TAZOBACTAM 3375 MG: 3; .375 INJECTION, POWDER, LYOPHILIZED, FOR SOLUTION INTRAVENOUS at 12:57

## 2023-10-20 RX ADMIN — MAGNESIUM SULFATE HEPTAHYDRATE 1000 MG: 1 INJECTION, SOLUTION INTRAVENOUS at 22:25

## 2023-10-20 RX ADMIN — SODIUM CHLORIDE: 9 INJECTION, SOLUTION INTRAVENOUS at 00:16

## 2023-10-20 ASSESSMENT — PATIENT HEALTH QUESTIONNAIRE - PHQ9
SUM OF ALL RESPONSES TO PHQ QUESTIONS 1-9: 0
SUM OF ALL RESPONSES TO PHQ QUESTIONS 1-9: 0
2. FEELING DOWN, DEPRESSED OR HOPELESS: 0
SUM OF ALL RESPONSES TO PHQ QUESTIONS 1-9: 0
SUM OF ALL RESPONSES TO PHQ9 QUESTIONS 1 & 2: 0
1. LITTLE INTEREST OR PLEASURE IN DOING THINGS: 0
SUM OF ALL RESPONSES TO PHQ QUESTIONS 1-9: 0
SUM OF ALL RESPONSES TO PHQ QUESTIONS 1-9: 0
1. LITTLE INTEREST OR PLEASURE IN DOING THINGS: 0

## 2023-10-20 ASSESSMENT — PAIN DESCRIPTION - LOCATION
LOCATION: HEAD
LOCATION: HEAD

## 2023-10-20 ASSESSMENT — PAIN SCALES - GENERAL
PAINLEVEL_OUTOF10: 7
PAINLEVEL_OUTOF10: 5
PAINLEVEL_OUTOF10: 5

## 2023-10-20 ASSESSMENT — LIFESTYLE VARIABLES
HOW OFTEN DO YOU HAVE A DRINK CONTAINING ALCOHOL: NEVER
HOW OFTEN DO YOU HAVE A DRINK CONTAINING ALCOHOL: NEVER
HOW MANY STANDARD DRINKS CONTAINING ALCOHOL DO YOU HAVE ON A TYPICAL DAY: PATIENT DOES NOT DRINK
HOW MANY STANDARD DRINKS CONTAINING ALCOHOL DO YOU HAVE ON A TYPICAL DAY: PATIENT DOES NOT DRINK

## 2023-10-20 NOTE — ED NOTES
Lab called to draw ordered blood cultures.  Electronically signed by Peter Costa RN on 10/19/2023 at 9:04 PM       Peter Costa RN  10/19/23 3225

## 2023-10-20 NOTE — ED NOTES
Report from [address] [place] [address] at this time  Patient MRI is completed at this time     Ashley Ashraf RN  10/20/23 6438

## 2023-10-20 NOTE — ED NOTES
Patient insulin given and Lovenox at this time, patient to the restroom at this time     Ivette Freire  10/20/23 9849

## 2023-10-20 NOTE — CARE COORDINATION
Case Management Assessment  Initial Evaluation    Date/Time of Evaluation: 10/20/2023 9:24 AM  Assessment Completed by: STELLA Bernard    If patient is discharged prior to next notation, then this note serves as note for discharge by case management. Patient Name: Sheila Boyer                   YOB: 1970  Diagnosis: Diabetic foot infection ([address]) [E11.628, L08.9]                   Date / Time: 10/19/2023  6:01 PM    Patient Admission Status: Inpatient   Readmission Risk (Low < 19, Mod (19-27), High > 27): Readmission Risk Score: 8.8    Current PCP: Babak Diggs MD  PCP verified by CM? Yes    Chart Reviewed: Yes      History Provided by: Patient  Patient Orientation: Alert and Oriented    Patient Cognition: Alert    Hospitalization in the last 30 days (Readmission):  No    If yes, Readmission Assessment in CM Navigator will be completed. Advance Directives:      Code Status: Full Code   Patient's Primary Decision Maker is: Legal Next of Kin    Primary Decision Maker: Kendra Gibson  Parent - [phone]    Primary Decision Maker: aminta villanueva - Child - [phone]    Discharge Planning:    Patient lives with:   Type of Home:    Primary Care Giver: Self  Patient Support Systems include: Children, Family Members   Current Financial resources: Other (Comment) (COMMERCIAL)  Current community resources: None  Current services prior to admission:              Current DME:              Type of Home Care services:       ADLS  Prior functional level: Independent in ADLs/IADLs  Current functional level: Independent in ADLs/IADLs    PT AM-PAC:     OT AM-PAC:       Family can provide assistance at DC: Yes  Would you like Case Management to discuss the discharge plan with any other family members/significant others, and if so, who?  No  Plans to Return to Present Housing: Yes  Other Identified Issues/Barriers to RETURNING to current housin [facility] Daniel Metzger, [address] [facility]  Case Management Department  Ph: [phone] Fax: [phone]

## 2023-10-20 NOTE — ED NOTES
Call placed to MRI to follow as to why pt has not went to MRI   MRI updated that pt has pick at 1540 for transfer     David Fonseca RN  10/20/23 1219

## 2023-10-20 NOTE — PROGRESS NOTES
Due to no bed availability, patient was accepted at [facility].  Police transferred to [facility] once bed is available after MRI is performed.   Communicated this to podiatry team.

## 2023-10-20 NOTE — H&P
[facility]    HISTORY AND PHYSICAL EXAM    PATIENT NAME:  Shavon Haley    MRN:  [id number]  SERVICE DATE:  10/19/2023   SERVICE TIME:  9:40 PM    Primary Care Physician: Maria Elena Jackson MD         SUBJECTIVE  CHIEF COMPLAINT: Foot wound       HPI: Patient being admitted for diabetic foot wound. Patient reports she had an ulcer on the bottom side of her right foot second digit and follow-up with podiatry. Patient states that the ulcer started about 6 weeks ago. She was placed on doxycycline by podiatry but the patient reports she has not been completely compliant and did not complete the medication. Then, today she noticed increased swelling and erythema becoming more moving from toe and involving the foot. Patient states that she is unaware if she had a fever but felt like she had chills. Patient denies any shortness of breath, nausea, or vomiting. Patient has a history of diabetes and is supposed to wear an insulin pump but she has taken it off and does not monitor her glucose. Patient reports she has other medications for bipolar depression, hypertension, and HLD which she also takes. PAST MEDICAL HISTORY:    Past Medical History:   Diagnosis Date    CAD (coronary artery disease) 6/25/2022    CAD (coronary artery disease) 6/25/2022    Depression     Environmental allergies     Gastroparesis     GERD (gastroesophageal reflux disease)     Headache     Dr. Idalia Miles    HPV in female     Hyperlipidemia     Hypertension     Leukocytosis     Migraines     Type II or unspecified type diabetes mellitus without mention of complication, not stated as uncontrolled      PAST SURGICAL HISTORY:    Past Surgical History:   Procedure Laterality Date    BONE MARROW BIOPSY  2012    (-)[facility] [place]    BREAST [address] [place] [address]    CARDIAC CATHETERIZATION  2009    (-)[address]  2012?     [address] [facility] HISTORY:    Family History   Problem Relation Age of Onset    Heart Disease Mother     Diabetes Father     Heart Disease Father     Cancer Father         thyroid    No Known Problems Sister     No Known Problems Paternal Grandfather     No Known Problems Paternal Grandmother     No Known Problems Maternal Grandmother     No Known Problems Maternal Grandfather     No Known Problems Brother     No Known Problems Other     Breast Cancer Neg Hx     Colon Cancer Neg Hx     Eclampsia Neg Hx     Hypertension Neg Hx     Ovarian Cancer Neg Hx      Labor Neg Hx     Spont Abortions Neg Hx     Stroke Neg Hx      SOCIAL HISTORY:    Social History     Socioeconomic History    Marital status:      Spouse name: Not on file    Number of children: Not on file    Years of education: Not on file    Highest education level: Not on file   Occupational History    Not on file   Tobacco Use    Smoking status: Never    Smokeless tobacco: Never   Vaping Use    Vaping Use: Never used   Substance and Sexual Activity    Alcohol use: Yes     Alcohol/week: 0.0 standard drinks of alcohol     Comment: ocassionally    Drug use: No    Sexual activity: Not Currently     Partners: Male   Other Topics Concern    Not on file   Social History Narrative    Not on file     Social Determinants of Health     Financial Resource Strain: Medium Risk (2023)    Overall Financial Resource Strain (CARDIA)     Difficulty of Paying Living Expenses: Somewhat hard   Food Insecurity: Food Insecurity Present (2023)    Hunger Vital Sign     Worried About Running Out of Food in the Last Year: Sometimes true     Ran Out of Food in the Last Year: Sometimes true   Transportation Needs: Unknown (2023)    PRAPARE - Transportation     Lack of Transportation (Medical): Not on file     Lack of Transportation (Non-Medical):  No   Physical Activity: Not on file   Stress: Not on file   Social Connections: Not on file   Intimate Partner Violence: Not on file rales.   Abdominal:      General: Bowel sounds are normal. There is no distension. Palpations: Abdomen is soft. There is no mass. Tenderness: There is no abdominal tenderness. There is no guarding or rebound. Hernia: No hernia is present. Musculoskeletal:         General: Normal range of motion. Cervical back: Normal range of motion. Feet:      Comments: Ulcer on bottom of her second digit right foot, erythema and slight edema involving surrounding digits and foot. No purulent drainage. Mildly warm to touch. See photos below  Skin:     General: Skin is warm and dry. Capillary Refill: Capillary refill takes less than 2 seconds. Neurological:      Mental Status: She is alert and oriented to person, place, and time. Psychiatric:         Mood and Affect: Mood normal.       Photos obtained with patient's verbal consent:            DATA:     Diagnostic tests reviewed for today's visit:    Most recent labs and imaging results reviewed.      LABS:    Recent Results (from the past 24 hour(s))   CBC with Auto Differential    Collection Time: 10/19/23  6:00 PM   Result Value Ref Range    WBC 8.6 4.8 - 10.8 K/uL    RBC 4.96 4.20 - 5.40 M/uL    Hemoglobin 14.8 12.0 - 16.0 g/dL    Hematocrit 44.4 37.0 - 47.0 %    MCV 89.5 79.4 - 94.8 fL    MCH 29.8 27.0 - 31.3 pg    MCHC 33.3 33.0 - 37.0 %    RDW 12.7 11.5 - 14.5 %    Platelets 299 548 - 646 K/uL    Neutrophils % 75.2 %    Lymphocytes % 15.9 %    Monocytes % 7.8 %    Eosinophils % 0.1 %    Basophils % 0.5 %    Neutrophils Absolute 6.5 1.4 - 6.5 K/uL    Lymphocytes Absolute 1.4 1.0 - 4.8 K/uL    Monocytes Absolute 0.7 0.2 - 0.8 K/uL    Eosinophils Absolute 0.0 0.0 - 0.7 K/uL    Basophils Absolute 0.0 0.0 - 0.2 K/uL   Comprehensive Metabolic Panel    Collection Time: 10/19/23  6:00 PM   Result Value Ref Range    Sodium 129 (L) 135 - 144 mEq/L    Potassium 3.1 (L) 3.4 - 4.9 mEq/L    Chloride 90 (L) 95 - 107 mEq/L    CO2 26 20 - 31 mEq/L    Anion

## 2023-10-20 NOTE — ACP (ADVANCE CARE PLANNING)
Advance Care Planning   Healthcare Decision Maker:    Primary Decision Maker: Dary Webb - Parent - [phone]    Primary Decision Maker: Aby Edmodnson - [place] - [phone]    Click here to complete Healthcare Decision Makers including selection of the Healthcare Decision Maker Relationship (ie \"Primary\"). Today we documented Decision Maker(s) consistent with Legal Next of Kin hierarchy.        If the relationship to the patient does NOT follow our state's Next of Kin hierarchy, the patient MUST complete an ACP Document to allow him/her to act on the patient's behalf. :    Electronically signed by STELLA Roberts on 10/20/2023 at 9:24 AM

## 2023-10-20 NOTE — ED NOTES
Advised by Dr Josias Leblanc that patient would be transferring to [facility]. Called [facility] @ [phone]. Called [facility] @ [phone]. They advised of patient's room number and accepting Dr. Everett Reyes. Physicians ETA @ [phone].        Bevely Backer  10/20/23 5848

## 2023-10-20 NOTE — CARE COORDINATION
HEATHERW received a phone call from [employer]. Would like LSW to ask if pt will be good with going to [facility]. LSW meet with pt at bedside. Pt would like to get her MRI done first before Tx to cristofer Hernandez. Notify Doc.      Electronically signed by STELLA Daniel on 10/20/2023 at 11:51 AM

## 2023-10-20 NOTE — ED NOTES
Both sets of blood cultures drawn by phlebotomy.  Electronically signed by Carrington Chacon RN on 10/19/2023 at 9:29 PM            Carrington Chacon RN  10/19/23 [id number]

## 2023-10-21 LAB
ANION GAP SERPL CALCULATED.3IONS-SCNC: 13 MEQ/L (ref 9–15)
BACTERIA BLD CULT ORG #2: NORMAL
BACTERIA BLD CULT: NORMAL
BASOPHILS # BLD: 0.1 K/UL (ref 0–0.1)
BASOPHILS NFR BLD: 0.6 % (ref 0.1–1.2)
BUN SERPL-MCNC: 8 MG/DL (ref 6–20)
CALCIUM SERPL-MCNC: 8.7 MG/DL (ref 8.5–9.9)
CHLORIDE SERPL-SCNC: 99 MEQ/L (ref 95–107)
CO2 SERPL-SCNC: 23 MEQ/L (ref 20–31)
CREAT SERPL-MCNC: 0.38 MG/DL (ref 0.5–0.9)
EOSINOPHIL # BLD: 0.1 K/UL (ref 0–0.4)
EOSINOPHIL NFR BLD: 0.6 % (ref 0.7–5.8)
ERYTHROCYTE [DISTWIDTH] IN BLOOD BY AUTOMATED COUNT: 12.9 % (ref 11.7–14.4)
GLUCOSE BLD-MCNC: 291 MG/DL (ref 70–99)
GLUCOSE BLD-MCNC: 306 MG/DL (ref 70–99)
GLUCOSE BLD-MCNC: 350 MG/DL (ref 70–99)
GLUCOSE BLD-MCNC: 429 MG/DL (ref 70–99)
GLUCOSE SERPL-MCNC: 362 MG/DL (ref 70–99)
HCT VFR BLD AUTO: 39.8 % (ref 37–47)
HGB BLD-MCNC: 13.2 G/DL (ref 11.2–15.7)
IMM GRANULOCYTES # BLD: 0 K/UL
IMM GRANULOCYTES NFR BLD: 0.4 %
LYMPHOCYTES # BLD: 2.1 K/UL (ref 1.2–3.7)
LYMPHOCYTES NFR BLD: 19.5 %
MAGNESIUM SERPL-MCNC: 2.2 MG/DL (ref 1.7–2.4)
MCH RBC QN AUTO: 29.7 PG (ref 25.6–32.2)
MCHC RBC AUTO-ENTMCNC: 33.2 % (ref 32.2–35.5)
MCV RBC AUTO: 89.6 FL (ref 79.4–94.8)
MONOCYTES # BLD: 1.2 K/UL (ref 0.2–0.9)
MONOCYTES NFR BLD: 10.7 % (ref 4.7–12.5)
NEUTROPHILS # BLD: 7.4 K/UL (ref 1.6–6.1)
NEUTS SEG NFR BLD: 68.2 % (ref 34–71.1)
PERFORMED ON: ABNORMAL
PLATELET # BLD AUTO: 204 K/UL (ref 182–369)
POTASSIUM SERPL-SCNC: 3.9 MEQ/L (ref 3.4–4.9)
RBC # BLD AUTO: 4.44 M/UL (ref 3.93–5.22)
SODIUM SERPL-SCNC: 135 MEQ/L (ref 135–144)
VANCOMYCIN SERPL-MCNC: <4 UG/ML (ref 10–40)
WBC # BLD AUTO: 10.9 K/UL (ref 4–10)

## 2023-10-21 PROCEDURE — 80048 BASIC METABOLIC PNL TOTAL CA: CPT

## 2023-10-21 PROCEDURE — 83735 ASSAY OF MAGNESIUM: CPT

## 2023-10-21 PROCEDURE — 80202 ASSAY OF VANCOMYCIN: CPT

## 2023-10-21 PROCEDURE — 36415 COLL VENOUS BLD VENIPUNCTURE: CPT

## 2023-10-21 PROCEDURE — 6370000000 HC RX 637 (ALT 250 FOR IP): Performed by: INTERNAL MEDICINE

## 2023-10-21 PROCEDURE — 6360000002 HC RX W HCPCS: Performed by: INTERNAL MEDICINE

## 2023-10-21 PROCEDURE — 85025 COMPLETE CBC W/AUTO DIFF WBC: CPT

## 2023-10-21 PROCEDURE — 2580000003 HC RX 258: Performed by: INTERNAL MEDICINE

## 2023-10-21 PROCEDURE — 1210000000 HC MED SURG R&B

## 2023-10-21 RX ORDER — INSULIN LISPRO 100 [IU]/ML
10 INJECTION, SOLUTION INTRAVENOUS; SUBCUTANEOUS ONCE
Status: COMPLETED | OUTPATIENT
Start: 2023-10-21 | End: 2023-10-21

## 2023-10-21 RX ORDER — LISINOPRIL 5 MG/1
5 TABLET ORAL DAILY
Status: DISCONTINUED | OUTPATIENT
Start: 2023-10-22 | End: 2023-10-26

## 2023-10-21 RX ORDER — INSULIN GLARGINE 100 [IU]/ML
40 INJECTION, SOLUTION SUBCUTANEOUS NIGHTLY
Status: DISCONTINUED | OUTPATIENT
Start: 2023-10-21 | End: 2023-10-22

## 2023-10-21 RX ORDER — INSULIN LISPRO 100 [IU]/ML
6 INJECTION, SOLUTION INTRAVENOUS; SUBCUTANEOUS
Status: DISCONTINUED | OUTPATIENT
Start: 2023-10-21 | End: 2023-10-22

## 2023-10-21 RX ORDER — ATORVASTATIN CALCIUM 40 MG/1
40 TABLET, FILM COATED ORAL NIGHTLY
Status: DISCONTINUED | OUTPATIENT
Start: 2023-10-21 | End: 2023-10-27 | Stop reason: HOSPADM

## 2023-10-21 RX ORDER — INSULIN GLARGINE 100 [IU]/ML
12 INJECTION, SOLUTION SUBCUTANEOUS ONCE
Status: COMPLETED | OUTPATIENT
Start: 2023-10-21 | End: 2023-10-21

## 2023-10-21 RX ADMIN — PIPERACILLIN AND TAZOBACTAM 3375 MG: 3; .375 INJECTION, POWDER, LYOPHILIZED, FOR SOLUTION INTRAVENOUS at 22:28

## 2023-10-21 RX ADMIN — INSULIN LISPRO 4 UNITS: 100 INJECTION, SOLUTION INTRAVENOUS; SUBCUTANEOUS at 20:41

## 2023-10-21 RX ADMIN — INSULIN GLARGINE 40 UNITS: 100 INJECTION, SOLUTION SUBCUTANEOUS at 20:43

## 2023-10-21 RX ADMIN — PIPERACILLIN AND TAZOBACTAM 3375 MG: 3; .375 INJECTION, POWDER, LYOPHILIZED, FOR SOLUTION INTRAVENOUS at 14:19

## 2023-10-21 RX ADMIN — INSULIN LISPRO 4 UNITS: 100 INJECTION, SOLUTION INTRAVENOUS; SUBCUTANEOUS at 16:35

## 2023-10-21 RX ADMIN — Medication 10 ML: at 20:38

## 2023-10-21 RX ADMIN — GABAPENTIN 300 MG: 300 CAPSULE ORAL at 08:30

## 2023-10-21 RX ADMIN — INSULIN LISPRO 6 UNITS: 100 INJECTION, SOLUTION INTRAVENOUS; SUBCUTANEOUS at 08:32

## 2023-10-21 RX ADMIN — PIPERACILLIN AND TAZOBACTAM 3375 MG: 3; .375 INJECTION, POWDER, LYOPHILIZED, FOR SOLUTION INTRAVENOUS at 05:26

## 2023-10-21 RX ADMIN — PROPRANOLOL HYDROCHLORIDE 20 MG: 10 TABLET ORAL at 20:37

## 2023-10-21 RX ADMIN — INSULIN LISPRO 6 UNITS: 100 INJECTION, SOLUTION INTRAVENOUS; SUBCUTANEOUS at 16:36

## 2023-10-21 RX ADMIN — VANCOMYCIN HYDROCHLORIDE 1500 MG: 1 INJECTION, POWDER, LYOPHILIZED, FOR SOLUTION INTRAVENOUS at 20:54

## 2023-10-21 RX ADMIN — GABAPENTIN 300 MG: 300 CAPSULE ORAL at 20:38

## 2023-10-21 RX ADMIN — TRAZODONE HYDROCHLORIDE 100 MG: 50 TABLET ORAL at 20:37

## 2023-10-21 RX ADMIN — VANCOMYCIN HYDROCHLORIDE 1250 MG: 1.25 INJECTION, POWDER, LYOPHILIZED, FOR SOLUTION INTRAVENOUS at 06:41

## 2023-10-21 RX ADMIN — LISINOPRIL 10 MG: 10 TABLET ORAL at 08:31

## 2023-10-21 RX ADMIN — ACETAMINOPHEN 650 MG: 325 TABLET ORAL at 09:57

## 2023-10-21 RX ADMIN — ESCITALOPRAM OXALATE 20 MG: 10 TABLET ORAL at 08:30

## 2023-10-21 RX ADMIN — INSULIN LISPRO 6 UNITS: 100 INJECTION, SOLUTION INTRAVENOUS; SUBCUTANEOUS at 12:05

## 2023-10-21 RX ADMIN — INSULIN GLARGINE 12 UNITS: 100 INJECTION, SOLUTION SUBCUTANEOUS at 10:15

## 2023-10-21 RX ADMIN — ATORVASTATIN CALCIUM 40 MG: 40 TABLET, FILM COATED ORAL at 20:37

## 2023-10-21 RX ADMIN — INSULIN LISPRO 10 UNITS: 100 INJECTION, SOLUTION INTRAVENOUS; SUBCUTANEOUS at 20:40

## 2023-10-21 RX ADMIN — INSULIN LISPRO 8 UNITS: 100 INJECTION, SOLUTION INTRAVENOUS; SUBCUTANEOUS at 12:03

## 2023-10-21 NOTE — PROGRESS NOTES
Called [facility] for consult to Dr Zachery Sue per [facility] BRAIN PELAYO phone number , Pam phone # wrong, [phone] is correct number for [facility]

## 2023-10-21 NOTE — H&P
input(s): \"INR\" in the last 72 hours. URINALYSIS:No results for input(s): \"NITRITE\", \"COLORU\", \"PHUR\", \"LABCAST\", \"WBCUA\", \"RBCUA\", \"MUCUS\", \"TRICHOMONAS\", \"YEAST\", \"BACTERIA\", \"CLARITYU\", \"SPECGRAV\", \"LEUKOCYTESUR\", \"UROBILINOGEN\", \"BILIRUBINUR\", \"BLOODU\", \"GLUCOSEU\", \"AMORPHOUS\" in the last 72 hours. Invalid input(s): \"KETONESU\"  -----------------------------------------------------------------   MRI FOOT RIGHT WO CONTRAST    Result Date: 10/20/2023  EXAMINATION: MRI OF THE RIGHT FOOT WITHOUT CONTRAST, 10/20/2023 3:33 pm TECHNIQUE: Multiplanar multisequence MRI of the right foot was performed without the administration of intravenous contrast. COMPARISON: None HISTORY: ORDERING SYSTEM PROVIDED HISTORY: rule out OM 2nd digit TECHNOLOGIST PROVIDED HISTORY: Reason for exam:->rule out OM 2nd digit What is the sedation requirement?->Sedation What is the area of interest?->Forefoot What reading provider will be dictating this exam?->CRC FINDINGS: LISFRANC JOINT: Visualized Lisfranc ligament is intact. No significant Lisfranc interval widening or significant periligamentous edema. BONE MARROW: No evidence of fracture. Normal marrow signal. GREATER AND LESSER MTP JOINTS: No significant joint effusion or osseous erosions. No significant degenerative changes. Normal alignment. SOFT TISSUES: Diffuse edema at the 2nd digit distally. Predominately in the distal soft tissues but there is also edema at the plantar aspect. No large fluid collection. TENDONS: Visualized flexor and extensor tendons are intact without tenosynovitis. Cellulitis of 2nd digit but no evidence for osteomyelitis. Assessment and Plan     *Right foot cellulitis and ulceration involving the second digit. No evidence of osteomyelitis on MRI. Very strong dorsalis pedis pulse making a possibility of significant arterial compromise unlikely. *Hypertension. *Diabetes, poor control.   Patient uses an insulin pump at home which is off.    *Hyponatremia, corrected. *Hypokalemia, corrected. *Hypomagnesemia, corrected. *Few other medical issues not listed above. Plan:  Continue IV Zosyn and vancomycin. The resolution of the erythema and tenderness involving the dorsal foot. Persistent erythema and induration involving the digits. Titrate insulin up to achieve better blood sugar control. Continue preadmission home medications. Lovenox for DVT prophylaxis. Patient Active Problem List   Diagnosis Code    Mixed hyperlipidemia E78.2    Essential hypertension I10    GERD (gastroesophageal reflux disease) K21.9    Leukocytosis D72.829    Cardiac disease I51.9    Mixed anxiety and depressive disorder F41.8    Type 2 diabetes mellitus, with long-term current use of insulin (HCC) E11.9, Z79.4    Headache R51.9    Acute diffuse otitis externa of left ear H60.312    Breast mass, right N63.10    Obesity, Class I, BMI 30-34.9 E66.9    Elevated BP without diagnosis of hypertension R03.0    Hypokalemia E87.6    CAD (coronary artery disease) I25.10    Hypomagnesemia E83.42    Chest pain R07.9    Dyspnea R06.00    Pneumonia due to infectious organism J18.9    Diabetic foot infection (HCC) E11.628, L08.9    Cellulitis of foot L03.119       Lilly Victor MD, MD  Admitting Hospitalist    TTS: 85mins where I focused more than 75% of my attention on rendering care, and planning treatment course for this patient, in addition to talking to RN team, mid levels, consulting with other physicians and following up on labs and imaging. High Risk Readmission Screening Tool Score Noted.      Emergency Contact:

## 2023-10-22 LAB
ANION GAP SERPL CALCULATED.3IONS-SCNC: 13 MEQ/L (ref 9–15)
BACTERIA SPEC ANAEROBE+AEROBE CULT: ABNORMAL
BACTERIA SPEC ANAEROBE+AEROBE CULT: ABNORMAL
BUN SERPL-MCNC: 22 MG/DL (ref 6–20)
CALCIUM SERPL-MCNC: 8.4 MG/DL (ref 8.5–9.9)
CHLORIDE SERPL-SCNC: 99 MEQ/L (ref 95–107)
CO2 SERPL-SCNC: 24 MEQ/L (ref 20–31)
CREAT SERPL-MCNC: 1.36 MG/DL (ref 0.5–0.9)
GLUCOSE BLD-MCNC: 153 MG/DL (ref 70–99)
GLUCOSE BLD-MCNC: 186 MG/DL (ref 70–99)
GLUCOSE BLD-MCNC: 228 MG/DL (ref 70–99)
GLUCOSE BLD-MCNC: 288 MG/DL (ref 70–99)
GLUCOSE SERPL-MCNC: 318 MG/DL (ref 70–99)
ORGANISM: ABNORMAL
PERFORMED ON: ABNORMAL
POTASSIUM SERPL-SCNC: 3.8 MEQ/L (ref 3.4–4.9)
SODIUM SERPL-SCNC: 136 MEQ/L (ref 135–144)

## 2023-10-22 PROCEDURE — 1210000000 HC MED SURG R&B

## 2023-10-22 PROCEDURE — 6370000000 HC RX 637 (ALT 250 FOR IP): Performed by: INTERNAL MEDICINE

## 2023-10-22 PROCEDURE — 6370000000 HC RX 637 (ALT 250 FOR IP): Performed by: PODIATRIST

## 2023-10-22 PROCEDURE — 6360000002 HC RX W HCPCS: Performed by: INTERNAL MEDICINE

## 2023-10-22 PROCEDURE — 2580000003 HC RX 258: Performed by: INTERNAL MEDICINE

## 2023-10-22 PROCEDURE — 80048 BASIC METABOLIC PNL TOTAL CA: CPT

## 2023-10-22 PROCEDURE — 99222 1ST HOSP IP/OBS MODERATE 55: CPT | Performed by: INTERNAL MEDICINE

## 2023-10-22 PROCEDURE — 2500000003 HC RX 250 WO HCPCS: Performed by: INTERNAL MEDICINE

## 2023-10-22 PROCEDURE — 93005 ELECTROCARDIOGRAM TRACING: CPT

## 2023-10-22 PROCEDURE — 36415 COLL VENOUS BLD VENIPUNCTURE: CPT

## 2023-10-22 RX ORDER — INSULIN GLARGINE 100 [IU]/ML
45 INJECTION, SOLUTION SUBCUTANEOUS NIGHTLY
Status: DISCONTINUED | OUTPATIENT
Start: 2023-10-22 | End: 2023-10-27 | Stop reason: HOSPADM

## 2023-10-22 RX ORDER — FLUCONAZOLE 2 MG/ML
200 INJECTION, SOLUTION INTRAVENOUS EVERY 24 HOURS
Status: DISCONTINUED | OUTPATIENT
Start: 2023-10-22 | End: 2023-10-22

## 2023-10-22 RX ORDER — INSULIN LISPRO 100 [IU]/ML
10 INJECTION, SOLUTION INTRAVENOUS; SUBCUTANEOUS
Status: DISCONTINUED | OUTPATIENT
Start: 2023-10-22 | End: 2023-10-27

## 2023-10-22 RX ORDER — INSULIN GLARGINE 100 [IU]/ML
15 INJECTION, SOLUTION SUBCUTANEOUS ONCE
Status: COMPLETED | OUTPATIENT
Start: 2023-10-22 | End: 2023-10-22

## 2023-10-22 RX ADMIN — ATORVASTATIN CALCIUM 40 MG: 40 TABLET, FILM COATED ORAL at 19:59

## 2023-10-22 RX ADMIN — PIPERACILLIN AND TAZOBACTAM 3375 MG: 3; .375 INJECTION, POWDER, LYOPHILIZED, FOR SOLUTION INTRAVENOUS at 21:13

## 2023-10-22 RX ADMIN — INSULIN GLARGINE 45 UNITS: 100 INJECTION, SOLUTION SUBCUTANEOUS at 19:59

## 2023-10-22 RX ADMIN — Medication 10 ML: at 08:45

## 2023-10-22 RX ADMIN — FLUCONAZOLE 200 MG: 200 INJECTION, SOLUTION INTRAVENOUS at 11:08

## 2023-10-22 RX ADMIN — PROPRANOLOL HYDROCHLORIDE 20 MG: 10 TABLET ORAL at 19:59

## 2023-10-22 RX ADMIN — CARIPRAZINE 3 MG: 1.5 CAPSULE, GELATIN COATED ORAL at 18:10

## 2023-10-22 RX ADMIN — TRAZODONE HYDROCHLORIDE 100 MG: 50 TABLET ORAL at 21:11

## 2023-10-22 RX ADMIN — INSULIN LISPRO 6 UNITS: 100 INJECTION, SOLUTION INTRAVENOUS; SUBCUTANEOUS at 08:43

## 2023-10-22 RX ADMIN — Medication 10 ML: at 21:12

## 2023-10-22 RX ADMIN — PIPERACILLIN AND TAZOBACTAM 3375 MG: 3; .375 INJECTION, POWDER, LYOPHILIZED, FOR SOLUTION INTRAVENOUS at 14:22

## 2023-10-22 RX ADMIN — INSULIN LISPRO 4 UNITS: 100 INJECTION, SOLUTION INTRAVENOUS; SUBCUTANEOUS at 08:42

## 2023-10-22 RX ADMIN — PIPERACILLIN AND TAZOBACTAM 3375 MG: 3; .375 INJECTION, POWDER, LYOPHILIZED, FOR SOLUTION INTRAVENOUS at 06:03

## 2023-10-22 RX ADMIN — INSULIN LISPRO 2 UNITS: 100 INJECTION, SOLUTION INTRAVENOUS; SUBCUTANEOUS at 11:52

## 2023-10-22 RX ADMIN — HYOSCYAMINE SULFATE: 16 SOLUTION at 10:17

## 2023-10-22 RX ADMIN — GABAPENTIN 300 MG: 300 CAPSULE ORAL at 19:59

## 2023-10-22 RX ADMIN — INSULIN LISPRO 10 UNITS: 100 INJECTION, SOLUTION INTRAVENOUS; SUBCUTANEOUS at 16:30

## 2023-10-22 RX ADMIN — GABAPENTIN 300 MG: 300 CAPSULE ORAL at 08:42

## 2023-10-22 RX ADMIN — LISINOPRIL 5 MG: 5 TABLET ORAL at 08:42

## 2023-10-22 RX ADMIN — ESCITALOPRAM OXALATE 20 MG: 10 TABLET ORAL at 08:42

## 2023-10-22 RX ADMIN — INSULIN LISPRO 10 UNITS: 100 INJECTION, SOLUTION INTRAVENOUS; SUBCUTANEOUS at 11:52

## 2023-10-22 RX ADMIN — DOXYCYCLINE 100 MG: 100 INJECTION, POWDER, LYOPHILIZED, FOR SOLUTION INTRAVENOUS at 11:00

## 2023-10-22 RX ADMIN — INSULIN GLARGINE 15 UNITS: 100 INJECTION, SOLUTION SUBCUTANEOUS at 09:49

## 2023-10-22 ASSESSMENT — ENCOUNTER SYMPTOMS
EYES NEGATIVE: 1
GASTROINTESTINAL NEGATIVE: 1
RESPIRATORY NEGATIVE: 1

## 2023-10-22 ASSESSMENT — PAIN DESCRIPTION - LOCATION: LOCATION: ABDOMEN

## 2023-10-22 ASSESSMENT — PAIN SCALES - GENERAL: PAINLEVEL_OUTOF10: 2

## 2023-10-22 NOTE — PROGRESS NOTES
Patient is feeling better overall. No chest pain palpitation. No abdominal pain, nausea or vomiting. General appearance: alert, appears stated age and cooperative, no apparent distress. Skin: Skin color, texture, turgor normal. No rashes or lesions  HEENT: Head: Normocephalic, no lesions, without obvious abnormality. Neck: no adenopathy, no carotid bruit, no JVD, supple, symmetrical, trachea midline, and thyroid not enlarged, symmetric, no tenderness/mass/nodules  Lungs: clear to auscultation bilaterally  Heart: regular rate and rhythm, S1, S2 normal, no murmur, click, rub or gallop  Abdomen: soft, non-tender; bowel sounds normal; no masses,  no organomegaly  Extremities:  Erythema and induration are fading away from the dorsal aspect of the foot as it is marked. Persistent erythema, induration and tenderness involving the second digit. Stage II ulceration involving the base of the second digit between the second and third. Very strong dorsalis pedis pulse. Neurologic: Mental status: Alert, oriented, thought content appropriate     *Right foot cellulitis and ulceration involving the second digit. No evidence of osteomyelitis on MRI. Very strong dorsalis pedis pulse making a possibility of significant arterial compromise unlikely. Blood culture is negative. Wound culture is growing a staph. I will add Diflucan. *Mild ARTIE, unknown etiology. Could be sepsis related. Could be related to interstitial nephritis. Discontinue vancomycin. Recheck BMP in AM.     *Hypertension. *Diabetes, poor control. Patient uses an insulin pump at home which is off. *Hyponatremia, corrected. *Hypokalemia, corrected. *Hypomagnesemia, corrected. *Few other medical issues not listed above.

## 2023-10-22 NOTE — PROGRESS NOTES
[address]   2009     (-)[facility]    CHOLECYSTECTOMY        ENDOMETRIAL ABLATION   2012? GALLBLADDER SURGERY   1999            No current facility-administered medications on file prior to encounter. Current Outpatient Medications on File Prior to Encounter   Medication Sig Dispense Refill    predniSONE (DELTASONE) 10 MG tablet Take 5 tabs daily for 2 days then 4 tabs daily for 3 days then 3 tabs daily for 3 days then 2 tabs daily for 2 days then 1 tab daily once 36 tablet 0    promethazine-dextromethorphan (PROMETHAZINE-DM) 6.25-15 MG/5ML syrup Take 5 mLs by mouth 4 times daily as needed for Cough 118 mL 0    albuterol sulfate HFA (PROVENTIL;VENTOLIN;PROAIR) 108 (90 Base) MCG/ACT inhaler Inhale 2 puffs into the lungs every 6 hours as needed for Wheezing or Shortness of Breath 1 each 0    JARDIANCE 25 MG tablet          atorvastatin (LIPITOR) 20 MG tablet Take 2 tablets by mouth daily 90 tablet 3    magnesium oxide (MAG-OX) 400 (240 Mg) MG tablet Take 1 tablet by mouth daily 90 tablet 3    gabapentin (NEURONTIN) 300 MG capsule Take 1 capsule by mouth 2 times daily.         insulin lispro (HUMALOG KWIKPEN) 200 UNIT/ML SOPN pen Use 3 times a day max dose 200 units/day (Patient taking differently: Use 3 times a day max dose 200 units/day  Insulin pump) 30 Adjustable Dose Pre-filled Pen Syringe 3    lisinopril (PRINIVIL;ZESTRIL) 10 MG tablet Take 1 tablet by mouth daily 30 tablet 1    propranolol (INDERAL) 20 MG tablet TAKE 1 TABLET BY MOUTH TWICE DAILY (Patient taking differently: Take 1 tablet by mouth nightly) 180 tablet 3    escitalopram (LEXAPRO) 20 MG tablet TK 1 T PO QD   1    traZODone (DESYREL) 50 MG tablet Take 2 tablets by mouth nightly        VRAYLAR 3 MG CAPS Take 1 capsule by mouth daily   1              Allergies   Allergen Reactions    Metformin Diarrhea    Nabumetone Rash and Other (See Comments)         Family History         Family History   Problem Relation Age of Lab Results   Component Value Date     WBC 7.8 10/20/2023     HGB 13.7 10/20/2023     HCT 41.3 10/20/2023     MCV 89.2 10/20/2023      10/20/2023            Lab Results   Component Value Date/Time      10/20/2023 07:12 AM     K 3.2 10/20/2023 07:12 AM     K 3.4 06/26/2022 08:08 AM     CL 95 10/20/2023 07:12 AM     CO2 23 10/20/2023 07:12 AM     BUN 5 10/20/2023 07:12 AM     CREATININE 0.30 10/20/2023 07:12 AM     GLUCOSE 327 10/20/2023 07:12 AM     GLUCOSE 82 03/04/2023 09:36 AM     CALCIUM 8.8 10/20/2023 07:12 AM            Lab Results   Component Value Date     LABALBU 3.6 10/20/2023            Lab Results   Component Value Date     SEDRATE 62 (H) 10/19/2023            Lab Results   Component Value Date     .4 (H) 10/19/2023            Lab Results   Component Value Date     LABA1C 7.7 (H) 03/18/2023         [id number] Alexey Recio

## 2023-10-23 ENCOUNTER — APPOINTMENT (OUTPATIENT)
Dept: ULTRASOUND IMAGING | Age: 53
DRG: 638 | End: 2023-10-23
Attending: INTERNAL MEDICINE
Payer: COMMERCIAL

## 2023-10-23 LAB
AMORPH SED URNS QL MICRO: ABNORMAL
ANION GAP SERPL CALCULATED.3IONS-SCNC: 15 MEQ/L (ref 9–15)
ANION GAP SERPL CALCULATED.3IONS-SCNC: 18 MEQ/L (ref 9–15)
BACTERIA URNS QL MICRO: ABNORMAL /HPF
BILIRUB UR QL STRIP: NEGATIVE
BUN SERPL-MCNC: 36 MG/DL (ref 6–20)
BUN SERPL-MCNC: 37 MG/DL (ref 6–20)
CALCIUM SERPL-MCNC: 7.8 MG/DL (ref 8.5–9.9)
CALCIUM SERPL-MCNC: 8.2 MG/DL (ref 8.5–9.9)
CHLORIDE SERPL-SCNC: 100 MEQ/L (ref 95–107)
CHLORIDE SERPL-SCNC: 98 MEQ/L (ref 95–107)
CK SERPL-CCNC: 20 U/L (ref 0–170)
CLARITY UR: NORMAL
CO2 SERPL-SCNC: 15 MEQ/L (ref 20–31)
CO2 SERPL-SCNC: 21 MEQ/L (ref 20–31)
COLOR UR: NORMAL
CREAT SERPL-MCNC: 3.42 MG/DL (ref 0.5–0.9)
CREAT SERPL-MCNC: 4.01 MG/DL (ref 0.5–0.9)
EKG ATRIAL RATE: 80 BPM
EKG P AXIS: 14 DEGREES
EKG P-R INTERVAL: 154 MS
EKG Q-T INTERVAL: 384 MS
EKG QRS DURATION: 84 MS
EKG QTC CALCULATION (BAZETT): 442 MS
EKG R AXIS: 23 DEGREES
EKG T AXIS: 7 DEGREES
EKG VENTRICULAR RATE: 80 BPM
EPI CELLS #/AREA URNS HPF: ABNORMAL /HPF
GLUCOSE BLD-MCNC: 144 MG/DL (ref 70–99)
GLUCOSE BLD-MCNC: 203 MG/DL (ref 70–99)
GLUCOSE BLD-MCNC: 205 MG/DL (ref 70–99)
GLUCOSE BLD-MCNC: 205 MG/DL (ref 70–99)
GLUCOSE SERPL-MCNC: 194 MG/DL (ref 70–99)
GLUCOSE SERPL-MCNC: 207 MG/DL (ref 70–99)
GLUCOSE UR STRIP-MCNC: NEGATIVE MG/DL
HGB UR QL STRIP: NORMAL
KETONES UR STRIP-MCNC: NEGATIVE MG/DL
LEUKOCYTE ESTERASE UR QL STRIP: NORMAL
NITRITE UR QL STRIP: NEGATIVE
PERFORMED ON: ABNORMAL
PH UR STRIP: 5 [PH] (ref 5–9)
POTASSIUM SERPL-SCNC: 3.8 MEQ/L (ref 3.4–4.9)
POTASSIUM SERPL-SCNC: 4.2 MEQ/L (ref 3.4–4.9)
PROT UR STRIP-MCNC: NORMAL MG/DL
RBC #/AREA URNS HPF: ABNORMAL /HPF (ref 0–2)
SODIUM SERPL-SCNC: 133 MEQ/L (ref 135–144)
SODIUM SERPL-SCNC: 134 MEQ/L (ref 135–144)
SP GR UR STRIP: <=1.005 (ref 1–1.03)
UROBILINOGEN UR STRIP-ACNC: 0.2 E.U./DL
VANCOMYCIN TROUGH SERPL-MCNC: 14.4 UG/ML (ref 10–20)
WBC #/AREA URNS HPF: ABNORMAL /HPF (ref 0–5)

## 2023-10-23 PROCEDURE — 6360000002 HC RX W HCPCS: Performed by: INTERNAL MEDICINE

## 2023-10-23 PROCEDURE — 2500000003 HC RX 250 WO HCPCS: Performed by: INTERNAL MEDICINE

## 2023-10-23 PROCEDURE — 2580000003 HC RX 258: Performed by: INTERNAL MEDICINE

## 2023-10-23 PROCEDURE — 6370000000 HC RX 637 (ALT 250 FOR IP): Performed by: INTERNAL MEDICINE

## 2023-10-23 PROCEDURE — 1210000000 HC MED SURG R&B

## 2023-10-23 PROCEDURE — 80202 ASSAY OF VANCOMYCIN: CPT

## 2023-10-23 PROCEDURE — 76775 US EXAM ABDO BACK WALL LIM: CPT

## 2023-10-23 PROCEDURE — 81001 URINALYSIS AUTO W/SCOPE: CPT

## 2023-10-23 PROCEDURE — 36415 COLL VENOUS BLD VENIPUNCTURE: CPT

## 2023-10-23 PROCEDURE — 93010 ELECTROCARDIOGRAM REPORT: CPT | Performed by: INTERNAL MEDICINE

## 2023-10-23 PROCEDURE — 80048 BASIC METABOLIC PNL TOTAL CA: CPT

## 2023-10-23 PROCEDURE — 82550 ASSAY OF CK (CPK): CPT

## 2023-10-23 RX ORDER — 0.9 % SODIUM CHLORIDE 0.9 %
1000 INTRAVENOUS SOLUTION INTRAVENOUS ONCE
Status: COMPLETED | OUTPATIENT
Start: 2023-10-23 | End: 2023-10-23

## 2023-10-23 RX ORDER — ENOXAPARIN SODIUM 100 MG/ML
30 INJECTION SUBCUTANEOUS DAILY
Status: DISCONTINUED | OUTPATIENT
Start: 2023-10-24 | End: 2023-10-25

## 2023-10-23 RX ORDER — SODIUM CHLORIDE 9 MG/ML
INJECTION, SOLUTION INTRAVENOUS CONTINUOUS
Status: DISCONTINUED | OUTPATIENT
Start: 2023-10-23 | End: 2023-10-23

## 2023-10-23 RX ORDER — CALCIUM CARBONATE 500 MG/1
500 TABLET, CHEWABLE ORAL 3 TIMES DAILY PRN
Status: DISCONTINUED | OUTPATIENT
Start: 2023-10-23 | End: 2023-10-27 | Stop reason: HOSPADM

## 2023-10-23 RX ADMIN — INSULIN LISPRO 10 UNITS: 100 INJECTION, SOLUTION INTRAVENOUS; SUBCUTANEOUS at 16:38

## 2023-10-23 RX ADMIN — SODIUM CHLORIDE: 9 INJECTION, SOLUTION INTRAVENOUS at 11:32

## 2023-10-23 RX ADMIN — PROPRANOLOL HYDROCHLORIDE 20 MG: 10 TABLET ORAL at 20:55

## 2023-10-23 RX ADMIN — INSULIN LISPRO 10 UNITS: 100 INJECTION, SOLUTION INTRAVENOUS; SUBCUTANEOUS at 08:32

## 2023-10-23 RX ADMIN — ESCITALOPRAM OXALATE 20 MG: 10 TABLET ORAL at 08:32

## 2023-10-23 RX ADMIN — PIPERACILLIN AND TAZOBACTAM 3375 MG: 3; .375 INJECTION, POWDER, LYOPHILIZED, FOR SOLUTION INTRAVENOUS at 16:36

## 2023-10-23 RX ADMIN — HYOSCYAMINE SULFATE: 16 SOLUTION at 08:34

## 2023-10-23 RX ADMIN — GABAPENTIN 300 MG: 300 CAPSULE ORAL at 08:32

## 2023-10-23 RX ADMIN — CALCIUM CARBONATE 500 MG: 500 TABLET, CHEWABLE ORAL at 22:14

## 2023-10-23 RX ADMIN — INSULIN LISPRO 2 UNITS: 100 INJECTION, SOLUTION INTRAVENOUS; SUBCUTANEOUS at 08:33

## 2023-10-23 RX ADMIN — ACETAMINOPHEN 650 MG: 325 TABLET ORAL at 20:54

## 2023-10-23 RX ADMIN — GABAPENTIN 300 MG: 300 CAPSULE ORAL at 20:55

## 2023-10-23 RX ADMIN — SODIUM BICARBONATE: 84 INJECTION, SOLUTION INTRAVENOUS at 23:44

## 2023-10-23 RX ADMIN — CARIPRAZINE 3 MG: 1.5 CAPSULE, GELATIN COATED ORAL at 08:32

## 2023-10-23 RX ADMIN — PIPERACILLIN AND TAZOBACTAM 3375 MG: 3; .375 INJECTION, POWDER, LYOPHILIZED, FOR SOLUTION INTRAVENOUS at 05:18

## 2023-10-23 RX ADMIN — INSULIN GLARGINE 45 UNITS: 100 INJECTION, SOLUTION SUBCUTANEOUS at 21:01

## 2023-10-23 RX ADMIN — TRAZODONE HYDROCHLORIDE 100 MG: 50 TABLET ORAL at 20:55

## 2023-10-23 RX ADMIN — ONDANSETRON 4 MG: 4 TABLET, ORALLY DISINTEGRATING ORAL at 20:54

## 2023-10-23 RX ADMIN — SODIUM CHLORIDE 1000 ML: 9 INJECTION, SOLUTION INTRAVENOUS at 09:20

## 2023-10-23 RX ADMIN — INSULIN LISPRO 10 UNITS: 100 INJECTION, SOLUTION INTRAVENOUS; SUBCUTANEOUS at 11:33

## 2023-10-23 RX ADMIN — INSULIN LISPRO 2 UNITS: 100 INJECTION, SOLUTION INTRAVENOUS; SUBCUTANEOUS at 16:38

## 2023-10-23 RX ADMIN — ACETAMINOPHEN 650 MG: 325 TABLET ORAL at 13:12

## 2023-10-23 ASSESSMENT — ENCOUNTER SYMPTOMS
EYE DISCHARGE: 0
ABDOMINAL DISTENTION: 0
APNEA: 0

## 2023-10-23 ASSESSMENT — PAIN DESCRIPTION - LOCATION
LOCATION: ABDOMEN
LOCATION: BACK

## 2023-10-23 ASSESSMENT — PAIN DESCRIPTION - ORIENTATION: ORIENTATION: RIGHT;LOWER

## 2023-10-23 ASSESSMENT — PAIN DESCRIPTION - DESCRIPTORS: DESCRIPTORS: ACHING

## 2023-10-23 ASSESSMENT — PAIN SCALES - GENERAL: PAINLEVEL_OUTOF10: 2

## 2023-10-23 NOTE — PROGRESS NOTES
Patient in bed with IV Zosyn infusing. Pt is pleasant and alert and oriented. Dressing to 2nd right toe changed, pt states he foot shows improvement. Redness is localized to the toe without drainage.

## 2023-10-23 NOTE — PROGRESS NOTES
Pharmacy Dose Adjustment Per Protocol    Chan Mcfarlane is a 47 y.o. female. Recent Labs     10/22/23  0610 10/23/23  0721   BUN 22* 36*   CREATININE 1.36* 3.42*   Estimated Creatinine Clearance: 20 mL/min (A) (based on SCr of 3.42 mg/dL (H)). [facility] Height: 160 cm (5' 2.99\"), Weight - Scale: 83.1 kg (183 lb 4.8 oz)    If yes to following, excluded from auto adjustment in Table 1 of policy - please contact provider with recommendations as appropriate. Include condition/exception in scratch notes.  Yes No   Trauma Service or Ortho Surgery []  [x]    COVID []  [x]    Pregnancy []  [x]        Current order:  Enoxaparin 40 mg SUBQ once daily      Plan:  Pharmacologic VTE prophylaxis modified based on patient renal function per [facility]/P&T approved protocol     Patient Weight (kg)      50.9 and below .9 101-150.9 151-174.9 175 or greater   Estimated   CrCl  (ml/min) 30 or greater []   30 mg   SUBQ daily   []   40 mg   SUBQ daily (or 30 mg BID for orthopedic cases) []  30 mg SUBQ   BID  []  40 mg   SUBQ   BID []  60mg SUBQ BID    15-29.9 []  UFH 5000   units SUBQ BID [x]  30 mg   SUBQ daily [] 30 mg SUBQ   daily []  40 mg SUBQ   daily [] 60 mg SUBQ   daily    Less than 15 or dialysis []  UFH 5000   units SUBQ BID [] UFH 5000 units SUBQ TID []  UFH 7500   units   SUBQ TID       Thank you,

## 2023-10-23 NOTE — CONSULTS
[facility]  Consult Note           Reason for Consult:  severe ARTIE / ATN  Requesting Physician:  Dr. Jayjay Contreras    Chief Complaint:  foot infection  History Obtained From:  patient, electronic medical record    History of Present Ilness:    47y.o. year old female admitted with foot infection. Has normal kidney function at baseline (cr 0.4). no contrast procedures. On lisinopril for htn. Bp ok to low. Admitted on 10/19. As of 10/21, cr was still 0.38.  then 10/22 was 1.36 and today up to 3.42.  no contrast procedures. No NSAIDs. Ace I held. Got 1 liter bolus and written for saline at 150 cc/hr. Isnt urinating much. Just had renal ultrasound . Results are pending. No family history of kidney problems. Was placed on vanco (1.5 q 12) and zosyn. Vanco held and zosyn adjusted. Past Medical History:        Diagnosis Date    CAD (coronary artery disease) 6/25/2022    CAD (coronary artery disease) 6/25/2022    Depression     Environmental allergies     Gastroparesis     GERD (gastroesophageal reflux disease)     Headache     Dr. Wiggins Lies    HPV in female     Hyperlipidemia     Hypertension     Leukocytosis     Migraines     Type II or unspecified type diabetes mellitus without mention of complication, not stated as uncontrolled        Past Surgical History:        Procedure Laterality Date    BONE MARROW BIOPSY  2012    (-)[facility]    BREAST REDUCTION [address] [place] [address]  2009    (-)[address]  2012? GALLBLADDER SURGERY  1999       Home Medications:    No current facility-administered medications on file prior to encounter.      Current Outpatient Medications on File Prior to Encounter   Medication Sig Dispense Refill    albuterol sulfate HFA (PROVENTIL;VENTOLIN;PROAIR) 108 (90 Base) MCG/ACT inhaler Inhale 2 puffs into the lungs every 6 hours as needed for Wheezing or Shortness of Breath (Patient not
lisinopril (PRINIVIL;ZESTRIL) 10 MG tablet Take 1 tablet by mouth daily 30 tablet 1    propranolol (INDERAL) 20 MG tablet TAKE 1 TABLET BY MOUTH TWICE DAILY (Patient taking differently: Take 1 tablet by mouth nightly) 180 tablet 3    escitalopram (LEXAPRO) 20 MG tablet TK 1 T PO QD  1    traZODone (DESYREL) 50 MG tablet Take 2 tablets by mouth nightly      VRAYLAR 3 MG CAPS Take 1 capsule by mouth daily  1       Allergies   Allergen Reactions    Metformin Diarrhea    Nabumetone Rash and Other (See Comments)         Family History   Problem Relation Age of Onset    Heart Disease Mother     Diabetes Father     Heart Disease Father     Cancer Father         thyroid    No Known Problems Sister     No Known Problems Paternal Grandfather     No Known Problems Paternal Grandmother     No Known Problems Maternal Grandmother     No Known Problems Maternal Grandfather     No Known Problems Brother     No Known Problems Other     Breast Cancer Neg Hx     Colon Cancer Neg Hx     Eclampsia Neg Hx     Hypertension Neg Hx     Ovarian Cancer Neg Hx      Labor Neg Hx     Spont Abortions Neg Hx     Stroke Neg Hx          Physical Exam:      Physical Exam  Constitutional:       Appearance: She is obese. She is not ill-appearing. HENT:      Head: Normocephalic and atraumatic. Eyes:      Conjunctiva/sclera: Conjunctivae normal.      Pupils: Pupils are equal, round, and reactive to light. Neck:      Vascular: No carotid bruit. Cardiovascular:      Heart sounds: Normal heart sounds. No murmur heard. Pulmonary:      Effort: Pulmonary effort is normal. No respiratory distress. Breath sounds: Normal breath sounds. No stridor. No wheezing, rhonchi or rales. Chest:      Chest wall: No tenderness. Abdominal:      General: Abdomen is flat. Bowel sounds are normal. There is no distension. Palpations: Abdomen is soft. There is no mass. Tenderness: There is no abdominal tenderness.  There is no right CVA

## 2023-10-23 NOTE — PROGRESS NOTES
Update  Repeat vanco level is 14  CPK is NL  UA does not show any RBC   US shows no obstructive uropathy

## 2023-10-23 NOTE — PROGRESS NOTES
Patient reported that her urine output is low. No chest pain palpitation. No abdominal pain. General appearance: alert, appears stated age and cooperative, no apparent distress. Skin: Skin color, texture, turgor normal. No rashes or lesions  HEENT: Head: Normocephalic, no lesions, without obvious abnormality. Neck: no adenopathy, no carotid bruit, no JVD, supple, symmetrical, trachea midline, and thyroid not enlarged, symmetric, no tenderness/mass/nodules  Lungs: clear to auscultation bilaterally  Heart: regular rate and rhythm, S1, S2 normal, no murmur, click, rub or gallop  Abdomen: soft, non-tender; bowel sounds normal; no masses,  no organomegaly  Extremities: Noticeable improvement of the erythema, induration and tenderness involving the right distal foot and second great toe. Very strong dorsalis pedis pulse. Neurologic: Mental status: Alert, oriented, thought content appropriate     *ARTIE with diminished urine output. Unknown etiology. Could be related to infection. Could be related to interstitial nephritis. Patient had been on Zosyn and vancomycin that were ordered by [address] [place] [address] providers of prior to arrival here. This also could be caused by hemodynamic change. Patient had not been hypotensive since admission however her blood pressure is probably relatively lower than baseline. Lisinopril has been on hold since yesterday. Last vancomycin level was subtherapeutic. I requested a UA to see if there is any RBC that would indicate acute glomerulonephritis. I requested the renal ultrasound to rule out obstructive uropathy. I discontinued her vancomycin yesterday when I noticed that her creatinine is up to 1.36.  I requested to repeat vancomycin level today. Patient also is on Lipitor, I requested a CPK to rule out rhabdomyolysis. I started patient on aggressive IV fluid infusion. Repeat BMP at 1800. I reduced the Zosyn dose to 2.25 every 8. I requested nephrology evaluation as well.   Defer further needed diagnostic and therapeutic intervention relative to her ARTIE to nephrology team.    *Right foot cellulitis and ulceration involving the second digit. No evidence of osteomyelitis on MRI. Very strong dorsalis pedis pulse making a possibility of significant arterial compromise unlikely. Patient was seen by Dr. Zoraida Akbar who recommended the continuation of Zosyn. *Hypertension. History of. Lisinopril had been on hold. I started patient on IV fluid infusion     *Diabetes, poor control. Patient uses an insulin pump at home which is off. *Hyponatremia, corrected. *Hypokalemia, corrected. *Hypomagnesemia, corrected. *Few other medical issues not listed above.

## 2023-10-23 NOTE — PROGRESS NOTES
Lab drawn (Vanco trough) and taken to lab. Dr. Miah Isaac completed a virtual visit with patient and explained the plan of care and need to watch for improvement in her kidney function and if they did not improve that she would possibly need to transfer to [place]. Pt denies having had kidney issues in the past and denies any family history. IVF's infusing as ordered. Pt states she is voiding minimal amounts. Hat in toilet to collect urine sample. Pt denies sob, no edema noted.

## 2023-10-24 PROBLEM — L97.512 DIABETIC ULCER OF TOE OF RIGHT FOOT ASSOCIATED WITH DIABETES MELLITUS DUE TO UNDERLYING CONDITION, WITH FAT LAYER EXPOSED (HCC): Status: ACTIVE | Noted: 2023-10-24

## 2023-10-24 PROBLEM — L08.9 TYPE 2 DIABETES MELLITUS WITH RIGHT DIABETIC FOOT INFECTION (HCC): Status: ACTIVE | Noted: 2018-06-27

## 2023-10-24 PROBLEM — N17.9 ACUTE KIDNEY INJURY (HCC): Status: ACTIVE | Noted: 2023-10-24

## 2023-10-24 PROBLEM — E11.628 TYPE 2 DIABETES MELLITUS WITH RIGHT DIABETIC FOOT INFECTION (HCC): Status: ACTIVE | Noted: 2018-06-27

## 2023-10-24 PROBLEM — E08.621 DIABETIC ULCER OF TOE OF RIGHT FOOT ASSOCIATED WITH DIABETES MELLITUS DUE TO UNDERLYING CONDITION, WITH FAT LAYER EXPOSED (HCC): Status: ACTIVE | Noted: 2023-10-24

## 2023-10-24 LAB
ANION GAP SERPL CALCULATED.3IONS-SCNC: 14 MEQ/L (ref 9–15)
ANION GAP SERPL CALCULATED.3IONS-SCNC: 17 MEQ/L (ref 9–15)
BACTERIA SPEC ANAEROBE+AEROBE CULT: ABNORMAL
BACTERIA SPEC ANAEROBE+AEROBE CULT: ABNORMAL
BUN SERPL-MCNC: 40 MG/DL (ref 6–20)
BUN SERPL-MCNC: 41 MG/DL (ref 6–20)
CALCIUM SERPL-MCNC: 7.8 MG/DL (ref 8.5–9.9)
CALCIUM SERPL-MCNC: 8 MG/DL (ref 8.5–9.9)
CHLORIDE SERPL-SCNC: 102 MEQ/L (ref 95–107)
CHLORIDE SERPL-SCNC: 99 MEQ/L (ref 95–107)
CO2 SERPL-SCNC: 18 MEQ/L (ref 20–31)
CO2 SERPL-SCNC: 20 MEQ/L (ref 20–31)
CREAT SERPL-MCNC: 4.25 MG/DL (ref 0.5–0.9)
CREAT SERPL-MCNC: 4.83 MG/DL (ref 0.5–0.9)
GLUCOSE BLD-MCNC: 113 MG/DL (ref 70–99)
GLUCOSE BLD-MCNC: 124 MG/DL (ref 70–99)
GLUCOSE BLD-MCNC: 158 MG/DL (ref 70–99)
GLUCOSE BLD-MCNC: 168 MG/DL (ref 70–99)
GLUCOSE SERPL-MCNC: 131 MG/DL (ref 70–99)
GLUCOSE SERPL-MCNC: 170 MG/DL (ref 70–99)
LACTATE BLDV-SCNC: 0.8 MMOL/L (ref 0.5–2.2)
ORGANISM: ABNORMAL
PERFORMED ON: ABNORMAL
POTASSIUM SERPL-SCNC: 4.1 MEQ/L (ref 3.4–4.9)
POTASSIUM SERPL-SCNC: 4.2 MEQ/L (ref 3.4–4.9)
SODIUM SERPL-SCNC: 134 MEQ/L (ref 135–144)
SODIUM SERPL-SCNC: 136 MEQ/L (ref 135–144)

## 2023-10-24 PROCEDURE — 99232 SBSQ HOSP IP/OBS MODERATE 35: CPT | Performed by: INTERNAL MEDICINE

## 2023-10-24 PROCEDURE — 6370000000 HC RX 637 (ALT 250 FOR IP): Performed by: INTERNAL MEDICINE

## 2023-10-24 PROCEDURE — 2580000003 HC RX 258: Performed by: INTERNAL MEDICINE

## 2023-10-24 PROCEDURE — 6360000002 HC RX W HCPCS: Performed by: INTERNAL MEDICINE

## 2023-10-24 PROCEDURE — 36415 COLL VENOUS BLD VENIPUNCTURE: CPT

## 2023-10-24 PROCEDURE — 80048 BASIC METABOLIC PNL TOTAL CA: CPT

## 2023-10-24 PROCEDURE — 2500000003 HC RX 250 WO HCPCS: Performed by: INTERNAL MEDICINE

## 2023-10-24 PROCEDURE — 83605 ASSAY OF LACTIC ACID: CPT

## 2023-10-24 PROCEDURE — 1210000000 HC MED SURG R&B

## 2023-10-24 RX ORDER — OXYCODONE HYDROCHLORIDE AND ACETAMINOPHEN 5; 325 MG/1; MG/1
1 TABLET ORAL EVERY 6 HOURS PRN
Status: DISCONTINUED | OUTPATIENT
Start: 2023-10-24 | End: 2023-10-27 | Stop reason: HOSPADM

## 2023-10-24 RX ORDER — DIPHENOXYLATE HYDROCHLORIDE AND ATROPINE SULFATE 2.5; .025 MG/1; MG/1
1 TABLET ORAL EVERY 6 HOURS
Status: COMPLETED | OUTPATIENT
Start: 2023-10-24 | End: 2023-10-24

## 2023-10-24 RX ORDER — MIDODRINE HYDROCHLORIDE 2.5 MG/1
5 TABLET ORAL ONCE
Status: COMPLETED | OUTPATIENT
Start: 2023-10-24 | End: 2023-10-24

## 2023-10-24 RX ADMIN — DIPHENOXYLATE HYDROCHLORIDE AND ATROPINE SULFATE 1 TABLET: 2.5; .025 TABLET ORAL at 09:40

## 2023-10-24 RX ADMIN — DIPHENOXYLATE HYDROCHLORIDE AND ATROPINE SULFATE 1 TABLET: 2.5; .025 TABLET ORAL at 14:42

## 2023-10-24 RX ADMIN — TRAZODONE HYDROCHLORIDE 100 MG: 50 TABLET ORAL at 20:43

## 2023-10-24 RX ADMIN — GABAPENTIN 300 MG: 300 CAPSULE ORAL at 08:05

## 2023-10-24 RX ADMIN — ESCITALOPRAM OXALATE 20 MG: 10 TABLET ORAL at 08:05

## 2023-10-24 RX ADMIN — INSULIN LISPRO 10 UNITS: 100 INJECTION, SOLUTION INTRAVENOUS; SUBCUTANEOUS at 12:01

## 2023-10-24 RX ADMIN — SODIUM BICARBONATE: 84 INJECTION, SOLUTION INTRAVENOUS at 14:47

## 2023-10-24 RX ADMIN — PROPRANOLOL HYDROCHLORIDE 20 MG: 10 TABLET ORAL at 19:50

## 2023-10-24 RX ADMIN — HYOSCYAMINE SULFATE: 16 SOLUTION at 10:49

## 2023-10-24 RX ADMIN — ACETAMINOPHEN 650 MG: 325 TABLET ORAL at 16:59

## 2023-10-24 RX ADMIN — INSULIN LISPRO 10 UNITS: 100 INJECTION, SOLUTION INTRAVENOUS; SUBCUTANEOUS at 16:57

## 2023-10-24 RX ADMIN — GABAPENTIN 300 MG: 300 CAPSULE ORAL at 19:50

## 2023-10-24 RX ADMIN — ONDANSETRON 4 MG: 2 INJECTION INTRAMUSCULAR; INTRAVENOUS at 08:06

## 2023-10-24 RX ADMIN — OXYCODONE AND ACETAMINOPHEN 1 TABLET: 5; 325 TABLET ORAL at 08:05

## 2023-10-24 RX ADMIN — INSULIN LISPRO 10 UNITS: 100 INJECTION, SOLUTION INTRAVENOUS; SUBCUTANEOUS at 08:07

## 2023-10-24 RX ADMIN — PIPERACILLIN AND TAZOBACTAM 3375 MG: 3; .375 INJECTION, POWDER, LYOPHILIZED, FOR SOLUTION INTRAVENOUS at 05:20

## 2023-10-24 RX ADMIN — CALCIUM CARBONATE 500 MG: 500 TABLET, CHEWABLE ORAL at 19:49

## 2023-10-24 RX ADMIN — Medication 10 ML: at 08:15

## 2023-10-24 RX ADMIN — CEFTRIAXONE 2000 MG: 1 INJECTION, POWDER, FOR SOLUTION INTRAMUSCULAR; INTRAVENOUS at 16:56

## 2023-10-24 RX ADMIN — INSULIN GLARGINE 45 UNITS: 100 INJECTION, SOLUTION SUBCUTANEOUS at 20:47

## 2023-10-24 RX ADMIN — CARIPRAZINE 3 MG: 1.5 CAPSULE, GELATIN COATED ORAL at 08:06

## 2023-10-24 RX ADMIN — MIDODRINE HYDROCHLORIDE 5 MG: 2.5 TABLET ORAL at 20:43

## 2023-10-24 RX ADMIN — ONDANSETRON 4 MG: 4 TABLET, ORALLY DISINTEGRATING ORAL at 16:59

## 2023-10-24 ASSESSMENT — PAIN DESCRIPTION - LOCATION: LOCATION: ABDOMEN;BACK

## 2023-10-24 ASSESSMENT — PAIN SCALES - GENERAL
PAINLEVEL_OUTOF10: 2
PAINLEVEL_OUTOF10: 3

## 2023-10-24 NOTE — PROGRESS NOTES
New order for rocephin IV, pharmacy called to verify compatibility with sodium bicarb IV. Pharmacy okayed IV infusion with y site.

## 2023-10-24 NOTE — PROGRESS NOTES
No new symptoms. Patient reported that her urine output is slightly increased since yesterday. No abdominal pain. No shortness of breath. General appearance: alert, appears stated age and cooperative, no apparent distress. Skin: Skin color, texture, turgor normal. No rashes or lesions  HEENT: Head: Normocephalic, no lesions, without obvious abnormality. Neck: no adenopathy, no carotid bruit, no JVD, supple, symmetrical, trachea midline, and thyroid not enlarged, symmetric, no tenderness/mass/nodules  Lungs: clear to auscultation bilaterally  Heart: regular rate and rhythm, S1, S2 normal, no murmur, click, rub or gallop  Abdomen: soft, non-tender; bowel sounds normal; no masses,  no organomegaly  Extremities: Noticeable improvement of the erythema, induration and tenderness involving the right distal foot and second great toe. Very strong dorsalis pedis pulse. Neurologic: Mental status: Alert, oriented, thought content appropriate      Patient arrived to the emergency room at [facility] on the evening 10/19. Due to bed situation, the patient was transferred to [facility] on the evening of 10/20. Initial admission orders where entered by my colleague on the evening of 10/20. I saw patient the first time on 10/21. *ARTIE with diminished urine output. Her urine output had increased since yesterday. Her blood pressure is improved as well. The degree of worsening kidney function have slowed down. Ultrasound is negative for obstructive uropathy. UA is bland. Vancomycin level is 14. CPK is normal.  Could be caused by interstitial nephritis. Could be related to sepsis on presentation to [facility] emergency room in the setting of patient taking ACE inhibitor. I stopped ACE inhibitor on 10/22. Please refer to [facility] record for details on her ER presentation and treatment plan on the first 24 to 36 hours. Continue gentle intravenous fluid fusion with the sodium bicarb.   I appreciate

## 2023-10-24 NOTE — PROGRESS NOTES
Patient A & O x4. Complaints of nausea/dry heaving and back pain through abd this morning, zofran IV & percocet given. Continuous sodium bicarb infusing, zosyn on hold. Pt in bed resting, call light within reach.

## 2023-10-24 NOTE — PROGRESS NOTES
Patient has low oxygen sats, 88% on RA. Pt placed on 3lt oxygen and is 93-94%. Pt is not in any distress. Pt has some edema to her extremities. Respiratory aware of increased oxygen need.

## 2023-10-24 NOTE — PROGRESS NOTES
Pharmacy requested clarification of Zosyn being held. Per Dr Reddy Dock is to be on hold. Pharmacy updated.

## 2023-10-24 NOTE — PROGRESS NOTES
Facility/Department: [facility] MED SURG UNIT  Occupational Therapy- Communication Note    Name: Shavon Haley  : 1970  MRN: [id number]  Date of Service: 10/24/2023    OT order was put in, then was canceled. OT spoke with Dr. Troy Griggs to ensure no OT needs, he states pt does not need OT eval but he would like PT eval to be completed. Updated PT and PT manager regarding new order.     Therapy Time   Individual Concurrent Group Co-treatment   Time In  8:40am         Time Out  8:45am         Minutes  [address] [facility], [id number]

## 2023-10-25 LAB
ANION GAP SERPL CALCULATED.3IONS-SCNC: 15 MEQ/L (ref 9–15)
BACTERIA BLD CULT ORG #2: NORMAL
BACTERIA BLD CULT: NORMAL
BASOPHILS # BLD: 0 K/UL (ref 0–0.1)
BASOPHILS NFR BLD: 0.3 % (ref 0.1–1.2)
BUN SERPL-MCNC: 41 MG/DL (ref 6–20)
CALCIUM SERPL-MCNC: 7.9 MG/DL (ref 8.5–9.9)
CHLORIDE SERPL-SCNC: 98 MEQ/L (ref 95–107)
CO2 SERPL-SCNC: 22 MEQ/L (ref 20–31)
CREAT SERPL-MCNC: 5.17 MG/DL (ref 0.5–0.9)
EOSINOPHIL # BLD: 0.2 K/UL (ref 0–0.4)
EOSINOPHIL NFR BLD: 1.8 % (ref 0.7–5.8)
ERYTHROCYTE [DISTWIDTH] IN BLOOD BY AUTOMATED COUNT: 12.7 % (ref 11.7–14.4)
GLUCOSE BLD-MCNC: 158 MG/DL (ref 70–99)
GLUCOSE BLD-MCNC: 191 MG/DL (ref 70–99)
GLUCOSE BLD-MCNC: 194 MG/DL (ref 70–99)
GLUCOSE BLD-MCNC: 261 MG/DL (ref 70–99)
GLUCOSE BLD-MCNC: 91 MG/DL (ref 70–99)
GLUCOSE SERPL-MCNC: 128 MG/DL (ref 70–99)
HCT VFR BLD AUTO: 28.7 % (ref 37–47)
HGB BLD-MCNC: 9.5 G/DL (ref 11.2–15.7)
IMM GRANULOCYTES # BLD: 0.1 K/UL
IMM GRANULOCYTES NFR BLD: 0.6 %
LYMPHOCYTES # BLD: 1.8 K/UL (ref 1.2–3.7)
LYMPHOCYTES NFR BLD: 14.4 %
MCH RBC QN AUTO: 30 PG (ref 25.6–32.2)
MCHC RBC AUTO-ENTMCNC: 33.1 % (ref 32.2–35.5)
MCV RBC AUTO: 90.5 FL (ref 79.4–94.8)
MONOCYTES # BLD: 1.2 K/UL (ref 0.2–0.9)
MONOCYTES NFR BLD: 9.4 % (ref 4.7–12.5)
NEUTROPHILS # BLD: 9 K/UL (ref 1.6–6.1)
NEUTS SEG NFR BLD: 73.5 % (ref 34–71.1)
PERFORMED ON: ABNORMAL
PERFORMED ON: NORMAL
PLATELET # BLD AUTO: 244 K/UL (ref 182–369)
POTASSIUM SERPL-SCNC: 4.2 MEQ/L (ref 3.4–4.9)
RBC # BLD AUTO: 3.17 M/UL (ref 3.93–5.22)
SODIUM SERPL-SCNC: 135 MEQ/L (ref 135–144)
WBC # BLD AUTO: 12.2 K/UL (ref 4–10)

## 2023-10-25 PROCEDURE — 6360000002 HC RX W HCPCS: Performed by: INTERNAL MEDICINE

## 2023-10-25 PROCEDURE — 2580000003 HC RX 258: Performed by: INTERNAL MEDICINE

## 2023-10-25 PROCEDURE — 6370000000 HC RX 637 (ALT 250 FOR IP): Performed by: INTERNAL MEDICINE

## 2023-10-25 PROCEDURE — 85025 COMPLETE CBC W/AUTO DIFF WBC: CPT

## 2023-10-25 PROCEDURE — 1210000000 HC MED SURG R&B

## 2023-10-25 PROCEDURE — 36415 COLL VENOUS BLD VENIPUNCTURE: CPT

## 2023-10-25 PROCEDURE — 80048 BASIC METABOLIC PNL TOTAL CA: CPT

## 2023-10-25 RX ORDER — TRAZODONE HYDROCHLORIDE 50 MG/1
50 TABLET ORAL NIGHTLY
Status: DISCONTINUED | OUTPATIENT
Start: 2023-10-25 | End: 2023-10-27 | Stop reason: HOSPADM

## 2023-10-25 RX ORDER — HEPARIN SODIUM 5000 [USP'U]/ML
5000 INJECTION, SOLUTION INTRAVENOUS; SUBCUTANEOUS EVERY 8 HOURS SCHEDULED
Status: DISCONTINUED | OUTPATIENT
Start: 2023-10-25 | End: 2023-10-27 | Stop reason: HOSPADM

## 2023-10-25 RX ORDER — ONDANSETRON 4 MG/1
4 TABLET, ORALLY DISINTEGRATING ORAL EVERY 6 HOURS PRN
Status: DISCONTINUED | OUTPATIENT
Start: 2023-10-25 | End: 2023-10-27 | Stop reason: HOSPADM

## 2023-10-25 RX ADMIN — ONDANSETRON 4 MG: 4 TABLET, ORALLY DISINTEGRATING ORAL at 11:07

## 2023-10-25 RX ADMIN — PROPRANOLOL HYDROCHLORIDE 20 MG: 10 TABLET ORAL at 20:24

## 2023-10-25 RX ADMIN — GABAPENTIN 300 MG: 300 CAPSULE ORAL at 08:55

## 2023-10-25 RX ADMIN — ESCITALOPRAM OXALATE 20 MG: 10 TABLET ORAL at 08:55

## 2023-10-25 RX ADMIN — TRAZODONE HYDROCHLORIDE 50 MG: 50 TABLET ORAL at 20:24

## 2023-10-25 RX ADMIN — ACETAMINOPHEN 650 MG: 325 TABLET ORAL at 11:07

## 2023-10-25 RX ADMIN — CALCIUM CARBONATE 500 MG: 500 TABLET, CHEWABLE ORAL at 11:10

## 2023-10-25 RX ADMIN — INSULIN GLARGINE 45 UNITS: 100 INJECTION, SOLUTION SUBCUTANEOUS at 20:24

## 2023-10-25 RX ADMIN — HYOSCYAMINE SULFATE: 16 SOLUTION at 08:56

## 2023-10-25 RX ADMIN — ACETAMINOPHEN 650 MG: 325 TABLET ORAL at 17:45

## 2023-10-25 RX ADMIN — CEFTRIAXONE 2000 MG: 1 INJECTION, POWDER, FOR SOLUTION INTRAMUSCULAR; INTRAVENOUS at 16:57

## 2023-10-25 RX ADMIN — Medication 10 ML: at 20:24

## 2023-10-25 RX ADMIN — CARIPRAZINE 3 MG: 1.5 CAPSULE, GELATIN COATED ORAL at 08:55

## 2023-10-25 RX ADMIN — ACETAMINOPHEN 650 MG: 325 TABLET ORAL at 03:12

## 2023-10-25 RX ADMIN — ONDANSETRON 4 MG: 4 TABLET, ORALLY DISINTEGRATING ORAL at 17:45

## 2023-10-25 RX ADMIN — HEPARIN SODIUM 5000 UNITS: 5000 INJECTION INTRAVENOUS; SUBCUTANEOUS at 13:34

## 2023-10-25 RX ADMIN — INSULIN LISPRO 10 UNITS: 100 INJECTION, SOLUTION INTRAVENOUS; SUBCUTANEOUS at 08:55

## 2023-10-25 ASSESSMENT — PAIN SCALES - GENERAL
PAINLEVEL_OUTOF10: 2
PAINLEVEL_OUTOF10: 2
PAINLEVEL_OUTOF10: 0

## 2023-10-25 ASSESSMENT — PAIN DESCRIPTION - DESCRIPTORS: DESCRIPTORS: ACHING

## 2023-10-25 ASSESSMENT — PAIN DESCRIPTION - LOCATION
LOCATION: BACK
LOCATION: HIP

## 2023-10-25 NOTE — ADT AUTH CERT
4.83  Est GFR 12.0, 10.3  Glucose 170, 158, 168, 131, 124  Calcium 8.0, 7.8     PHYSICAL EXAM:  General appearance: alert, appears stated age and cooperative, no apparent distress. Extremities: Noticeable improvement of the erythema, induration and tenderness involving the right distal foot and second great toe. Very strong dorsalis pedis pulse. Neurologic: Mental status: Alert, oriented, thought content appropriate       MD CONSULTS/ASSESSMENT AND PLAN:  Hospitalist:  *ARTIE with diminished urine output. Her urine output had increased since yesterday. Her blood pressure is improved as well. The degree of worsening kidney function have slowed down. Ultrasound is negative for obstructive uropathy. UA is bland. Vancomycin level is 14. CPK is normal.  Could be caused by interstitial nephritis. Could be related to sepsis on presentation to [facility] emergency room in the setting of patient taking ACE inhibitor. I stopped ACE inhibitor on 10/22. Please refer to [facility] record for details on her ER presentation and treatment plan on the first 24 to 36 hours. Continue gentle intravenous fluid fusion with the sodium bicarb. I appreciate Dr. Sonido Dominique input and involvement. No clinical indication for dialysis. *Right foot cellulitis and ulceration involving the second digit. No evidence of osteomyelitis on MRI. Very strong dorsalis pedis pulse making a possibility of significant arterial compromise unlikely. Patient was seen by Dr. Milligan who recommended the continuation of Zosyn.      Infectious disease:  IMPRESSION:    Right foot cellulitis with diabetic foot infection of second toe, concern for underlying osteomyelitis  Right diabetic foot ulcer of second toe associated with uncontrolled diabetes mellitus type 2  Acute kidney injury, possible interstitial nephritis secondary to Zosyn     PLAN:  DC Zosyn  Start IV Rocephin  Follow-up BMP and CBC  Local wound care  Repeat CRP in 3 days MEDICATIONS:  Rocephin 2000mg IV q 24h  Neurontin 300mg po bid  Proamatine 5mg po once  Zosyn 3375mg IV q 12h  Sodium bicarb 100mEq in 1/2NS 1000ml cont IV gtt at 50/h  Tylenol 650mg po q 6h prn x1  Zofran ODT 4mg po q 8h prn x1  Zofran 4mg IV q 6h prn x1  Percocet 5-325mg 1tab po q 4h prn x1     ORDERS:  I/O

## 2023-10-25 NOTE — PROGRESS NOTES
Nephrology Progress Note    Assessment/  48 yo lady with no CKD. Has DM, htn. Admitted on 10/19 with foot infection. Placed on vanco /zosyn. Has oliguric severe ATN with cr 1.3 on 10/22 and then over 3 on 10/23. Most likely abx related. UA bland except w/ bacteria, renal U/S unremarkable. . Was on lisinopril, now on hold.  Function continues to worsen, pt was on zosyn, last dose (10/24)      Plan:  - no current need for IVFs  - keep lisinopril on hold   - will monitor now that pt off zosyn   - checking CBC w/ diff to evaluate for eosinophilia and urine eosinophils   - if no improvement/stabilization of renal indices or becomes more oliguric will need to transfer to [facility] for possible RRT   - stopping gabapentin     Patient Active Problem List:     Mixed hyperlipidemia     Essential hypertension     GERD (gastroesophageal reflux disease)     Leukocytosis     Cardiac disease     Mixed anxiety and depressive disorder     Type 2 diabetes mellitus with right diabetic foot infection ([address])     Headache     Acute diffuse otitis externa of left ear     Breast mass, right     Obesity, Class I, BMI 30-34.9     Elevated BP without diagnosis of hypertension     Hypokalemia     CAD (coronary artery disease)     Hypomagnesemia     Chest pain     Dyspnea     Pneumonia due to infectious organism     Diabetic foot infection ([address])     Cellulitis of foot     Diabetic ulcer of toe of right foot associated with diabetes mellitus due to underlying condition, with fat layer exposed ([address])     Acute kidney injury ([address])      Subjective:  Admit Date: 10/20/2023    Interval History: Scr continues to trend up, pt states she is voiding more, has been having jerks     Medications:  Scheduled Meds:   heparin (porcine)  5,000 Units SubCUTAneous 3 times per day    cefTRIAXone (ROCEPHIN) IV  2,000 mg IntraVENous Q24H    insulin lispro  10 Units SubCUTAneous TID WC    insulin glargine  45 Units SubCUTAneous Nightly    Sodium Hypochlorite What Type Of Note Output Would You Prefer (Optional)?: Bullet Format How Severe Is Your Skin Lesion?: mild Has Your Skin Lesion Been Treated?: not been treated Is This A New Presentation, Or A Follow-Up?: Skin Lesion

## 2023-10-25 NOTE — PROGRESS NOTES
No new symptoms. Patient reported feeling Subjective shortness of breath and the need to take a deep breath. General appearance: alert, appears stated age and cooperative, no apparent distress. Skin: Skin color, texture, turgor normal. No rashes or lesions  HEENT: Head: Normocephalic, no lesions, without obvious abnormality. Neck: no adenopathy, no carotid bruit, no JVD, supple, symmetrical, trachea midline, and thyroid not enlarged, symmetric, no tenderness/mass/nodules  Lungs: clear to auscultation bilaterally  Heart: regular rate and rhythm, S1, S2 normal, no murmur, click, rub or gallop  Abdomen: soft, non-tender; bowel sounds normal; no masses,  no organomegaly  Extremities: Noticeable improvement of the erythema, induration and tenderness involving the right distal foot and second great toe. Very strong dorsalis pedis pulse. No edema. Neurologic: Mental status: Alert, oriented, thought content appropriate      Patient arrived to the emergency room at [facility] on the evening 10/19. Due to bed situation, the patient was transferred to [facility] on the evening of 10/20. Initial admission orders where entered by my colleague on the evening of 10/20. I saw patient the first time on 10/21. *ARTIE with diminished urine output. This is suspected to be caused by interstitial nephritis or SIRS or combination. Patient has been off lisinopril and vancomycin when her initial creatinine bumped up to 1.36 on 10/22. Her Zosyn was also converted to Rocephin by infectious disease. Her urine output had increased over the last 48 hours. Patient had received half-normal saline with sodium bicarb. No indication for urgent hemodialysis. No hyperkalemia, significant metabolic acidosis or rise of her BUN. No significant fluid overload. Her chest is clear. I discussed her case with Dr. Vincent Green.   He recommended to continue to monitor her kidney function and in agreement to hold IV fluid infusion at this

## 2023-10-26 ENCOUNTER — APPOINTMENT (OUTPATIENT)
Dept: CT IMAGING | Age: 53
DRG: 638 | End: 2023-10-26
Attending: INTERNAL MEDICINE
Payer: COMMERCIAL

## 2023-10-26 LAB
ADV 40+41 DNA STL QL NAA+NON-PROBE: NOT DETECTED
ALBUMIN SERPL-MCNC: 2.8 G/DL (ref 3.5–4.6)
ALP SERPL-CCNC: 217 U/L (ref 40–130)
ALT SERPL-CCNC: 32 U/L (ref 0–33)
ANION GAP SERPL CALCULATED.3IONS-SCNC: 14 MEQ/L (ref 9–15)
AST SERPL-CCNC: 26 U/L (ref 0–35)
BASOPHILS # BLD: 0 K/UL (ref 0–0.1)
BASOPHILS NFR BLD: 0.4 % (ref 0.1–1.2)
BILIRUB DIRECT SERPL-MCNC: <0.2 MG/DL (ref 0–0.4)
BILIRUB INDIRECT SERPL-MCNC: ABNORMAL MG/DL (ref 0–0.6)
BILIRUB SERPL-MCNC: <0.2 MG/DL (ref 0.2–0.7)
BUN SERPL-MCNC: 44 MG/DL (ref 6–20)
C CAYETANENSIS DNA STL QL NAA+NON-PROBE: NOT DETECTED
C COLI+JEJ+UPSA DNA STL QL NAA+NON-PROBE: NOT DETECTED
CALCIUM SERPL-MCNC: 8.1 MG/DL (ref 8.5–9.9)
CHLORIDE SERPL-SCNC: 100 MEQ/L (ref 95–107)
CO2 SERPL-SCNC: 23 MEQ/L (ref 20–31)
CREAT SERPL-MCNC: 5.75 MG/DL (ref 0.5–0.9)
CREAT UR-MCNC: 31.1 MG/DL
CRYPTOSP DNA STL QL NAA+NON-PROBE: NOT DETECTED
E HISTOLYT DNA STL QL NAA+NON-PROBE: NOT DETECTED
EAEC PAA PLAS AGGR+AATA ST NAA+NON-PRB: NOT DETECTED
EC STX1+STX2 GENES STL QL NAA+NON-PROBE: NOT DETECTED
EOSINOPHIL # BLD: 0.2 K/UL (ref 0–0.4)
EOSINOPHIL NFR BLD: 1.3 % (ref 0.7–5.8)
EPEC EAE GENE STL QL NAA+NON-PROBE: NOT DETECTED
ERYTHROCYTE [DISTWIDTH] IN BLOOD BY AUTOMATED COUNT: 12.8 % (ref 11.7–14.4)
ETEC LTA+ST1A+ST1B TOX ST NAA+NON-PROBE: NOT DETECTED
G LAMBLIA DNA STL QL NAA+NON-PROBE: NOT DETECTED
GI PATH DNA+RNA PNL STL NAA+NON-PROBE: NOT DETECTED
GLUCOSE BLD-MCNC: 120 MG/DL (ref 70–99)
GLUCOSE BLD-MCNC: 128 MG/DL (ref 70–99)
GLUCOSE BLD-MCNC: 177 MG/DL (ref 70–99)
GLUCOSE BLD-MCNC: 80 MG/DL (ref 70–99)
GLUCOSE SERPL-MCNC: 83 MG/DL (ref 70–99)
HCT VFR BLD AUTO: 27.7 % (ref 37–47)
HGB BLD-MCNC: 9 G/DL (ref 11.2–15.7)
IMM GRANULOCYTES # BLD: 0.1 K/UL
IMM GRANULOCYTES NFR BLD: 0.5 %
LIPASE SERPL-CCNC: 9 U/L (ref 12–95)
LYMPHOCYTES # BLD: 1.7 K/UL (ref 1.2–3.7)
LYMPHOCYTES NFR BLD: 15.3 %
MCH RBC QN AUTO: 29.9 PG (ref 25.6–32.2)
MCHC RBC AUTO-ENTMCNC: 32.5 % (ref 32.2–35.5)
MCV RBC AUTO: 92 FL (ref 79.4–94.8)
MONOCYTES # BLD: 1.2 K/UL (ref 0.2–0.9)
MONOCYTES NFR BLD: 10.2 % (ref 4.7–12.5)
NEUTROPHILS # BLD: 8.2 K/UL (ref 1.6–6.1)
NEUTS SEG NFR BLD: 72.3 % (ref 34–71.1)
NOROVIRUS GI+II RNA STL QL NAA+NON-PROBE: DETECTED
P SHIGELLOIDES DNA STL QL NAA+NON-PROBE: NOT DETECTED
PERFORMED ON: ABNORMAL
PERFORMED ON: NORMAL
PLATELET # BLD AUTO: 276 K/UL (ref 182–369)
POTASSIUM SERPL-SCNC: 4.5 MEQ/L (ref 3.4–4.9)
PROT SERPL-MCNC: 6.3 G/DL (ref 6.3–8)
RBC # BLD AUTO: 3.01 M/UL (ref 3.93–5.22)
RVA RNA STL QL NAA+NON-PROBE: NOT DETECTED
S ENT+BONG DNA STL QL NAA+NON-PROBE: NOT DETECTED
SAPO I+II+IV+V RNA STL QL NAA+NON-PROBE: NOT DETECTED
SHIGELLA SP+EIEC IPAH ST NAA+NON-PROBE: NOT DETECTED
SODIUM SERPL-SCNC: 137 MEQ/L (ref 135–144)
SODIUM UR-SCNC: 40 MEQ/L
V CHOL+PARA+VUL DNA STL QL NAA+NON-PROBE: NOT DETECTED
V CHOLERAE DNA STL QL NAA+NON-PROBE: NOT DETECTED
WBC # BLD AUTO: 11.3 K/UL (ref 4–10)
Y ENTEROCOL DNA STL QL NAA+NON-PROBE: NOT DETECTED

## 2023-10-26 PROCEDURE — 2580000003 HC RX 258: Performed by: INTERNAL MEDICINE

## 2023-10-26 PROCEDURE — 6370000000 HC RX 637 (ALT 250 FOR IP): Performed by: INTERNAL MEDICINE

## 2023-10-26 PROCEDURE — 1210000000 HC MED SURG R&B

## 2023-10-26 PROCEDURE — 85025 COMPLETE CBC W/AUTO DIFF WBC: CPT

## 2023-10-26 PROCEDURE — 80048 BASIC METABOLIC PNL TOTAL CA: CPT

## 2023-10-26 PROCEDURE — 93005 ELECTROCARDIOGRAM TRACING: CPT

## 2023-10-26 PROCEDURE — 36415 COLL VENOUS BLD VENIPUNCTURE: CPT

## 2023-10-26 PROCEDURE — 74176 CT ABD & PELVIS W/O CONTRAST: CPT

## 2023-10-26 PROCEDURE — 83690 ASSAY OF LIPASE: CPT

## 2023-10-26 PROCEDURE — 6360000002 HC RX W HCPCS: Performed by: INTERNAL MEDICINE

## 2023-10-26 PROCEDURE — 87507 IADNA-DNA/RNA PROBE TQ 12-25: CPT

## 2023-10-26 PROCEDURE — 84300 ASSAY OF URINE SODIUM: CPT

## 2023-10-26 PROCEDURE — 82570 ASSAY OF URINE CREATININE: CPT

## 2023-10-26 PROCEDURE — 80076 HEPATIC FUNCTION PANEL: CPT

## 2023-10-26 RX ORDER — FUROSEMIDE 10 MG/ML
40 INJECTION INTRAMUSCULAR; INTRAVENOUS ONCE
Status: COMPLETED | OUTPATIENT
Start: 2023-10-26 | End: 2023-10-26

## 2023-10-26 RX ORDER — LABETALOL HYDROCHLORIDE 5 MG/ML
10 INJECTION, SOLUTION INTRAVENOUS ONCE
Status: COMPLETED | OUTPATIENT
Start: 2023-10-26 | End: 2023-10-26

## 2023-10-26 RX ORDER — HYDRALAZINE HYDROCHLORIDE 20 MG/ML
10 INJECTION INTRAMUSCULAR; INTRAVENOUS
Status: DISCONTINUED | OUTPATIENT
Start: 2023-10-26 | End: 2023-10-27 | Stop reason: HOSPADM

## 2023-10-26 RX ORDER — FUROSEMIDE 10 MG/ML
20 INJECTION INTRAMUSCULAR; INTRAVENOUS ONCE
Status: COMPLETED | OUTPATIENT
Start: 2023-10-26 | End: 2023-10-26

## 2023-10-26 RX ORDER — LOPERAMIDE HYDROCHLORIDE 2 MG/1
2 CAPSULE ORAL 3 TIMES DAILY PRN
Status: DISCONTINUED | OUTPATIENT
Start: 2023-10-26 | End: 2023-10-27 | Stop reason: HOSPADM

## 2023-10-26 RX ADMIN — ACETAMINOPHEN 650 MG: 325 TABLET ORAL at 02:25

## 2023-10-26 RX ADMIN — FUROSEMIDE 40 MG: 10 INJECTION, SOLUTION INTRAMUSCULAR; INTRAVENOUS at 12:33

## 2023-10-26 RX ADMIN — LOPERAMIDE HYDROCHLORIDE 2 MG: 2 CAPSULE ORAL at 02:46

## 2023-10-26 RX ADMIN — Medication 10 ML: at 07:52

## 2023-10-26 RX ADMIN — OXYCODONE AND ACETAMINOPHEN 1 TABLET: 5; 325 TABLET ORAL at 14:48

## 2023-10-26 RX ADMIN — FUROSEMIDE 40 MG: 10 INJECTION, SOLUTION INTRAMUSCULAR; INTRAVENOUS at 21:58

## 2023-10-26 RX ADMIN — Medication 5 ML: at 20:35

## 2023-10-26 RX ADMIN — FUROSEMIDE 20 MG: 10 INJECTION, SOLUTION INTRAMUSCULAR; INTRAVENOUS at 20:34

## 2023-10-26 RX ADMIN — ONDANSETRON 4 MG: 4 TABLET, ORALLY DISINTEGRATING ORAL at 20:43

## 2023-10-26 RX ADMIN — LABETALOL HYDROCHLORIDE 10 MG: 5 INJECTION, SOLUTION INTRAVENOUS at 22:00

## 2023-10-26 RX ADMIN — CEFTRIAXONE 2000 MG: 1 INJECTION, POWDER, FOR SOLUTION INTRAMUSCULAR; INTRAVENOUS at 16:23

## 2023-10-26 RX ADMIN — HYOSCYAMINE SULFATE: 16 SOLUTION at 07:52

## 2023-10-26 RX ADMIN — INSULIN GLARGINE 45 UNITS: 100 INJECTION, SOLUTION SUBCUTANEOUS at 20:36

## 2023-10-26 RX ADMIN — OXYCODONE AND ACETAMINOPHEN 1 TABLET: 5; 325 TABLET ORAL at 08:30

## 2023-10-26 RX ADMIN — INSULIN LISPRO 10 UNITS: 100 INJECTION, SOLUTION INTRAVENOUS; SUBCUTANEOUS at 08:30

## 2023-10-26 ASSESSMENT — PAIN DESCRIPTION - LOCATION
LOCATION: ABDOMEN;BACK
LOCATION: HEAD
LOCATION: ABDOMEN;BACK
LOCATION: BACK;ABDOMEN

## 2023-10-26 ASSESSMENT — PAIN DESCRIPTION - ORIENTATION
ORIENTATION: RIGHT;UPPER
ORIENTATION: RIGHT;LOWER;UPPER
ORIENTATION: RIGHT;UPPER

## 2023-10-26 ASSESSMENT — PAIN SCALES - GENERAL
PAINLEVEL_OUTOF10: 3
PAINLEVEL_OUTOF10: 5
PAINLEVEL_OUTOF10: 3
PAINLEVEL_OUTOF10: 6

## 2023-10-26 ASSESSMENT — PAIN DESCRIPTION - DESCRIPTORS
DESCRIPTORS: SORE
DESCRIPTORS: ACHING
DESCRIPTORS: SHARP
DESCRIPTORS: ACHING

## 2023-10-26 ASSESSMENT — PAIN - FUNCTIONAL ASSESSMENT: PAIN_FUNCTIONAL_ASSESSMENT: ACTIVITIES ARE NOT PREVENTED

## 2023-10-26 ASSESSMENT — PAIN DESCRIPTION - FREQUENCY: FREQUENCY: CONTINUOUS

## 2023-10-26 ASSESSMENT — PAIN DESCRIPTION - ONSET: ONSET: ON-GOING

## 2023-10-26 ASSESSMENT — PAIN DESCRIPTION - PAIN TYPE: TYPE: ACUTE PAIN

## 2023-10-26 NOTE — PROGRESS NOTES
Stool sample collected and sent to lab. Will continue to monitor pt.   Electronically signed by Kati Millan RN on 10/26/2023 at 5:05 AM

## 2023-10-26 NOTE — PROGRESS NOTES
Patient reported having pain and discomfort in the right flank, right upper and mid abdomen. Moderate intensity. General appearance: alert, appears stated age and cooperative, no apparent distress. Skin: Skin color, texture, turgor normal. No rashes or lesions  HEENT: Head: Normocephalic, no lesions, without obvious abnormality. Neck: no adenopathy, no carotid bruit, no JVD, supple, symmetrical, trachea midline, and thyroid not enlarged, symmetric, no tenderness/mass/nodules  Lungs: clear to auscultation bilaterally  Heart: regular rate and rhythm, S1, S2 normal, no murmur, click, rub or gallop  Abdomen: soft, denies at the right upper and mid abdomen, right flank. Extremities: Noticeable improvement of the erythema, induration and tenderness involving the right distal foot and second great toe. Very strong dorsalis pedis pulse. No edema. Neurologic: Mental status: Alert, oriented, thought content appropriate      *ARTIE with diminished urine output. This is suspected to be caused by interstitial nephritis or SIRS or combination. Patient has been off lisinopril and vancomycin when her initial creatinine bumped up to 1.36 on 10/22. Her Zosyn was also converted to Rocephin by infectious disease. Last Zosyn dose was received on 10/23. Her urine output had increased over the last 72 hours. CAT scan showed anasarca and pleural effusion. Patient reported having dyspnea on exertion walking to the bathroom. I will give patient 1 dose of Lasix otherwise defer further needed diagnostic and therapeutic intervention related to her kidney failure, electrolytes and volume management to nephrology team.      *Right foot cellulitis and ulceration involving the second digit. No evidence of osteomyelitis on MRI. Very strong dorsalis pedis pulse making a possibility of significant arterial compromise unlikely.   Patient was seen by Dr. Keyla Kamara who recommended the continuation of Zosyn which was later converted to

## 2023-10-26 NOTE — PROGRESS NOTES
5012 - Per  Rajiv Rist, Hepatic function panel and lipase are able to be added on to this morning's labs.   2377 - Radiology tech notified of CT abd pelvis WO contrast.

## 2023-10-27 ENCOUNTER — HOSPITAL ENCOUNTER (INPATIENT)
Age: 53
LOS: 6 days | Discharge: HOME | DRG: 981 | End: 2023-11-02
Attending: INTERNAL MEDICINE | Admitting: INTERNAL MEDICINE
Payer: COMMERCIAL

## 2023-10-27 VITALS
BODY MASS INDEX: 32.48 KG/M2 | OXYGEN SATURATION: 99 % | TEMPERATURE: 98.6 F | RESPIRATION RATE: 18 BRPM | HEART RATE: 80 BPM | HEIGHT: 63 IN | WEIGHT: 183.3 LBS | DIASTOLIC BLOOD PRESSURE: 59 MMHG | SYSTOLIC BLOOD PRESSURE: 128 MMHG

## 2023-10-27 DIAGNOSIS — I20.9 ANGINA, CLASS IV (HCC): ICD-10-CM

## 2023-10-27 PROBLEM — N19 RENAL FAILURE: Status: ACTIVE | Noted: 2023-10-27

## 2023-10-27 LAB
ANION GAP SERPL CALCULATED.3IONS-SCNC: 17 MEQ/L (ref 9–15)
BNP BLD-MCNC: NORMAL PG/ML
BUN SERPL-MCNC: 48 MG/DL (ref 6–20)
CALCIUM SERPL-MCNC: 8.8 MG/DL (ref 8.5–9.9)
CHLORIDE SERPL-SCNC: 99 MEQ/L (ref 95–107)
CO2 SERPL-SCNC: 24 MEQ/L (ref 20–31)
CREAT SERPL-MCNC: 6.34 MG/DL (ref 0.5–0.9)
GLUCOSE BLD-MCNC: 113 MG/DL (ref 70–99)
GLUCOSE BLD-MCNC: 117 MG/DL (ref 70–99)
GLUCOSE BLD-MCNC: 146 MG/DL (ref 70–99)
GLUCOSE BLD-MCNC: 170 MG/DL (ref 70–99)
GLUCOSE BLD-MCNC: 200 MG/DL (ref 70–99)
GLUCOSE BLD-MCNC: 90 MG/DL (ref 70–99)
GLUCOSE SERPL-MCNC: 120 MG/DL (ref 70–99)
PERFORMED ON: ABNORMAL
PERFORMED ON: NORMAL
POTASSIUM SERPL-SCNC: 4.8 MEQ/L (ref 3.4–4.9)
SODIUM SERPL-SCNC: 140 MEQ/L (ref 135–144)

## 2023-10-27 PROCEDURE — 6360000002 HC RX W HCPCS: Performed by: INTERNAL MEDICINE

## 2023-10-27 PROCEDURE — 6370000000 HC RX 637 (ALT 250 FOR IP): Performed by: INTERNAL MEDICINE

## 2023-10-27 PROCEDURE — 94761 N-INVAS EAR/PLS OXIMETRY MLT: CPT

## 2023-10-27 PROCEDURE — 83880 ASSAY OF NATRIURETIC PEPTIDE: CPT

## 2023-10-27 PROCEDURE — 80048 BASIC METABOLIC PNL TOTAL CA: CPT

## 2023-10-27 PROCEDURE — 2580000003 HC RX 258: Performed by: INTERNAL MEDICINE

## 2023-10-27 PROCEDURE — 1210000000 HC MED SURG R&B

## 2023-10-27 PROCEDURE — 36415 COLL VENOUS BLD VENIPUNCTURE: CPT

## 2023-10-27 RX ORDER — TRAZODONE HYDROCHLORIDE 50 MG/1
50 TABLET ORAL NIGHTLY
Status: DISCONTINUED | OUTPATIENT
Start: 2023-10-27 | End: 2023-11-02 | Stop reason: HOSPADM

## 2023-10-27 RX ORDER — ATORVASTATIN CALCIUM 40 MG/1
40 TABLET, FILM COATED ORAL NIGHTLY
Status: DISCONTINUED | OUTPATIENT
Start: 2023-10-27 | End: 2023-11-02 | Stop reason: HOSPADM

## 2023-10-27 RX ORDER — DEXTROSE MONOHYDRATE 100 MG/ML
INJECTION, SOLUTION INTRAVENOUS CONTINUOUS PRN
Status: CANCELLED | OUTPATIENT
Start: 2023-10-27

## 2023-10-27 RX ORDER — SODIUM CHLORIDE 9 MG/ML
INJECTION, SOLUTION INTRAVENOUS PRN
Status: DISCONTINUED | OUTPATIENT
Start: 2023-10-27 | End: 2023-11-02 | Stop reason: HOSPADM

## 2023-10-27 RX ORDER — L. ACIDOPHILUS/L.BULGARICUS 1MM CELL
4 TABLET ORAL 3 TIMES DAILY
Status: DISCONTINUED | OUTPATIENT
Start: 2023-10-27 | End: 2023-11-02 | Stop reason: HOSPADM

## 2023-10-27 RX ORDER — ACETAMINOPHEN 650 MG/1
650 SUPPOSITORY RECTAL EVERY 6 HOURS PRN
Status: DISCONTINUED | OUTPATIENT
Start: 2023-10-27 | End: 2023-11-02 | Stop reason: HOSPADM

## 2023-10-27 RX ORDER — INSULIN LISPRO 100 [IU]/ML
5 INJECTION, SOLUTION INTRAVENOUS; SUBCUTANEOUS
Status: DISCONTINUED | OUTPATIENT
Start: 2023-10-27 | End: 2023-10-27 | Stop reason: HOSPADM

## 2023-10-27 RX ORDER — ESCITALOPRAM OXALATE 10 MG/1
20 TABLET ORAL DAILY
Status: CANCELLED | OUTPATIENT
Start: 2023-10-28

## 2023-10-27 RX ORDER — LOPERAMIDE HYDROCHLORIDE 2 MG/1
2 CAPSULE ORAL 3 TIMES DAILY PRN
Status: DISCONTINUED | OUTPATIENT
Start: 2023-10-27 | End: 2023-11-02 | Stop reason: HOSPADM

## 2023-10-27 RX ORDER — INSULIN LISPRO 100 [IU]/ML
5 INJECTION, SOLUTION INTRAVENOUS; SUBCUTANEOUS
Status: CANCELLED | OUTPATIENT
Start: 2023-10-27

## 2023-10-27 RX ORDER — ATORVASTATIN CALCIUM 40 MG/1
40 TABLET, FILM COATED ORAL NIGHTLY
Status: CANCELLED | OUTPATIENT
Start: 2023-10-27

## 2023-10-27 RX ORDER — ALBUTEROL SULFATE 90 UG/1
2 AEROSOL, METERED RESPIRATORY (INHALATION) EVERY 6 HOURS PRN
Status: CANCELLED | OUTPATIENT
Start: 2023-10-27

## 2023-10-27 RX ORDER — INSULIN GLARGINE 100 [IU]/ML
25 INJECTION, SOLUTION SUBCUTANEOUS NIGHTLY
Status: CANCELLED | OUTPATIENT
Start: 2023-10-27

## 2023-10-27 RX ORDER — DOCUSATE SODIUM 100 MG/1
100 CAPSULE, LIQUID FILLED ORAL DAILY
Status: DISCONTINUED | OUTPATIENT
Start: 2023-10-27 | End: 2023-11-02 | Stop reason: HOSPADM

## 2023-10-27 RX ORDER — INSULIN LISPRO 100 [IU]/ML
0-4 INJECTION, SOLUTION INTRAVENOUS; SUBCUTANEOUS NIGHTLY
Status: DISCONTINUED | OUTPATIENT
Start: 2023-10-27 | End: 2023-11-02 | Stop reason: HOSPADM

## 2023-10-27 RX ORDER — ALBUTEROL SULFATE 90 UG/1
2 AEROSOL, METERED RESPIRATORY (INHALATION) EVERY 6 HOURS PRN
Status: DISCONTINUED | OUTPATIENT
Start: 2023-10-27 | End: 2023-11-02 | Stop reason: HOSPADM

## 2023-10-27 RX ORDER — INSULIN LISPRO 100 [IU]/ML
0-4 INJECTION, SOLUTION INTRAVENOUS; SUBCUTANEOUS NIGHTLY
Status: CANCELLED | OUTPATIENT
Start: 2023-10-27

## 2023-10-27 RX ORDER — PROPRANOLOL HYDROCHLORIDE 10 MG/1
20 TABLET ORAL NIGHTLY
Status: CANCELLED | OUTPATIENT
Start: 2023-10-27

## 2023-10-27 RX ORDER — CALCIUM CARBONATE 500 MG/1
500 TABLET, CHEWABLE ORAL 3 TIMES DAILY PRN
Status: DISCONTINUED | OUTPATIENT
Start: 2023-10-27 | End: 2023-11-02 | Stop reason: HOSPADM

## 2023-10-27 RX ORDER — GLUCAGON 1 MG/ML
1 KIT INJECTION PRN
Status: DISCONTINUED | OUTPATIENT
Start: 2023-10-27 | End: 2023-11-02 | Stop reason: HOSPADM

## 2023-10-27 RX ORDER — POLYETHYLENE GLYCOL 3350 17 G/17G
17 POWDER, FOR SOLUTION ORAL ONCE
Status: COMPLETED | OUTPATIENT
Start: 2023-10-27 | End: 2023-10-27

## 2023-10-27 RX ORDER — ACETAMINOPHEN 325 MG/1
650 TABLET ORAL EVERY 6 HOURS PRN
Status: DISCONTINUED | OUTPATIENT
Start: 2023-10-27 | End: 2023-11-01

## 2023-10-27 RX ORDER — ESCITALOPRAM OXALATE 20 MG/1
20 TABLET ORAL DAILY
Status: DISCONTINUED | OUTPATIENT
Start: 2023-10-28 | End: 2023-11-02 | Stop reason: HOSPADM

## 2023-10-27 RX ORDER — HEPARIN SODIUM 5000 [USP'U]/ML
5000 INJECTION, SOLUTION INTRAVENOUS; SUBCUTANEOUS EVERY 8 HOURS SCHEDULED
Status: CANCELLED | OUTPATIENT
Start: 2023-10-27

## 2023-10-27 RX ORDER — PROPRANOLOL HYDROCHLORIDE 20 MG/1
20 TABLET ORAL NIGHTLY
Status: DISCONTINUED | OUTPATIENT
Start: 2023-10-27 | End: 2023-10-30

## 2023-10-27 RX ORDER — SODIUM CHLORIDE 0.9 % (FLUSH) 0.9 %
5-40 SYRINGE (ML) INJECTION PRN
Status: DISCONTINUED | OUTPATIENT
Start: 2023-10-27 | End: 2023-11-02 | Stop reason: HOSPADM

## 2023-10-27 RX ORDER — SODIUM CHLORIDE 0.9 % (FLUSH) 0.9 %
5-40 SYRINGE (ML) INJECTION PRN
Status: CANCELLED | OUTPATIENT
Start: 2023-10-27

## 2023-10-27 RX ORDER — HEPARIN SODIUM 5000 [USP'U]/ML
5000 INJECTION, SOLUTION INTRAVENOUS; SUBCUTANEOUS EVERY 8 HOURS SCHEDULED
Status: DISCONTINUED | OUTPATIENT
Start: 2023-10-27 | End: 2023-10-28

## 2023-10-27 RX ORDER — SODIUM CHLORIDE 9 MG/ML
INJECTION, SOLUTION INTRAVENOUS PRN
Status: CANCELLED | OUTPATIENT
Start: 2023-10-27

## 2023-10-27 RX ORDER — INSULIN LISPRO 100 [IU]/ML
0-8 INJECTION, SOLUTION INTRAVENOUS; SUBCUTANEOUS
Status: CANCELLED | OUTPATIENT
Start: 2023-10-27

## 2023-10-27 RX ORDER — ONDANSETRON 2 MG/ML
4 INJECTION INTRAMUSCULAR; INTRAVENOUS EVERY 6 HOURS PRN
Status: DISCONTINUED | OUTPATIENT
Start: 2023-10-27 | End: 2023-10-28

## 2023-10-27 RX ORDER — ACETAMINOPHEN 325 MG/1
650 TABLET ORAL EVERY 6 HOURS PRN
Status: CANCELLED | OUTPATIENT
Start: 2023-10-27

## 2023-10-27 RX ORDER — INSULIN LISPRO 100 [IU]/ML
0-8 INJECTION, SOLUTION INTRAVENOUS; SUBCUTANEOUS
Status: DISCONTINUED | OUTPATIENT
Start: 2023-10-27 | End: 2023-11-02 | Stop reason: HOSPADM

## 2023-10-27 RX ORDER — SODIUM CHLORIDE 0.9 % (FLUSH) 0.9 %
5-40 SYRINGE (ML) INJECTION EVERY 12 HOURS SCHEDULED
Status: DISCONTINUED | OUTPATIENT
Start: 2023-10-27 | End: 2023-11-02 | Stop reason: HOSPADM

## 2023-10-27 RX ORDER — LOPERAMIDE HYDROCHLORIDE 2 MG/1
2 CAPSULE ORAL 3 TIMES DAILY PRN
Status: CANCELLED | OUTPATIENT
Start: 2023-10-27

## 2023-10-27 RX ORDER — ACETAMINOPHEN 650 MG/1
650 SUPPOSITORY RECTAL EVERY 6 HOURS PRN
Status: CANCELLED | OUTPATIENT
Start: 2023-10-27

## 2023-10-27 RX ORDER — TRAZODONE HYDROCHLORIDE 50 MG/1
50 TABLET ORAL NIGHTLY
Status: CANCELLED | OUTPATIENT
Start: 2023-10-27

## 2023-10-27 RX ORDER — DEXTROSE MONOHYDRATE 100 MG/ML
INJECTION, SOLUTION INTRAVENOUS CONTINUOUS PRN
Status: DISCONTINUED | OUTPATIENT
Start: 2023-10-27 | End: 2023-11-02 | Stop reason: HOSPADM

## 2023-10-27 RX ORDER — SODIUM CHLORIDE 0.9 % (FLUSH) 0.9 %
5-40 SYRINGE (ML) INJECTION EVERY 12 HOURS SCHEDULED
Status: CANCELLED | OUTPATIENT
Start: 2023-10-27

## 2023-10-27 RX ADMIN — Medication 4 TABLET: at 23:10

## 2023-10-27 RX ADMIN — ATORVASTATIN CALCIUM 40 MG: 40 TABLET, FILM COATED ORAL at 23:09

## 2023-10-27 RX ADMIN — ONDANSETRON 4 MG: 2 INJECTION INTRAMUSCULAR; INTRAVENOUS at 18:58

## 2023-10-27 RX ADMIN — TRAZODONE HYDROCHLORIDE 50 MG: 50 TABLET ORAL at 23:09

## 2023-10-27 RX ADMIN — ACETAMINOPHEN 650 MG: 325 TABLET ORAL at 06:13

## 2023-10-27 RX ADMIN — ANTACID TABLETS 500 MG: 500 TABLET, CHEWABLE ORAL at 18:58

## 2023-10-27 RX ADMIN — HYOSCYAMINE SULFATE: 16 SOLUTION at 08:34

## 2023-10-27 RX ADMIN — PROPRANOLOL HYDROCHLORIDE 20 MG: 20 TABLET ORAL at 23:09

## 2023-10-27 RX ADMIN — ACETAMINOPHEN 650 MG: 325 TABLET ORAL at 18:58

## 2023-10-27 RX ADMIN — OXYCODONE AND ACETAMINOPHEN 1 TABLET: 5; 325 TABLET ORAL at 00:09

## 2023-10-27 RX ADMIN — INSULIN LISPRO 10 UNITS: 100 INJECTION, SOLUTION INTRAVENOUS; SUBCUTANEOUS at 08:33

## 2023-10-27 RX ADMIN — CALCIUM CARBONATE 500 MG: 500 TABLET, CHEWABLE ORAL at 13:14

## 2023-10-27 RX ADMIN — Medication 10 ML: at 23:10

## 2023-10-27 RX ADMIN — INSULIN LISPRO 5 UNITS: 100 INJECTION, SOLUTION INTRAVENOUS; SUBCUTANEOUS at 12:20

## 2023-10-27 RX ADMIN — ONDANSETRON 4 MG: 4 TABLET, ORALLY DISINTEGRATING ORAL at 13:14

## 2023-10-27 RX ADMIN — DOCUSATE SODIUM 100 MG: 100 CAPSULE, LIQUID FILLED ORAL at 18:58

## 2023-10-27 RX ADMIN — POLYETHYLENE GLYCOL 3350 17 G: 17 POWDER, FOR SOLUTION ORAL at 18:58

## 2023-10-27 RX ADMIN — Medication 10 ML: at 08:33

## 2023-10-27 RX ADMIN — CEFTRIAXONE SODIUM 1000 MG: 1 INJECTION, POWDER, FOR SOLUTION INTRAMUSCULAR; INTRAVENOUS at 17:34

## 2023-10-27 ASSESSMENT — PAIN DESCRIPTION - DESCRIPTORS
DESCRIPTORS: ACHING

## 2023-10-27 ASSESSMENT — PAIN DESCRIPTION - ORIENTATION
ORIENTATION: UPPER
ORIENTATION: UPPER

## 2023-10-27 ASSESSMENT — PAIN DESCRIPTION - FREQUENCY
FREQUENCY: CONTINUOUS
FREQUENCY: CONTINUOUS

## 2023-10-27 ASSESSMENT — PAIN DESCRIPTION - LOCATION
LOCATION: HEAD
LOCATION: ABDOMEN
LOCATION: ABDOMEN

## 2023-10-27 ASSESSMENT — PAIN - FUNCTIONAL ASSESSMENT
PAIN_FUNCTIONAL_ASSESSMENT: ACTIVITIES ARE NOT PREVENTED
PAIN_FUNCTIONAL_ASSESSMENT: ACTIVITIES ARE NOT PREVENTED

## 2023-10-27 ASSESSMENT — PAIN SCALES - GENERAL
PAINLEVEL_OUTOF10: 3
PAINLEVEL_OUTOF10: 3
PAINLEVEL_OUTOF10: 7

## 2023-10-27 ASSESSMENT — PAIN DESCRIPTION - PAIN TYPE
TYPE: ACUTE PAIN
TYPE: ACUTE PAIN

## 2023-10-27 ASSESSMENT — PAIN DESCRIPTION - ONSET
ONSET: ON-GOING
ONSET: ON-GOING

## 2023-10-27 NOTE — PLAN OF CARE
Problem: Discharge Planning  Goal: Discharge to home or other facility with appropriate resources  10/27/2023 1109 by eHctor Dumont RN  Outcome: Adequate for Discharge  10/27/2023 0345 by Xiomara Lewis RN  Outcome: Progressing     Problem: Pain  Goal: Verbalizes/displays adequate comfort level or baseline comfort level  10/27/2023 1109 by Hector Dumont RN  Outcome: Adequate for Discharge  10/27/2023 0345 by Xiomara Lewis RN  Outcome: Progressing     Problem: Safety - Adult  Goal: Free from fall injury  10/27/2023 1109 by Hector Dumont RN  Outcome: Adequate for Discharge  10/27/2023 0345 by Xiomara Lewis RN  Outcome: Progressing     Problem: Chronic Conditions and Co-morbidities  Goal: Patient's chronic conditions and co-morbidity symptoms are monitored and maintained or improved  10/27/2023 1109 by Hector Dumont RN  Outcome: Adequate for Discharge  10/27/2023 0345 by Xiomara Lewis RN  Outcome: Progressing

## 2023-10-27 NOTE — DISCHARGE SUMMARY
Hospital Medicine Discharge Summary    Dennis Salgado  :  1970  MRN:  [id number]    Admit date:  10/20/2023  Discharge date:  10/27/2023    Admitting Physician:  Onesimo Dougherty MD  Primary Care Physician:  Pavithra Mariscal MD      Discharge Diagnoses:      *ARTIE with diminished urine output. This is suspected to be caused by interstitial nephritis or SIRS or combination. Lactic acid is back to normal.  Her blood pressure did not drop below 110. Patient was transferred to [facility] about 30 hours after arrival to [facility] emergency room where the initial evaluation and treatment had been implemented. Patient has been off lisinopril and vancomycin when her initial creatinine bumped up to 1.36 on 10/22. Vancomycin level was not toxic. Last Zosyn dose was received on 10/23 and the subsequently switched to Rocephin by ID. Eosinophil test could not be ordered in epic. UA is bland. CPK is normal.  Off Lipitor. Renal ultrasound is negative for obstructive uropathy. Patient had developed worsening fluid overload over the last for 24 to 48 hours. CT showed pleural effusion and anasarca. Patient became hypoxic yesterday requiring a total of 100 mg of Lasix with improvement of her respiratory status. Her potassium is normal.  Bicarb is normal.  I discussed her case with Dr. Shaista Molina. The plan is to transfer patient back to [facility] in preparation for possible dialysis. *SIRS 2nd to foot infection. Treated at [address] [place] [address] prior to arrival to [facility] 30 hrs after ER arrival.      *Right foot cellulitis and ulceration involving the second digit. No evidence of osteomyelitis on MRI. Very strong dorsalis pedis pulse making a possibility of significant arterial compromise unlikely. Patient was seen by Dr. Zoraida Akbar who recommended the continuation of Zosyn which was later converted to ceftriaxone by Dr. Pavel Dias. Continue antibiotic as guided by infectious disease team.     *Hypertension.   History consultants, reconciling medications, discussing plan answering questions with patient. SignedGretchen Vazquez MD  10/27/2023, 10:36 AM  ----------------------------------------------------------------------------------------------------------------------    [facility],     Please return to ER or call 911 if you develop any significant signs or symptoms. I may not have addressed all of your medical illnesses or the abnormal blood work or imaging therefore please ask your PCP Ari Garibay MD and other out patient specialists and providers  to obtain [facility] record entirely to follow up on all of the abnormal labs, imaging and findings that I have and have not addressed during your hospitalization. Discharging you from the hospital does not mean that your medical care ends here and now. You may still need additional work up, investigation, monitoring, and treatment to be handled from this point on by outside providers including your PCP, Ari Garibay MD , Specialists and other healthcare providers. Please review your list of discharge medications prior to resuming medications you might still have at home, as the medications you need to be taking, dosages or how often you must take them may have changed. For medication questions, contact your retail pharmacy and your PCP, Ari Garibay MD .     ** I STRONGLY RECOMMEND that you follow up with Ari Garibay MD within 3 to 5 days for a post hospitalization evaluation. This specific office visit is covered by your insurance, and is not the same as your annual doctor visit/ check up. This office visit is important, as it may prevent need for repeat and/or future hospitalizations. **    Your medical team at [facility] ([facility]) appreciates the opportunity to work with you to get well! Sincerely,  Gretchen Vazquez MD Follow up with Dr. Ari Garibay MD in the next 7 days or sooner if needed.     Please return to ER or call

## 2023-10-27 NOTE — PROGRESS NOTES
Patient c/o SOB and nausea. Had several sm emesis of clear fluid. Skin pale. Continues to c/o of back pain.   Vitals per flow sheet reported via perfect serve to Dr. Terry Yung

## 2023-10-27 NOTE — PROGRESS NOTES
Pt is A&O x 4. Cooperative with staff and care. Able to use call light to make needs known. Pain managed with PRN Percocet with + results. Continues on telemetry and NSR. Vitals assessed and WNL. No further complaints voiced. Call light within reach. Safety maintained.

## 2023-10-27 NOTE — PROGRESS NOTES
Brief Note:   Discussed w/ Dr. Tristan Feeler this AM, pt hypoxic yesterday w/ CT showing pleural effusion and interstitial edema, got lasix x3 doses, doing better. Moreover Scr continues to trend up.  Recommended that best for her to be at [facility] as patient low threshold for needing RRT at this time    Luis Ye MD  Nephrology

## 2023-10-27 NOTE — PROGRESS NOTES
Physician Progress Note      PATIENT:               George Ambrocio  CSN #:                  [id number]  :                       1970  ADMIT DATE:       10/19/2023 6:01 PM  [facility] DATE:        10/20/2023 5:30 PM  RESPONDING  PROVIDER #:        Fadi Powell DPCHAPIN          QUERY TEXT:    Per 10/20/23 podiatry consult note \"was seen today for cellulitis and   worsening R 2nd digit wound. \" Patient noted to have T2DM with hyperglycemia-on   insulin documented. If possible, please document in progress notes and   discharge summary the relationship, if any, between cellulitis and DM. The medical record reflects the following:  Risk Factors: 47 yof  T2DM  HTN  HDL  Neuropathy-diabetic  Clinical Indicators:   HA1C  14.0 % on 10/20/23   random glucose 506 mg/dL on   10/19/23  10/20 Dr Peter [place]: \"Diabetic foot infection-right second digit\"  10/20 MRI right foot: \"Cellulitis of 2nd digit but no evidence for   osteomyelitis. \"  10/20 Dr Cande Patel: \"podiatry was consulted for worsening right 2nd digit wound. Pt is AF/VSS. No leukocytosis. Elevated ESR/CRP. XR negative for OM, however   given the wound PTB wound recommend MRI to rule out bone infection. \" \"was seen   today for cellulitis and worsening R 2nd digit wound. \" \"Location: right foot   2nd digit plantar. ...++ periwound erythema and edema extending 8 cm beyond   wound borders with no evidence of ascending lymphangitis. Wound undermines 2   mm and probes to joint capsule. \"  Treatment: MRI R foot   XR R foot  IV Vancomycin   IV Zosyn   podiatry    Adriana Kurtz RN CCDS  [phone]  Options provided:  -- RLE cellulitis associated with diabetes  -- RLE cellulitis unrelated to diabetes  -- Other - I will add my own diagnosis  -- Disagree - Not applicable / Not valid  -- Disagree - Clinically unable to determine / Unknown  -- Refer to Clinical Documentation Reviewer    PROVIDER RESPONSE TEXT:    RLE cellulitis associated with diabetes.     Query created by:

## 2023-10-27 NOTE — PROGRESS NOTES
Dr. Poli Flores to clarify that Lasix 40 mg is in addition to lasix 20 mg given at 2034.   Yes to give additional Lasix 40mg

## 2023-10-27 NOTE — PROGRESS NOTES
Patient stable and ready for transfer to [facility] for further care per Dr. Mae Atkinson.  Report given to Moe Arceo RN.

## 2023-10-27 NOTE — H&P
Hospital Medicine  History and Physical    Patient:  Herberth King  MRN: [id number]    CHIEF COMPLAINT:  No chief complaint on file. History Obtained From:  Patient, EMR  Primary Care Physician: Meghana Pineda MD    HISTORY OF PRESENT ILLNESS:   The patient is a 47 y.o. female with PMH of CAD, GERD, DMII, HTN, HLD who presents with an ARTIE. Jossue initially presented with cellulitis. Renal function worsening significantly so transferred to [facility] for possible RRT. She is still complaining of intermittent subjective fevers and chills. She had a watery BM a few days ago and none since. She does have generalized abdominal pain. Making minimal urine. Denies shortness of breath or chest pain. Past Medical History:      Diagnosis Date    Acute kidney injury ([address]) 10/24/2023    CAD (coronary artery disease) 6/25/2022    CAD (coronary artery disease) 6/25/2022    Depression     Environmental allergies     Gastroparesis     GERD (gastroesophageal reflux disease)     Headache     Dr. Dariela Coleman    HPV in female     Hyperlipidemia     Hypertension     Leukocytosis     Migraines     Type II or unspecified type diabetes mellitus without mention of complication, not stated as uncontrolled        Past Surgical History:      Procedure Laterality Date    BONE MARROW BIOPSY  2012    (-)[facility] [place]    BREAST [address]  2009    (-)[address]  2012? GALLBLADDER SURGERY  1999       Medications Prior to Admission:    Prior to Admission medications    Medication Sig Start Date End Date Taking?  Authorizing Provider   atorvastatin (LIPITOR) 20 MG tablet Take 2 tablets by mouth daily 3/19/23   Mellisa Bradford MD   insulin lispro (HUMALOG KWIKPEN) 200 UNIT/ML SOPN pen Use 3 times a day max dose 200 units/day  Patient taking differently: Use 3 times a day max dose 200 units/day  Insulin pump 9/8/22

## 2023-10-28 ENCOUNTER — APPOINTMENT (OUTPATIENT)
Dept: GENERAL RADIOLOGY | Age: 53
DRG: 981 | End: 2023-10-28
Attending: INTERNAL MEDICINE
Payer: COMMERCIAL

## 2023-10-28 PROBLEM — I20.0 UNSTABLE ANGINA (HCC): Status: ACTIVE | Noted: 2023-10-28

## 2023-10-28 LAB
ANION GAP SERPL CALCULATED.3IONS-SCNC: 17 MEQ/L (ref 9–15)
ANTI-XA UNFRAC HEPARIN: 0.13 IU/ML
ANTI-XA UNFRAC HEPARIN: <0.1 IU/ML
APTT PPP: 35.2 SEC (ref 24.4–36.8)
BASOPHILS # BLD: 0.1 K/UL (ref 0–0.2)
BASOPHILS NFR BLD: 0.4 %
BUN SERPL-MCNC: 49 MG/DL (ref 6–20)
CALCIUM SERPL-MCNC: 8.5 MG/DL (ref 8.5–9.9)
CHLORIDE SERPL-SCNC: 100 MEQ/L (ref 95–107)
CO2 SERPL-SCNC: 23 MEQ/L (ref 20–31)
CREAT SERPL-MCNC: 6.19 MG/DL (ref 0.5–0.9)
EOSINOPHIL # BLD: 0.3 K/UL (ref 0–0.7)
EOSINOPHIL NFR BLD: 2.3 %
ERYTHROCYTE [DISTWIDTH] IN BLOOD BY AUTOMATED COUNT: 12.7 % (ref 11.5–14.5)
ERYTHROCYTE [DISTWIDTH] IN BLOOD BY AUTOMATED COUNT: 12.7 % (ref 11.5–14.5)
GLUCOSE BLD-MCNC: 109 MG/DL (ref 70–99)
GLUCOSE BLD-MCNC: 171 MG/DL (ref 70–99)
GLUCOSE BLD-MCNC: 180 MG/DL (ref 70–99)
GLUCOSE BLD-MCNC: 184 MG/DL (ref 70–99)
GLUCOSE SERPL-MCNC: 106 MG/DL (ref 70–99)
HCT VFR BLD AUTO: 28.8 % (ref 37–47)
HCT VFR BLD AUTO: 30.2 % (ref 37–47)
HGB BLD-MCNC: 9.3 G/DL (ref 12–16)
HGB BLD-MCNC: 9.8 G/DL (ref 12–16)
INR PPP: 1.2
LYMPHOCYTES # BLD: 1.6 K/UL (ref 1–4.8)
LYMPHOCYTES NFR BLD: 14.2 %
MAGNESIUM SERPL-MCNC: 1.5 MG/DL (ref 1.7–2.4)
MCH RBC QN AUTO: 29.7 PG (ref 27–31.3)
MCH RBC QN AUTO: 30 PG (ref 27–31.3)
MCHC RBC AUTO-ENTMCNC: 32.3 % (ref 33–37)
MCHC RBC AUTO-ENTMCNC: 32.5 % (ref 33–37)
MCV RBC AUTO: 91.5 FL (ref 79.4–94.8)
MCV RBC AUTO: 92.9 FL (ref 79.4–94.8)
MONOCYTES # BLD: 1.4 K/UL (ref 0.2–0.8)
MONOCYTES NFR BLD: 12.7 %
NEUTROPHILS # BLD: 7.9 K/UL (ref 1.4–6.5)
NEUTS SEG NFR BLD: 70 %
PERFORMED ON: ABNORMAL
PLATELET # BLD AUTO: 335 K/UL (ref 130–400)
PLATELET # BLD AUTO: 356 K/UL (ref 130–400)
POTASSIUM SERPL-SCNC: 4.7 MEQ/L (ref 3.4–4.9)
PROTHROMBIN TIME: 14.9 SEC (ref 12.3–14.9)
RBC # BLD AUTO: 3.1 M/UL (ref 4.2–5.4)
RBC # BLD AUTO: 3.3 M/UL (ref 4.2–5.4)
SODIUM SERPL-SCNC: 140 MEQ/L (ref 135–144)
TROPONIN, HIGH SENSITIVITY: 19 NG/L (ref 0–19)
WBC # BLD AUTO: 11.2 K/UL (ref 4.8–10.8)
WBC # BLD AUTO: 13 K/UL (ref 4.8–10.8)

## 2023-10-28 PROCEDURE — 6370000000 HC RX 637 (ALT 250 FOR IP): Performed by: INTERNAL MEDICINE

## 2023-10-28 PROCEDURE — 94640 AIRWAY INHALATION TREATMENT: CPT

## 2023-10-28 PROCEDURE — 6360000002 HC RX W HCPCS: Performed by: INTERNAL MEDICINE

## 2023-10-28 PROCEDURE — 80048 BASIC METABOLIC PNL TOTAL CA: CPT

## 2023-10-28 PROCEDURE — 83735 ASSAY OF MAGNESIUM: CPT

## 2023-10-28 PROCEDURE — 93005 ELECTROCARDIOGRAM TRACING: CPT | Performed by: INTERNAL MEDICINE

## 2023-10-28 PROCEDURE — 36415 COLL VENOUS BLD VENIPUNCTURE: CPT

## 2023-10-28 PROCEDURE — 2580000003 HC RX 258: Performed by: INTERNAL MEDICINE

## 2023-10-28 PROCEDURE — 85730 THROMBOPLASTIN TIME PARTIAL: CPT

## 2023-10-28 PROCEDURE — 85027 COMPLETE CBC AUTOMATED: CPT

## 2023-10-28 PROCEDURE — 85025 COMPLETE CBC W/AUTO DIFF WBC: CPT

## 2023-10-28 PROCEDURE — C9113 INJ PANTOPRAZOLE SODIUM, VIA: HCPCS | Performed by: INTERNAL MEDICINE

## 2023-10-28 PROCEDURE — 5A09457 ASSISTANCE WITH RESPIRATORY VENTILATION, 24-96 CONSECUTIVE HOURS, CONTINUOUS POSITIVE AIRWAY PRESSURE: ICD-10-PCS | Performed by: INTERNAL MEDICINE

## 2023-10-28 PROCEDURE — 99291 CRITICAL CARE FIRST HOUR: CPT | Performed by: INTERNAL MEDICINE

## 2023-10-28 PROCEDURE — 99222 1ST HOSP IP/OBS MODERATE 55: CPT | Performed by: INTERNAL MEDICINE

## 2023-10-28 PROCEDURE — 85520 HEPARIN ASSAY: CPT

## 2023-10-28 PROCEDURE — 99223 1ST HOSP IP/OBS HIGH 75: CPT | Performed by: INTERNAL MEDICINE

## 2023-10-28 PROCEDURE — 71045 X-RAY EXAM CHEST 1 VIEW: CPT

## 2023-10-28 PROCEDURE — 2700000000 HC OXYGEN THERAPY PER DAY

## 2023-10-28 PROCEDURE — 85610 PROTHROMBIN TIME: CPT

## 2023-10-28 PROCEDURE — 94660 CPAP INITIATION&MGMT: CPT

## 2023-10-28 PROCEDURE — 2000000000 HC ICU R&B

## 2023-10-28 PROCEDURE — 94761 N-INVAS EAR/PLS OXIMETRY MLT: CPT

## 2023-10-28 PROCEDURE — 84484 ASSAY OF TROPONIN QUANT: CPT

## 2023-10-28 RX ORDER — 0.9 % SODIUM CHLORIDE 0.9 %
500 INTRAVENOUS SOLUTION INTRAVENOUS ONCE
Status: DISCONTINUED | OUTPATIENT
Start: 2023-10-28 | End: 2023-11-02 | Stop reason: HOSPADM

## 2023-10-28 RX ORDER — FUROSEMIDE 10 MG/ML
20 INJECTION INTRAMUSCULAR; INTRAVENOUS ONCE
Status: COMPLETED | OUTPATIENT
Start: 2023-10-28 | End: 2023-10-28

## 2023-10-28 RX ORDER — CLOPIDOGREL BISULFATE 75 MG/1
300 TABLET ORAL ONCE
Status: COMPLETED | OUTPATIENT
Start: 2023-10-28 | End: 2023-10-28

## 2023-10-28 RX ORDER — HEPARIN SODIUM 1000 [USP'U]/ML
4000 INJECTION, SOLUTION INTRAVENOUS; SUBCUTANEOUS ONCE
Status: COMPLETED | OUTPATIENT
Start: 2023-10-28 | End: 2023-10-28

## 2023-10-28 RX ORDER — CLINDAMYCIN HYDROCHLORIDE 300 MG/1
300 CAPSULE ORAL EVERY 8 HOURS SCHEDULED
Status: DISCONTINUED | OUTPATIENT
Start: 2023-10-28 | End: 2023-10-31

## 2023-10-28 RX ORDER — ASPIRIN 81 MG/1
81 TABLET ORAL DAILY
Status: DISCONTINUED | OUTPATIENT
Start: 2023-10-28 | End: 2023-11-02 | Stop reason: HOSPADM

## 2023-10-28 RX ORDER — TORSEMIDE 20 MG/1
40 TABLET ORAL DAILY
Status: DISCONTINUED | OUTPATIENT
Start: 2023-10-28 | End: 2023-11-02 | Stop reason: HOSPADM

## 2023-10-28 RX ORDER — HEPARIN SODIUM 1000 [USP'U]/ML
4000 INJECTION, SOLUTION INTRAVENOUS; SUBCUTANEOUS PRN
Status: DISCONTINUED | OUTPATIENT
Start: 2023-10-28 | End: 2023-10-30 | Stop reason: ALTCHOICE

## 2023-10-28 RX ORDER — PROCHLORPERAZINE EDISYLATE 5 MG/ML
10 INJECTION INTRAMUSCULAR; INTRAVENOUS EVERY 6 HOURS PRN
Status: DISCONTINUED | OUTPATIENT
Start: 2023-10-28 | End: 2023-11-02 | Stop reason: HOSPADM

## 2023-10-28 RX ORDER — NITROGLYCERIN 0.4 MG/1
0.4 TABLET SUBLINGUAL EVERY 5 MIN PRN
Status: DISCONTINUED | OUTPATIENT
Start: 2023-10-28 | End: 2023-11-02 | Stop reason: HOSPADM

## 2023-10-28 RX ORDER — CLOPIDOGREL BISULFATE 75 MG/1
75 TABLET ORAL DAILY
Status: DISCONTINUED | OUTPATIENT
Start: 2023-10-29 | End: 2023-11-02 | Stop reason: HOSPADM

## 2023-10-28 RX ORDER — RANOLAZINE 500 MG/1
500 TABLET, EXTENDED RELEASE ORAL 2 TIMES DAILY
Status: DISCONTINUED | OUTPATIENT
Start: 2023-10-28 | End: 2023-10-30

## 2023-10-28 RX ORDER — NITROGLYCERIN 20 MG/100ML
5-200 INJECTION INTRAVENOUS CONTINUOUS
Status: DISCONTINUED | OUTPATIENT
Start: 2023-10-28 | End: 2023-10-30

## 2023-10-28 RX ORDER — HEPARIN SODIUM 10000 [USP'U]/100ML
5-30 INJECTION, SOLUTION INTRAVENOUS CONTINUOUS
Status: DISCONTINUED | OUTPATIENT
Start: 2023-10-28 | End: 2023-10-30

## 2023-10-28 RX ORDER — HYDRALAZINE HYDROCHLORIDE 20 MG/ML
10 INJECTION INTRAMUSCULAR; INTRAVENOUS EVERY 6 HOURS PRN
Status: DISCONTINUED | OUTPATIENT
Start: 2023-10-28 | End: 2023-10-28

## 2023-10-28 RX ORDER — HEPARIN SODIUM 1000 [USP'U]/ML
2000 INJECTION, SOLUTION INTRAVENOUS; SUBCUTANEOUS PRN
Status: DISCONTINUED | OUTPATIENT
Start: 2023-10-28 | End: 2023-10-30 | Stop reason: ALTCHOICE

## 2023-10-28 RX ADMIN — HEPARIN SODIUM 2000 UNITS: 1000 INJECTION INTRAVENOUS; SUBCUTANEOUS at 22:44

## 2023-10-28 RX ADMIN — RANOLAZINE 500 MG: 500 TABLET, FILM COATED, EXTENDED RELEASE ORAL at 20:21

## 2023-10-28 RX ADMIN — ALBUTEROL SULFATE 2 PUFF: 90 AEROSOL, METERED RESPIRATORY (INHALATION) at 03:47

## 2023-10-28 RX ADMIN — HEPARIN SODIUM 4000 UNITS: 1000 INJECTION INTRAVENOUS; SUBCUTANEOUS at 15:28

## 2023-10-28 RX ADMIN — PROPRANOLOL HYDROCHLORIDE 20 MG: 20 TABLET ORAL at 20:21

## 2023-10-28 RX ADMIN — FUROSEMIDE 20 MG: 10 INJECTION, SOLUTION INTRAMUSCULAR; INTRAVENOUS at 06:26

## 2023-10-28 RX ADMIN — CLINDAMYCIN HYDROCHLORIDE 300 MG: 300 CAPSULE ORAL at 14:12

## 2023-10-28 RX ADMIN — CLOPIDOGREL BISULFATE 300 MG: 75 TABLET ORAL at 14:05

## 2023-10-28 RX ADMIN — ESCITALOPRAM OXALATE 20 MG: 20 TABLET ORAL at 14:05

## 2023-10-28 RX ADMIN — ATORVASTATIN CALCIUM 40 MG: 40 TABLET, FILM COATED ORAL at 20:20

## 2023-10-28 RX ADMIN — NITROGLYCERIN 0.4 MG: 0.4 TABLET, ORALLY DISINTEGRATING SUBLINGUAL at 02:14

## 2023-10-28 RX ADMIN — HYOSCYAMINE SULFATE: 16 SOLUTION at 11:50

## 2023-10-28 RX ADMIN — ACETAMINOPHEN 650 MG: 325 TABLET ORAL at 04:58

## 2023-10-28 RX ADMIN — Medication 10 ML: at 21:10

## 2023-10-28 RX ADMIN — NITROGLYCERIN 10 MCG/MIN: 20 INJECTION INTRAVENOUS at 15:45

## 2023-10-28 RX ADMIN — DOCUSATE SODIUM 100 MG: 100 CAPSULE, LIQUID FILLED ORAL at 14:05

## 2023-10-28 RX ADMIN — SODIUM CHLORIDE, PRESERVATIVE FREE 40 MG: 5 INJECTION INTRAVENOUS at 02:01

## 2023-10-28 RX ADMIN — HEPARIN SODIUM 11 UNITS/KG/HR: 10000 INJECTION, SOLUTION INTRAVENOUS at 15:27

## 2023-10-28 RX ADMIN — HEPARIN SODIUM 5000 UNITS: 5000 INJECTION INTRAVENOUS; SUBCUTANEOUS at 06:26

## 2023-10-28 RX ADMIN — NITROGLYCERIN 5 MCG/MIN: 20 INJECTION INTRAVENOUS at 15:38

## 2023-10-28 RX ADMIN — HEPARIN SODIUM 5000 UNITS: 5000 INJECTION INTRAVENOUS; SUBCUTANEOUS at 14:12

## 2023-10-28 RX ADMIN — PROCHLORPERAZINE EDISYLATE 10 MG: 5 INJECTION INTRAMUSCULAR; INTRAVENOUS at 14:13

## 2023-10-28 RX ADMIN — TORSEMIDE 40 MG: 20 TABLET ORAL at 14:05

## 2023-10-28 RX ADMIN — TRAZODONE HYDROCHLORIDE 50 MG: 50 TABLET ORAL at 20:21

## 2023-10-28 RX ADMIN — ASPIRIN 81 MG: 81 TABLET, COATED ORAL at 14:05

## 2023-10-28 RX ADMIN — HYDRALAZINE HYDROCHLORIDE 10 MG: 20 INJECTION, SOLUTION INTRAMUSCULAR; INTRAVENOUS at 11:02

## 2023-10-28 RX ADMIN — RANOLAZINE 500 MG: 500 TABLET, FILM COATED, EXTENDED RELEASE ORAL at 14:05

## 2023-10-28 RX ADMIN — CLINDAMYCIN HYDROCHLORIDE 300 MG: 300 CAPSULE ORAL at 21:30

## 2023-10-28 RX ADMIN — Medication 10 ML: at 11:18

## 2023-10-28 RX ADMIN — Medication 4 TABLET: at 20:21

## 2023-10-28 RX ADMIN — Medication 4 TABLET: at 14:05

## 2023-10-28 ASSESSMENT — ENCOUNTER SYMPTOMS
ALLERGIC/IMMUNOLOGIC NEGATIVE: 1
VOMITING: 0
ABDOMINAL PAIN: 0
NAUSEA: 0
GASTROINTESTINAL NEGATIVE: 1
SHORTNESS OF BREATH: 1
WHEEZING: 0
RESPIRATORY NEGATIVE: 1
EYES NEGATIVE: 1

## 2023-10-28 ASSESSMENT — PAIN DESCRIPTION - LOCATION
LOCATION: CHEST
LOCATION: CHEST

## 2023-10-28 ASSESSMENT — PAIN DESCRIPTION - FREQUENCY: FREQUENCY: CONTINUOUS

## 2023-10-28 ASSESSMENT — PAIN DESCRIPTION - ORIENTATION: ORIENTATION: UPPER

## 2023-10-28 ASSESSMENT — PAIN SCALES - GENERAL
PAINLEVEL_OUTOF10: 5
PAINLEVEL_OUTOF10: 0

## 2023-10-28 ASSESSMENT — PAIN - FUNCTIONAL ASSESSMENT: PAIN_FUNCTIONAL_ASSESSMENT: ACTIVITIES ARE NOT PREVENTED

## 2023-10-28 ASSESSMENT — PAIN DESCRIPTION - ONSET: ONSET: ON-GOING

## 2023-10-28 ASSESSMENT — PAIN DESCRIPTION - PAIN TYPE: TYPE: ACUTE PAIN

## 2023-10-28 ASSESSMENT — PAIN DESCRIPTION - DESCRIPTORS: DESCRIPTORS: ACHING

## 2023-10-28 NOTE — PLAN OF CARE
Problem: Chronic Conditions and Co-morbidities  Goal: Patient's chronic conditions and co-morbidity symptoms are monitored and maintained or improved  Outcome: Progressing  Flowsheets (Taken 10/27/2023 6925 by Lyly Valentino RN)  Care Plan - Patient's Chronic Conditions and Co-Morbidity Symptoms are Monitored and Maintained or Improved:   Monitor and assess patient's chronic conditions and comorbid symptoms for stability, deterioration, or improvement   Collaborate with multidisciplinary team to address chronic and comorbid conditions and prevent exacerbation or deterioration   Update acute care plan with appropriate goals if chronic or comorbid symptoms are exacerbated and prevent overall improvement and discharge

## 2023-10-28 NOTE — CONSULTS
DATE OF CONSULTATION  10/28/2023    CONSULTANT  Salazar Rust DO     REQUESTING PHYSICIAN  Sabsa Zee MD         REASON FOR CONSULTATION  No chief complaint on file. Hospital Day: 1      Patient is a 47 y.o. female who presents with a chief complaint of right toe infection. Patient is followed on a regular basis by Dr. Ruthie Tavares MD.   Patient with past medical history of diabetes, hypertension, hyperlipidemia, family history of CAD who initially presented for right toe infection. She was placed on IV Zosyn as well as vancomycin and developed acute kidney injury. Creatinine is up to 6.0 today. Nephrology was following. She developed severe midsternal chest heaviness and pressure this morning with radiation to the back as well as shortness of breath. She was noted to have new onset ischemic EKG changes. States her chest pain was not relieved with sublingual nitroglycerin. She continues to have chest pain. She denies any prior history of cardiac catheterization. Most recent stress test was negative in July 2023.         Past Medical History:   Diagnosis Date    Acute kidney injury ([address]) 10/24/2023    CAD (coronary artery disease) 6/25/2022    CAD (coronary artery disease) 6/25/2022    Depression     Environmental allergies     Gastroparesis     GERD (gastroesophageal reflux disease)     Headache     Dr. Shar Stanotn    HPV in female     Hyperlipidemia     Hypertension     Leukocytosis     Migraines     Type II or unspecified type diabetes mellitus without mention of complication, not stated as uncontrolled       Patient Active Problem List   Diagnosis    Mixed hyperlipidemia    Essential hypertension    GERD (gastroesophageal reflux disease)    Leukocytosis    Cardiac disease    Mixed anxiety and depressive disorder    Type 2 diabetes mellitus with right diabetic foot infection (HCC)    Headache    Acute diffuse otitis externa of left ear    Breast mass, right    Obesity, Class I, BMI 30-34.9

## 2023-10-28 NOTE — PROGRESS NOTES
3794 pt complained of mild nausea and no vomiting. Pt states she is having pain that \"feels like heartburn\" pt also states \"I've been belching a lot more. Pt continues to complain of worsening mid sternal/ epigastric pain that is precipitated by inhalation an and radiates to her shoulder. EKG obtained. Noted changes in EKG     0105 perfect served Dr. Jomar Ny r/t pts pain and EKG changes. 4898   Dr. Jomar Ny to bedside to assess pt. New orders obtained     0228 last dose nitro given. Minimal change in pain. See MAR. Pt on telemetry monitoring. VS at pts baseline. 2868 respiratory  to bedside for increasing SOB. 02 saturations >92% on 3L increase rhonchi in BL lungs. 9851 pt complained of mid sternal/ epigastric pain. Medicated per MAR. Bed in the lowest position and call light in reach.

## 2023-10-28 NOTE — CONSULTS
Pulmonary and Critical Care Medicine  Consult Note  Encounter Date: 10/28/2023 2:52 PM    Ms. Dennis Salgado is a 47 y.o. female  : 1970  Requesting Provider: Charissa Huerta MD    Reason for request: Critical care management          HISTORY OF PRESENT ILLNESS:    Patient is 47 y.o. female who presented to the emergency department with complaints of erythema and swelling present to the right foot secondary to a diabetic ulcer. She does have a past medical history of diabetes, CAD, hyperlipidemia, hypertension, and ARTIE. While in the emergency department she did undergo right foot x-ray which was nonacute. She was given IV vancomycin and Zosyn while in the emergency department and ultimately admitted for IV antibiotics for treatment of her right foot cellulitis. On 10/23/2023 it was noted that she did have declining kidney function. At that point her lisinopril was placed on hold her IV antibiotics were also changed at that time. Nephrology was then consulted. On 10/26/2023 she did begin with some complaints of shortness of breath. She did undergo a CT the abdomen and pelvis which did show moderate to large pleural effusion as well as anasarca. She was given a total of 100 mg of Lasix at that time. Her renal function does continue to decline. The patient states that last evening she did begin to experience some chest discomfort. EKG was obtained which did show T wave inversion in anterolateral leads. She was given several doses of p.o. nitroglycerin with no relief. At that point cardiology was then consulted. She has been transferred to the intensive care unit for further management. Pulmonary/critical care has been consulted for critical care management. The patient is currently resting comfortably in bed. She notes some improvement to her chest pain. She denies dyspnea.     Past Medical History:        Diagnosis Date    Acute kidney injury ([address]) 10/24/2023    CAD (coronary artery 10/26/23  0604 10/27/23  0601 10/28/23  0408    140 140   K 4.5 4.8 4.7    99 100   CO2 23 24 23   BUN 44* 48* 49*   CREATININE 5.75* 6.34* 6.19*   GLUCOSE 83 120* 106*       MV Settings: ABGs: No results for input(s): \"PHART\", \"KFH6VQR\", \"PO2ART\", \"TXS1VQS\", \"BEART\", \"B5WXKKIT\", \"JOG3AFN\" in the last 72 hours. O2 Device: Nasal cannula  O2 Flow Rate (L/min): 3 L/min  Lab Results   Component Value Date/Time    LACTA 0.8 10/24/2023 05:30 AM    LACTA 2.9 10/19/2023 06:00 PM    LACTA 1.3 06/08/2023 11:24 PM       Radiology:    XR CHEST PORTABLE    Result Date: 10/28/2023  EXAMINATION: ONE XRAY VIEW OF THE CHEST 10/28/2023 6:45 am COMPARISON: 06/10/2023 HISTORY: ORDERING SYSTEM PROVIDED HISTORY: SOB TECHNOLOGIST PROVIDED HISTORY: Reason for exam:->SOB Is the patient pregnant?->No What reading provider will be dictating this exam?->CRC FINDINGS: Portable chest reveals heart to be enlarged. Lung volumes are low. Ill-defined opacification to suggest atelectatic changes seen lung bases with slight prominence of the pulmonary vascularity. No definite pleural effusion or pneumothorax. Low lung volumes with findings to suggest atelectatic changes at the lung bases. Slight prominence of the pulmonary vascularity with no definite pleural effusion or pneumothorax. Assessment/Plan:     Unstable angina--she was transferred to the tensive care unit with complaints of unstable angina. She was also noted to have EKG changes to the anterior lateral leads with T wave inversion. She was given oral nitroglycerin on the medical floor, however did not have any improvement to her chest discomfort. She has been transferred to the intensive care unit for nitroglycerin and heparin drip. Cardiology is currently on consultation. Due to the patient's acute kidney injury she is not a good candidate for heart cath at this time. ARTIE--nephrology does continue to follow.   Her kidney function does continue

## 2023-10-28 NOTE — CONSULTS
Infectious Disease     Patient Name: Isamar Valentine  Date: 10/28/2023  YOB: 1970  Medical Record Number: [id number]        Right diabetic foot infection  Right foot cellulitis      History of Present Illness:  Coronary disease depression gastroparesis gastroesophageal reflux disease hypertension diabetes type 2 hyperlipidemia      Patient has ulcer on right second toe for approximately 6 weeks  Patient presented with pain and swelling tenderness right foot especially second toe  Apparently no fevers chills sweats no nausea vomiting    MRI of foot does not show evidence of osteomyelitis      Zosyn doxycycline    Also on fluconazole      MRI FOOT RIGHT WO CONTRAST     Result Date: 10/20/2023  EXAMINATION: MRI OF THE RIGHT FOOT WITHOUT CONTRAST, 10/20/2023 3:33 pm TECHNIQUE: Multiplanar multisequence MRI of the right foot was performed without the administration of intravenous contrast. COMPARISON: None HISTORY: ORDERING SYSTEM PROVIDED HISTORY: rule out OM 2nd digit TECHNOLOGIST PROVIDED HISTORY: Reason for exam:->rule out OM 2nd digit What is the sedation requirement?->Sedation What is the area of interest?->Forefoot What reading provider will be dictating this exam?->CRC FINDINGS: LISFRANC JOINT: Visualized Lisfranc ligament is intact. No significant Lisfranc interval widening or significant periligamentous edema. BONE MARROW: No evidence of fracture. Normal marrow signal. GREATER AND LESSER MTP JOINTS: No significant joint effusion or osseous erosions. No significant degenerative changes. Normal alignment. SOFT TISSUES: Diffuse edema at the 2nd digit distally. Predominately in the distal soft tissues but there is also edema at the plantar aspect. No large fluid collection. TENDONS: Visualized flexor and extensor tendons are intact without tenosynovitis. Cellulitis of 2nd digit but no evidence for osteomyelitis.              [phone] 10/19/2023      Narrative & daily  1       Allergies   Allergen Reactions    Metformin Diarrhea    Nabumetone Rash and Other (See Comments)         Family History   Problem Relation Age of Onset    Heart Disease Mother     Diabetes Father     Heart Disease Father     Cancer Father         thyroid    No Known Problems Sister     No Known Problems Paternal Grandfather     No Known Problems Paternal Grandmother     No Known Problems Maternal Grandmother     No Known Problems Maternal Grandfather     No Known Problems Brother     No Known Problems Other     Breast Cancer Neg Hx     Colon Cancer Neg Hx     Eclampsia Neg Hx     Hypertension Neg Hx     Ovarian Cancer Neg Hx      Labor Neg Hx     Spont Abortions Neg Hx     Stroke Neg Hx          Physical Exam:      Physical Exam  Constitutional:       Appearance: She is obese. She is not ill-appearing. HENT:      Head: Normocephalic and atraumatic. Eyes:      Conjunctiva/sclera: Conjunctivae normal.      Pupils: Pupils are equal, round, and reactive to light. Neck:      Vascular: No carotid bruit. Cardiovascular:      Heart sounds: Normal heart sounds. No murmur heard. Pulmonary:      Effort: Pulmonary effort is normal. No respiratory distress. Breath sounds: Normal breath sounds. No stridor. No wheezing, rhonchi or rales. Chest:      Chest wall: No tenderness. Abdominal:      General: Abdomen is flat. Bowel sounds are normal. There is no distension. Palpations: Abdomen is soft. There is no mass. Tenderness: There is no abdominal tenderness. There is no right CVA tenderness, left CVA tenderness, guarding or rebound. Hernia: No hernia is present. Musculoskeletal:         General: Swelling and tenderness present. Cervical back: Normal range of motion. No rigidity or tenderness. Comments: Plantar and lateral second toe ulcerations nearly healed. Cellulitis resolved,   Lymphadenopathy:      Cervical: No cervical adenopathy.          Blood pressure (!)

## 2023-10-28 NOTE — PROGRESS NOTES
Renal Progress Note    Assessment/  48 yo lady with no CKD. Has DM, htn. Admitted on 10/19 with foot infection. Placed on vanco /zosyn. Has oliguric severe ATN with cr 1.3 on 10/22 and then over 3 on 10/23. Most likely abx related. UA bland except w/ bacteria, renal U/S unremarkable. . Was on lisinopril, now on hold.  Function continues to worsen, pt was on zosyn, last dose (10/24)      Plan:  - no current need for IVFs  - keep lisinopril on hold   - will monitor now that pt off zosyn   -No peripheral eosinophilia  -  patient is diabetic sustained acute kidney injury in the differential diagnosis tubulointerstitial nephritis and/or cortical necrosis or papillary necrosis those type of diagnosis cannot be prove except with kidney biopsy will discuss with the patient  -Start torsemide 40 mg daily  - stopping gabapentin     Patient Active Problem List:     Mixed hyperlipidemia     Essential hypertension     GERD (gastroesophageal reflux disease)     Leukocytosis     Cardiac disease     Mixed anxiety and depressive disorder     Type 2 diabetes mellitus with right diabetic foot infection (HCC)     Headache     Acute diffuse otitis externa of left ear     Breast mass, right     Obesity, Class I, BMI 30-34.9     Elevated BP without diagnosis of hypertension     Hypokalemia     CAD (coronary artery disease)     Hypomagnesemia     Chest pain     Dyspnea     Pneumonia due to infectious organism     Diabetic foot infection ([address])     Cellulitis of foot     Diabetic ulcer of toe of right foot associated with diabetes mellitus due to underlying condition, with fat layer exposed ([address])     Acute kidney injury ([address])     Renal failure      Subjective:   Admit Date: 10/27/2023    Interval History: Seen and examined uneventful night complain from chest pain reproducible nausea and hiccups explained to the patient kidney in origin however not enough to initiate renal replacement therapy    Medications:   Scheduled Meds:   pantoprazole

## 2023-10-29 PROBLEM — L03.031 CELLULITIS AND ABSCESS OF TOE OF RIGHT FOOT: Status: ACTIVE | Noted: 2023-10-29

## 2023-10-29 PROBLEM — I20.9 ANGINA, CLASS IV (HCC): Status: ACTIVE | Noted: 2023-10-27

## 2023-10-29 PROBLEM — L02.611 CELLULITIS AND ABSCESS OF TOE OF RIGHT FOOT: Status: ACTIVE | Noted: 2023-10-29

## 2023-10-29 LAB
ANION GAP SERPL CALCULATED.3IONS-SCNC: 17 MEQ/L (ref 9–15)
ANTI-XA UNFRAC HEPARIN: 0.17 IU/ML
ANTI-XA UNFRAC HEPARIN: 0.18 IU/ML
ANTI-XA UNFRAC HEPARIN: 0.18 IU/ML
ANTI-XA UNFRAC HEPARIN: <0.1 IU/ML
BUN SERPL-MCNC: 52 MG/DL (ref 6–20)
CALCIUM SERPL-MCNC: 8.6 MG/DL (ref 8.5–9.9)
CHLORIDE SERPL-SCNC: 97 MEQ/L (ref 95–107)
CO2 SERPL-SCNC: 26 MEQ/L (ref 20–31)
CREAT SERPL-MCNC: 6.37 MG/DL (ref 0.5–0.9)
EKG ATRIAL RATE: 107 BPM
EKG P AXIS: 44 DEGREES
EKG P-R INTERVAL: 140 MS
EKG Q-T INTERVAL: 330 MS
EKG QRS DURATION: 76 MS
EKG QTC CALCULATION (BAZETT): 440 MS
EKG R AXIS: 3 DEGREES
EKG T AXIS: -2 DEGREES
EKG VENTRICULAR RATE: 107 BPM
GLUCOSE BLD-MCNC: 164 MG/DL (ref 70–99)
GLUCOSE BLD-MCNC: 182 MG/DL (ref 70–99)
GLUCOSE BLD-MCNC: 189 MG/DL (ref 70–99)
GLUCOSE BLD-MCNC: 205 MG/DL (ref 70–99)
GLUCOSE BLD-MCNC: 216 MG/DL (ref 70–99)
GLUCOSE SERPL-MCNC: 163 MG/DL (ref 70–99)
PERFORMED ON: ABNORMAL
POTASSIUM SERPL-SCNC: 4.8 MEQ/L (ref 3.4–4.9)
SODIUM SERPL-SCNC: 140 MEQ/L (ref 135–144)
TROPONIN, HIGH SENSITIVITY: 26 NG/L (ref 0–19)

## 2023-10-29 PROCEDURE — 2700000000 HC OXYGEN THERAPY PER DAY

## 2023-10-29 PROCEDURE — 93010 ELECTROCARDIOGRAM REPORT: CPT | Performed by: INTERNAL MEDICINE

## 2023-10-29 PROCEDURE — A4216 STERILE WATER/SALINE, 10 ML: HCPCS | Performed by: INTERNAL MEDICINE

## 2023-10-29 PROCEDURE — 6360000002 HC RX W HCPCS: Performed by: INTERNAL MEDICINE

## 2023-10-29 PROCEDURE — 94660 CPAP INITIATION&MGMT: CPT

## 2023-10-29 PROCEDURE — 6370000000 HC RX 637 (ALT 250 FOR IP): Performed by: INTERNAL MEDICINE

## 2023-10-29 PROCEDURE — 99232 SBSQ HOSP IP/OBS MODERATE 35: CPT | Performed by: INTERNAL MEDICINE

## 2023-10-29 PROCEDURE — 94761 N-INVAS EAR/PLS OXIMETRY MLT: CPT

## 2023-10-29 PROCEDURE — 2580000003 HC RX 258: Performed by: INTERNAL MEDICINE

## 2023-10-29 PROCEDURE — 6360000002 HC RX W HCPCS

## 2023-10-29 PROCEDURE — 80048 BASIC METABOLIC PNL TOTAL CA: CPT

## 2023-10-29 PROCEDURE — 2000000000 HC ICU R&B

## 2023-10-29 PROCEDURE — 85520 HEPARIN ASSAY: CPT

## 2023-10-29 PROCEDURE — 93005 ELECTROCARDIOGRAM TRACING: CPT | Performed by: FAMILY MEDICINE

## 2023-10-29 PROCEDURE — 99233 SBSQ HOSP IP/OBS HIGH 50: CPT | Performed by: INTERNAL MEDICINE

## 2023-10-29 PROCEDURE — 2500000003 HC RX 250 WO HCPCS: Performed by: INTERNAL MEDICINE

## 2023-10-29 PROCEDURE — 36415 COLL VENOUS BLD VENIPUNCTURE: CPT

## 2023-10-29 PROCEDURE — 99291 CRITICAL CARE FIRST HOUR: CPT | Performed by: INTERNAL MEDICINE

## 2023-10-29 PROCEDURE — 93005 ELECTROCARDIOGRAM TRACING: CPT

## 2023-10-29 PROCEDURE — C9113 INJ PANTOPRAZOLE SODIUM, VIA: HCPCS | Performed by: INTERNAL MEDICINE

## 2023-10-29 PROCEDURE — 84484 ASSAY OF TROPONIN QUANT: CPT

## 2023-10-29 RX ORDER — MAGNESIUM SULFATE IN WATER 40 MG/ML
2000 INJECTION, SOLUTION INTRAVENOUS ONCE
Status: COMPLETED | OUTPATIENT
Start: 2023-10-29 | End: 2023-10-29

## 2023-10-29 RX ORDER — HYDRALAZINE HYDROCHLORIDE 25 MG/1
25 TABLET, FILM COATED ORAL EVERY 8 HOURS SCHEDULED
Status: DISCONTINUED | OUTPATIENT
Start: 2023-10-29 | End: 2023-11-02 | Stop reason: HOSPADM

## 2023-10-29 RX ORDER — METOPROLOL TARTRATE 5 MG/5ML
5 INJECTION INTRAVENOUS EVERY 6 HOURS PRN
Status: DISCONTINUED | OUTPATIENT
Start: 2023-10-29 | End: 2023-11-02 | Stop reason: HOSPADM

## 2023-10-29 RX ORDER — HYDRALAZINE HYDROCHLORIDE 20 MG/ML
10 INJECTION INTRAMUSCULAR; INTRAVENOUS EVERY 4 HOURS PRN
Status: DISCONTINUED | OUTPATIENT
Start: 2023-10-29 | End: 2023-11-02 | Stop reason: HOSPADM

## 2023-10-29 RX ORDER — CHLORTHALIDONE 25 MG/1
25 TABLET ORAL DAILY
Status: DISCONTINUED | OUTPATIENT
Start: 2023-10-29 | End: 2023-11-02 | Stop reason: HOSPADM

## 2023-10-29 RX ORDER — FUROSEMIDE 10 MG/ML
80 INJECTION INTRAMUSCULAR; INTRAVENOUS ONCE
Status: COMPLETED | OUTPATIENT
Start: 2023-10-29 | End: 2023-10-29

## 2023-10-29 RX ORDER — ISOSORBIDE MONONITRATE 30 MG/1
30 TABLET, EXTENDED RELEASE ORAL DAILY
Status: DISCONTINUED | OUTPATIENT
Start: 2023-10-29 | End: 2023-11-02 | Stop reason: HOSPADM

## 2023-10-29 RX ORDER — SODIUM CHLORIDE 9 MG/ML
INJECTION, SOLUTION INTRAVENOUS CONTINUOUS
Status: DISCONTINUED | OUTPATIENT
Start: 2023-10-29 | End: 2023-10-30

## 2023-10-29 RX ADMIN — Medication 4 TABLET: at 20:14

## 2023-10-29 RX ADMIN — HEPARIN SODIUM 4000 UNITS: 1000 INJECTION INTRAVENOUS; SUBCUTANEOUS at 06:45

## 2023-10-29 RX ADMIN — METOPROLOL TARTRATE 5 MG: 5 INJECTION, SOLUTION INTRAVENOUS at 16:46

## 2023-10-29 RX ADMIN — Medication 4 TABLET: at 09:31

## 2023-10-29 RX ADMIN — SODIUM CHLORIDE, PRESERVATIVE FREE 40 MG: 5 INJECTION INTRAVENOUS at 09:32

## 2023-10-29 RX ADMIN — TORSEMIDE 40 MG: 20 TABLET ORAL at 09:32

## 2023-10-29 RX ADMIN — ESCITALOPRAM OXALATE 20 MG: 20 TABLET ORAL at 09:31

## 2023-10-29 RX ADMIN — Medication 10 ML: at 20:14

## 2023-10-29 RX ADMIN — FUROSEMIDE 80 MG: 10 INJECTION, SOLUTION INTRAMUSCULAR; INTRAVENOUS at 13:34

## 2023-10-29 RX ADMIN — HEPARIN SODIUM 17 UNITS/KG/HR: 10000 INJECTION, SOLUTION INTRAVENOUS at 12:27

## 2023-10-29 RX ADMIN — ISOSORBIDE MONONITRATE 30 MG: 30 TABLET, EXTENDED RELEASE ORAL at 14:30

## 2023-10-29 RX ADMIN — SODIUM CHLORIDE: 9 INJECTION, SOLUTION INTRAVENOUS at 14:32

## 2023-10-29 RX ADMIN — Medication 4 TABLET: at 13:34

## 2023-10-29 RX ADMIN — HYOSCYAMINE SULFATE: 16 SOLUTION at 20:45

## 2023-10-29 RX ADMIN — HEPARIN SODIUM 2000 UNITS: 1000 INJECTION INTRAVENOUS; SUBCUTANEOUS at 21:06

## 2023-10-29 RX ADMIN — DOCUSATE SODIUM 100 MG: 100 CAPSULE, LIQUID FILLED ORAL at 09:32

## 2023-10-29 RX ADMIN — ASPIRIN 81 MG: 81 TABLET, COATED ORAL at 09:31

## 2023-10-29 RX ADMIN — CLINDAMYCIN HYDROCHLORIDE 300 MG: 300 CAPSULE ORAL at 13:34

## 2023-10-29 RX ADMIN — CLINDAMYCIN HYDROCHLORIDE 300 MG: 300 CAPSULE ORAL at 06:46

## 2023-10-29 RX ADMIN — MAGNESIUM SULFATE HEPTAHYDRATE 2000 MG: 40 INJECTION, SOLUTION INTRAVENOUS at 01:32

## 2023-10-29 RX ADMIN — ATORVASTATIN CALCIUM 40 MG: 40 TABLET, FILM COATED ORAL at 20:14

## 2023-10-29 RX ADMIN — RANOLAZINE 500 MG: 500 TABLET, FILM COATED, EXTENDED RELEASE ORAL at 09:31

## 2023-10-29 RX ADMIN — CHLORTHALIDONE 25 MG: 25 TABLET ORAL at 13:46

## 2023-10-29 RX ADMIN — INSULIN LISPRO 2 UNITS: 100 INJECTION, SOLUTION INTRAVENOUS; SUBCUTANEOUS at 17:22

## 2023-10-29 RX ADMIN — HEPARIN SODIUM 19 UNITS/KG/HR: 10000 INJECTION, SOLUTION INTRAVENOUS at 13:38

## 2023-10-29 RX ADMIN — CLOPIDOGREL BISULFATE 75 MG: 75 TABLET ORAL at 09:31

## 2023-10-29 RX ADMIN — PROCHLORPERAZINE EDISYLATE 10 MG: 5 INJECTION INTRAMUSCULAR; INTRAVENOUS at 17:22

## 2023-10-29 RX ADMIN — MAGNESIUM SULFATE HEPTAHYDRATE 2000 MG: 40 INJECTION, SOLUTION INTRAVENOUS at 12:29

## 2023-10-29 RX ADMIN — RANOLAZINE 500 MG: 500 TABLET, FILM COATED, EXTENDED RELEASE ORAL at 20:14

## 2023-10-29 RX ADMIN — HYDRALAZINE HYDROCHLORIDE 25 MG: 25 TABLET, FILM COATED ORAL at 13:34

## 2023-10-29 RX ADMIN — TRAZODONE HYDROCHLORIDE 50 MG: 50 TABLET ORAL at 20:14

## 2023-10-29 RX ADMIN — HEPARIN SODIUM 2700 UNITS: 1000 INJECTION INTRAVENOUS; SUBCUTANEOUS at 13:39

## 2023-10-29 RX ADMIN — CLINDAMYCIN HYDROCHLORIDE 300 MG: 300 CAPSULE ORAL at 20:14

## 2023-10-29 ASSESSMENT — PAIN SCALES - GENERAL
PAINLEVEL_OUTOF10: 2
PAINLEVEL_OUTOF10: 2

## 2023-10-29 ASSESSMENT — ENCOUNTER SYMPTOMS
RESPIRATORY NEGATIVE: 1
GASTROINTESTINAL NEGATIVE: 1

## 2023-10-29 NOTE — PROGRESS NOTES
sodium chloride flush 0.9 % injection 5-40 mL  5-40 mL IntraVENous 2 times per day Meenu Barnhart MD   10 mL at 10/28/23 1118    sodium chloride flush 0.9 % injection 5-40 mL  5-40 mL IntraVENous PRN Meenu Barnhart MD        0.9 % sodium chloride infusion   IntraVENous PRN Meenu Barnhart MD        insulin lispro (HUMALOG) injection vial 0-8 Units  0-8 Units SubCUTAneous TID WC Stuart Davidson MD        insulin lispro (HUMALOG) injection vial 0-4 Units  0-4 Units SubCUTAneous Nightly Stuart Davidson MD        albuterol sulfate HFA (PROVENTIL;VENTOLIN;PROAIR) 108 (90 Base) MCG/ACT inhaler 2 puff  2 puff Inhalation Q6H PRN Stuart Davidson MD   2 puff at 10/28/23 0347    escitalopram (LEXAPRO) tablet 20 mg  20 mg Oral Daily Stuart Davidson MD        propranolol (INDERAL) tablet 20 mg  20 mg Oral Nightly Meenu Barnhart MD   20 mg at 10/27/23 2309    Sodium Hypochlorite (DAKINS) 0.25 % half strength external soln   Irrigation Daily Meenu Barnhart MD   Given at 10/28/23 1150    heparin (porcine) injection 5,000 Units  5,000 Units SubCUTAneous 3 times per day Meenu Barnhart MD   5,000 Units at 10/28/23 0626    traZODone (DESYREL) tablet 50 mg  50 mg Oral Nightly Meenu Barnhart MD   50 mg at 10/27/23 2309    loperamide (IMODIUM) capsule 2 mg  2 mg Oral TID PRN Stuart Davidson MD        acetaminophen (TYLENOL) tablet 650 mg  650 mg Oral Q6H PRN Stuart Davidson MD   650 mg at 10/28/23 0458     Or    acetaminophen (TYLENOL) suppository 650 mg  650 mg Rectal Q6H PRN Stuart Davidson MD        atorvastatin (LIPITOR) tablet 40 mg  40 mg Oral Nightly Stuart Davidson MD   40 mg at 10/27/23 2309    calcium carbonate (TUMS) chewable tablet 500 mg  500 mg Oral TID PRN Maddi Steele MD   500 mg at 10/27/23 1858    docusate sodium (COLACE) capsule 100 mg  100 mg Oral Daily Maddi Steele MD   100 mg at 10/27/23 1858    lactobacillus acidophilus (FLORANEX) 4 tablet  4 tablet Oral TID Maddi Steele MD   4 tablet at Thank you for allowing me to participate in the care of your patient, please don't hesitate to contact me if you have any further questions.

## 2023-10-29 NOTE — PLAN OF CARE
Problem: Discharge Planning  Goal: Discharge to home or other facility with appropriate resources  Outcome: Progressing  Flowsheets (Taken 10/29/2023 1200)  Discharge to home or other facility with appropriate resources:   Identify barriers to discharge with patient and caregiver   Arrange for needed discharge resources and transportation as appropriate   Identify discharge learning needs (meds, wound care, etc)   Arrange for interpreters to assist at discharge as needed   Refer to discharge planning if patient needs post-hospital services based on physician order or complex needs related to functional status, cognitive ability or social support system     Problem: Chronic Conditions and Co-morbidities  Goal: Patient's chronic conditions and co-morbidity symptoms are monitored and maintained or improved  Outcome: Progressing  Flowsheets (Taken 10/29/2023 1200)  Care Plan - Patient's Chronic Conditions and Co-Morbidity Symptoms are Monitored and Maintained or Improved:   Monitor and assess patient's chronic conditions and comorbid symptoms for stability, deterioration, or improvement   Collaborate with multidisciplinary team to address chronic and comorbid conditions and prevent exacerbation or deterioration   Update acute care plan with appropriate goals if chronic or comorbid symptoms are exacerbated and prevent overall improvement and discharge     Problem: Safety - Adult  Goal: Free from fall injury  Outcome: Progressing

## 2023-10-29 NOTE — PROGRESS NOTES
Pt on heparin gtt. This RN titrated per order. Anti Xa 0.13 adjusted per order   Anti Xa 0.18 not adjust Lab drawn to soon from adjustment, Labs retimed. Anti Xa <0.10, Adjusted per order, lab retimed. Pt complained of 2/10 chest pain. This RN obtained an EKG and notified hospitalist and cardiology. No new orders at this time.      Pt tolerating the BiPap well

## 2023-10-29 NOTE — PROGRESS NOTES
Renal Progress Note    Assessment/  46 yo lady with no CKD. Has DM, htn. Admitted on 10/19 with foot infection. Placed on vanco /zosyn. Has oliguric severe ATN with cr 1.3 on 10/22 and then over 3 on 10/23. Most likely abx related. UA bland except w/ bacteria, renal U/S unremarkable. . Was on lisinopril, now on hold.  Function continues to worsen, pt was on zosyn, last dose (10/24)      Plan:  -We will implement contrast-induced nephropathy prophylaxis  -Discussed with the patient for possible dialysis that would be required dialysis has not been ordered as there is no evidence that pre or post contrast exposure renal replacement therapy whether in the form of dialysis is more beneficial than simply IV fluid in this regard dialysis will be ordered as clinically or laboratory indicated  - keep lisinopril on hold   - will monitor now that pt off zosyn   -No peripheral eosinophilia  -  patient is diabetic sustained acute kidney injury in the differential diagnosis tubulointerstitial nephritis and/or cortical necrosis or papillary necrosis those type of diagnosis cannot be prove except with kidney biopsy will discuss with the patient  -Start torsemide 40 mg daily  - stopping gabapentin     Patient Active Problem List:     Mixed hyperlipidemia     Essential hypertension     GERD (gastroesophageal reflux disease)     Leukocytosis     Cardiac disease     Mixed anxiety and depressive disorder     Type 2 diabetes mellitus with right diabetic foot infection ([address])     Headache     Acute diffuse otitis externa of left ear     Breast mass, right     Obesity, Class I, BMI 30-34.9     Elevated BP without diagnosis of hypertension     Hypokalemia     CAD (coronary artery disease)     Hypomagnesemia     Chest pain     Dyspnea     Pneumonia due to infectious organism     Diabetic foot infection ([address])     Cellulitis of foot     Diabetic ulcer of toe of right foot associated with diabetes mellitus due to underlying condition, with

## 2023-10-29 NOTE — PROGRESS NOTES
Infectious Disease     Patient Name: Toby Vaughan  Date: 10/29/2023  YOB: 1970  Medical Record Number: [id number]        Right diabetic foot infection  Right foot cellulitis    Patient has ulcer on right second toe for approximately 6 weeks  Patient presented with pain and swelling tenderness right foot especially second toe  Apparently no fevers chills sweats no nausea vomiting    MRI of foot does not show evidence of osteomyelitis      Zosyn doxycycline    Also on fluconazole      MRI FOOT RIGHT WO CONTRAST     Result Date: 10/20/2023  EXAMINATION: MRI OF THE RIGHT FOOT WITHOUT CONTRAST, 10/20/2023 3:33 pm TECHNIQUE: Multiplanar multisequence MRI of the right foot was performed without the administration of intravenous contrast. COMPARISON: None HISTORY: ORDERING SYSTEM PROVIDED HISTORY: rule out OM 2nd digit TECHNOLOGIST PROVIDED HISTORY: Reason for exam:->rule out OM 2nd digit What is the sedation requirement?->Sedation What is the area of interest?->Forefoot What reading provider will be dictating this exam?->CRC FINDINGS: LISFRANC JOINT: Visualized Lisfranc ligament is intact. No significant Lisfranc interval widening or significant periligamentous edema. BONE MARROW: No evidence of fracture. Normal marrow signal. GREATER AND LESSER MTP JOINTS: No significant joint effusion or osseous erosions. No significant degenerative changes. Normal alignment. SOFT TISSUES: Diffuse edema at the 2nd digit distally. Predominately in the distal soft tissues but there is also edema at the plantar aspect. No large fluid collection. TENDONS: Visualized flexor and extensor tendons are intact without tenosynovitis. Cellulitis of 2nd digit but no evidence for osteomyelitis. [phone] 10/19/2023      Narrative & Impression  EXAMINATION:  THREE XRAY VIEWS OF THE RIGHT FOOT     10/19/2023 6:08 pm     COMPARISON:  None.      HISTORY:  ORDERING SYSTEM PROVIDED HISTORY: toe ulcer  TECHNOLOGIST

## 2023-10-30 DIAGNOSIS — F31.9 BIPOLAR DEPRESSION (MULTI): ICD-10-CM

## 2023-10-30 LAB
ANION GAP SERPL CALCULATED.3IONS-SCNC: 13 MEQ/L (ref 9–15)
ANION GAP SERPL CALCULATED.3IONS-SCNC: 16 MEQ/L (ref 9–15)
ANTI-XA UNFRAC HEPARIN: 0.18 IU/ML
ANTI-XA UNFRAC HEPARIN: 0.27 IU/ML
ANTI-XA UNFRAC HEPARIN: 0.51 IU/ML
ANTI-XA UNFRAC HEPARIN: <0.1 IU/ML
BUN SERPL-MCNC: 51 MG/DL (ref 6–20)
BUN SERPL-MCNC: 53 MG/DL (ref 6–20)
CALCIUM SERPL-MCNC: 8.4 MG/DL (ref 8.5–9.9)
CALCIUM SERPL-MCNC: 8.7 MG/DL (ref 8.5–9.9)
CHLORIDE SERPL-SCNC: 95 MEQ/L (ref 95–107)
CHLORIDE SERPL-SCNC: 99 MEQ/L (ref 95–107)
CO2 SERPL-SCNC: 23 MEQ/L (ref 20–31)
CO2 SERPL-SCNC: 29 MEQ/L (ref 20–31)
CREAT SERPL-MCNC: 5.63 MG/DL (ref 0.5–0.9)
CREAT SERPL-MCNC: 5.88 MG/DL (ref 0.5–0.9)
EKG ATRIAL RATE: 81 BPM
EKG ATRIAL RATE: 83 BPM
EKG ATRIAL RATE: 86 BPM
EKG P AXIS: 36 DEGREES
EKG P AXIS: 49 DEGREES
EKG P-R INTERVAL: 150 MS
EKG P-R INTERVAL: 152 MS
EKG P-R INTERVAL: 176 MS
EKG Q-T INTERVAL: 366 MS
EKG Q-T INTERVAL: 406 MS
EKG Q-T INTERVAL: 418 MS
EKG QRS DURATION: 74 MS
EKG QRS DURATION: 78 MS
EKG QRS DURATION: 82 MS
EKG QTC CALCULATION (BAZETT): 425 MS
EKG QTC CALCULATION (BAZETT): 477 MS
EKG QTC CALCULATION (BAZETT): 500 MS
EKG R AXIS: -3 DEGREES
EKG R AXIS: -6 DEGREES
EKG R AXIS: 0 DEGREES
EKG T AXIS: -16 DEGREES
EKG T AXIS: -17 DEGREES
EKG T AXIS: -9 DEGREES
EKG VENTRICULAR RATE: 81 BPM
EKG VENTRICULAR RATE: 83 BPM
EKG VENTRICULAR RATE: 86 BPM
ERYTHROCYTE [DISTWIDTH] IN BLOOD BY AUTOMATED COUNT: 12.9 % (ref 11.5–14.5)
GLUCOSE BLD-MCNC: 175 MG/DL (ref 70–99)
GLUCOSE BLD-MCNC: 199 MG/DL (ref 70–99)
GLUCOSE SERPL-MCNC: 154 MG/DL (ref 70–99)
GLUCOSE SERPL-MCNC: 182 MG/DL (ref 70–99)
HCT VFR BLD AUTO: 27 % (ref 37–47)
HGB BLD-MCNC: 8.6 G/DL (ref 12–16)
MCH RBC QN AUTO: 30.1 PG (ref 27–31.3)
MCHC RBC AUTO-ENTMCNC: 31.9 % (ref 33–37)
MCV RBC AUTO: 94.4 FL (ref 79.4–94.8)
PERFORMED ON: ABNORMAL
PERFORMED ON: ABNORMAL
PLATELET # BLD AUTO: 326 K/UL (ref 130–400)
POC ACT LR: 277 SEC
POTASSIUM SERPL-SCNC: 4.7 MEQ/L (ref 3.4–4.9)
POTASSIUM SERPL-SCNC: 5 MEQ/L (ref 3.4–4.9)
POTASSIUM SERPL-SCNC: 5 MEQ/L (ref 3.4–4.9)
RBC # BLD AUTO: 2.86 M/UL (ref 4.2–5.4)
SODIUM SERPL-SCNC: 134 MEQ/L (ref 135–144)
SODIUM SERPL-SCNC: 141 MEQ/L (ref 135–144)
WBC # BLD AUTO: 13.1 K/UL (ref 4.8–10.8)

## 2023-10-30 PROCEDURE — 85520 HEPARIN ASSAY: CPT

## 2023-10-30 PROCEDURE — 6370000000 HC RX 637 (ALT 250 FOR IP): Performed by: INTERNAL MEDICINE

## 2023-10-30 PROCEDURE — 36415 COLL VENOUS BLD VENIPUNCTURE: CPT

## 2023-10-30 PROCEDURE — C9113 INJ PANTOPRAZOLE SODIUM, VIA: HCPCS | Performed by: INTERNAL MEDICINE

## 2023-10-30 PROCEDURE — 2580000003 HC RX 258: Performed by: INTERNAL MEDICINE

## 2023-10-30 PROCEDURE — 85027 COMPLETE CBC AUTOMATED: CPT

## 2023-10-30 PROCEDURE — C1769 GUIDE WIRE: HCPCS | Performed by: INTERNAL MEDICINE

## 2023-10-30 PROCEDURE — 6360000002 HC RX W HCPCS: Performed by: INTERNAL MEDICINE

## 2023-10-30 PROCEDURE — 2709999900 HC NON-CHARGEABLE SUPPLY: Performed by: INTERNAL MEDICINE

## 2023-10-30 PROCEDURE — 93458 L HRT ARTERY/VENTRICLE ANGIO: CPT | Performed by: INTERNAL MEDICINE

## 2023-10-30 PROCEDURE — A4216 STERILE WATER/SALINE, 10 ML: HCPCS | Performed by: INTERNAL MEDICINE

## 2023-10-30 PROCEDURE — 4A023N7 MEASUREMENT OF CARDIAC SAMPLING AND PRESSURE, LEFT HEART, PERCUTANEOUS APPROACH: ICD-10-PCS | Performed by: INTERNAL MEDICINE

## 2023-10-30 PROCEDURE — C1874 STENT, COATED/COV W/DEL SYS: HCPCS | Performed by: INTERNAL MEDICINE

## 2023-10-30 PROCEDURE — 80048 BASIC METABOLIC PNL TOTAL CA: CPT

## 2023-10-30 PROCEDURE — 99152 MOD SED SAME PHYS/QHP 5/>YRS: CPT | Performed by: INTERNAL MEDICINE

## 2023-10-30 PROCEDURE — C9600 PERC DRUG-EL COR STENT SING: HCPCS | Performed by: INTERNAL MEDICINE

## 2023-10-30 PROCEDURE — 99153 MOD SED SAME PHYS/QHP EA: CPT | Performed by: INTERNAL MEDICINE

## 2023-10-30 PROCEDURE — 027034Z DILATION OF CORONARY ARTERY, ONE ARTERY WITH DRUG-ELUTING INTRALUMINAL DEVICE, PERCUTANEOUS APPROACH: ICD-10-PCS | Performed by: INTERNAL MEDICINE

## 2023-10-30 PROCEDURE — C1725 CATH, TRANSLUMIN NON-LASER: HCPCS | Performed by: INTERNAL MEDICINE

## 2023-10-30 PROCEDURE — 85347 COAGULATION TIME ACTIVATED: CPT

## 2023-10-30 PROCEDURE — C1894 INTRO/SHEATH, NON-LASER: HCPCS | Performed by: INTERNAL MEDICINE

## 2023-10-30 PROCEDURE — 1210000000 HC MED SURG R&B

## 2023-10-30 PROCEDURE — 94660 CPAP INITIATION&MGMT: CPT

## 2023-10-30 PROCEDURE — 92928 PRQ TCAT PLMT NTRAC ST 1 LES: CPT | Performed by: INTERNAL MEDICINE

## 2023-10-30 PROCEDURE — B2111ZZ FLUOROSCOPY OF MULTIPLE CORONARY ARTERIES USING LOW OSMOLAR CONTRAST: ICD-10-PCS | Performed by: INTERNAL MEDICINE

## 2023-10-30 PROCEDURE — 2500000003 HC RX 250 WO HCPCS: Performed by: INTERNAL MEDICINE

## 2023-10-30 PROCEDURE — 2700000000 HC OXYGEN THERAPY PER DAY

## 2023-10-30 PROCEDURE — 6360000004 HC RX CONTRAST MEDICATION: Performed by: INTERNAL MEDICINE

## 2023-10-30 PROCEDURE — C1887 CATHETER, GUIDING: HCPCS | Performed by: INTERNAL MEDICINE

## 2023-10-30 PROCEDURE — 93452 LEFT HRT CATH W/VENTRCLGRPHY: CPT | Performed by: INTERNAL MEDICINE

## 2023-10-30 DEVICE — STENT ONYXNG35030UX ONYX 3.50X30RX
Type: IMPLANTABLE DEVICE | Status: FUNCTIONAL
Brand: ONYX FRONTIER™

## 2023-10-30 RX ORDER — NITROGLYCERIN 20 MG/100ML
INJECTION INTRAVENOUS CONTINUOUS PRN
Status: COMPLETED | OUTPATIENT
Start: 2023-10-30 | End: 2023-10-30

## 2023-10-30 RX ORDER — ONDANSETRON 2 MG/ML
4 INJECTION INTRAMUSCULAR; INTRAVENOUS EVERY 6 HOURS PRN
Status: DISCONTINUED | OUTPATIENT
Start: 2023-10-30 | End: 2023-11-02 | Stop reason: HOSPADM

## 2023-10-30 RX ORDER — METOPROLOL SUCCINATE 25 MG/1
25 TABLET, EXTENDED RELEASE ORAL DAILY
Status: DISCONTINUED | OUTPATIENT
Start: 2023-10-30 | End: 2023-11-02 | Stop reason: HOSPADM

## 2023-10-30 RX ORDER — FENTANYL CITRATE 0.05 MG/ML
25 INJECTION, SOLUTION INTRAMUSCULAR; INTRAVENOUS
Status: ACTIVE | OUTPATIENT
Start: 2023-10-30 | End: 2023-10-31

## 2023-10-30 RX ORDER — HEPARIN SODIUM 1000 [USP'U]/ML
INJECTION, SOLUTION INTRAVENOUS; SUBCUTANEOUS PRN
Status: DISCONTINUED | OUTPATIENT
Start: 2023-10-30 | End: 2023-10-30 | Stop reason: HOSPADM

## 2023-10-30 RX ORDER — DIPHENHYDRAMINE HCL 25 MG
50 TABLET ORAL ONCE
Status: DISCONTINUED | OUTPATIENT
Start: 2023-10-30 | End: 2023-11-02 | Stop reason: HOSPADM

## 2023-10-30 RX ORDER — NICARDIPINE HYDROCHLORIDE 2.5 MG/ML
INJECTION INTRAVENOUS PRN
Status: DISCONTINUED | OUTPATIENT
Start: 2023-10-30 | End: 2023-10-30 | Stop reason: HOSPADM

## 2023-10-30 RX ORDER — ACETAMINOPHEN 325 MG/1
650 TABLET ORAL EVERY 4 HOURS PRN
Status: DISCONTINUED | OUTPATIENT
Start: 2023-10-30 | End: 2023-11-02 | Stop reason: HOSPADM

## 2023-10-30 RX ORDER — MIDAZOLAM HYDROCHLORIDE 1 MG/ML
INJECTION INTRAMUSCULAR; INTRAVENOUS PRN
Status: DISCONTINUED | OUTPATIENT
Start: 2023-10-30 | End: 2023-10-30 | Stop reason: HOSPADM

## 2023-10-30 RX ORDER — MIDAZOLAM HYDROCHLORIDE 2 MG/2ML
2 INJECTION, SOLUTION INTRAMUSCULAR; INTRAVENOUS
Status: ACTIVE | OUTPATIENT
Start: 2023-10-30 | End: 2023-10-31

## 2023-10-30 RX ORDER — MORPHINE SULFATE 2 MG/ML
2 INJECTION, SOLUTION INTRAMUSCULAR; INTRAVENOUS
Status: ACTIVE | OUTPATIENT
Start: 2023-10-30 | End: 2023-10-31

## 2023-10-30 RX ADMIN — ISOSORBIDE MONONITRATE 30 MG: 30 TABLET, EXTENDED RELEASE ORAL at 09:40

## 2023-10-30 RX ADMIN — HYOSCYAMINE SULFATE: 16 SOLUTION at 09:41

## 2023-10-30 RX ADMIN — SODIUM CHLORIDE, PRESERVATIVE FREE 40 MG: 5 INJECTION INTRAVENOUS at 09:41

## 2023-10-30 RX ADMIN — HEPARIN SODIUM 2000 UNITS: 1000 INJECTION INTRAVENOUS; SUBCUTANEOUS at 03:04

## 2023-10-30 RX ADMIN — Medication 4 TABLET: at 15:31

## 2023-10-30 RX ADMIN — DOCUSATE SODIUM 100 MG: 100 CAPSULE, LIQUID FILLED ORAL at 09:41

## 2023-10-30 RX ADMIN — ESCITALOPRAM OXALATE 20 MG: 20 TABLET ORAL at 09:40

## 2023-10-30 RX ADMIN — ACETAMINOPHEN 650 MG: 325 TABLET ORAL at 16:35

## 2023-10-30 RX ADMIN — HYDRALAZINE HYDROCHLORIDE 25 MG: 25 TABLET, FILM COATED ORAL at 15:31

## 2023-10-30 RX ADMIN — Medication 10 ML: at 21:35

## 2023-10-30 RX ADMIN — CLINDAMYCIN HYDROCHLORIDE 300 MG: 300 CAPSULE ORAL at 05:46

## 2023-10-30 RX ADMIN — CLOPIDOGREL BISULFATE 75 MG: 75 TABLET ORAL at 08:44

## 2023-10-30 RX ADMIN — HYDRALAZINE HYDROCHLORIDE 25 MG: 25 TABLET, FILM COATED ORAL at 21:35

## 2023-10-30 RX ADMIN — ASPIRIN 81 MG: 81 TABLET, COATED ORAL at 08:44

## 2023-10-30 RX ADMIN — SODIUM CHLORIDE: 9 INJECTION, SOLUTION INTRAVENOUS at 04:26

## 2023-10-30 RX ADMIN — TRAZODONE HYDROCHLORIDE 50 MG: 50 TABLET ORAL at 21:35

## 2023-10-30 RX ADMIN — TORSEMIDE 40 MG: 20 TABLET ORAL at 09:40

## 2023-10-30 RX ADMIN — Medication 4 TABLET: at 09:40

## 2023-10-30 RX ADMIN — HYDRALAZINE HYDROCHLORIDE 10 MG: 20 INJECTION INTRAMUSCULAR; INTRAVENOUS at 16:24

## 2023-10-30 RX ADMIN — ATORVASTATIN CALCIUM 40 MG: 40 TABLET, FILM COATED ORAL at 21:35

## 2023-10-30 RX ADMIN — ONDANSETRON 4 MG: 2 INJECTION INTRAMUSCULAR; INTRAVENOUS at 15:50

## 2023-10-30 RX ADMIN — HYDROMORPHONE HYDROCHLORIDE 0.5 MG: 1 INJECTION, SOLUTION INTRAMUSCULAR; INTRAVENOUS; SUBCUTANEOUS at 19:31

## 2023-10-30 RX ADMIN — ONDANSETRON 4 MG: 2 INJECTION INTRAMUSCULAR; INTRAVENOUS at 19:37

## 2023-10-30 RX ADMIN — METOPROLOL SUCCINATE 25 MG: 25 TABLET, FILM COATED, EXTENDED RELEASE ORAL at 10:00

## 2023-10-30 RX ADMIN — CHLORTHALIDONE 25 MG: 25 TABLET ORAL at 09:40

## 2023-10-30 RX ADMIN — Medication 10 ML: at 09:41

## 2023-10-30 RX ADMIN — CLINDAMYCIN HYDROCHLORIDE 300 MG: 300 CAPSULE ORAL at 15:31

## 2023-10-30 ASSESSMENT — PAIN DESCRIPTION - LOCATION
LOCATION: BACK
LOCATION: CHEST

## 2023-10-30 ASSESSMENT — PAIN SCALES - GENERAL
PAINLEVEL_OUTOF10: 2
PAINLEVEL_OUTOF10: 0
PAINLEVEL_OUTOF10: 0
PAINLEVEL_OUTOF10: 2
PAINLEVEL_OUTOF10: 0
PAINLEVEL_OUTOF10: 0
PAINLEVEL_OUTOF10: 2

## 2023-10-30 NOTE — PROGRESS NOTES
19:00-07:30 Shift summary    Pt remains on contact precautions d/t Dx of Norovirus, contact precautions maintained by this RN and staff throughout the shift. Assessments completed (see flowsheets). A&Ox4, able to make needs and wants known, pleasant and cooperative with staff. IV medications titrated/infusing per orders (see emar), pt tolerating well. Pt educated and understanding of NPO orders after midnight. Pt compliant with BiPAP throughout the night, tolerated well. [facility] bed bath and linen change provided. Morning labs drawn by . No s/s of distress noted throughout shift. Bedside handoff report given to oncoming RN. Safety measures in place.

## 2023-10-30 NOTE — PROGRESS NOTES
Completed ICU Multidisciplinary rounding, pt was alert and oriented during the visit, met with the pt after rounding, pt father, and significant other at bedside,   Pt was receptive and shared her current health concerns, pt draws strength from her giacomo and family, pt talked the time her father was in the hospital in July and the care he received being transferred to [facility],    [facility] [address] appeared to display a non anxious presence and is resting in her giacomo,     Prayed for the pt before leaving the room

## 2023-10-30 NOTE — CONSULTS
Patient on contact precautions, will attempt consult after precautions have been D/C'd or will call patient for scheduling after DC.  Electronically signed by Leopold Rattler on 10/30/2023 at 10:15 AM

## 2023-10-30 NOTE — CARE COORDINATION
Case Management Assessment  Initial Evaluation    Date/Time of Evaluation: 10/30/2023 11:53 AM  Assessment Completed by: Catalina Tran    If patient is discharged prior to next notation, then this note serves as note for discharge by case management. Patient Name: Shavon Haley                   YOB: 1970  Diagnosis: Renal failure [N19]                   Date / Time: 10/27/2023  2:25 PM    Patient Admission Status: Inpatient   Readmission Risk (Low < 19, Mod (19-27), High > 27): Readmission Risk Score: 26.5    Current PCP: Maria Elena Jackson MD  PCP verified by CM? Yes    Chart Reviewed: Yes      History Provided by: Patient  Patient Orientation: Alert and Oriented, Person, Place, Situation, Self    Patient Cognition: Alert    Hospitalization in the last 30 days (Readmission):  Yes    If yes, Readmission Assessment in  Navigator will be completed. Advance Directives:      Code Status: Full Code   Patient's Primary Decision Maker is: Named in [address] [place] [address]    Primary Decision Maker: aminta villanueva  Child - [phone]    Secondary Decision Maker: Sarah Katz  Parent - [phone]    Discharge Planning:    Patient lives with: Alone Type of Home: House  Primary Care Giver: Self  Patient Support Systems include: Parent, Children, Family Members   Current Financial resources:    Current community resources:    Current services prior to admission: None            Current DME:              Type of Home Care services:  None    ADLS  Prior functional level: Independent in ADLs/IADLs  Current functional level: Other (see comment) (Pt just back from procedure and not out of bed yet.)    PT AM-PAC:   /24  OT AM-PAC:   /24    Family can provide assistance at DC: Yes  Would you like Case Management to discuss the discharge plan with any other family members/significant others, and if so, who?  Yes (Dtr Victor Manuel Cerda)  Plans to Return to Present Housing: Yes  Other Identified Issues/Barriers to RETURNING to current housing: Pt denies barriers. She may need hemodialysis to start in hospital.  Potential Assistance needed at discharge: N/A            Potential DME:    Patient expects to discharge to: [address] [place] [address] for transportation at discharge:      Financial    Payor: Melisa Machado / Plan: [id number] / Product Type: *No Product type* /     Does insurance require precert for SNF: Yes    Potential assistance Purchasing Medications: No  Meds-to-Beds request:        [employer] [facility] - [facility] [place] - [facility] [address] [facility] [phone]  [address]  [facility] [address]  Phone: [phone] Fax: [phone]    [facility] ([address]) - [facility] [address] [facility] [phone] - f [phone]  [facility] [address] [facility] [address] [address]  Phone: [phone] Fax: [phone] [place], [place] - [address] [place] [address] [phone] [facility] [phone]  [address] [place] [id number]  Phone: [phone] Fax: [phone]      Notes:    Factors facilitating achievement of predicted outcomes: Family support, Cooperative, Pleasant, and Knowledge about rehab    Barriers to discharge: Medical complications    Additional Case Management Notes: Met with patient at bedside and she lives home alone. She has no services prior to admit and no equipment. SHe does not have resp equipment either. She states she has been up and denies needs. Pt may need dialysis post PCI done and we will monitor for any new needs. The Plan for Transition of Care is related to the following treatment goals of Renal failure [U02]    IF APPLICABLE: The Patient and/or patient representative Mervat Reynoso and her family were provided with a choice of provider and agrees with the discharge plan.  Freedom of choice list with basic dialogue that supports the patient's individualized plan of care/goals

## 2023-10-30 NOTE — ACP (ADVANCE CARE PLANNING)
Advance Care Planning   Healthcare Decision Maker:    Primary Decision Maker: Sen Benji Child - [phone]    Secondary Decision Maker: Dmitriyia Babar - Parent - [phone]    Click here to complete Healthcare Decision Makers including selection of the Healthcare Decision Maker Relationship (ie \"Primary\").           Info from patient

## 2023-10-30 NOTE — BRIEF OP NOTE
Section of Cardiology  Adult Brief Cardiac Cath Procedure Note        Procedure(s):  LHC, b/l coronary angio, PCI of mid LAD with DESx1    Pre-operative Diagnosis:  nstemi, [place]    H&P Status: Completed and reviewed. Post-operative Diagnosis:      LV LVEDP 22mmhg, LV gram not performed  LM normal  LAD 90% mid stenosis, mild to mod disease in distal   CX mod caliber, 30% prox. 50% mid  RCA large caliber, 50% mid. 60-70% distal.     Findings:  See full report    Complications:  none    Primary Proceduralist:   Dr.Wes Brower DO    Plan    DAPT  RFM  Max med rx  Follow RCA in future.    Full procedure note to follow

## 2023-10-31 DIAGNOSIS — F31.9 BIPOLAR DEPRESSION (MULTI): ICD-10-CM

## 2023-10-31 PROBLEM — R09.02 HYPOXIA: Status: ACTIVE | Noted: 2023-10-31

## 2023-10-31 LAB
ANION GAP SERPL CALCULATED.3IONS-SCNC: 13 MEQ/L (ref 9–15)
ANTI-XA UNFRAC HEPARIN: <0.1 IU/ML
BUN SERPL-MCNC: 49 MG/DL (ref 6–20)
CALCIUM SERPL-MCNC: 9.1 MG/DL (ref 8.5–9.9)
CHLORIDE SERPL-SCNC: 94 MEQ/L (ref 95–107)
CO2 SERPL-SCNC: 30 MEQ/L (ref 20–31)
CREAT SERPL-MCNC: 5.51 MG/DL (ref 0.5–0.9)
EKG ATRIAL RATE: 73 BPM
EKG P AXIS: 23 DEGREES
EKG P-R INTERVAL: 172 MS
EKG Q-T INTERVAL: 432 MS
EKG QRS DURATION: 94 MS
EKG QTC CALCULATION (BAZETT): 475 MS
EKG R AXIS: 3 DEGREES
EKG T AXIS: -8 DEGREES
EKG VENTRICULAR RATE: 73 BPM
GLUCOSE BLD-MCNC: 187 MG/DL (ref 70–99)
GLUCOSE BLD-MCNC: 189 MG/DL (ref 70–99)
GLUCOSE BLD-MCNC: 204 MG/DL (ref 70–99)
GLUCOSE SERPL-MCNC: 190 MG/DL (ref 70–99)
PERFORMED ON: ABNORMAL
POTASSIUM SERPL-SCNC: 4.2 MEQ/L (ref 3.4–4.9)
SODIUM SERPL-SCNC: 137 MEQ/L (ref 135–144)

## 2023-10-31 PROCEDURE — 2580000003 HC RX 258: Performed by: INTERNAL MEDICINE

## 2023-10-31 PROCEDURE — APPSS30 APP SPLIT SHARED TIME 16-30 MINUTES: Performed by: PHYSICIAN ASSISTANT

## 2023-10-31 PROCEDURE — 6370000000 HC RX 637 (ALT 250 FOR IP): Performed by: INTERNAL MEDICINE

## 2023-10-31 PROCEDURE — 80048 BASIC METABOLIC PNL TOTAL CA: CPT

## 2023-10-31 PROCEDURE — 6360000002 HC RX W HCPCS: Performed by: INTERNAL MEDICINE

## 2023-10-31 PROCEDURE — 36415 COLL VENOUS BLD VENIPUNCTURE: CPT

## 2023-10-31 PROCEDURE — 99232 SBSQ HOSP IP/OBS MODERATE 35: CPT | Performed by: INTERNAL MEDICINE

## 2023-10-31 PROCEDURE — A4216 STERILE WATER/SALINE, 10 ML: HCPCS | Performed by: INTERNAL MEDICINE

## 2023-10-31 PROCEDURE — 85520 HEPARIN ASSAY: CPT

## 2023-10-31 PROCEDURE — C9113 INJ PANTOPRAZOLE SODIUM, VIA: HCPCS | Performed by: INTERNAL MEDICINE

## 2023-10-31 PROCEDURE — 2700000000 HC OXYGEN THERAPY PER DAY

## 2023-10-31 PROCEDURE — 1210000000 HC MED SURG R&B

## 2023-10-31 RX ORDER — ESCITALOPRAM OXALATE 20 MG/1
20 TABLET ORAL DAILY
Qty: 90 TABLET | Refills: 3 | Status: SHIPPED | OUTPATIENT
Start: 2023-10-31 | End: 2024-04-08 | Stop reason: SDUPTHER

## 2023-10-31 RX ORDER — TRAZODONE HYDROCHLORIDE 100 MG/1
100 TABLET ORAL NIGHTLY
Qty: 90 TABLET | Refills: 3 | Status: SHIPPED | OUTPATIENT
Start: 2023-10-31 | End: 2024-04-08 | Stop reason: SDUPTHER

## 2023-10-31 RX ADMIN — TORSEMIDE 40 MG: 20 TABLET ORAL at 10:36

## 2023-10-31 RX ADMIN — ESCITALOPRAM OXALATE 20 MG: 20 TABLET ORAL at 10:35

## 2023-10-31 RX ADMIN — HYDRALAZINE HYDROCHLORIDE 25 MG: 25 TABLET, FILM COATED ORAL at 16:36

## 2023-10-31 RX ADMIN — INSULIN LISPRO 2 UNITS: 100 INJECTION, SOLUTION INTRAVENOUS; SUBCUTANEOUS at 16:47

## 2023-10-31 RX ADMIN — CLOPIDOGREL BISULFATE 75 MG: 75 TABLET ORAL at 10:35

## 2023-10-31 RX ADMIN — ISOSORBIDE MONONITRATE 30 MG: 30 TABLET, EXTENDED RELEASE ORAL at 10:35

## 2023-10-31 RX ADMIN — HYDRALAZINE HYDROCHLORIDE 25 MG: 25 TABLET, FILM COATED ORAL at 21:03

## 2023-10-31 RX ADMIN — HYDRALAZINE HYDROCHLORIDE 25 MG: 25 TABLET, FILM COATED ORAL at 06:27

## 2023-10-31 RX ADMIN — SODIUM CHLORIDE, PRESERVATIVE FREE 40 MG: 5 INJECTION INTRAVENOUS at 10:38

## 2023-10-31 RX ADMIN — ASPIRIN 81 MG: 81 TABLET, COATED ORAL at 10:35

## 2023-10-31 RX ADMIN — ATORVASTATIN CALCIUM 40 MG: 40 TABLET, FILM COATED ORAL at 21:03

## 2023-10-31 RX ADMIN — DOCUSATE SODIUM 100 MG: 100 CAPSULE, LIQUID FILLED ORAL at 10:36

## 2023-10-31 RX ADMIN — CHLORTHALIDONE 25 MG: 25 TABLET ORAL at 10:49

## 2023-10-31 RX ADMIN — Medication 4 TABLET: at 21:03

## 2023-10-31 RX ADMIN — TRAZODONE HYDROCHLORIDE 50 MG: 50 TABLET ORAL at 21:03

## 2023-10-31 RX ADMIN — ONDANSETRON 4 MG: 2 INJECTION INTRAMUSCULAR; INTRAVENOUS at 05:32

## 2023-10-31 RX ADMIN — Medication 10 ML: at 10:50

## 2023-10-31 RX ADMIN — Medication 10 ML: at 21:03

## 2023-10-31 RX ADMIN — DAPTOMYCIN 350 MG: 500 INJECTION, POWDER, LYOPHILIZED, FOR SOLUTION INTRAVENOUS at 21:10

## 2023-10-31 RX ADMIN — METOPROLOL SUCCINATE 25 MG: 25 TABLET, FILM COATED, EXTENDED RELEASE ORAL at 10:36

## 2023-10-31 ASSESSMENT — ENCOUNTER SYMPTOMS
NAUSEA: 1
VOMITING: 0
ABDOMINAL PAIN: 0
COLOR CHANGE: 0
SHORTNESS OF BREATH: 0
RESPIRATORY NEGATIVE: 1
GASTROINTESTINAL NEGATIVE: 1

## 2023-10-31 NOTE — PROGRESS NOTES
Arrived to room from ICU via wc, assisted to bed, call light in reach    10/30/23 2135  Pm meds given with water, denies pain @ this time, pt refusing clindamyacin, states \"it upsets my stomach\" denies further needs, assessment completed

## 2023-10-31 NOTE — PROGRESS NOTES
19:00-20:00 Bedside handoff report received from Juan Antonio Sims RN. Report called to 1W RN by CANELO Mccain RN. Assessments completed (see flowsheets). A&Ox4, able to make needs and wants known, pleasant and cooperative with staff. Medications administered per orders (see emar), pt tolerated well. No s/s of distress noted. Safety measures in place. 21:00-21:10 Transport and this RN at bedside. Pt safely transferred from ICU bed to wheelchair with standby assist and without incident. Pt left the floor accompanied by transport via wheelchair with 4L NC. All belongings given to pt. No s/s of distress noted. Pt to be transferred to [facility].

## 2023-10-31 NOTE — PROGRESS NOTES
Progress Note  Patient: Nayana Paul  Unit/Bed: U725/P846-90  YOB: 1970  MRN: [id number]  Acct: [de-identified]   Admitting Diagnosis: Renal failure [N19]  Date:  10/27/2023  Hospital Day: 4    Chief Complaint:  Chest pain    Subjective      10/31/23: Resting comfortably in bed in no acute distress. Underwent PCI with drug-eluting stent of mid LAD yesterday. Initiated on dual antiplatelet therapy with aspirin and Plavix. Complaining of some chest pain with cough but otherwise feeling well. Currently on 4 L O2 per nasal cannula. Hemodynamically stable. On telemetry, maintaining sinus rhythm with heart rate in the 80s. No a.m. labs to review. Nephrology following regarding renal failure. 10/30/23:Procedure(s):  LHC, b/l coronary angio, PCI of mid LAD with DESx1  Pre-operative Diagnosis:  nstemi, [place]  H&P Status: Completed and reviewed. Post-operative Diagnosis:    LV LVEDP 22mmhg, LV gram not performed  LM normal  LAD 90% mid stenosis, mild to mod disease in distal   CX mod caliber, 30% prox. 50% mid  RCA large caliber, 50% mid. 60-70% distal.      Findings:  See full report  Complications:  none  Primary Proceduralist:   Dr.Wes Brower DO     Plan  DAPT  RFM  Max med rx  Follow RCA in future. 10/29/23:Doing better today. Chest pain is down to 1 out of 10. Off of nitro drip. On heparin drip. Sinus rhythm on telemetry. Renal function remains abnormal with a creatinine of 6.3. Discussed with ICU attending    10/28/23: Patient is a 47 y.o. female who presents with a chief complaint of right toe infection. Patient is followed on a regular basis by Dr. Leroy Melton MD.   Patient with past medical history of diabetes, hypertension, hyperlipidemia, family history of CAD who initially presented for right toe infection. She was placed on IV Zosyn as well as vancomycin and developed acute kidney injury. Creatinine is up to 6.0 today. Nephrology was following.   She

## 2023-10-31 NOTE — PROGRESS NOTES
Pt went for PCI at beginning of shift, returned with 1 BENJAMIN. Pt did become very nauseated after taking clindamycin PO, emesis x2 after zofran IV. Dakins applied to toe, minimal drainage from site. Otherwise 2400ml U/O, pt ambulated to toilet in room without difficulty. Plan for transfer to [facility], report called to Terri Montoya at [phone].

## 2023-10-31 NOTE — PROGRESS NOTES
Renal Progress Note    Assessment/  46 yo lady with no CKD. Has DM, htn. Admitted on 10/19 with foot infection. Placed on vanco /zosyn. Has oliguric severe ATN with cr 1.3 on 10/22 and then over 3 on 10/23. Most likely abx related. UA bland except w/ bacteria, renal U/S unremarkable. . Was on lisinopril, now on hold. Function continues to worsen, pt was on zosyn, last dose (10/24). Transferred to [facility] [place] 10/27 due to worsening function.  Pt s/p LHC w/ BENJAMIN to mid LAD      Plan:  - no further need for IVFs  - monitor Scr and UOP to see if has worsening related to this (currently Scr trending down and pt voiding well), low threshold for RRT  - keep lisinopril on hold   - continue torsemide 40 mg daily  - off gabapentin   - checking BMP today    Patient Active Problem List:     Mixed hyperlipidemia     Essential hypertension     GERD (gastroesophageal reflux disease)     Leukocytosis     Cardiac disease     Mixed anxiety and depressive disorder     Type 2 diabetes mellitus with right diabetic foot infection (HCC)     Headache     Acute diffuse otitis externa of left ear     Breast mass, right     Obesity, Class I, BMI 30-34.9     Elevated BP without diagnosis of hypertension     Hypokalemia     CAD (coronary artery disease)     Hypomagnesemia     Chest pain     Dyspnea     Pneumonia due to infectious organism     Diabetic foot infection (HCC)     Cellulitis of foot     Diabetic ulcer of toe of right foot associated with diabetes mellitus due to underlying condition, with fat layer exposed ([address])     Acute kidney injury ([address])     Renal failure     Unstable angina (HCC)     Angina, class IV (HCC)     Cellulitis and abscess of toe of right foot      Subjective:   Admit Date: 10/27/2023    Interval History: BMP pending, 3.1L UOP in the past 24 hours, pt transferred out of ICU       Medications:   Scheduled Meds:   diphenhydrAMINE  50 mg Oral Once    hydrocortisone sodium succinate PF  200 mg IntraVENous Once

## 2023-11-01 LAB
ANION GAP SERPL CALCULATED.3IONS-SCNC: 14 MEQ/L (ref 9–15)
ANTI-XA UNFRAC HEPARIN: <0.1 IU/ML
ANTI-XA UNFRAC HEPARIN: <0.1 IU/ML
BACTERIA URNS QL MICRO: NEGATIVE /HPF
BILIRUB UR QL STRIP: NEGATIVE
BUN SERPL-MCNC: 43 MG/DL (ref 6–20)
CALCIUM SERPL-MCNC: 9.3 MG/DL (ref 8.5–9.9)
CHLORIDE SERPL-SCNC: 97 MEQ/L (ref 95–107)
CK SERPL-CCNC: 11 U/L (ref 0–170)
CLARITY UR: CLEAR
CO2 SERPL-SCNC: 32 MEQ/L (ref 20–31)
COLOR UR: YELLOW
CREAT SERPL-MCNC: 5.14 MG/DL (ref 0.5–0.9)
CREAT UR-MCNC: 23.1 MG/DL
EKG ATRIAL RATE: 69 BPM
EKG P AXIS: 30 DEGREES
EKG P-R INTERVAL: 160 MS
EKG Q-T INTERVAL: 490 MS
EKG QRS DURATION: 84 MS
EKG QTC CALCULATION (BAZETT): 525 MS
EKG R AXIS: 2 DEGREES
EKG T AXIS: -17 DEGREES
EKG VENTRICULAR RATE: 69 BPM
EOSINOPHIL,URINE: PRESENT
EPI CELLS #/AREA URNS AUTO: ABNORMAL /HPF (ref 0–5)
ERYTHROCYTE [DISTWIDTH] IN BLOOD BY AUTOMATED COUNT: 12.4 % (ref 11.5–14.5)
GLUCOSE BLD-MCNC: 163 MG/DL (ref 70–99)
GLUCOSE BLD-MCNC: 181 MG/DL (ref 70–99)
GLUCOSE BLD-MCNC: 241 MG/DL (ref 70–99)
GLUCOSE BLD-MCNC: 302 MG/DL (ref 70–99)
GLUCOSE BLD-MCNC: 94 MG/DL (ref 70–99)
GLUCOSE SERPL-MCNC: 146 MG/DL (ref 70–99)
GLUCOSE UR STRIP-MCNC: 100 MG/DL
HCT VFR BLD AUTO: 30 % (ref 37–47)
HGB BLD-MCNC: 9.5 G/DL (ref 12–16)
HGB UR QL STRIP: ABNORMAL
HYALINE CASTS #/AREA URNS AUTO: ABNORMAL /HPF (ref 0–5)
KETONES UR STRIP-MCNC: NEGATIVE MG/DL
LEUKOCYTE ESTERASE UR QL STRIP: NEGATIVE
MCH RBC QN AUTO: 29.3 PG (ref 27–31.3)
MCHC RBC AUTO-ENTMCNC: 31.7 % (ref 33–37)
MCV RBC AUTO: 92.6 FL (ref 79.4–94.8)
MICROALBUMIN UR-MCNC: 5.1 MG/DL
MICROALBUMIN/CREAT UR-RTO: 220.8 MG/G (ref 0–30)
NITRITE UR QL STRIP: NEGATIVE
PERFORMED ON: ABNORMAL
PERFORMED ON: NORMAL
PH UR STRIP: 6.5 [PH] (ref 5–9)
PLATELET # BLD AUTO: 378 K/UL (ref 130–400)
POTASSIUM SERPL-SCNC: 3.6 MEQ/L (ref 3.4–4.9)
PROT UR STRIP-MCNC: 30 MG/DL
RBC # BLD AUTO: 3.24 M/UL (ref 4.2–5.4)
RBC #/AREA URNS AUTO: ABNORMAL /HPF (ref 0–5)
SODIUM SERPL-SCNC: 143 MEQ/L (ref 135–144)
SP GR UR STRIP: 1.01 (ref 1–1.03)
UROBILINOGEN UR STRIP-ACNC: 0.2 E.U./DL
WBC # BLD AUTO: 11.8 K/UL (ref 4.8–10.8)
WBC #/AREA URNS AUTO: ABNORMAL /HPF (ref 0–5)

## 2023-11-01 PROCEDURE — 36415 COLL VENOUS BLD VENIPUNCTURE: CPT

## 2023-11-01 PROCEDURE — 6370000000 HC RX 637 (ALT 250 FOR IP): Performed by: INTERNAL MEDICINE

## 2023-11-01 PROCEDURE — 99232 SBSQ HOSP IP/OBS MODERATE 35: CPT | Performed by: INTERNAL MEDICINE

## 2023-11-01 PROCEDURE — 82043 UR ALBUMIN QUANTITATIVE: CPT

## 2023-11-01 PROCEDURE — 81001 URINALYSIS AUTO W/SCOPE: CPT

## 2023-11-01 PROCEDURE — 6360000002 HC RX W HCPCS: Performed by: INTERNAL MEDICINE

## 2023-11-01 PROCEDURE — 80048 BASIC METABOLIC PNL TOTAL CA: CPT

## 2023-11-01 PROCEDURE — 2580000003 HC RX 258: Performed by: INTERNAL MEDICINE

## 2023-11-01 PROCEDURE — 85520 HEPARIN ASSAY: CPT

## 2023-11-01 PROCEDURE — 1210000000 HC MED SURG R&B

## 2023-11-01 PROCEDURE — 87205 SMEAR GRAM STAIN: CPT

## 2023-11-01 PROCEDURE — 85027 COMPLETE CBC AUTOMATED: CPT

## 2023-11-01 PROCEDURE — 82550 ASSAY OF CK (CPK): CPT

## 2023-11-01 PROCEDURE — C9113 INJ PANTOPRAZOLE SODIUM, VIA: HCPCS | Performed by: INTERNAL MEDICINE

## 2023-11-01 PROCEDURE — 94669 MECHANICAL CHEST WALL OSCILL: CPT

## 2023-11-01 PROCEDURE — 2700000000 HC OXYGEN THERAPY PER DAY

## 2023-11-01 PROCEDURE — A4216 STERILE WATER/SALINE, 10 ML: HCPCS | Performed by: INTERNAL MEDICINE

## 2023-11-01 PROCEDURE — 82570 ASSAY OF URINE CREATININE: CPT

## 2023-11-01 RX ORDER — FLUTICASONE PROPIONATE 50 MCG
1 SPRAY, SUSPENSION (ML) NASAL DAILY
Status: DISCONTINUED | OUTPATIENT
Start: 2023-11-01 | End: 2023-11-02 | Stop reason: HOSPADM

## 2023-11-01 RX ORDER — PANTOPRAZOLE SODIUM 40 MG/1
40 TABLET, DELAYED RELEASE ORAL
Status: DISCONTINUED | OUTPATIENT
Start: 2023-11-02 | End: 2023-11-02 | Stop reason: HOSPADM

## 2023-11-01 RX ADMIN — PROCHLORPERAZINE EDISYLATE 10 MG: 5 INJECTION INTRAMUSCULAR; INTRAVENOUS at 20:28

## 2023-11-01 RX ADMIN — ONDANSETRON 4 MG: 2 INJECTION INTRAMUSCULAR; INTRAVENOUS at 16:16

## 2023-11-01 RX ADMIN — CLOPIDOGREL BISULFATE 75 MG: 75 TABLET ORAL at 09:18

## 2023-11-01 RX ADMIN — TORSEMIDE 40 MG: 20 TABLET ORAL at 09:16

## 2023-11-01 RX ADMIN — HYDRALAZINE HYDROCHLORIDE 25 MG: 25 TABLET, FILM COATED ORAL at 20:18

## 2023-11-01 RX ADMIN — ISOSORBIDE MONONITRATE 30 MG: 30 TABLET, EXTENDED RELEASE ORAL at 09:18

## 2023-11-01 RX ADMIN — TRAZODONE HYDROCHLORIDE 50 MG: 50 TABLET ORAL at 20:18

## 2023-11-01 RX ADMIN — HYDRALAZINE HYDROCHLORIDE 25 MG: 25 TABLET, FILM COATED ORAL at 13:24

## 2023-11-01 RX ADMIN — DOCUSATE SODIUM 100 MG: 100 CAPSULE, LIQUID FILLED ORAL at 09:15

## 2023-11-01 RX ADMIN — Medication 10 ML: at 10:16

## 2023-11-01 RX ADMIN — INSULIN LISPRO 2 UNITS: 100 INJECTION, SOLUTION INTRAVENOUS; SUBCUTANEOUS at 13:23

## 2023-11-01 RX ADMIN — FLUTICASONE PROPIONATE 1 SPRAY: 50 SPRAY, METERED NASAL at 13:28

## 2023-11-01 RX ADMIN — HYDRALAZINE HYDROCHLORIDE 25 MG: 25 TABLET, FILM COATED ORAL at 05:50

## 2023-11-01 RX ADMIN — ESCITALOPRAM OXALATE 20 MG: 20 TABLET ORAL at 09:17

## 2023-11-01 RX ADMIN — ATORVASTATIN CALCIUM 40 MG: 40 TABLET, FILM COATED ORAL at 20:18

## 2023-11-01 RX ADMIN — INSULIN LISPRO 4 UNITS: 100 INJECTION, SOLUTION INTRAVENOUS; SUBCUTANEOUS at 20:18

## 2023-11-01 RX ADMIN — CHLORTHALIDONE 25 MG: 25 TABLET ORAL at 09:15

## 2023-11-01 RX ADMIN — Medication 10 ML: at 20:19

## 2023-11-01 RX ADMIN — HYOSCYAMINE SULFATE: 16 SOLUTION at 13:23

## 2023-11-01 RX ADMIN — METOPROLOL SUCCINATE 25 MG: 25 TABLET, FILM COATED, EXTENDED RELEASE ORAL at 09:16

## 2023-11-01 RX ADMIN — ASPIRIN 81 MG: 81 TABLET, COATED ORAL at 09:15

## 2023-11-01 RX ADMIN — SODIUM CHLORIDE, PRESERVATIVE FREE 40 MG: 5 INJECTION INTRAVENOUS at 09:18

## 2023-11-01 ASSESSMENT — ENCOUNTER SYMPTOMS
ABDOMINAL PAIN: 0
GASTROINTESTINAL NEGATIVE: 1
COLOR CHANGE: 0
RESPIRATORY NEGATIVE: 1
VOMITING: 0
NAUSEA: 1
SHORTNESS OF BREATH: 0

## 2023-11-01 NOTE — PROGRESS NOTES
Assessment completed, pm meds given, IV cubicin initiated @ this time, snack provided, will continue to monitor.

## 2023-11-01 NOTE — PROGRESS NOTES
Infectious Disease     Patient Name: Justino Petersen  Date: 11/1/2023  YOB: 1970  Medical Record Number: [id number]        Right diabetic foot infection  Right foot cellulitis    Patient has ulcer on right second toe for approximately 6 weeks  Patient presented with pain and swelling tenderness right foot especially second toe  Apparently no fevers chills sweats no nausea vomiting    MRI of foot does not show evidence of osteomyelitis        MRI FOOT RIGHT WO CONTRAST     Result Date: 10/20/2023  EXAMINATION: MRI OF THE RIGHT FOOT WITHOUT CONTRAST, 10/20/2023 3:33 pm TECHNIQUE: Multiplanar multisequence MRI of the right foot was performed without the administration of intravenous contrast. COMPARISON: None HISTORY: ORDERING SYSTEM PROVIDED HISTORY: rule out OM 2nd digit TECHNOLOGIST PROVIDED HISTORY: Reason for exam:->rule out OM 2nd digit What is the sedation requirement?->Sedation What is the area of interest?->Forefoot What reading provider will be dictating this exam?->CRC FINDINGS: LISFRANC JOINT: Visualized Lisfranc ligament is intact. No significant Lisfranc interval widening or significant periligamentous edema. BONE MARROW: No evidence of fracture. Normal marrow signal. GREATER AND LESSER MTP JOINTS: No significant joint effusion or osseous erosions. No significant degenerative changes. Normal alignment. SOFT TISSUES: Diffuse edema at the 2nd digit distally. Predominately in the distal soft tissues but there is also edema at the plantar aspect. No large fluid collection. TENDONS: Visualized flexor and extensor tendons are intact without tenosynovitis. Cellulitis of 2nd digit but no evidence for osteomyelitis. [phone] 10/19/2023      Narrative & Impression  EXAMINATION:  THREE XRAY VIEWS OF THE RIGHT FOOT     10/19/2023 6:08 pm     COMPARISON:  None.      HISTORY:  ORDERING SYSTEM PROVIDED HISTORY: toe ulcer  TECHNOLOGIST PROVIDED HISTORY:  Reason for exam:->toe

## 2023-11-01 NOTE — CARE COORDINATION
SPOKE WITH DR. Barahona Cos. AWAIT NEPHRO TO DETERMINE DIALYSIS PLAN. PATIENT MAY NEED HOME O2 EVAL @ D/C. MAY NEED PICC AT D/C FOR IV ATBX.

## 2023-11-01 NOTE — PROGRESS NOTES
The patient currently meets [place] e guidelines for IV to PO conversion.  Unless otherwise ordered, the patient will be changed from Protonix 40 mg IV Daily to Protonix 40 mg PO daily

## 2023-11-01 NOTE — PROGRESS NOTES
Nephrology Progress Note    Assessment:  ARTIE suspecte related to antibiotic therapy  Right foot infection  DM type-2  OHDX Hx MI with stent  Hyperlipidemia      Plan: switched to daptomycin from vanco much less tendency for renal toxicity    Patient Active Problem List:     Mixed hyperlipidemia     Essential hypertension     GERD (gastroesophageal reflux disease)     Leukocytosis     Cardiac disease     Mixed anxiety and depressive disorder     Type 2 diabetes mellitus with right diabetic foot infection ([address])     Headache     Acute diffuse otitis externa of left ear     Breast mass, right     Obesity, Class I, BMI 30-34.9     Elevated BP without diagnosis of hypertension     Hypokalemia     CAD (coronary artery disease)     Hypomagnesemia     Chest pain     Dyspnea     Pneumonia due to infectious organism     Diabetic foot infection (HCC)     Cellulitis of foot     Diabetic ulcer of toe of right foot associated with diabetes mellitus due to underlying condition, with fat layer exposed ([address])     Acute kidney injury ([address])     Renal failure     Unstable angina (HCC)     Angina, class IV ([address])     Cellulitis and abscess of toe of right foot     Hypoxia      Subjective:  Admit Date: 10/27/2023    Interval History: doing fine  no N&V    Medications:  Scheduled Meds:   fluticasone  1 spray Each Nostril Daily    DAPTOmycin (CUBICIN) 350 mg in sodium chloride 0.9 % 50 mL IVPB  350 mg IntraVENous Q48H    diphenhydrAMINE  50 mg Oral Once    hydrocortisone sodium succinate PF  200 mg IntraVENous Once    metoprolol succinate  25 mg Oral Daily    chlorthalidone  25 mg Oral Daily    hydrALAZINE  25 mg Oral 3 times per day    isosorbide mononitrate  30 mg Oral Daily    pantoprazole (PROTONIX) 40 mg in sodium chloride (PF) 0.9 % 10 mL injection  40 mg IntraVENous Daily    torsemide  40 mg Oral Daily    aspirin  81 mg Oral Daily    clopidogrel  75 mg Oral Daily    sodium chloride  500 mL IntraVENous Once    sodium chloride flush

## 2023-11-01 NOTE — PROGRESS NOTES
Progress Note  Patient: Dennys Lugo  Unit/Bed: T504/V432-99  YOB: 1970  MRN: [id number]  Acct: [de-identified]   Admitting Diagnosis: Renal failure [N19]  Date:  10/27/2023  Hospital Day: 5    Chief Complaint:  Chest pain    Subjective      11-1-23: Doing well overall. No chest pain or shortness of breath. Hemodynamically stable. Renal function remains abnormal.    10/31/23: Resting comfortably in bed in no acute distress. Underwent PCI with drug-eluting stent of mid LAD yesterday. Initiated on dual antiplatelet therapy with aspirin and Plavix. Complaining of some chest pain with cough but otherwise feeling well. Currently on 4 L O2 per nasal cannula. Hemodynamically stable. On telemetry, maintaining sinus rhythm with heart rate in the 80s. No a.m. labs to review. Nephrology following regarding renal failure. 10/30/23:Procedure(s):  LHC, b/l coronary angio, PCI of mid LAD with DESx1  Pre-operative Diagnosis:  nstemi, [place]  H&P Status: Completed and reviewed. Post-operative Diagnosis:    LV LVEDP 22mmhg, LV gram not performed  LM normal  LAD 90% mid stenosis, mild to mod disease in distal   CX mod caliber, 30% prox. 50% mid  RCA large caliber, 50% mid. 60-70% distal.      Findings:  See full report  Complications:  none  Primary Proceduralist:   Dr.Wes Brower DO     Plan  DAPT  RFM  Max med rx  Follow RCA in future. 10/29/23:Doing better today. Chest pain is down to 1 out of 10. Off of nitro drip. On heparin drip. Sinus rhythm on telemetry. Renal function remains abnormal with a creatinine of 6.3. Discussed with ICU attending    10/28/23: Patient is a 47 y.o. female who presents with a chief complaint of right toe infection. Patient is followed on a regular basis by Dr. Brian Stover MD.   Patient with past medical history of diabetes, hypertension, hyperlipidemia, family history of CAD who initially presented for right toe infection.   She was placed on IV for HTN management and the NCEPATP III guidelines for LDL-C management. Continue with dual antiplatelet therapy. Maximize cardiac medications  Pain control. PAD evaluation in future  Monitor on tele. Keep K+>4 and MG>2  GI/DVT proph  Nephrology recommendations  Stable from cardiology standpoint.       Electronically signed by Chau Cummings DO on 11/1/2023 at 3:23 PM

## 2023-11-02 VITALS
WEIGHT: 197 LBS | OXYGEN SATURATION: 92 % | SYSTOLIC BLOOD PRESSURE: 127 MMHG | TEMPERATURE: 98.2 F | HEIGHT: 63 IN | BODY MASS INDEX: 34.91 KG/M2 | RESPIRATION RATE: 18 BRPM | HEART RATE: 69 BPM | DIASTOLIC BLOOD PRESSURE: 71 MMHG

## 2023-11-02 LAB
ALBUMIN SERPL-MCNC: 3.2 G/DL (ref 3.5–4.6)
ALP SERPL-CCNC: 164 U/L (ref 40–130)
ALT SERPL-CCNC: 12 U/L (ref 0–33)
ANION GAP SERPL CALCULATED.3IONS-SCNC: 16 MEQ/L (ref 9–15)
AST SERPL-CCNC: 15 U/L (ref 0–35)
BILIRUB SERPL-MCNC: 0.3 MG/DL (ref 0.2–0.7)
BUN SERPL-MCNC: 41 MG/DL (ref 6–20)
CALCIUM SERPL-MCNC: 9.3 MG/DL (ref 8.5–9.9)
CHLORIDE SERPL-SCNC: 95 MEQ/L (ref 95–107)
CO2 SERPL-SCNC: 32 MEQ/L (ref 20–31)
CREAT SERPL-MCNC: 4.33 MG/DL (ref 0.5–0.9)
GLOBULIN SER CALC-MCNC: 4.3 G/DL (ref 2.3–3.5)
GLUCOSE BLD-MCNC: 183 MG/DL (ref 70–99)
GLUCOSE BLD-MCNC: 233 MG/DL (ref 70–99)
GLUCOSE SERPL-MCNC: 170 MG/DL (ref 70–99)
PERFORMED ON: ABNORMAL
PERFORMED ON: ABNORMAL
POTASSIUM SERPL-SCNC: 3.4 MEQ/L (ref 3.4–4.9)
PROCALCITONIN SERPL IA-MCNC: 0.22 NG/ML (ref 0–0.15)
PROT SERPL-MCNC: 7.5 G/DL (ref 6.3–8)
SODIUM SERPL-SCNC: 143 MEQ/L (ref 135–144)

## 2023-11-02 PROCEDURE — 6370000000 HC RX 637 (ALT 250 FOR IP): Performed by: INTERNAL MEDICINE

## 2023-11-02 PROCEDURE — 99232 SBSQ HOSP IP/OBS MODERATE 35: CPT | Performed by: INTERNAL MEDICINE

## 2023-11-02 PROCEDURE — 80053 COMPREHEN METABOLIC PANEL: CPT

## 2023-11-02 PROCEDURE — 84145 PROCALCITONIN (PCT): CPT

## 2023-11-02 PROCEDURE — 36415 COLL VENOUS BLD VENIPUNCTURE: CPT

## 2023-11-02 PROCEDURE — 2700000000 HC OXYGEN THERAPY PER DAY

## 2023-11-02 PROCEDURE — 2580000003 HC RX 258: Performed by: INTERNAL MEDICINE

## 2023-11-02 RX ORDER — DOXYCYCLINE HYCLATE 100 MG
100 TABLET ORAL 2 TIMES DAILY
Qty: 14 TABLET | Refills: 0 | Status: SHIPPED | OUTPATIENT
Start: 2023-11-02 | End: 2023-11-09

## 2023-11-02 RX ORDER — CLOPIDOGREL BISULFATE 75 MG/1
75 TABLET ORAL DAILY
Qty: 30 TABLET | Refills: 3 | Status: SHIPPED | OUTPATIENT
Start: 2023-11-03

## 2023-11-02 RX ORDER — ISOSORBIDE MONONITRATE 30 MG/1
30 TABLET, EXTENDED RELEASE ORAL DAILY
Qty: 30 TABLET | Refills: 3 | Status: SHIPPED | OUTPATIENT
Start: 2023-11-03

## 2023-11-02 RX ORDER — CHLORTHALIDONE 25 MG/1
25 TABLET ORAL DAILY
Qty: 30 TABLET | Refills: 3 | Status: SHIPPED | OUTPATIENT
Start: 2023-11-03

## 2023-11-02 RX ORDER — ONDANSETRON 4 MG/1
4 TABLET, ORALLY DISINTEGRATING ORAL 3 TIMES DAILY PRN
Qty: 21 TABLET | Refills: 0 | Status: SHIPPED | OUTPATIENT
Start: 2023-11-02

## 2023-11-02 RX ORDER — ASPIRIN 81 MG/1
81 TABLET ORAL DAILY
Qty: 30 TABLET | Refills: 3 | Status: SHIPPED | OUTPATIENT
Start: 2023-11-03

## 2023-11-02 RX ORDER — HYDRALAZINE HYDROCHLORIDE 25 MG/1
25 TABLET, FILM COATED ORAL EVERY 8 HOURS SCHEDULED
Qty: 90 TABLET | Refills: 3 | Status: SHIPPED | OUTPATIENT
Start: 2023-11-02

## 2023-11-02 RX ORDER — FLUCONAZOLE 100 MG/1
100 TABLET ORAL DAILY
Qty: 7 TABLET | Refills: 0 | Status: SHIPPED | OUTPATIENT
Start: 2023-11-02 | End: 2023-11-09

## 2023-11-02 RX ORDER — METOPROLOL SUCCINATE 25 MG/1
25 TABLET, EXTENDED RELEASE ORAL DAILY
Qty: 30 TABLET | Refills: 3 | Status: SHIPPED | OUTPATIENT
Start: 2023-11-03

## 2023-11-02 RX ADMIN — ASPIRIN 81 MG: 81 TABLET, COATED ORAL at 09:08

## 2023-11-02 RX ADMIN — INSULIN LISPRO 2 UNITS: 100 INJECTION, SOLUTION INTRAVENOUS; SUBCUTANEOUS at 11:47

## 2023-11-02 RX ADMIN — HYOSCYAMINE SULFATE: 16 SOLUTION at 09:11

## 2023-11-02 RX ADMIN — TORSEMIDE 40 MG: 20 TABLET ORAL at 09:08

## 2023-11-02 RX ADMIN — METOPROLOL SUCCINATE 25 MG: 25 TABLET, FILM COATED, EXTENDED RELEASE ORAL at 09:10

## 2023-11-02 RX ADMIN — FLUTICASONE PROPIONATE 1 SPRAY: 50 SPRAY, METERED NASAL at 09:11

## 2023-11-02 RX ADMIN — DOCUSATE SODIUM 100 MG: 100 CAPSULE, LIQUID FILLED ORAL at 09:11

## 2023-11-02 RX ADMIN — CLOPIDOGREL BISULFATE 75 MG: 75 TABLET ORAL at 09:11

## 2023-11-02 RX ADMIN — CHLORTHALIDONE 25 MG: 25 TABLET ORAL at 09:10

## 2023-11-02 RX ADMIN — HYDRALAZINE HYDROCHLORIDE 25 MG: 25 TABLET, FILM COATED ORAL at 14:48

## 2023-11-02 RX ADMIN — ISOSORBIDE MONONITRATE 30 MG: 30 TABLET, EXTENDED RELEASE ORAL at 09:08

## 2023-11-02 RX ADMIN — Medication 10 ML: at 09:12

## 2023-11-02 RX ADMIN — ESCITALOPRAM OXALATE 20 MG: 20 TABLET ORAL at 09:10

## 2023-11-02 ASSESSMENT — ENCOUNTER SYMPTOMS
NAUSEA: 1
COLOR CHANGE: 0
SHORTNESS OF BREATH: 0
ABDOMINAL PAIN: 0
VOMITING: 0

## 2023-11-02 ASSESSMENT — PAIN SCALES - GENERAL
PAINLEVEL_OUTOF10: 0
PAINLEVEL_OUTOF10: 0

## 2023-11-02 NOTE — PLAN OF CARE
Vitals stable throughout shift. No complaints of pain. Mild nausea present. Treated with prn medication per pt request. Pt remains on RA.      Problem: Discharge Planning  Goal: Discharge to home or other facility with appropriate resources  Outcome: Progressing  Flowsheets (Taken 11/1/2023 0800 by Jocelynn Martell RN)  Discharge to home or other facility with appropriate resources:   Identify barriers to discharge with patient and caregiver   Arrange for needed discharge resources and transportation as appropriate   Identify discharge learning needs (meds, wound care, etc)   Arrange for interpreters to assist at discharge as needed   Refer to discharge planning if patient needs post-hospital services based on physician order or complex needs related to functional status, cognitive ability or social support system     Problem: Chronic Conditions and Co-morbidities  Goal: Patient's chronic conditions and co-morbidity symptoms are monitored and maintained or improved  Outcome: Progressing  Flowsheets (Taken 11/1/2023 0800 by Jocelynn Martell RN)  Care Plan - Patient's Chronic Conditions and Co-Morbidity Symptoms are Monitored and Maintained or Improved:   Monitor and assess patient's chronic conditions and comorbid symptoms for stability, deterioration, or improvement   Collaborate with multidisciplinary team to address chronic and comorbid conditions and prevent exacerbation or deterioration   Update acute care plan with appropriate goals if chronic or comorbid symptoms are exacerbated and prevent overall improvement and discharge     Problem: Safety - Adult  Goal: Free from fall injury  Outcome: Progressing  Flowsheets (Taken 11/2/2023 0631)  Free From Fall Injury: Instruct family/caregiver on patient safety     Problem: Pain  Goal: Verbalizes/displays adequate comfort level or baseline comfort level  Outcome: Progressing  Flowsheets (Taken 11/1/2023 1107 by Jocelynn Martell RN)  Verbalizes/displays adequate comfort

## 2023-11-02 NOTE — PROGRESS NOTES
Nephrology Progress Note    Assessment:  ARTIE slight improvement d/t antibiotics   Hypertension  DM type-2  Right foot infection  Hypokalemia      Plan:add potassium to meds  follow labs could keep another  day  she wants out.  If discharged needs to be seen by nephrology one week with bmp    Patient Active Problem List:     Mixed hyperlipidemia     Essential hypertension     GERD (gastroesophageal reflux disease)     Leukocytosis     Cardiac disease     Mixed anxiety and depressive disorder     Type 2 diabetes mellitus with right diabetic foot infection ([address])     Headache     Acute diffuse otitis externa of left ear     Breast mass, right     Obesity, Class I, BMI 30-34.9     Elevated BP without diagnosis of hypertension     Hypokalemia     CAD (coronary artery disease)     Hypomagnesemia     Chest pain     Dyspnea     Pneumonia due to infectious organism     Diabetic foot infection (HCC)     Cellulitis of foot     Diabetic ulcer of toe of right foot associated with diabetes mellitus due to underlying condition, with fat layer exposed ([address])     Acute kidney injury ([address])     Renal failure     Unstable angina (HCC)     Angina, class IV (HCC)     Cellulitis and abscess of toe of right foot     Hypoxia      Subjective:  Admit Date: 10/27/2023    Interval History: no issues    Medications:  Scheduled Meds:   fluticasone  1 spray Each Nostril Daily    pantoprazole  40 mg Oral QAM AC    DAPTOmycin (CUBICIN) 350 mg in sodium chloride 0.9 % 50 mL IVPB  350 mg IntraVENous Q48H    diphenhydrAMINE  50 mg Oral Once    hydrocortisone sodium succinate PF  200 mg IntraVENous Once    metoprolol succinate  25 mg Oral Daily    chlorthalidone  25 mg Oral Daily    hydrALAZINE  25 mg Oral 3 times per day    isosorbide mononitrate  30 mg Oral Daily    torsemide  40 mg Oral Daily    aspirin  81 mg Oral Daily    clopidogrel  75 mg Oral Daily    sodium chloride  500 mL IntraVENous Once    sodium chloride flush  5-40 mL IntraVENous 2 times per

## 2023-11-02 NOTE — DISCHARGE SUMMARY
Physician Discharge Summary     Patient ID:  Dylan Tan  [id number]  38 y.o.  1970    Admit date: 10/27/2023    Discharge date : 11/02/23     Admitting Physician: Elma Isabel MD     Discharge Physician: Roscoe Bradley MD     Admission Diagnoses: Renal failure [N19]    Discharge Diagnoses: ***    Admission Condition: {condition:32221}    Discharged Condition: {condition:65971}    Hospital Course: ***    Consults: {consultation:39383}    Significant Diagnostic Studies: {diagnostics:00729}    Discharge Exam:  {exam; complete normal and system select:73899}    Labs:   Recent Labs     11/01/23  0549   WBC 11.8*   HGB 9.5*   HCT 30.0*        Recent Labs     10/31/23  1216 11/01/23  0549 11/02/23  0457    143 143   K 4.2 3.6 3.4   CL 94* 97 95   CO2 30 32* 32*   BUN 49* 43* 41*   CREATININE 5.51* 5.14* 4.33*   CALCIUM 9.1 9.3 9.3     Recent Labs     11/02/23  0457   AST 15   ALT 12   BILITOT 0.3   ALKPHOS 164*     No results for input(s): \"INR\" in the last 72 hours. Recent Labs     11/01/23  0549   CKTOTAL 11       Urinalysis:   Lab Results   Component Value Date/Time    NITRU Negative 11/01/2023 01:36 PM    WBCUA 0-2 11/01/2023 01:36 PM    BACTERIA Negative 11/01/2023 01:36 PM    RBCUA 20-50 11/01/2023 01:36 PM    BLOODU MODERATE 11/01/2023 01:36 PM    SPECGRAV 1.007 11/01/2023 01:36 PM    GLUCOSEU 100 11/01/2023 01:36 PM       Radiology:   Most recent    Chest CT      WITH CONTRAST:No results found for this or any previous visit.        WITHOUT CONTRAST: Results for orders placed during the hospital encounter of 10/23/20    CT CHEST WO CONTRAST    Narrative  EXAMINATION: CT CHEST WO CONTRAST, , 10/23/2020 9:42 AM    CLINICAL HISTORY:  Bilateral posterior rib pain    COMPARISON: None      TECHNIQUE: Helical CT was performed through the chest utilizing 100-mL of Optiray IV contrast, All CT scans at this facility use dose modulation, iterative reconstruction, and/or weight based dosing when

## 2023-11-02 NOTE — CARE COORDINATION
PER NURSING PT HAS BEEN AMBULATING UP IN ROOM WITHOUT DIFFICULTY. PT CURRENTLY ON ROOM AIR. PER RENAL NO HD AT THIS TIME. PT CURRENTLY ON CUBICIN, NEED ID FINAL PLANS.      1400 DISCUSSED WITH DRS, PLAN FOR HOME ON PO ABX.

## 2023-11-03 ENCOUNTER — DOCUMENTATION (OUTPATIENT)
Dept: PRIMARY CARE | Facility: CLINIC | Age: 53
End: 2023-11-03
Payer: COMMERCIAL

## 2023-11-03 RX ORDER — HYDRALAZINE HYDROCHLORIDE 25 MG/1
1 TABLET, FILM COATED ORAL EVERY 8 HOURS
COMMUNITY
Start: 2023-11-02

## 2023-11-03 RX ORDER — METOPROLOL SUCCINATE 25 MG/1
1 TABLET, EXTENDED RELEASE ORAL DAILY
COMMUNITY
Start: 2023-11-03

## 2023-11-03 RX ORDER — DOXYCYCLINE HYCLATE 100 MG
100 TABLET ORAL
COMMUNITY
Start: 2023-11-02 | End: 2023-11-09

## 2023-11-03 RX ORDER — CHLORTHALIDONE 25 MG/1
1 TABLET ORAL DAILY
COMMUNITY
Start: 2023-11-03

## 2023-11-03 RX ORDER — ASPIRIN 81 MG/1
81 TABLET ORAL DAILY
COMMUNITY
End: 2024-04-08 | Stop reason: SDUPTHER

## 2023-11-03 RX ORDER — ONDANSETRON 4 MG/1
4 TABLET, ORALLY DISINTEGRATING ORAL
COMMUNITY
Start: 2023-11-02

## 2023-11-03 RX ORDER — CLOPIDOGREL BISULFATE 75 MG/1
1 TABLET ORAL DAILY
COMMUNITY
Start: 2023-11-03

## 2023-11-03 RX ORDER — ISOSORBIDE MONONITRATE 30 MG/1
1 TABLET, EXTENDED RELEASE ORAL DAILY
COMMUNITY
Start: 2023-11-03

## 2023-11-04 PROBLEM — E78.5 DYSLIPIDEMIA: Status: RESOLVED | Noted: 2023-02-04 | Resolved: 2023-11-04

## 2023-11-04 PROBLEM — E11.628 DIABETIC INFECTION OF LEFT FOOT (MULTI): Status: RESOLVED | Noted: 2023-02-04 | Resolved: 2023-11-04

## 2023-11-04 PROBLEM — L08.9 DIABETIC INFECTION OF LEFT FOOT (MULTI): Status: RESOLVED | Noted: 2023-02-04 | Resolved: 2023-11-04

## 2023-11-04 PROBLEM — L03.032 CELLULITIS OF TOE OF LEFT FOOT: Status: RESOLVED | Noted: 2023-02-04 | Resolved: 2023-11-04

## 2023-11-04 PROBLEM — L03.032 PARONYCHIA OF TOE OF LEFT FOOT: Status: RESOLVED | Noted: 2023-02-04 | Resolved: 2023-11-04

## 2023-11-04 PROBLEM — E11.628 DIABETIC INFECTION OF RIGHT FOOT (MULTI): Status: ACTIVE | Noted: 2023-11-04

## 2023-11-04 PROBLEM — L08.9 DIABETIC INFECTION OF RIGHT FOOT (MULTI): Status: ACTIVE | Noted: 2023-11-04

## 2023-11-05 LAB — ECHO BSA: 1.99 M2

## 2023-11-08 ENCOUNTER — TELEPHONE (OUTPATIENT)
Dept: PRIMARY CARE | Facility: CLINIC | Age: 53
End: 2023-11-08

## 2023-11-08 ENCOUNTER — OFFICE VISIT (OUTPATIENT)
Dept: PRIMARY CARE | Facility: CLINIC | Age: 53
End: 2023-11-08
Payer: COMMERCIAL

## 2023-11-08 VITALS
HEIGHT: 63 IN | TEMPERATURE: 97.7 F | DIASTOLIC BLOOD PRESSURE: 80 MMHG | WEIGHT: 178 LBS | SYSTOLIC BLOOD PRESSURE: 118 MMHG | BODY MASS INDEX: 31.54 KG/M2 | RESPIRATION RATE: 18 BRPM | HEART RATE: 103 BPM | OXYGEN SATURATION: 97 %

## 2023-11-08 DIAGNOSIS — R11.2 NAUSEA AND VOMITING, UNSPECIFIED VOMITING TYPE: Primary | ICD-10-CM

## 2023-11-08 DIAGNOSIS — I10 ESSENTIAL HYPERTENSION: ICD-10-CM

## 2023-11-08 DIAGNOSIS — Z79.4 TYPE 2 DIABETES MELLITUS WITH HYPERGLYCEMIA, WITH LONG-TERM CURRENT USE OF INSULIN (MULTI): ICD-10-CM

## 2023-11-08 DIAGNOSIS — E11.65 TYPE 2 DIABETES MELLITUS WITH HYPERGLYCEMIA, WITH LONG-TERM CURRENT USE OF INSULIN (MULTI): ICD-10-CM

## 2023-11-08 DIAGNOSIS — I25.118 CORONARY ARTERY DISEASE OF NATIVE ARTERY OF NATIVE HEART WITH STABLE ANGINA PECTORIS (CMS-HCC): ICD-10-CM

## 2023-11-08 PROCEDURE — 3008F BODY MASS INDEX DOCD: CPT | Performed by: PHYSICIAN ASSISTANT

## 2023-11-08 PROCEDURE — 3052F HG A1C>EQUAL 8.0%<EQUAL 9.0%: CPT | Performed by: PHYSICIAN ASSISTANT

## 2023-11-08 PROCEDURE — 3079F DIAST BP 80-89 MM HG: CPT | Performed by: PHYSICIAN ASSISTANT

## 2023-11-08 PROCEDURE — 99496 TRANSJ CARE MGMT HIGH F2F 7D: CPT | Performed by: PHYSICIAN ASSISTANT

## 2023-11-08 PROCEDURE — 1036F TOBACCO NON-USER: CPT | Performed by: PHYSICIAN ASSISTANT

## 2023-11-08 PROCEDURE — 3074F SYST BP LT 130 MM HG: CPT | Performed by: PHYSICIAN ASSISTANT

## 2023-11-08 RX ORDER — PROMETHAZINE HYDROCHLORIDE 25 MG/1
25 SUPPOSITORY RECTAL EVERY 6 HOURS PRN
Qty: 28 EACH | Refills: 0 | Status: SHIPPED | OUTPATIENT
Start: 2023-11-08 | End: 2023-11-09 | Stop reason: ENTERED-IN-ERROR

## 2023-11-08 NOTE — PROGRESS NOTES
"Subjective   Patient ID: Ruth Zambrano is a 52 y.o. female who presents for Hospital Follow-up (Discharge Facility: [facility]/Discharge Diagnosis: Angina, class IV; Cellulitis of foot; Rt foot wound infection /Admission Date: 10/27/2023/Discharge Date:  11/2/2023//Since being out hasn't feeling good; can't eat, nausea, shaking, cold feeling, every time she eats she gets sick, vomits everyday. Pt brought meds with her today to discuss to see if its one of those doing it. ) and Medication Question (Wants to discuss all meds and what there for and states that the Torsemide she didn't start because they didn't have it available at her pharmacy. Pt not sure if she should be on the Atorvastatin and what mg. ).    HPI     Hospital discharge follow up/ Diabetic foot wound infection  - Ulcer on the bottom side of her right foot second digit. Saw podiatry and was on doxycycloine but wasn't compliant and didn't complete the abx.   - Wasn't compliant with her insulin either - took off her insulin pump and wasn't monitoring her sugars.   - Went to [facility] and stayed 10/27-11/2 - IV abx cleared diabetic foot wound but later had NSTEMI and had to have 1 stent placed. Was discharged on new meds - chlorthalidone 25, asa 81 mg, hydralazine 25 mg TID, Imdur 30 mg, metoprolol xl 25 mg, torsemide 40 mg   - Still sick and vomiting every day, stomach hurting and nauseaous, low stamina, was supposed to go back to work yesterday and couldn't     Cardiac cath 10/30/23 - LM normal, LAD 90% mid stenosis, Cx 30% prox and 50% mid, RCA 50% mid and 60-70% distal       Review of Systems   Gastrointestinal:  Positive for nausea and vomiting. Negative for diarrhea.       Objective   /80   Pulse 103   Temp 36.5 °C (97.7 °F)   Resp 18   Ht 1.6 m (5' 3\")   Wt 80.7 kg (178 lb)   SpO2 97%   BMI 31.53 kg/m²     Physical Exam  Constitutional:       Appearance: Normal appearance. She is obese.   Cardiovascular: " "     Rate and Rhythm: Normal rate and regular rhythm.      Pulses: Normal pulses.      Heart sounds: Normal heart sounds. No murmur heard.  Pulmonary:      Effort: Pulmonary effort is normal.      Breath sounds: Normal breath sounds.   Feet:      Right foot:      Skin integrity: Skin integrity normal.      Left foot:      Skin integrity: Skin integrity normal.      Comments: Foot wound has healed  Neurological:      Mental Status: She is alert.   Psychiatric:         Mood and Affect: Mood and affect normal.         Assessment/Plan     Problem List Items Addressed This Visit       Essential hypertension    Current Assessment & Plan     - Stable on current medications   - Continue current tx          Nausea with vomiting - Primary    Relevant Medications    promethazine (Phenergan) 25 mg tablet    Type 2 diabetes mellitus with hyperglycemia, with long-term current use of insulin (CMS/[facility])    Current Assessment & Plan     - Needs better compliance   - Admits she stopped taking her insulin prior to hospitalization and no longer taking Jardiance or Mounjaro   - encouraged better compliance - take the insulin and monitor sugars. Need good glucose control to prevent another MI or diabetic wound.   - Follow up with Dr. Fonseca soonest available.          Relevant Orders    Follow Up In [facility] - PCP - Established    CAD (coronary artery disease) (Chronic)    Overview     - 10/30/23 - NSTEMI while in [facility]. Dr. Monzon did cardiac cath showing - \"LM normal, LAD 90% mid stenosis, Cx 30% prox and 50% mid, RCA 50% mid and 60-70% distal\"  - PCI one PAIGE mid LAD          Current Assessment & Plan     - Following with [facility] cardiologist Dr. Monzon - she still needs to schedule appt with them - encouraged she do so   - Continue on secondary prevention - chlorthalidone 25, asa 81 mg, hydralazine 25 mg TID, Imdur 30 mg, metoprolol xl 25 mg, torsemide 40 mg          Relevant Orders    Follow Up In [facility] " [facility] - PCP - Established         Hospital follow up diabetic foot wound - healed now

## 2023-11-09 ENCOUNTER — APPOINTMENT (OUTPATIENT)
Dept: CT IMAGING | Age: 53
DRG: 314 | End: 2023-11-09
Payer: COMMERCIAL

## 2023-11-09 ENCOUNTER — APPOINTMENT (OUTPATIENT)
Age: 53
DRG: 314 | End: 2023-11-09
Attending: INTERNAL MEDICINE
Payer: COMMERCIAL

## 2023-11-09 ENCOUNTER — APPOINTMENT (OUTPATIENT)
Dept: GENERAL RADIOLOGY | Age: 53
DRG: 314 | End: 2023-11-09
Payer: COMMERCIAL

## 2023-11-09 ENCOUNTER — HOSPITAL ENCOUNTER (INPATIENT)
Age: 53
LOS: 1 days | Discharge: HOME | DRG: 314 | End: 2023-11-12
Attending: EMERGENCY MEDICINE | Admitting: INTERNAL MEDICINE
Payer: COMMERCIAL

## 2023-11-09 ENCOUNTER — PATIENT OUTREACH (OUTPATIENT)
Dept: PRIMARY CARE | Facility: CLINIC | Age: 53
End: 2023-11-09
Payer: COMMERCIAL

## 2023-11-09 DIAGNOSIS — I31.39 PERICARDIAL EFFUSION: ICD-10-CM

## 2023-11-09 DIAGNOSIS — R73.9 HYPERGLYCEMIA: Primary | ICD-10-CM

## 2023-11-09 DIAGNOSIS — E83.42 HYPOMAGNESEMIA: ICD-10-CM

## 2023-11-09 DIAGNOSIS — E86.0 DEHYDRATION: ICD-10-CM

## 2023-11-09 PROBLEM — E66.811 CLASS 1 OBESITY WITH BODY MASS INDEX (BMI) OF 32.0 TO 32.9 IN ADULT: Status: RESOLVED | Noted: 2023-02-04 | Resolved: 2023-11-09

## 2023-11-09 PROBLEM — L08.9 DIABETIC INFECTION OF RIGHT FOOT (MULTI): Status: RESOLVED | Noted: 2023-11-04 | Resolved: 2023-11-09

## 2023-11-09 PROBLEM — R11.0 NAUSEA: Status: ACTIVE | Noted: 2023-11-09

## 2023-11-09 PROBLEM — E11.628 DIABETIC INFECTION OF RIGHT FOOT (MULTI): Status: RESOLVED | Noted: 2023-11-04 | Resolved: 2023-11-09

## 2023-11-09 PROBLEM — E66.9 CLASS 1 OBESITY WITH BODY MASS INDEX (BMI) OF 32.0 TO 32.9 IN ADULT: Status: RESOLVED | Noted: 2023-02-04 | Resolved: 2023-11-09

## 2023-11-09 LAB
ALBUMIN SERPL-MCNC: 3.8 G/DL (ref 3.5–4.6)
ALP SERPL-CCNC: 146 U/L (ref 40–130)
ALT SERPL-CCNC: 7 U/L (ref 0–33)
ANION GAP SERPL CALCULATED.3IONS-SCNC: 16 MEQ/L (ref 9–15)
APTT PPP: 31.7 SEC (ref 24.4–36.8)
AST SERPL-CCNC: 8 U/L (ref 0–35)
BACTERIA URNS QL MICRO: NEGATIVE /HPF
BASOPHILS # BLD: 0.1 K/UL (ref 0–0.2)
BASOPHILS NFR BLD: 0.7 %
BILIRUB SERPL-MCNC: 0.7 MG/DL (ref 0.2–0.7)
BILIRUB UR QL STRIP: NEGATIVE
BUN SERPL-MCNC: 40 MG/DL (ref 6–20)
CALCIUM SERPL-MCNC: 9 MG/DL (ref 8.5–9.9)
CHLORIDE SERPL-SCNC: 89 MEQ/L (ref 95–107)
CHP ED QC CHECK: 329
CHP ED QC CHECK: NORMAL
CLARITY UR: ABNORMAL
CO2 SERPL-SCNC: 29 MEQ/L (ref 20–31)
COLOR UR: YELLOW
CREAT SERPL-MCNC: 1.99 MG/DL (ref 0.5–0.9)
ECHO AO ROOT DIAM: 3.1 CM
ECHO AO ROOT INDEX: 1.68 CM/M2
ECHO AV AREA PEAK VELOCITY: 2.2 CM2
ECHO AV AREA PEAK VELOCITY: 2.2 CM2
ECHO AV AREA VTI: 2.3 CM2
ECHO AV AREA/BSA VTI: 1.3 CM2/M2
ECHO AV CUSP MM: 1.8 CM
ECHO AV MEAN GRADIENT: 6 MMHG
ECHO AV MEAN VELOCITY: 1.1 M/S
ECHO AV PEAK GRADIENT: 10 MMHG
ECHO AV PEAK GRADIENT: 12 MMHG
ECHO AV PEAK VELOCITY: 1.7 M/S
ECHO AV PEAK VELOCITY: 1.7 M/S
ECHO AV VTI: 34.6 CM
ECHO BSA: 1.89 M2
ECHO EST RA PRESSURE: 10 MMHG
ECHO LA VOL A-L A2C: 47 ML (ref 22–52)
ECHO LA VOL A-L A4C: 41 ML (ref 22–52)
ECHO LA VOL MOD A2C: 47 ML (ref 22–52)
ECHO LA VOL MOD A4C: 37 ML (ref 22–52)
ECHO LA VOLUME AREA LENGTH: 45 ML
ECHO LA VOLUME INDEX A-L A2C: 26 ML/M2 (ref 16–34)
ECHO LA VOLUME INDEX A-L A4C: 22 ML/M2 (ref 16–34)
ECHO LA VOLUME INDEX AREA LENGTH: 24 ML/M2 (ref 16–34)
ECHO LA VOLUME INDEX MOD A2C: 26 ML/M2 (ref 16–34)
ECHO LA VOLUME INDEX MOD A4C: 20 ML/M2 (ref 16–34)
ECHO LV E' LATERAL VELOCITY: 9 CM/S
ECHO LV E' SEPTAL VELOCITY: 9 CM/S
ECHO LV EDV A2C: 86 ML
ECHO LV EDV A4C: 68 ML
ECHO LV EDV BP: 79 ML (ref 56–104)
ECHO LV EDV INDEX A4C: 37 ML/M2
ECHO LV EDV INDEX BP: 43 ML/M2
ECHO LV EDV NDEX A2C: 47 ML/M2
ECHO LV EJECTION FRACTION A2C: 70 %
ECHO LV EJECTION FRACTION A4C: 66 %
ECHO LV EJECTION FRACTION BIPLANE: 67 % (ref 55–100)
ECHO LV ESV A2C: 26 ML
ECHO LV ESV A4C: 23 ML
ECHO LV ESV BP: 26 ML (ref 19–49)
ECHO LV ESV INDEX A2C: 14 ML/M2
ECHO LV ESV INDEX A4C: 13 ML/M2
ECHO LV ESV INDEX BP: 14 ML/M2
ECHO LV FRACTIONAL SHORTENING: 40 % (ref 28–44)
ECHO LV INTERNAL DIMENSION DIASTOLE INDEX: 2.34 CM/M2
ECHO LV INTERNAL DIMENSION DIASTOLIC: 4.3 CM (ref 3.9–5.3)
ECHO LV INTERNAL DIMENSION SYSTOLIC INDEX: 1.41 CM/M2
ECHO LV INTERNAL DIMENSION SYSTOLIC: 2.6 CM
ECHO LV IVSD: 0.9 CM (ref 0.6–0.9)
ECHO LV IVSS: 1.3 CM
ECHO LV MASS 2D: 132.7 G (ref 67–162)
ECHO LV MASS INDEX 2D: 72.1 G/M2 (ref 43–95)
ECHO LV POSTERIOR WALL DIASTOLIC: 1 CM (ref 0.6–0.9)
ECHO LV POSTERIOR WALL SYSTOLIC: 1.4 CM
ECHO LV RELATIVE WALL THICKNESS RATIO: 0.47
ECHO LVOT AREA: 3.1 CM2
ECHO LVOT AV VTI INDEX: 0.75
ECHO LVOT DIAM: 2 CM
ECHO LVOT MEAN GRADIENT: 3 MMHG
ECHO LVOT PEAK GRADIENT: 5 MMHG
ECHO LVOT PEAK VELOCITY: 1.2 M/S
ECHO LVOT STROKE VOLUME INDEX: 44.4 ML/M2
ECHO LVOT SV: 81.6 ML
ECHO LVOT VTI: 26 CM
ECHO MV A VELOCITY: 0.98 M/S
ECHO MV AREA VTI: 2.7 CM2
ECHO MV E DECELERATION TIME (DT): 208.3 MS
ECHO MV E VELOCITY: 0.97 M/S
ECHO MV E/A RATIO: 0.99
ECHO MV E/E' LATERAL: 10.78
ECHO MV LVOT VTI INDEX: 1.16
ECHO MV MAX VELOCITY: 1.2 M/S
ECHO MV MEAN GRADIENT: 2 MMHG
ECHO MV MEAN VELOCITY: 0.7 M/S
ECHO MV PEAK GRADIENT: 5 MMHG
ECHO MV VTI: 30.2 CM
ECHO PV MAX VELOCITY: 0.9 M/S
ECHO PV PEAK GRADIENT: 3 MMHG
ECHO RIGHT VENTRICULAR SYSTOLIC PRESSURE (RVSP): 15 MMHG
ECHO RV INTERNAL DIMENSION: 2.4 CM
ECHO TV REGURGITANT MAX VELOCITY: 1.14 M/S
ECHO TV REGURGITANT PEAK GRADIENT: 5 MMHG
EOSINOPHIL # BLD: 0.4 K/UL (ref 0–0.7)
EOSINOPHIL NFR BLD: 2.1 %
EPI CELLS #/AREA URNS AUTO: ABNORMAL /HPF (ref 0–5)
ERYTHROCYTE [DISTWIDTH] IN BLOOD BY AUTOMATED COUNT: 12.1 % (ref 11.5–14.5)
GLOBULIN SER CALC-MCNC: 4.7 G/DL (ref 2.3–3.5)
GLUCOSE BLD-MCNC: 188 MG/DL (ref 70–99)
GLUCOSE BLD-MCNC: 229 MG/DL (ref 70–99)
GLUCOSE BLD-MCNC: 245 MG/DL (ref 70–99)
GLUCOSE BLD-MCNC: 329 MG/DL (ref 70–99)
GLUCOSE BLD-MCNC: 346 MG/DL
GLUCOSE BLD-MCNC: 346 MG/DL (ref 70–99)
GLUCOSE BLD-MCNC: 346 MG/DL (ref 70–99)
GLUCOSE BLD-MCNC: 350 MG/DL (ref 70–99)
GLUCOSE SERPL-MCNC: 340 MG/DL (ref 70–99)
GLUCOSE UR STRIP-MCNC: 500 MG/DL
HBA1C MFR BLD: 12.5 % (ref 4.8–5.9)
HCT VFR BLD AUTO: 33.1 % (ref 37–47)
HGB BLD-MCNC: 10.6 G/DL (ref 12–16)
HGB UR QL STRIP: NEGATIVE
HYALINE CASTS #/AREA URNS AUTO: ABNORMAL /HPF (ref 0–5)
INR PPP: 1.2
KETONES UR STRIP-MCNC: NEGATIVE MG/DL
LACTIC ACID, SEPSIS: 1.4 MMOL/L (ref 0.5–1.9)
LEUKOCYTE ESTERASE UR QL STRIP: ABNORMAL
LIPASE SERPL-CCNC: 11 U/L (ref 12–95)
LYMPHOCYTES # BLD: 2.9 K/UL (ref 1–4.8)
LYMPHOCYTES NFR BLD: 14.6 %
MAGNESIUM SERPL-MCNC: 1.2 MG/DL (ref 1.7–2.4)
MCH RBC QN AUTO: 29.1 PG (ref 27–31.3)
MCHC RBC AUTO-ENTMCNC: 32 % (ref 33–37)
MCV RBC AUTO: 90.9 FL (ref 79.4–94.8)
MONOCYTES # BLD: 1.5 K/UL (ref 0.2–0.8)
MONOCYTES NFR BLD: 7.6 %
NEUTROPHILS # BLD: 14.5 K/UL (ref 1.4–6.5)
NEUTS SEG NFR BLD: 74.1 %
NITRITE UR QL STRIP: NEGATIVE
PERFORMED ON: ABNORMAL
PH UR STRIP: 5.5 [PH] (ref 5–9)
PLATELET # BLD AUTO: 406 K/UL (ref 130–400)
POC CREATININE WHOLE BLOOD: 2.1
POC CREATININE: 2.1 MG/DL (ref 0.6–1.2)
POC SAMPLE TYPE: ABNORMAL
POTASSIUM SERPL-SCNC: 3.1 MEQ/L (ref 3.4–4.9)
PROCALCITONIN SERPL IA-MCNC: 0.1 NG/ML (ref 0–0.15)
PROT SERPL-MCNC: 8.5 G/DL (ref 6.3–8)
PROT UR STRIP-MCNC: 30 MG/DL
PROTHROMBIN TIME: 15.4 SEC (ref 12.3–14.9)
RBC # BLD AUTO: 3.64 M/UL (ref 4.2–5.4)
RBC #/AREA URNS HPF: ABNORMAL /HPF (ref 0–2)
SARS-COV-2 RDRP RESP QL NAA+PROBE: NOT DETECTED
SODIUM SERPL-SCNC: 134 MEQ/L (ref 135–144)
SP GR UR STRIP: 1.01 (ref 1–1.03)
TSH REFLEX: 2.87 UIU/ML (ref 0.44–3.86)
URINE REFLEX TO CULTURE: ABNORMAL
UROBILINOGEN UR STRIP-ACNC: 0.2 E.U./DL
WBC # BLD AUTO: 19.6 K/UL (ref 4.8–10.8)
WBC #/AREA URNS AUTO: ABNORMAL /HPF (ref 0–5)

## 2023-11-09 PROCEDURE — 6360000002 HC RX W HCPCS: Performed by: REGISTERED NURSE

## 2023-11-09 PROCEDURE — 83036 HEMOGLOBIN GLYCOSYLATED A1C: CPT

## 2023-11-09 PROCEDURE — 2580000003 HC RX 258: Performed by: EMERGENCY MEDICINE

## 2023-11-09 PROCEDURE — 6370000000 HC RX 637 (ALT 250 FOR IP): Performed by: REGISTERED NURSE

## 2023-11-09 PROCEDURE — 87040 BLOOD CULTURE FOR BACTERIA: CPT

## 2023-11-09 PROCEDURE — 96367 TX/PROPH/DG ADDL SEQ IV INF: CPT

## 2023-11-09 PROCEDURE — 85730 THROMBOPLASTIN TIME PARTIAL: CPT

## 2023-11-09 PROCEDURE — 96376 TX/PRO/DX INJ SAME DRUG ADON: CPT

## 2023-11-09 PROCEDURE — 99285 EMERGENCY DEPT VISIT HI MDM: CPT

## 2023-11-09 PROCEDURE — 93005 ELECTROCARDIOGRAM TRACING: CPT | Performed by: EMERGENCY MEDICINE

## 2023-11-09 PROCEDURE — 93306 TTE W/DOPPLER COMPLETE: CPT | Performed by: INTERNAL MEDICINE

## 2023-11-09 PROCEDURE — 84443 ASSAY THYROID STIM HORMONE: CPT

## 2023-11-09 PROCEDURE — 80053 COMPREHEN METABOLIC PANEL: CPT

## 2023-11-09 PROCEDURE — 6370000000 HC RX 637 (ALT 250 FOR IP): Performed by: INTERNAL MEDICINE

## 2023-11-09 PROCEDURE — 96361 HYDRATE IV INFUSION ADD-ON: CPT

## 2023-11-09 PROCEDURE — 2580000003 HC RX 258: Performed by: REGISTERED NURSE

## 2023-11-09 PROCEDURE — 36415 COLL VENOUS BLD VENIPUNCTURE: CPT

## 2023-11-09 PROCEDURE — G0378 HOSPITAL OBSERVATION PER HR: HCPCS

## 2023-11-09 PROCEDURE — 74176 CT ABD & PELVIS W/O CONTRAST: CPT

## 2023-11-09 PROCEDURE — 85025 COMPLETE CBC W/AUTO DIFF WBC: CPT

## 2023-11-09 PROCEDURE — 99222 1ST HOSP IP/OBS MODERATE 55: CPT | Performed by: THORACIC SURGERY (CARDIOTHORACIC VASCULAR SURGERY)

## 2023-11-09 PROCEDURE — 96365 THER/PROPH/DIAG IV INF INIT: CPT

## 2023-11-09 PROCEDURE — 93306 TTE W/DOPPLER COMPLETE: CPT

## 2023-11-09 PROCEDURE — 96375 TX/PRO/DX INJ NEW DRUG ADDON: CPT

## 2023-11-09 PROCEDURE — 96366 THER/PROPH/DIAG IV INF ADDON: CPT

## 2023-11-09 PROCEDURE — 85610 PROTHROMBIN TIME: CPT

## 2023-11-09 PROCEDURE — 6370000000 HC RX 637 (ALT 250 FOR IP): Performed by: EMERGENCY MEDICINE

## 2023-11-09 PROCEDURE — 96372 THER/PROPH/DIAG INJ SC/IM: CPT

## 2023-11-09 PROCEDURE — 99222 1ST HOSP IP/OBS MODERATE 55: CPT | Performed by: INTERNAL MEDICINE

## 2023-11-09 PROCEDURE — 71045 X-RAY EXAM CHEST 1 VIEW: CPT

## 2023-11-09 PROCEDURE — 83690 ASSAY OF LIPASE: CPT

## 2023-11-09 PROCEDURE — 6360000002 HC RX W HCPCS: Performed by: EMERGENCY MEDICINE

## 2023-11-09 PROCEDURE — 84145 PROCALCITONIN (PCT): CPT

## 2023-11-09 PROCEDURE — 87635 SARS-COV-2 COVID-19 AMP PRB: CPT

## 2023-11-09 PROCEDURE — 83605 ASSAY OF LACTIC ACID: CPT

## 2023-11-09 PROCEDURE — 81001 URINALYSIS AUTO W/SCOPE: CPT

## 2023-11-09 PROCEDURE — 83735 ASSAY OF MAGNESIUM: CPT

## 2023-11-09 PROCEDURE — 96368 THER/DIAG CONCURRENT INF: CPT

## 2023-11-09 RX ORDER — MORPHINE SULFATE 4 MG/ML
4 INJECTION, SOLUTION INTRAMUSCULAR; INTRAVENOUS ONCE
Status: COMPLETED | OUTPATIENT
Start: 2023-11-09 | End: 2023-11-09

## 2023-11-09 RX ORDER — PROCHLORPERAZINE EDISYLATE 5 MG/ML
10 INJECTION INTRAMUSCULAR; INTRAVENOUS ONCE
Status: COMPLETED | OUTPATIENT
Start: 2023-11-09 | End: 2023-11-09

## 2023-11-09 RX ORDER — ATORVASTATIN CALCIUM 40 MG/1
40 TABLET, FILM COATED ORAL NIGHTLY
Status: DISCONTINUED | OUTPATIENT
Start: 2023-11-09 | End: 2023-11-12 | Stop reason: HOSPADM

## 2023-11-09 RX ORDER — SODIUM CHLORIDE 9 MG/ML
INJECTION, SOLUTION INTRAVENOUS CONTINUOUS
Status: DISCONTINUED | OUTPATIENT
Start: 2023-11-09 | End: 2023-11-11

## 2023-11-09 RX ORDER — SODIUM CHLORIDE 0.9 % (FLUSH) 0.9 %
5-40 SYRINGE (ML) INJECTION PRN
Status: DISCONTINUED | OUTPATIENT
Start: 2023-11-09 | End: 2023-11-12 | Stop reason: HOSPADM

## 2023-11-09 RX ORDER — GLUCAGON 1 MG/ML
1 KIT INJECTION PRN
Status: DISCONTINUED | OUTPATIENT
Start: 2023-11-09 | End: 2023-11-12 | Stop reason: HOSPADM

## 2023-11-09 RX ORDER — ESCITALOPRAM OXALATE 20 MG/1
20 TABLET ORAL DAILY
Status: DISCONTINUED | OUTPATIENT
Start: 2023-11-09 | End: 2023-11-12 | Stop reason: HOSPADM

## 2023-11-09 RX ORDER — 0.9 % SODIUM CHLORIDE 0.9 %
500 INTRAVENOUS SOLUTION INTRAVENOUS ONCE
Status: COMPLETED | OUTPATIENT
Start: 2023-11-09 | End: 2023-11-09

## 2023-11-09 RX ORDER — DEXTROSE MONOHYDRATE 100 MG/ML
INJECTION, SOLUTION INTRAVENOUS CONTINUOUS PRN
Status: DISCONTINUED | OUTPATIENT
Start: 2023-11-09 | End: 2023-11-12 | Stop reason: HOSPADM

## 2023-11-09 RX ORDER — PROMETHAZINE HYDROCHLORIDE 25 MG/1
25 TABLET ORAL EVERY 6 HOURS PRN
Qty: 28 TABLET | Refills: 0 | Status: SHIPPED | OUTPATIENT
Start: 2023-11-09 | End: 2023-11-16

## 2023-11-09 RX ORDER — CLOPIDOGREL BISULFATE 75 MG/1
75 TABLET ORAL DAILY
Status: DISCONTINUED | OUTPATIENT
Start: 2023-11-09 | End: 2023-11-12 | Stop reason: HOSPADM

## 2023-11-09 RX ORDER — TRAMADOL HYDROCHLORIDE 50 MG/1
25 TABLET ORAL EVERY 6 HOURS PRN
Status: DISCONTINUED | OUTPATIENT
Start: 2023-11-09 | End: 2023-11-12 | Stop reason: HOSPADM

## 2023-11-09 RX ORDER — METOPROLOL SUCCINATE 25 MG/1
25 TABLET, EXTENDED RELEASE ORAL DAILY
Status: DISCONTINUED | OUTPATIENT
Start: 2023-11-09 | End: 2023-11-12 | Stop reason: HOSPADM

## 2023-11-09 RX ORDER — POTASSIUM CHLORIDE 7.45 MG/ML
10 INJECTION INTRAVENOUS ONCE
Status: COMPLETED | OUTPATIENT
Start: 2023-11-09 | End: 2023-11-09

## 2023-11-09 RX ORDER — ATORVASTATIN CALCIUM 40 MG/1
40 TABLET, FILM COATED ORAL DAILY
Status: DISCONTINUED | OUTPATIENT
Start: 2023-11-09 | End: 2023-11-09

## 2023-11-09 RX ORDER — ISOSORBIDE MONONITRATE 60 MG/1
30 TABLET, EXTENDED RELEASE ORAL DAILY
Status: DISCONTINUED | OUTPATIENT
Start: 2023-11-09 | End: 2023-11-12

## 2023-11-09 RX ORDER — ONDANSETRON 4 MG/1
4 TABLET, ORALLY DISINTEGRATING ORAL EVERY 8 HOURS PRN
Status: DISCONTINUED | OUTPATIENT
Start: 2023-11-09 | End: 2023-11-12 | Stop reason: HOSPADM

## 2023-11-09 RX ORDER — ENOXAPARIN SODIUM 100 MG/ML
40 INJECTION SUBCUTANEOUS EVERY 24 HOURS
Status: DISCONTINUED | OUTPATIENT
Start: 2023-11-09 | End: 2023-11-12 | Stop reason: HOSPADM

## 2023-11-09 RX ORDER — TRAZODONE HYDROCHLORIDE 100 MG/1
100 TABLET ORAL NIGHTLY
Status: DISCONTINUED | OUTPATIENT
Start: 2023-11-09 | End: 2023-11-12 | Stop reason: HOSPADM

## 2023-11-09 RX ORDER — PANTOPRAZOLE SODIUM 40 MG/1
40 TABLET, DELAYED RELEASE ORAL
Status: DISCONTINUED | OUTPATIENT
Start: 2023-11-10 | End: 2023-11-12 | Stop reason: HOSPADM

## 2023-11-09 RX ORDER — POTASSIUM CHLORIDE 20 MEQ/1
40 TABLET, EXTENDED RELEASE ORAL PRN
Status: DISCONTINUED | OUTPATIENT
Start: 2023-11-09 | End: 2023-11-12 | Stop reason: HOSPADM

## 2023-11-09 RX ORDER — POTASSIUM CHLORIDE 7.45 MG/ML
10 INJECTION INTRAVENOUS PRN
Status: DISCONTINUED | OUTPATIENT
Start: 2023-11-09 | End: 2023-11-11

## 2023-11-09 RX ORDER — SODIUM CHLORIDE 9 MG/ML
INJECTION, SOLUTION INTRAVENOUS PRN
Status: DISCONTINUED | OUTPATIENT
Start: 2023-11-09 | End: 2023-11-12 | Stop reason: HOSPADM

## 2023-11-09 RX ORDER — ACETAMINOPHEN 650 MG/1
650 SUPPOSITORY RECTAL EVERY 6 HOURS PRN
Status: DISCONTINUED | OUTPATIENT
Start: 2023-11-09 | End: 2023-11-12 | Stop reason: HOSPADM

## 2023-11-09 RX ORDER — LACTOBACILLUS RHAMNOSUS GG 10B CELL
1 CAPSULE ORAL
Status: DISCONTINUED | OUTPATIENT
Start: 2023-11-10 | End: 2023-11-12 | Stop reason: HOSPADM

## 2023-11-09 RX ORDER — TORSEMIDE 20 MG/1
40 TABLET ORAL DAILY
Status: DISCONTINUED | OUTPATIENT
Start: 2023-11-09 | End: 2023-11-12

## 2023-11-09 RX ORDER — INSULIN LISPRO 100 [IU]/ML
0-4 INJECTION, SOLUTION INTRAVENOUS; SUBCUTANEOUS NIGHTLY
Status: DISCONTINUED | OUTPATIENT
Start: 2023-11-09 | End: 2023-11-12 | Stop reason: HOSPADM

## 2023-11-09 RX ORDER — POLYETHYLENE GLYCOL 3350 17 G/17G
17 POWDER, FOR SOLUTION ORAL DAILY PRN
Status: DISCONTINUED | OUTPATIENT
Start: 2023-11-09 | End: 2023-11-12 | Stop reason: HOSPADM

## 2023-11-09 RX ORDER — ACETAMINOPHEN 325 MG/1
650 TABLET ORAL EVERY 6 HOURS PRN
Status: DISCONTINUED | OUTPATIENT
Start: 2023-11-09 | End: 2023-11-12 | Stop reason: HOSPADM

## 2023-11-09 RX ORDER — ONDANSETRON 2 MG/ML
4 INJECTION INTRAMUSCULAR; INTRAVENOUS ONCE
Status: COMPLETED | OUTPATIENT
Start: 2023-11-09 | End: 2023-11-09

## 2023-11-09 RX ORDER — ONDANSETRON 2 MG/ML
4 INJECTION INTRAMUSCULAR; INTRAVENOUS EVERY 6 HOURS PRN
Status: DISCONTINUED | OUTPATIENT
Start: 2023-11-09 | End: 2023-11-12 | Stop reason: HOSPADM

## 2023-11-09 RX ORDER — SODIUM CHLORIDE 0.9 % (FLUSH) 0.9 %
5-40 SYRINGE (ML) INJECTION EVERY 12 HOURS SCHEDULED
Status: DISCONTINUED | OUTPATIENT
Start: 2023-11-09 | End: 2023-11-12 | Stop reason: HOSPADM

## 2023-11-09 RX ORDER — SODIUM CHLORIDE 9 MG/ML
INJECTION, SOLUTION INTRAVENOUS CONTINUOUS
Status: DISPENSED | OUTPATIENT
Start: 2023-11-09 | End: 2023-11-11

## 2023-11-09 RX ORDER — HYDRALAZINE HYDROCHLORIDE 25 MG/1
25 TABLET, FILM COATED ORAL EVERY 8 HOURS SCHEDULED
Status: DISCONTINUED | OUTPATIENT
Start: 2023-11-09 | End: 2023-11-12

## 2023-11-09 RX ORDER — ASPIRIN 81 MG/1
81 TABLET ORAL DAILY
Status: DISCONTINUED | OUTPATIENT
Start: 2023-11-09 | End: 2023-11-12 | Stop reason: HOSPADM

## 2023-11-09 RX ORDER — INSULIN LISPRO 100 [IU]/ML
0-8 INJECTION, SOLUTION INTRAVENOUS; SUBCUTANEOUS
Status: DISCONTINUED | OUTPATIENT
Start: 2023-11-09 | End: 2023-11-11

## 2023-11-09 RX ORDER — MAGNESIUM SULFATE IN WATER 40 MG/ML
2000 INJECTION, SOLUTION INTRAVENOUS PRN
Status: DISCONTINUED | OUTPATIENT
Start: 2023-11-09 | End: 2023-11-12 | Stop reason: HOSPADM

## 2023-11-09 RX ORDER — MAGNESIUM SULFATE IN WATER 40 MG/ML
2000 INJECTION, SOLUTION INTRAVENOUS ONCE
Status: COMPLETED | OUTPATIENT
Start: 2023-11-09 | End: 2023-11-09

## 2023-11-09 RX ADMIN — SODIUM CHLORIDE: 9 INJECTION, SOLUTION INTRAVENOUS at 09:27

## 2023-11-09 RX ADMIN — CEFTRIAXONE SODIUM 1000 MG: 1 INJECTION, POWDER, FOR SOLUTION INTRAMUSCULAR; INTRAVENOUS at 04:08

## 2023-11-09 RX ADMIN — MORPHINE SULFATE 4 MG: 4 INJECTION, SOLUTION INTRAMUSCULAR; INTRAVENOUS at 03:46

## 2023-11-09 RX ADMIN — CLOPIDOGREL BISULFATE 75 MG: 75 TABLET ORAL at 14:08

## 2023-11-09 RX ADMIN — ONDANSETRON 4 MG: 2 INJECTION INTRAMUSCULAR; INTRAVENOUS at 03:48

## 2023-11-09 RX ADMIN — ASPIRIN 81 MG: 81 TABLET, COATED ORAL at 14:07

## 2023-11-09 RX ADMIN — TRAZODONE HYDROCHLORIDE 100 MG: 100 TABLET ORAL at 21:19

## 2023-11-09 RX ADMIN — PROCHLORPERAZINE EDISYLATE 10 MG: 5 INJECTION INTRAMUSCULAR; INTRAVENOUS at 04:59

## 2023-11-09 RX ADMIN — TORSEMIDE 40 MG: 20 TABLET ORAL at 14:08

## 2023-11-09 RX ADMIN — TRAMADOL HYDROCHLORIDE 25 MG: 50 TABLET ORAL at 14:07

## 2023-11-09 RX ADMIN — SODIUM CHLORIDE: 9 INJECTION, SOLUTION INTRAVENOUS at 21:23

## 2023-11-09 RX ADMIN — SODIUM CHLORIDE 500 ML: 9 INJECTION, SOLUTION INTRAVENOUS at 05:30

## 2023-11-09 RX ADMIN — METOPROLOL SUCCINATE 25 MG: 25 TABLET, EXTENDED RELEASE ORAL at 14:08

## 2023-11-09 RX ADMIN — ESCITALOPRAM OXALATE 20 MG: 20 TABLET ORAL at 14:07

## 2023-11-09 RX ADMIN — ATORVASTATIN CALCIUM 40 MG: 40 TABLET, FILM COATED ORAL at 21:18

## 2023-11-09 RX ADMIN — ISOSORBIDE MONONITRATE 30 MG: 60 TABLET, EXTENDED RELEASE ORAL at 14:07

## 2023-11-09 RX ADMIN — POTASSIUM BICARBONATE 50 MEQ: 978 TABLET, EFFERVESCENT ORAL at 14:06

## 2023-11-09 RX ADMIN — ONDANSETRON 4 MG: 2 INJECTION INTRAMUSCULAR; INTRAVENOUS at 16:09

## 2023-11-09 RX ADMIN — SODIUM CHLORIDE, PRESERVATIVE FREE 10 ML: 5 INJECTION INTRAVENOUS at 21:20

## 2023-11-09 RX ADMIN — INSULIN HUMAN 12 UNITS: 100 INJECTION, SOLUTION PARENTERAL at 07:30

## 2023-11-09 RX ADMIN — ONDANSETRON 4 MG: 4 TABLET, ORALLY DISINTEGRATING ORAL at 21:19

## 2023-11-09 RX ADMIN — INSULIN HUMAN 10 UNITS: 100 INJECTION, SOLUTION PARENTERAL at 05:37

## 2023-11-09 RX ADMIN — SODIUM CHLORIDE 500 ML: 9 INJECTION, SOLUTION INTRAVENOUS at 09:26

## 2023-11-09 RX ADMIN — SODIUM CHLORIDE 500 ML: 9 INJECTION, SOLUTION INTRAVENOUS at 04:12

## 2023-11-09 RX ADMIN — POTASSIUM CHLORIDE 10 MEQ: 7.46 INJECTION, SOLUTION INTRAVENOUS at 05:33

## 2023-11-09 RX ADMIN — MAGNESIUM SULFATE HEPTAHYDRATE 2000 MG: 40 INJECTION, SOLUTION INTRAVENOUS at 05:04

## 2023-11-09 RX ADMIN — TRAMADOL HYDROCHLORIDE 25 MG: 50 TABLET ORAL at 21:18

## 2023-11-09 ASSESSMENT — PAIN SCALES - GENERAL
PAINLEVEL_OUTOF10: 6
PAINLEVEL_OUTOF10: 5
PAINLEVEL_OUTOF10: 7
PAINLEVEL_OUTOF10: 7

## 2023-11-09 ASSESSMENT — PAIN DESCRIPTION - LOCATION
LOCATION: CHEST
LOCATION: SHOULDER

## 2023-11-09 ASSESSMENT — ENCOUNTER SYMPTOMS
SHORTNESS OF BREATH: 0
DIARRHEA: 0
VOICE CHANGE: 0
TROUBLE SWALLOWING: 0
DIARRHEA: 1
ALLERGIC/IMMUNOLOGIC NEGATIVE: 1
DIARRHEA: 0
CHEST TIGHTNESS: 0
CHOKING: 0
COUGH: 0
NAUSEA: 1
WHEEZING: 0
STRIDOR: 0
SORE THROAT: 0
VOMITING: 1
ABDOMINAL PAIN: 1
VOMITING: 1
ABDOMINAL DISTENTION: 0
NAUSEA: 1

## 2023-11-09 ASSESSMENT — LIFESTYLE VARIABLES
HOW OFTEN DO YOU HAVE A DRINK CONTAINING ALCOHOL: NEVER
HOW MANY STANDARD DRINKS CONTAINING ALCOHOL DO YOU HAVE ON A TYPICAL DAY: PATIENT DOES NOT DRINK

## 2023-11-09 ASSESSMENT — PAIN DESCRIPTION - PAIN TYPE: TYPE: ACUTE PAIN;CHRONIC PAIN

## 2023-11-09 ASSESSMENT — PAIN DESCRIPTION - DESCRIPTORS: DESCRIPTORS: SPASM

## 2023-11-09 ASSESSMENT — PAIN DESCRIPTION - ORIENTATION: ORIENTATION: LEFT

## 2023-11-09 ASSESSMENT — PAIN DESCRIPTION - FREQUENCY: FREQUENCY: CONTINUOUS

## 2023-11-09 ASSESSMENT — PAIN - FUNCTIONAL ASSESSMENT: PAIN_FUNCTIONAL_ASSESSMENT: 0-10

## 2023-11-09 NOTE — ASSESSMENT & PLAN NOTE
- Following with [facility] cardiologist Dr. Monzon - she still needs to schedule appt with them - encouraged she do so   - Continue on secondary prevention - chlorthalidone 25, asa 81 mg, hydralazine 25 mg TID, Imdur 30 mg, metoprolol xl 25 mg, torsemide 40 mg

## 2023-11-09 NOTE — H&P
Negative Negative    pH, UA 5.5 5.0 - 9.0    Protein, UA 30 (A) Negative mg/dL    Urobilinogen, Urine 0.2 <2.0 E.U./dL    Nitrite, Urine Negative Negative    Leukocyte Esterase, Urine TRACE (A) Negative    Urine Reflex to Culture Not Indicated    Microscopic Urinalysis    Collection Time: 11/09/23  3:15 AM   Result Value Ref Range    RBC, UA 0-2 0 - 2 /HPF    Bacteria, UA Negative Negative /HPF    Hyaline Casts, UA 5-10 0 - 5 /HPF    WBC, UA 6-9 (A) 0 - 5 /HPF    Epithelial Cells, UA 6-10 0 - 5 /HPF   Lactate, Sepsis    Collection Time: 11/09/23  3:27 AM   Result Value Ref Range    Lactic Acid, Sepsis 1.4 0.5 - 1.9 mmol/L   POCT Glucose    Collection Time: 11/09/23  5:28 AM   Result Value Ref Range    POC Glucose 346 (H) 70 - 99 mg/dl    Performed on ACCU-CHEK    POCT Glucose    Collection Time: 11/09/23  6:47 AM   Result Value Ref Range    POC Glucose 350 (H) 70 - 99 mg/dl    Performed on ACCU-CHEK    POCT Glucose    Collection Time: 11/09/23  7:29 AM   Result Value Ref Range    POC Glucose 329 (H) 70 - 99 mg/dl    Performed on ACCU-CHEK    POCT Glucose    Collection Time: 11/09/23  7:45 AM   Result Value Ref Range    QC OK? 329         Imaging/Diagnostics Last 24 Hours   CT ABDOMEN PELVIS WO CONTRAST Additional Contrast? None    Result Date: 11/9/2023  EXAMINATION: CT OF THE ABDOMEN AND PELVIS WITHOUT CONTRAST 11/9/2023 3:30 am TECHNIQUE: CT of the abdomen and pelvis was performed without the administration of intravenous contrast. Multiplanar reformatted images are provided for review. Automated exposure control, iterative reconstruction, and/or weight based adjustment of the mA/kV was utilized to reduce the radiation dose to as low as reasonably achievable. COMPARISON: 06/08/2023 HISTORY: ORDERING SYSTEM PROVIDED HISTORY: diverticulitis? TECHNOLOGIST PROVIDED HISTORY: Reason for exam:->diverticulitis?  Additional Contrast?->None Decision Support Exception - unselect if not a suspected or confirmed emergency

## 2023-11-09 NOTE — ED NOTES
[facility] @ [phone]  Dr Zulma Babcock returned call @ [phone]        [facility] @ 0  Dr Karla Stroud returned call @ [place], [place]  11/09/23 6407

## 2023-11-09 NOTE — ASSESSMENT & PLAN NOTE
- Needs better compliance   - Admits she stopped taking her insulin prior to hospitalization and no longer taking Jardiance or Mounjaro   - encouraged better compliance - take the insulin and monitor sugars. Need good glucose control to prevent another MI or diabetic wound.   - Follow up with Dr. Fonseca soonest available.

## 2023-11-09 NOTE — ED PROVIDER NOTES
[facility] ED  EMERGENCY DEPARTMENT ENCOUNTER      Pt Name: Estelle Flores  MRN: [id number]  [address] 1970  Date of evaluation: 11/9/2023  Provider: Estelle Flores MD  2:43 AM EST    CHIEF COMPLAINT       Chief Complaint   Patient presents with    Nausea     And vomiting, ongoing since discharge, \"I cant take it anymore\", saw PCP yesterday for same and \"they didn't do anything\", SP MI with stent 10/26         HISTORY OF PRESENT ILLNESS   (Location/Symptom, Timing/Onset, Context/Setting, Quality, Duration, Modifying Factors, Severity)  Note limiting factors. Estelle Flores is a 47 y.o. female who presents to the emergency department via private vehicle with her  for evaluation of abdominal pain. She says that ever since she was discharged she has had up to 10 episodes daily of vomiting with lower like abdominal discomfort. She says that her symptoms worsen after she eats. She denies fever, headache or vision change, neck stiffness, current chest pain, increased shortness of breath, dysuria, vaginal complaints, blood in stool. Reports history of cholecystectomy. She was seen by her physician and given rectal suppositories for nausea that she did not fill. She reports she is not on any diuretics currently  Chart review notes that the patient was discharged from the hospital 1 week ago after an admission for ARTIE suspected to be secondary to interstitial nephritis, fluid overload given Lasix, foot cellulitis on Zosyn and Rocephin  Of note while the patient was here she had an episode of chest pain with ischemic changes noted on EKG, was taken to Cath Lab, had PCI of mid LAD  She states she was not sent home on oxygen. Reports she was started on aspirin, Plavix, doxycycline and blood pressure medications. REPORTS FRANK not on CPAP  HPI  Chart review notes history of CAD, hypertension, diabetes, hyperlipidemia, hypertension  Nursing Notes were reviewed.     REVIEW OF SYSTEMS    (2-9

## 2023-11-09 NOTE — PROGRESS NOTES
Unable to reach patient for call back after patient's follow up appointment with PCP.   MIYAM with call back number for patient to call if needed   If no voicemail available call attempts x 2 were made to contact the patient to assist with any questions or concerns patient may have.

## 2023-11-09 NOTE — ED TRIAGE NOTES
Pt to ed from home via triage with SO with c/o nausea and vomiting  Pt sates symptoms ongoing for over a week, since prior to discharge from admit  Pt had recent admission for cellulitis and during admission pt states she had an MI and stent, states she had PCP appt  yesterday for same complaint and states \"they didn't do anything\"  On arrival, pt skin WDI, pt alert, calm and cooperative  Respirations even and unlabored.   No shows no signs of acute distress  Pt reports chest pain today, states it has been same and unchanged since previous admission

## 2023-11-10 ENCOUNTER — APPOINTMENT (OUTPATIENT)
Dept: GENERAL RADIOLOGY | Age: 53
DRG: 314 | End: 2023-11-10
Payer: COMMERCIAL

## 2023-11-10 LAB
ALBUMIN SERPL-MCNC: 3.3 G/DL (ref 3.5–4.6)
ALP SERPL-CCNC: 125 U/L (ref 40–130)
ALT SERPL-CCNC: 6 U/L (ref 0–33)
ANION GAP SERPL CALCULATED.3IONS-SCNC: 14 MEQ/L (ref 9–15)
AST SERPL-CCNC: 7 U/L (ref 0–35)
BASOPHILS # BLD: 0.1 K/UL (ref 0–0.2)
BASOPHILS NFR BLD: 0.6 %
BILIRUB SERPL-MCNC: 0.5 MG/DL (ref 0.2–0.7)
BUN SERPL-MCNC: 32 MG/DL (ref 6–20)
CALCIUM SERPL-MCNC: 8.4 MG/DL (ref 8.5–9.9)
CHLORIDE SERPL-SCNC: 94 MEQ/L (ref 95–107)
CO2 SERPL-SCNC: 27 MEQ/L (ref 20–31)
CREAT SERPL-MCNC: 1.67 MG/DL (ref 0.5–0.9)
EKG ATRIAL RATE: 103 BPM
EKG ATRIAL RATE: 103 BPM
EKG P AXIS: 14 DEGREES
EKG P AXIS: 30 DEGREES
EKG P-R INTERVAL: 154 MS
EKG P-R INTERVAL: 158 MS
EKG Q-T INTERVAL: 360 MS
EKG Q-T INTERVAL: 364 MS
EKG QRS DURATION: 72 MS
EKG QRS DURATION: 84 MS
EKG QTC CALCULATION (BAZETT): 471 MS
EKG QTC CALCULATION (BAZETT): 476 MS
EKG R AXIS: -10 DEGREES
EKG R AXIS: -6 DEGREES
EKG T AXIS: -34 DEGREES
EKG T AXIS: -45 DEGREES
EKG VENTRICULAR RATE: 103 BPM
EKG VENTRICULAR RATE: 103 BPM
EOSINOPHIL # BLD: 0.3 K/UL (ref 0–0.7)
EOSINOPHIL NFR BLD: 1.5 %
ERYTHROCYTE [DISTWIDTH] IN BLOOD BY AUTOMATED COUNT: 12.3 % (ref 11.5–14.5)
GLOBULIN SER CALC-MCNC: 4.2 G/DL (ref 2.3–3.5)
GLUCOSE BLD-MCNC: 279 MG/DL (ref 70–99)
GLUCOSE BLD-MCNC: 281 MG/DL (ref 70–99)
GLUCOSE BLD-MCNC: 291 MG/DL (ref 70–99)
GLUCOSE BLD-MCNC: 296 MG/DL (ref 70–99)
GLUCOSE SERPL-MCNC: 296 MG/DL (ref 70–99)
HCT VFR BLD AUTO: 29.6 % (ref 37–47)
HGB BLD-MCNC: 9.6 G/DL (ref 12–16)
LYMPHOCYTES # BLD: 1.6 K/UL (ref 1–4.8)
LYMPHOCYTES NFR BLD: 8.8 %
MCH RBC QN AUTO: 30 PG (ref 27–31.3)
MCHC RBC AUTO-ENTMCNC: 32.4 % (ref 33–37)
MCV RBC AUTO: 92.5 FL (ref 79.4–94.8)
MONOCYTES # BLD: 1.3 K/UL (ref 0.2–0.8)
MONOCYTES NFR BLD: 7 %
NEUTROPHILS # BLD: 14.6 K/UL (ref 1.4–6.5)
NEUTS SEG NFR BLD: 81.4 %
PERFORMED ON: ABNORMAL
PLATELET # BLD AUTO: 357 K/UL (ref 130–400)
POTASSIUM SERPL-SCNC: 3.4 MEQ/L (ref 3.4–4.9)
PROT SERPL-MCNC: 7.5 G/DL (ref 6.3–8)
RBC # BLD AUTO: 3.2 M/UL (ref 4.2–5.4)
SODIUM SERPL-SCNC: 135 MEQ/L (ref 135–144)
TROPONIN, HIGH SENSITIVITY: 19 NG/L (ref 0–19)
TROPONIN, HIGH SENSITIVITY: 20 NG/L (ref 0–19)
TROPONIN, HIGH SENSITIVITY: 20 NG/L (ref 0–19)
WBC # BLD AUTO: 17.9 K/UL (ref 4.8–10.8)

## 2023-11-10 PROCEDURE — 2580000003 HC RX 258: Performed by: INTERNAL MEDICINE

## 2023-11-10 PROCEDURE — G0378 HOSPITAL OBSERVATION PER HR: HCPCS

## 2023-11-10 PROCEDURE — 93010 ELECTROCARDIOGRAM REPORT: CPT | Performed by: INTERNAL MEDICINE

## 2023-11-10 PROCEDURE — 36415 COLL VENOUS BLD VENIPUNCTURE: CPT

## 2023-11-10 PROCEDURE — 80053 COMPREHEN METABOLIC PANEL: CPT

## 2023-11-10 PROCEDURE — 6370000000 HC RX 637 (ALT 250 FOR IP): Performed by: INTERNAL MEDICINE

## 2023-11-10 PROCEDURE — 2580000003 HC RX 258: Performed by: REGISTERED NURSE

## 2023-11-10 PROCEDURE — 99222 1ST HOSP IP/OBS MODERATE 55: CPT | Performed by: NURSE PRACTITIONER

## 2023-11-10 PROCEDURE — 99232 SBSQ HOSP IP/OBS MODERATE 35: CPT | Performed by: INTERNAL MEDICINE

## 2023-11-10 PROCEDURE — 71045 X-RAY EXAM CHEST 1 VIEW: CPT

## 2023-11-10 PROCEDURE — 93005 ELECTROCARDIOGRAM TRACING: CPT | Performed by: INTERNAL MEDICINE

## 2023-11-10 PROCEDURE — 85025 COMPLETE CBC W/AUTO DIFF WBC: CPT

## 2023-11-10 PROCEDURE — 6370000000 HC RX 637 (ALT 250 FOR IP): Performed by: REGISTERED NURSE

## 2023-11-10 PROCEDURE — 6360000002 HC RX W HCPCS: Performed by: REGISTERED NURSE

## 2023-11-10 PROCEDURE — 2500000003 HC RX 250 WO HCPCS: Performed by: INTERNAL MEDICINE

## 2023-11-10 PROCEDURE — 84484 ASSAY OF TROPONIN QUANT: CPT

## 2023-11-10 PROCEDURE — 96361 HYDRATE IV INFUSION ADD-ON: CPT

## 2023-11-10 PROCEDURE — 3E033XZ INTRODUCTION OF VASOPRESSOR INTO PERIPHERAL VEIN, PERCUTANEOUS APPROACH: ICD-10-PCS | Performed by: INTERNAL MEDICINE

## 2023-11-10 PROCEDURE — 2580000003 HC RX 258: Performed by: EMERGENCY MEDICINE

## 2023-11-10 RX ORDER — TIZANIDINE 4 MG/1
4 TABLET ORAL EVERY 6 HOURS PRN
Status: DISCONTINUED | OUTPATIENT
Start: 2023-11-10 | End: 2023-11-12 | Stop reason: HOSPADM

## 2023-11-10 RX ORDER — 0.9 % SODIUM CHLORIDE 0.9 %
500 INTRAVENOUS SOLUTION INTRAVENOUS ONCE
Status: COMPLETED | OUTPATIENT
Start: 2023-11-10 | End: 2023-11-10

## 2023-11-10 RX ORDER — NOREPINEPHRINE BITARTRATE 0.06 MG/ML
1-100 INJECTION, SOLUTION INTRAVENOUS CONTINUOUS
Status: DISCONTINUED | OUTPATIENT
Start: 2023-11-10 | End: 2023-11-12

## 2023-11-10 RX ORDER — INSULIN GLARGINE 100 [IU]/ML
10 INJECTION, SOLUTION SUBCUTANEOUS 2 TIMES DAILY
Status: DISCONTINUED | OUTPATIENT
Start: 2023-11-10 | End: 2023-11-11

## 2023-11-10 RX ADMIN — TIZANIDINE 4 MG: 4 TABLET ORAL at 16:04

## 2023-11-10 RX ADMIN — INSULIN GLARGINE 10 UNITS: 100 INJECTION, SOLUTION SUBCUTANEOUS at 13:41

## 2023-11-10 RX ADMIN — SODIUM CHLORIDE 500 ML: 9 INJECTION, SOLUTION INTRAVENOUS at 21:27

## 2023-11-10 RX ADMIN — SODIUM CHLORIDE: 9 INJECTION, SOLUTION INTRAVENOUS at 20:07

## 2023-11-10 RX ADMIN — ONDANSETRON 4 MG: 2 INJECTION INTRAMUSCULAR; INTRAVENOUS at 23:55

## 2023-11-10 RX ADMIN — TIZANIDINE 4 MG: 4 TABLET ORAL at 09:32

## 2023-11-10 RX ADMIN — SODIUM CHLORIDE 500 ML: 900 INJECTION, SOLUTION INTRAVENOUS at 12:36

## 2023-11-10 RX ADMIN — INSULIN LISPRO 4 UNITS: 100 INJECTION, SOLUTION INTRAVENOUS; SUBCUTANEOUS at 17:48

## 2023-11-10 RX ADMIN — SODIUM CHLORIDE, PRESERVATIVE FREE 10 ML: 5 INJECTION INTRAVENOUS at 08:43

## 2023-11-10 RX ADMIN — ASPIRIN 81 MG: 81 TABLET, COATED ORAL at 08:35

## 2023-11-10 RX ADMIN — PANTOPRAZOLE SODIUM 40 MG: 40 TABLET, DELAYED RELEASE ORAL at 07:07

## 2023-11-10 RX ADMIN — CLOPIDOGREL BISULFATE 75 MG: 75 TABLET ORAL at 08:35

## 2023-11-10 RX ADMIN — SODIUM CHLORIDE: 9 INJECTION, SOLUTION INTRAVENOUS at 11:37

## 2023-11-10 RX ADMIN — Medication 5 MCG/MIN: at 23:46

## 2023-11-10 RX ADMIN — INSULIN LISPRO 4 UNITS: 100 INJECTION, SOLUTION INTRAVENOUS; SUBCUTANEOUS at 13:30

## 2023-11-10 RX ADMIN — ACETAMINOPHEN 650 MG: 325 TABLET ORAL at 20:04

## 2023-11-10 RX ADMIN — INSULIN GLARGINE 10 UNITS: 100 INJECTION, SOLUTION SUBCUTANEOUS at 21:35

## 2023-11-10 RX ADMIN — HYDRALAZINE HYDROCHLORIDE 25 MG: 25 TABLET, FILM COATED ORAL at 07:07

## 2023-11-10 RX ADMIN — Medication 1 CAPSULE: at 08:35

## 2023-11-10 RX ADMIN — INSULIN LISPRO 4 UNITS: 100 INJECTION, SOLUTION INTRAVENOUS; SUBCUTANEOUS at 08:40

## 2023-11-10 RX ADMIN — ATORVASTATIN CALCIUM 40 MG: 40 TABLET, FILM COATED ORAL at 20:04

## 2023-11-10 RX ADMIN — ESCITALOPRAM OXALATE 20 MG: 20 TABLET ORAL at 08:34

## 2023-11-10 RX ADMIN — ISOSORBIDE MONONITRATE 30 MG: 60 TABLET, EXTENDED RELEASE ORAL at 08:35

## 2023-11-10 RX ADMIN — METOPROLOL SUCCINATE 25 MG: 25 TABLET, EXTENDED RELEASE ORAL at 08:35

## 2023-11-10 RX ADMIN — TORSEMIDE 40 MG: 20 TABLET ORAL at 08:34

## 2023-11-10 ASSESSMENT — PAIN DESCRIPTION - ORIENTATION: ORIENTATION: RIGHT;MID

## 2023-11-10 ASSESSMENT — PAIN SCALES - GENERAL
PAINLEVEL_OUTOF10: 0
PAINLEVEL_OUTOF10: 5
PAINLEVEL_OUTOF10: 7

## 2023-11-10 ASSESSMENT — PAIN DESCRIPTION - LOCATION: LOCATION: SHOULDER;NECK

## 2023-11-10 ASSESSMENT — PAIN DESCRIPTION - DESCRIPTORS: DESCRIPTORS: SHARP

## 2023-11-10 NOTE — CARE COORDINATION
Met with pt at bedside. Pt from home alone, no DME, no O2, no VA, no HD, no prior services. Pt plans to return home on discharge and denies needs. Pt is in Observation status.

## 2023-11-11 PROBLEM — I95.9 HYPOTENSION: Status: ACTIVE | Noted: 2023-11-11

## 2023-11-11 LAB
ANION GAP SERPL CALCULATED.3IONS-SCNC: 14 MEQ/L (ref 9–15)
BASOPHILS # BLD: 0.1 K/UL (ref 0–0.2)
BASOPHILS NFR BLD: 0.3 %
BUN SERPL-MCNC: 27 MG/DL (ref 6–20)
CALCIUM SERPL-MCNC: 8.4 MG/DL (ref 8.5–9.9)
CHLORIDE SERPL-SCNC: 94 MEQ/L (ref 95–107)
CO2 SERPL-SCNC: 27 MEQ/L (ref 20–31)
CREAT SERPL-MCNC: 1.67 MG/DL (ref 0.5–0.9)
EOSINOPHIL # BLD: 0.4 K/UL (ref 0–0.7)
EOSINOPHIL NFR BLD: 2 %
ERYTHROCYTE [DISTWIDTH] IN BLOOD BY AUTOMATED COUNT: 12.3 % (ref 11.5–14.5)
GLUCOSE BLD-MCNC: 181 MG/DL (ref 70–99)
GLUCOSE BLD-MCNC: 242 MG/DL (ref 70–99)
GLUCOSE BLD-MCNC: 259 MG/DL (ref 70–99)
GLUCOSE BLD-MCNC: 367 MG/DL (ref 70–99)
GLUCOSE SERPL-MCNC: 208 MG/DL (ref 70–99)
HCT VFR BLD AUTO: 26.7 % (ref 37–47)
HGB BLD-MCNC: 8.8 G/DL (ref 12–16)
LYMPHOCYTES # BLD: 2.1 K/UL (ref 1–4.8)
LYMPHOCYTES NFR BLD: 11.8 %
MCH RBC QN AUTO: 30.1 PG (ref 27–31.3)
MCHC RBC AUTO-ENTMCNC: 33 % (ref 33–37)
MCV RBC AUTO: 91.4 FL (ref 79.4–94.8)
MONOCYTES # BLD: 1.4 K/UL (ref 0.2–0.8)
MONOCYTES NFR BLD: 8.2 %
NEUTROPHILS # BLD: 13.3 K/UL (ref 1.4–6.5)
NEUTS SEG NFR BLD: 77.1 %
PERFORMED ON: ABNORMAL
PLATELET # BLD AUTO: 302 K/UL (ref 130–400)
POTASSIUM SERPL-SCNC: 2.9 MEQ/L (ref 3.4–4.9)
PROCALCITONIN SERPL IA-MCNC: 0.12 NG/ML (ref 0–0.15)
RBC # BLD AUTO: 2.92 M/UL (ref 4.2–5.4)
SODIUM SERPL-SCNC: 135 MEQ/L (ref 135–144)
TROPONIN, HIGH SENSITIVITY: 15 NG/L (ref 0–19)
WBC # BLD AUTO: 17.3 K/UL (ref 4.8–10.8)

## 2023-11-11 PROCEDURE — 99291 CRITICAL CARE FIRST HOUR: CPT | Performed by: INTERNAL MEDICINE

## 2023-11-11 PROCEDURE — 93005 ELECTROCARDIOGRAM TRACING: CPT

## 2023-11-11 PROCEDURE — 85025 COMPLETE CBC W/AUTO DIFF WBC: CPT

## 2023-11-11 PROCEDURE — 80048 BASIC METABOLIC PNL TOTAL CA: CPT

## 2023-11-11 PROCEDURE — 2580000003 HC RX 258: Performed by: REGISTERED NURSE

## 2023-11-11 PROCEDURE — 84484 ASSAY OF TROPONIN QUANT: CPT

## 2023-11-11 PROCEDURE — 84145 PROCALCITONIN (PCT): CPT

## 2023-11-11 PROCEDURE — 6370000000 HC RX 637 (ALT 250 FOR IP): Performed by: INTERNAL MEDICINE

## 2023-11-11 PROCEDURE — 6370000000 HC RX 637 (ALT 250 FOR IP): Performed by: STUDENT IN AN ORGANIZED HEALTH CARE EDUCATION/TRAINING PROGRAM

## 2023-11-11 PROCEDURE — 6370000000 HC RX 637 (ALT 250 FOR IP): Performed by: REGISTERED NURSE

## 2023-11-11 PROCEDURE — 6360000002 HC RX W HCPCS: Performed by: REGISTERED NURSE

## 2023-11-11 PROCEDURE — 99233 SBSQ HOSP IP/OBS HIGH 50: CPT | Performed by: INTERNAL MEDICINE

## 2023-11-11 PROCEDURE — 36415 COLL VENOUS BLD VENIPUNCTURE: CPT

## 2023-11-11 PROCEDURE — 2000000000 HC ICU R&B

## 2023-11-11 PROCEDURE — 2580000003 HC RX 258: Performed by: EMERGENCY MEDICINE

## 2023-11-11 RX ORDER — INSULIN LISPRO 100 [IU]/ML
0-16 INJECTION, SOLUTION INTRAVENOUS; SUBCUTANEOUS
Status: DISCONTINUED | OUTPATIENT
Start: 2023-11-11 | End: 2023-11-12 | Stop reason: HOSPADM

## 2023-11-11 RX ORDER — MIDODRINE HYDROCHLORIDE 5 MG/1
10 TABLET ORAL
Status: DISCONTINUED | OUTPATIENT
Start: 2023-11-11 | End: 2023-11-12 | Stop reason: HOSPADM

## 2023-11-11 RX ORDER — MAGNESIUM HYDROXIDE/ALUMINUM HYDROXICE/SIMETHICONE 120; 1200; 1200 MG/30ML; MG/30ML; MG/30ML
30 SUSPENSION ORAL EVERY 6 HOURS PRN
Status: DISCONTINUED | OUTPATIENT
Start: 2023-11-11 | End: 2023-11-12 | Stop reason: HOSPADM

## 2023-11-11 RX ORDER — INSULIN GLARGINE 100 [IU]/ML
12 INJECTION, SOLUTION SUBCUTANEOUS 2 TIMES DAILY
Status: DISCONTINUED | OUTPATIENT
Start: 2023-11-11 | End: 2023-11-12 | Stop reason: HOSPADM

## 2023-11-11 RX ORDER — POTASSIUM CHLORIDE 29.8 MG/ML
20 INJECTION INTRAVENOUS PRN
Status: DISCONTINUED | OUTPATIENT
Start: 2023-11-11 | End: 2023-11-12 | Stop reason: HOSPADM

## 2023-11-11 RX ADMIN — ACETAMINOPHEN 650 MG: 325 TABLET ORAL at 04:42

## 2023-11-11 RX ADMIN — Medication 1 CAPSULE: at 09:42

## 2023-11-11 RX ADMIN — MIDODRINE HYDROCHLORIDE 10 MG: 5 TABLET ORAL at 12:12

## 2023-11-11 RX ADMIN — INSULIN GLARGINE 10 UNITS: 100 INJECTION, SOLUTION SUBCUTANEOUS at 09:51

## 2023-11-11 RX ADMIN — INSULIN LISPRO 8 UNITS: 100 INJECTION, SOLUTION INTRAVENOUS; SUBCUTANEOUS at 12:12

## 2023-11-11 RX ADMIN — MIDODRINE HYDROCHLORIDE 10 MG: 5 TABLET ORAL at 09:43

## 2023-11-11 RX ADMIN — POTASSIUM CHLORIDE 10 MEQ: 10 INJECTION, SOLUTION INTRAVENOUS at 13:11

## 2023-11-11 RX ADMIN — INSULIN LISPRO 8 UNITS: 100 INJECTION, SOLUTION INTRAVENOUS; SUBCUTANEOUS at 09:51

## 2023-11-11 RX ADMIN — POTASSIUM CHLORIDE 10 MEQ: 10 INJECTION, SOLUTION INTRAVENOUS at 06:56

## 2023-11-11 RX ADMIN — POTASSIUM CHLORIDE 10 MEQ: 10 INJECTION, SOLUTION INTRAVENOUS at 05:59

## 2023-11-11 RX ADMIN — SODIUM CHLORIDE: 9 INJECTION, SOLUTION INTRAVENOUS at 00:36

## 2023-11-11 RX ADMIN — ACETAMINOPHEN 650 MG: 325 TABLET ORAL at 12:51

## 2023-11-11 RX ADMIN — TIZANIDINE 4 MG: 4 TABLET ORAL at 12:51

## 2023-11-11 RX ADMIN — SODIUM CHLORIDE, PRESERVATIVE FREE 10 ML: 5 INJECTION INTRAVENOUS at 09:51

## 2023-11-11 RX ADMIN — POTASSIUM CHLORIDE 10 MEQ: 10 INJECTION, SOLUTION INTRAVENOUS at 09:51

## 2023-11-11 RX ADMIN — TRAZODONE HYDROCHLORIDE 100 MG: 100 TABLET ORAL at 20:43

## 2023-11-11 RX ADMIN — TIZANIDINE 4 MG: 4 TABLET ORAL at 20:43

## 2023-11-11 RX ADMIN — POTASSIUM CHLORIDE 10 MEQ: 10 INJECTION, SOLUTION INTRAVENOUS at 12:09

## 2023-11-11 RX ADMIN — PANTOPRAZOLE SODIUM 40 MG: 40 TABLET, DELAYED RELEASE ORAL at 09:43

## 2023-11-11 RX ADMIN — POTASSIUM CHLORIDE 10 MEQ: 10 INJECTION, SOLUTION INTRAVENOUS at 10:59

## 2023-11-11 RX ADMIN — INSULIN GLARGINE 12 UNITS: 100 INJECTION, SOLUTION SUBCUTANEOUS at 20:43

## 2023-11-11 RX ADMIN — ESCITALOPRAM OXALATE 20 MG: 20 TABLET ORAL at 09:42

## 2023-11-11 RX ADMIN — TIZANIDINE 4 MG: 4 TABLET ORAL at 04:42

## 2023-11-11 RX ADMIN — ATORVASTATIN CALCIUM 40 MG: 40 TABLET, FILM COATED ORAL at 20:43

## 2023-11-11 RX ADMIN — ASPIRIN 81 MG: 81 TABLET, COATED ORAL at 09:43

## 2023-11-11 RX ADMIN — SODIUM CHLORIDE, PRESERVATIVE FREE 10 ML: 5 INJECTION INTRAVENOUS at 20:45

## 2023-11-11 RX ADMIN — CLOPIDOGREL BISULFATE 75 MG: 75 TABLET ORAL at 09:43

## 2023-11-11 RX ADMIN — ALUMINUM HYDROXIDE, MAGNESIUM HYDROXIDE, AND SIMETHICONE 30 ML: 200; 200; 20 SUSPENSION ORAL at 14:14

## 2023-11-11 RX ADMIN — ACETAMINOPHEN 650 MG: 325 TABLET ORAL at 20:43

## 2023-11-11 ASSESSMENT — PAIN SCALES - GENERAL
PAINLEVEL_OUTOF10: 6
PAINLEVEL_OUTOF10: 0
PAINLEVEL_OUTOF10: 5
PAINLEVEL_OUTOF10: 0
PAINLEVEL_OUTOF10: 0
PAINLEVEL_OUTOF10: 7
PAINLEVEL_OUTOF10: 6
PAINLEVEL_OUTOF10: 0

## 2023-11-11 ASSESSMENT — PAIN DESCRIPTION - DESCRIPTORS
DESCRIPTORS: ACHING;DISCOMFORT
DESCRIPTORS: ACHING;DISCOMFORT
DESCRIPTORS: SHARP

## 2023-11-11 ASSESSMENT — PAIN DESCRIPTION - LOCATION
LOCATION: SHOULDER
LOCATION: ABDOMEN;CHEST
LOCATION: SHOULDER
LOCATION: ABDOMEN

## 2023-11-11 ASSESSMENT — PAIN DESCRIPTION - ORIENTATION
ORIENTATION: LEFT;UPPER
ORIENTATION: LEFT;UPPER

## 2023-11-11 NOTE — CONSULTS
Consult  Patient: George Posey  Unit/Bed:W279/W279-01  YOB: 1970  MRN: [id number]  Acct: [de-identified]   Admitting Diagnosis: Dehydration [E86.0]  Hypomagnesemia [E83.42]  Nausea [R11.0]  Pericardial effusion [I31.39]  Hyperglycemia [R73.9]  Admit Date:  11/9/2023  Hospital Day: 0      Chief Complaint:   Pericardial effusion    History of Present Illness:   George Posey is a 47 y.o. female who presents to the emergency department via private vehicle with her  for evaluation of abdominal pain. She says that ever since she was discharged she has had up to 10 episodes daily of vomiting with lower like abdominal discomfort. She says that her symptoms worsen after she eats. She denies fever, headache or vision change, neck stiffness, current chest pain, increased shortness of breath, dysuria, vaginal complaints, blood in stool. Reports history of cholecystectomy. She was seen by her physician and given rectal suppositories for nausea that she did not fill. She reports she is not on any diuretics currently.   Says she was recently diagnosed with kidney disease    Allergies   Allergen Reactions    Metformin Diarrhea    Nabumetone Rash and Other (See Comments)       Current Facility-Administered Medications   Medication Dose Route Frequency Provider Last Rate Last Admin    0.9 % sodium chloride infusion   IntraVENous Continuous George Posey  mL/hr at 11/09/23 0927 New Bag at 11/09/23 0927    sodium chloride flush 0.9 % injection 5-40 mL  5-40 mL IntraVENous 2 times per day PAOLO Ramírez CNP        sodium chloride flush 0.9 % injection 5-40 mL  5-40 mL IntraVENous PRN PAOLO Ramírez CNP        0.9 % sodium chloride infusion   IntraVENous PRN PAOLO Ramírez CNP        potassium chloride (KLOR-CON M) extended release tablet 40 mEq  40 mEq Oral PRN PAOLO Ramírez CNP        Or    potassium bicarb-citric acid (EFFER-K) effervescent tablet 40 mEq  40 mEq Oral PRN
DATE OF CONSULTATION  11/9/2023    CONSULTANT  Dr Thuan Raygoza     REQUESTING MD Dilia Nieto Dr.  Chief Complaint   Patient presents with    Nausea     And vomiting, ongoing since discharge, \"I cant take it anymore\", saw PCP yesterday for same and \"they didn't do anything\", SP MI with stent 10/26       Hospital Day: 0       Patient is a 47 y.o. female with a past medical history of CAD s/p PCI of mid LAD on 10/30/23 (compliant on aspirin and Plavix), HTN, HLD, D2M, who presents with a chief complaint of n/v/abdominal pain for the past several days. Patient recently on IV vanco/zosyn for diabetic foot infection, in which her kidney function worsened but today is improved at BUN 40 Cr 1.99. CT of abdomen was done initially and incidental finding showed possible moderate pericardial effusion. Cardiology consulted for this finding. In ED, vital signs were BP 91/61, heart rate 86, respirations 20, SpO2 97% on room air, sodium 134, potassium 3.1, chloride 89, CO2 29, BUN 40, Cr 1.99, GFR 29.7, magnesium 1.2, glucose 340, calcium 9.0, a1C 12.5, WBC 19.6, hemoglobin 10.6, hematocrit 33.1, platelets 143    Patient seen and examined today. Patient is in no acute distress. She denies sob, chest pain, palpitations, leg swelling, n/v, abdominal pain. Current vital signs stable.     Past Medical History:   Diagnosis Date    Acute kidney injury ([address]) 10/24/2023    CAD (coronary artery disease) 6/25/2022    CAD (coronary artery disease) 6/25/2022    Depression     Environmental allergies     Gastroparesis     GERD (gastroesophageal reflux disease)     Headache     Dr. Wiggins Lies    HPV in female     Hyperlipidemia     Hypertension     Leukocytosis     Migraines     Type II or unspecified type diabetes mellitus without mention of complication, not stated as uncontrolled       Patient Active Problem List   Diagnosis    Mixed hyperlipidemia    Essential
Inpatient consult to GI  Consult performed by: PAOLO Mena - CNP  Consult ordered by: Armando Weaver MD          Patient Name: Masood Enciso Date: 2023  2:46 AM  MR #: [id number]  : 1970    Attending Physician: Armando Weaver MD  Reason for consult: N/V, abd pain    History of Presenting Illness:      Cortney Pena is a 47 y.o. female on hospital day 0 with a history of hypertension, hyperlipidemia, type 2 diabetes, CAD, gastroparesis, GERD. Past surgical history of recent PCI of mid LAD, cholecystectomy, breast reduction surgery, bone marrow biopsy. Family history is negative for GI malignancy. Social history is negative for nicotine, EtOH or illicit drug use. history Obtained From:  patient, electronic medical record    GI consult for nausea vomiting and abdominal pain-patient was admitted with nausea vomiting and epigastric pain, symptoms have been intermittent for the past several weeks, she actually feels that this started during her recent hospitalization. She was hospitalized at the end of October for cellulitis, fever and chills, while hospitalized she developed ARTIE after the initiation of IV Zosyn and vancomycin and developed chest pain and pressure with ischemia on EKG and underwent cardiac cath with PCI to her LAD, she is on Plavix. Noted patient has a history of diabetes, not well controlled with most recent hemoglobin A1c 12.5. Cardiology was consulted this admission because patient was found to have CT imaging of moderate-sized pericardial effusion, had 2D echo that trivial circumferential pericardial effusion, thoracic surgery was consulted there is no planned surgical intervention. As reported her symptoms are intermittent in nature she is able to tolerate food at times she has been on a clear liquid diet and feels hungry and would like to try to advance her diet. At baseline she is not on a PPI and denies any chronic heartburn symptoms.   She reports
tobacco. She reports current alcohol use. She reports that she does not use drugs.     Family History:       Problem Relation Age of Onset    Heart Disease Mother     Diabetes Father     Heart Disease Father     Cancer Father         thyroid    No Known Problems Sister     No Known Problems Paternal Grandfather     No Known Problems Paternal Grandmother     No Known Problems Maternal Grandmother     No Known Problems Maternal Grandfather     No Known Problems Brother     No Known Problems Other     Breast Cancer Neg Hx     Colon Cancer Neg Hx     Eclampsia Neg Hx     Hypertension Neg Hx     Ovarian Cancer Neg Hx      Labor Neg Hx     Spont Abortions Neg Hx     Stroke Neg Hx        Allergies:  Metformin and Nabumetone        MEDICATIONS during current hospitalization:    Continuous Infusions:   norepinephrine 1 mcg/min (23)    sodium chloride 125 mL/hr at 23    sodium chloride      sodium chloride Stopped (11/10/23 0700)    dextrose         Scheduled Meds:   midodrine  10 mg Oral TID WC    insulin glargine  10 Units SubCUTAneous BID    sodium chloride flush  5-40 mL IntraVENous 2 times per day    enoxaparin  40 mg SubCUTAneous Q24H    pantoprazole  40 mg Oral QAM AC    lactobacillus  1 capsule Oral Daily with breakfast    insulin lispro  0-8 Units SubCUTAneous TID WC    insulin lispro  0-4 Units SubCUTAneous Nightly    aspirin  81 mg Oral Daily    clopidogrel  75 mg Oral Daily    escitalopram  20 mg Oral Daily    [Held by provider] hydrALAZINE  25 mg Oral 3 times per day    [Held by provider] torsemide  40 mg Oral Daily    [Held by provider] metoprolol succinate  25 mg Oral Daily    [Held by provider] isosorbide mononitrate  30 mg Oral Daily    traZODone  100 mg Oral Nightly    atorvastatin  40 mg Oral Nightly       PRN Meds:potassium chloride, tiZANidine, sodium chloride flush, sodium chloride, potassium chloride **OR** potassium alternative oral replacement **OR** [DISCONTINUED]

## 2023-11-12 VITALS
HEART RATE: 65 BPM | SYSTOLIC BLOOD PRESSURE: 94 MMHG | HEIGHT: 63 IN | BODY MASS INDEX: 32.81 KG/M2 | DIASTOLIC BLOOD PRESSURE: 57 MMHG | TEMPERATURE: 97.7 F | OXYGEN SATURATION: 93 % | WEIGHT: 185.19 LBS | RESPIRATION RATE: 18 BRPM

## 2023-11-12 LAB
ANION GAP SERPL CALCULATED.3IONS-SCNC: 12 MEQ/L (ref 9–15)
BASOPHILS # BLD: 0.1 K/UL (ref 0–0.2)
BASOPHILS NFR BLD: 0.5 %
BUN SERPL-MCNC: 19 MG/DL (ref 6–20)
CALCIUM SERPL-MCNC: 8.9 MG/DL (ref 8.5–9.9)
CHLORIDE SERPL-SCNC: 99 MEQ/L (ref 95–107)
CO2 SERPL-SCNC: 29 MEQ/L (ref 20–31)
CREAT SERPL-MCNC: 1.4 MG/DL (ref 0.5–0.9)
EOSINOPHIL # BLD: 0.5 K/UL (ref 0–0.7)
EOSINOPHIL NFR BLD: 3.4 %
ERYTHROCYTE [DISTWIDTH] IN BLOOD BY AUTOMATED COUNT: 12.1 % (ref 11.5–14.5)
GLUCOSE BLD-MCNC: 191 MG/DL (ref 70–99)
GLUCOSE BLD-MCNC: 267 MG/DL (ref 70–99)
GLUCOSE BLD-MCNC: 304 MG/DL (ref 70–99)
GLUCOSE SERPL-MCNC: 218 MG/DL (ref 70–99)
HCT VFR BLD AUTO: 28.9 % (ref 37–47)
HGB BLD-MCNC: 9.5 G/DL (ref 12–16)
LYMPHOCYTES # BLD: 2.6 K/UL (ref 1–4.8)
LYMPHOCYTES NFR BLD: 17.7 %
MCH RBC QN AUTO: 30.1 PG (ref 27–31.3)
MCHC RBC AUTO-ENTMCNC: 32.9 % (ref 33–37)
MCV RBC AUTO: 91.5 FL (ref 79.4–94.8)
MONOCYTES # BLD: 1.1 K/UL (ref 0.2–0.8)
MONOCYTES NFR BLD: 7.5 %
NEUTROPHILS # BLD: 10.2 K/UL (ref 1.4–6.5)
NEUTS SEG NFR BLD: 70.4 %
PERFORMED ON: ABNORMAL
PLATELET # BLD AUTO: 364 K/UL (ref 130–400)
POTASSIUM SERPL-SCNC: 3.5 MEQ/L (ref 3.4–4.9)
RBC # BLD AUTO: 3.16 M/UL (ref 4.2–5.4)
SODIUM SERPL-SCNC: 140 MEQ/L (ref 135–144)
WBC # BLD AUTO: 14.4 K/UL (ref 4.8–10.8)

## 2023-11-12 PROCEDURE — 6370000000 HC RX 637 (ALT 250 FOR IP): Performed by: INTERNAL MEDICINE

## 2023-11-12 PROCEDURE — 99232 SBSQ HOSP IP/OBS MODERATE 35: CPT | Performed by: INTERNAL MEDICINE

## 2023-11-12 PROCEDURE — 36415 COLL VENOUS BLD VENIPUNCTURE: CPT

## 2023-11-12 PROCEDURE — 80048 BASIC METABOLIC PNL TOTAL CA: CPT

## 2023-11-12 PROCEDURE — 6370000000 HC RX 637 (ALT 250 FOR IP): Performed by: STUDENT IN AN ORGANIZED HEALTH CARE EDUCATION/TRAINING PROGRAM

## 2023-11-12 PROCEDURE — 85025 COMPLETE CBC W/AUTO DIFF WBC: CPT

## 2023-11-12 PROCEDURE — 2700000000 HC OXYGEN THERAPY PER DAY

## 2023-11-12 PROCEDURE — 6370000000 HC RX 637 (ALT 250 FOR IP): Performed by: REGISTERED NURSE

## 2023-11-12 PROCEDURE — 2580000003 HC RX 258: Performed by: REGISTERED NURSE

## 2023-11-12 PROCEDURE — 99231 SBSQ HOSP IP/OBS SF/LOW 25: CPT | Performed by: NURSE PRACTITIONER

## 2023-11-12 RX ORDER — PANTOPRAZOLE SODIUM 40 MG/1
40 TABLET, DELAYED RELEASE ORAL
Qty: 30 TABLET | Refills: 3 | Status: SHIPPED | OUTPATIENT
Start: 2023-11-13

## 2023-11-12 RX ORDER — MIDODRINE HYDROCHLORIDE 10 MG/1
10 TABLET ORAL
Qty: 90 TABLET | Refills: 3 | Status: SHIPPED | OUTPATIENT
Start: 2023-11-12

## 2023-11-12 RX ADMIN — ACETAMINOPHEN 650 MG: 325 TABLET ORAL at 05:08

## 2023-11-12 RX ADMIN — TIZANIDINE 4 MG: 4 TABLET ORAL at 11:48

## 2023-11-12 RX ADMIN — TIZANIDINE 4 MG: 4 TABLET ORAL at 05:08

## 2023-11-12 RX ADMIN — ESCITALOPRAM OXALATE 20 MG: 20 TABLET ORAL at 09:12

## 2023-11-12 RX ADMIN — INSULIN LISPRO 8 UNITS: 100 INJECTION, SOLUTION INTRAVENOUS; SUBCUTANEOUS at 09:10

## 2023-11-12 RX ADMIN — ASPIRIN 81 MG: 81 TABLET, COATED ORAL at 09:11

## 2023-11-12 RX ADMIN — INSULIN GLARGINE 12 UNITS: 100 INJECTION, SOLUTION SUBCUTANEOUS at 09:10

## 2023-11-12 RX ADMIN — CARIPRAZINE 3 MG: 1.5 CAPSULE, GELATIN COATED ORAL at 09:58

## 2023-11-12 RX ADMIN — MIDODRINE HYDROCHLORIDE 10 MG: 5 TABLET ORAL at 09:11

## 2023-11-12 RX ADMIN — CLOPIDOGREL BISULFATE 75 MG: 75 TABLET ORAL at 09:12

## 2023-11-12 RX ADMIN — MIDODRINE HYDROCHLORIDE 10 MG: 5 TABLET ORAL at 16:58

## 2023-11-12 RX ADMIN — MIDODRINE HYDROCHLORIDE 10 MG: 5 TABLET ORAL at 11:48

## 2023-11-12 RX ADMIN — SODIUM CHLORIDE, PRESERVATIVE FREE 10 ML: 5 INJECTION INTRAVENOUS at 09:12

## 2023-11-12 RX ADMIN — INSULIN LISPRO 12 UNITS: 100 INJECTION, SOLUTION INTRAVENOUS; SUBCUTANEOUS at 11:47

## 2023-11-12 RX ADMIN — Medication 1 CAPSULE: at 09:11

## 2023-11-12 RX ADMIN — ACETAMINOPHEN 650 MG: 325 TABLET ORAL at 11:46

## 2023-11-12 RX ADMIN — PANTOPRAZOLE SODIUM 40 MG: 40 TABLET, DELAYED RELEASE ORAL at 05:01

## 2023-11-12 ASSESSMENT — PAIN SCALES - GENERAL
PAINLEVEL_OUTOF10: 5
PAINLEVEL_OUTOF10: 5
PAINLEVEL_OUTOF10: 0
PAINLEVEL_OUTOF10: 0

## 2023-11-12 ASSESSMENT — PAIN DESCRIPTION - LOCATION
LOCATION: BACK
LOCATION: ABDOMEN

## 2023-11-12 ASSESSMENT — PAIN DESCRIPTION - DESCRIPTORS
DESCRIPTORS: DISCOMFORT
DESCRIPTORS: ACHING;SORE

## 2023-11-12 ASSESSMENT — PAIN DESCRIPTION - ORIENTATION: ORIENTATION: RIGHT;LEFT

## 2023-11-12 NOTE — DISCHARGE INSTR - DIET

## 2023-11-12 NOTE — DISCHARGE INSTR - COC
score (0-10 scale): Pain Level: 5  Last Weight:   Wt Readings from Last 1 Encounters:   11/10/23 84 kg (185 lb 3 oz)     Mental Status:  {IP PT MENTAL STATUS:56054}    IV Access:  { ELI IV ACCESS:749819216}    Nursing Mobility/ADLs:  Walking   {CHP DME SDEI:421624472}  Transfer  {CHP DME VCYV:362605255}  Bathing  {CHP DME ZPDB:535793044}  Dressing  {CHP DME BZRN:012019970}  Toileting  {CHP DME NVJX:498728273}  Feeding  {CHP DME ICYJ:191383685}  Med Admin  {P DME JWUV:039612838}  Med Delivery   { ELI MED Delivery:945755432}    Wound Care Documentation and Therapy:        Elimination:  Continence: Bowel: {YES / BZ:20375}  Bladder: {YES / BV:86439}  Urinary Catheter: {Urinary Catheter:011470859}   Colostomy/Ileostomy/Ileal Conduit: {YES / CJ:10152}       Date of Last BM: ***    Intake/Output Summary (Last 24 hours) at 2023 1421  Last data filed at 2023 0043  Gross per 24 hour   Intake 697.77 ml   Output 900 ml   Net -202.23 ml     I/O last 3 completed shifts: In: 6909 [P.O.:1200;  I.V.:5082.7; IV Piggyback:626.2]  Out: 2600 [Urine:2600]    Safety Concerns:     1105 On license of [facility] [employer] Safety Concerns:506099048}    Impairments/Disabilities:      [address] [facility] Impairments/Disabilities:167371977}    Nutrition Therapy:  Current Nutrition Therapy:   1105 [facility] ELI Diet List:754143885}    Routes of Feeding: {[facility] DME Other Feedings:522950433}  Liquids: {Slp liquid thickness:87869}  Daily Fluid Restriction: {CHP DME Yes amt example:756587534}  Last Modified Barium Swallow with Video (Video Swallowing Test): {Done Not Done OFDA:183512837}    Treatments at the Time of Hospital Discharge:   Respiratory Treatments: ***  Oxygen Therapy:  {Therapy; copd oxygen:19678}  Ventilator:    { CC Vent NCTU:892482344}    Rehab Therapies: {THERAPEUTIC INTERVENTION:4702791391}  Weight Bearing Status/Restrictions: [facility] Weight Bearin}  Other Medical Equipment (for information only, NOT a DME order):  {EQUIPMENT:985558815}  Other Treatments:

## 2023-11-12 NOTE — PLAN OF CARE
Problem: Pain  Goal: Verbalizes/displays adequate comfort level or baseline comfort level  Outcome: Progressing  Flowsheets (Taken 11/11/2023 2030 by Annia Bunch RN)  Verbalizes/displays adequate comfort level or baseline comfort level: Assess pain using appropriate pain scale     Problem: ABCDS Injury Assessment  Goal: Absence of physical injury  Outcome: Progressing     Problem: Chronic Conditions and Co-morbidities  Goal: Patient's chronic conditions and co-morbidity symptoms are monitored and maintained or improved  Outcome: Progressing  Flowsheets (Taken 11/11/2023 2000 by Annia Bunch RN)  Care Plan - Patient's Chronic Conditions and Co-Morbidity Symptoms are Monitored and Maintained or Improved: Monitor and assess patient's chronic conditions and comorbid symptoms for stability, deterioration, or improvement     Problem: Safety - Adult  Goal: Free from fall injury  Outcome: Progressing     Problem: Discharge Planning  Goal: Discharge to home or other facility with appropriate resources  Outcome: Progressing  Flowsheets (Taken 11/11/2023 2000 by Anina Bunch RN)  Discharge to home or other facility with appropriate resources: Identify barriers to discharge with patient and caregiver

## 2023-11-12 NOTE — DISCHARGE SUMMARY
Normal systolic function. Aortic Valve: Trileaflet valve. Thickened cusp. Tricuspid Valve: Normal RVSP. The estimated RVSP is 15 mmHg. Pericardium: Trivial circumferential pericardial effusion present. Pericardial effusion contains echogenic structures. No indication of cardiac tamponade. CT ABDOMEN PELVIS WO CONTRAST Additional Contrast? None    Result Date: 11/9/2023  EXAMINATION: CT OF THE ABDOMEN AND PELVIS WITHOUT CONTRAST 11/9/2023 3:30 am TECHNIQUE: CT of the abdomen and pelvis was performed without the administration of intravenous contrast. Multiplanar reformatted images are provided for review. Automated exposure control, iterative reconstruction, and/or weight based adjustment of the mA/kV was utilized to reduce the radiation dose to as low as reasonably achievable. COMPARISON: 06/08/2023 HISTORY: ORDERING SYSTEM PROVIDED HISTORY: diverticulitis? TECHNOLOGIST PROVIDED HISTORY: Reason for exam:->diverticulitis? Additional Contrast?->None Decision Support Exception - unselect if not a suspected or confirmed emergency medical condition->Emergency Medical Condition (MA) What reading provider will be dictating this exam?->CRC FINDINGS: Lower Chest: Moderate pericardial effusion. Organs: The liver, spleen, pancreas, adrenals, and kidneys are unremarkable. The gallbladder is absent. GI/Bowel: There is no evidence of bowel obstruction. No evidence of abnormal bowel wall thickening or distension. The appendix is normal.  There are scattered diverticula. Pelvis: The urinary bladder is partially filled. The uterus is unremarkable. Peritoneum/Retroperitoneum: No evidence of ascites or free air. No evidence of lymphadenopathy. Aorta is normal in caliber. Bones/Soft Tissues:  No acute abnormality of the visualized osseous structures. Chronic compression deformity of the L1 vertebral body. No acute abdominopelvic abnormality. Moderate pericardial effusion.      XR CHEST PORTABLE    Result Date:

## 2023-11-13 LAB
EKG ATRIAL RATE: 64 BPM
EKG P AXIS: 15 DEGREES
EKG P-R INTERVAL: 164 MS
EKG Q-T INTERVAL: 412 MS
EKG QRS DURATION: 82 MS
EKG QTC CALCULATION (BAZETT): 425 MS
EKG R AXIS: 0 DEGREES
EKG T AXIS: -32 DEGREES
EKG VENTRICULAR RATE: 64 BPM

## 2023-11-13 PROCEDURE — 93010 ELECTROCARDIOGRAM REPORT: CPT | Performed by: INTERNAL MEDICINE

## 2023-11-14 LAB
BACTERIA BLD CULT ORG #2: NORMAL
BACTERIA BLD CULT: NORMAL

## 2023-11-21 NOTE — PROGRESS NOTES
Physician Progress Note      PATIENT:               Mendel Balsam  CSN #:                  [id number]  :                       1970  ADMIT DATE:       10/27/2023 2:25 PM  [address] [place] [address] DATE:        2023 4:24 PM  RESPONDING  PROVIDER #:        [place] CEE DO          QUERY TEXT:    Patient admitted with ARTIE, diabetic foot infection. Per Dr. Sherin Boast consult   note and progress note, NSTEMI documented under  preop diagnosis, but NSTEMI   is not listed under post op diagnosis. If possible, please document in the   progress notes and discharge summary if NSTEMI was: The medical record reflects the following:  Risk Factors: 47 yof, PMH CAD, GERD, DMII, HTN, HLD  Clinical Indicators: troponin 19  ,  Dr Sherin Boast consult note 10/28/23: \" She   developed severe midsternal chest heaviness and pressure this morning with   radiation to the back as well as shortness of breath. She was noted to have   new onset ischemic EKG changes. \"  Dr. Francisca Unger cath procedure note   10/30/23 \"Pre-operative Diagnosis:  nstemi, [place]\". Post-operative diagnosis: LV   LVEDP 22mmhg, LV gram not performed LM normal  LAD 90% mid stenosis, mild to   mod disease in distal CX mod caliber, 30% prox. 50% mid RCA large caliber, 50%   mid. 60-70% distal.  Treatment: Cardiology consult, [facility]< B/L coronary angio, PCI of mid LAD with   BENJAMIN x 1,  serial trop    Thank you  Sachin DORADON RN CDS   M-F 6am-2pm  Options provided:  -- NSTEMI confirmed after study  -- NSTEMI ruled out after study  -- Other - I will add my own diagnosis  -- Disagree - Not applicable / Not valid  -- Disagree - Clinically unable to determine / Unknown  -- Refer to Clinical Documentation Reviewer    PROVIDER RESPONSE TEXT:    NSTEMI confirmed after study. Query created by: Parveen Reyez on 2023 12:56 PM      Electronically signed by:   Salazar CEE DO 2023 11:26 AM

## 2023-11-23 DIAGNOSIS — I10 ESSENTIAL HYPERTENSION: Primary | ICD-10-CM

## 2023-11-24 RX ORDER — LISINOPRIL 10 MG/1
10 TABLET ORAL DAILY
Qty: 90 TABLET | Refills: 3 | Status: SHIPPED | OUTPATIENT
Start: 2023-11-24

## 2023-11-25 ENCOUNTER — HOSPITAL ENCOUNTER (EMERGENCY)
Age: 53
Discharge: HOME | End: 2023-11-26
Payer: COMMERCIAL

## 2023-11-25 ENCOUNTER — APPOINTMENT (OUTPATIENT)
Dept: GENERAL RADIOLOGY | Age: 53
End: 2023-11-25
Payer: COMMERCIAL

## 2023-11-25 VITALS
OXYGEN SATURATION: 97 % | SYSTOLIC BLOOD PRESSURE: 126 MMHG | DIASTOLIC BLOOD PRESSURE: 72 MMHG | TEMPERATURE: 98.7 F | HEART RATE: 97 BPM | WEIGHT: 178 LBS | BODY MASS INDEX: 31.54 KG/M2 | RESPIRATION RATE: 17 BRPM | HEIGHT: 63 IN

## 2023-11-25 DIAGNOSIS — E87.6 HYPOKALEMIA: ICD-10-CM

## 2023-11-25 DIAGNOSIS — R07.9 CHEST PAIN, UNSPECIFIED TYPE: ICD-10-CM

## 2023-11-25 DIAGNOSIS — Z91.199 H/O NONCOMPLIANCE WITH MEDICAL TREATMENT, PRESENTING HAZARDS TO HEALTH: Primary | ICD-10-CM

## 2023-11-25 LAB
ALBUMIN SERPL-MCNC: 3.7 G/DL (ref 3.5–4.6)
ALP SERPL-CCNC: 159 U/L (ref 40–130)
ALT SERPL-CCNC: 7 U/L (ref 0–33)
ANION GAP SERPL CALCULATED.3IONS-SCNC: 17 MEQ/L (ref 9–15)
AST SERPL-CCNC: 13 U/L (ref 0–35)
BASOPHILS # BLD: 0.1 K/UL (ref 0–0.2)
BASOPHILS NFR BLD: 0.4 %
BILIRUB SERPL-MCNC: 0.4 MG/DL (ref 0.2–0.7)
BUN SERPL-MCNC: 12 MG/DL (ref 6–20)
CALCIUM SERPL-MCNC: 9 MG/DL (ref 8.5–9.9)
CHLORIDE SERPL-SCNC: 97 MEQ/L (ref 95–107)
CHP ED QC CHECK: NORMAL
CO2 SERPL-SCNC: 27 MEQ/L (ref 20–31)
CREAT SERPL-MCNC: 0.67 MG/DL (ref 0.5–0.9)
EOSINOPHIL # BLD: 0.3 K/UL (ref 0–0.7)
EOSINOPHIL NFR BLD: 2.1 %
ERYTHROCYTE [DISTWIDTH] IN BLOOD BY AUTOMATED COUNT: 12.1 % (ref 11.5–14.5)
GLOBULIN SER CALC-MCNC: 4.3 G/DL (ref 2.3–3.5)
GLUCOSE BLD-MCNC: 154 MG/DL
GLUCOSE BLD-MCNC: 154 MG/DL (ref 70–99)
GLUCOSE SERPL-MCNC: 145 MG/DL (ref 70–99)
HCT VFR BLD AUTO: 29 % (ref 37–47)
HGB BLD-MCNC: 10 G/DL (ref 12–16)
LACTATE BLDV-SCNC: 2.1 MMOL/L (ref 0.5–2.2)
LYMPHOCYTES # BLD: 3.7 K/UL (ref 1–4.8)
LYMPHOCYTES NFR BLD: 23.4 %
MCH RBC QN AUTO: 30 PG (ref 27–31.3)
MCHC RBC AUTO-ENTMCNC: 34.5 % (ref 33–37)
MCV RBC AUTO: 87.1 FL (ref 79.4–94.8)
MONOCYTES # BLD: 1.2 K/UL (ref 0.2–0.8)
MONOCYTES NFR BLD: 7.4 %
NEUTROPHILS # BLD: 10.6 K/UL (ref 1.4–6.5)
NEUTS SEG NFR BLD: 66.3 %
PERFORMED ON: ABNORMAL
PLATELET # BLD AUTO: 354 K/UL (ref 130–400)
POTASSIUM SERPL-SCNC: 2.7 MEQ/L (ref 3.4–4.9)
PROCALCITONIN SERPL IA-MCNC: 0.06 NG/ML (ref 0–0.15)
PROT SERPL-MCNC: 8 G/DL (ref 6.3–8)
RBC # BLD AUTO: 3.33 M/UL (ref 4.2–5.4)
SODIUM SERPL-SCNC: 141 MEQ/L (ref 135–144)
TROPONIN, HIGH SENSITIVITY: 12 NG/L (ref 0–19)
TROPONIN, HIGH SENSITIVITY: 14 NG/L (ref 0–19)
TSH SERPL-MCNC: 0.49 UIU/ML (ref 0.44–3.86)
WBC # BLD AUTO: 16 K/UL (ref 4.8–10.8)

## 2023-11-25 PROCEDURE — 6370000000 HC RX 637 (ALT 250 FOR IP): Performed by: EMERGENCY MEDICINE

## 2023-11-25 PROCEDURE — 84484 ASSAY OF TROPONIN QUANT: CPT

## 2023-11-25 PROCEDURE — 2580000003 HC RX 258: Performed by: PHYSICIAN ASSISTANT

## 2023-11-25 PROCEDURE — 84443 ASSAY THYROID STIM HORMONE: CPT

## 2023-11-25 PROCEDURE — 80053 COMPREHEN METABOLIC PANEL: CPT

## 2023-11-25 PROCEDURE — 96367 TX/PROPH/DG ADDL SEQ IV INF: CPT

## 2023-11-25 PROCEDURE — 6360000002 HC RX W HCPCS: Performed by: EMERGENCY MEDICINE

## 2023-11-25 PROCEDURE — 84145 PROCALCITONIN (PCT): CPT

## 2023-11-25 PROCEDURE — 85025 COMPLETE CBC W/AUTO DIFF WBC: CPT

## 2023-11-25 PROCEDURE — 6370000000 HC RX 637 (ALT 250 FOR IP): Performed by: PHYSICIAN ASSISTANT

## 2023-11-25 PROCEDURE — 99285 EMERGENCY DEPT VISIT HI MDM: CPT

## 2023-11-25 PROCEDURE — 96361 HYDRATE IV INFUSION ADD-ON: CPT

## 2023-11-25 PROCEDURE — 96365 THER/PROPH/DIAG IV INF INIT: CPT

## 2023-11-25 PROCEDURE — 71045 X-RAY EXAM CHEST 1 VIEW: CPT

## 2023-11-25 PROCEDURE — 36415 COLL VENOUS BLD VENIPUNCTURE: CPT

## 2023-11-25 PROCEDURE — 2580000003 HC RX 258

## 2023-11-25 PROCEDURE — 83605 ASSAY OF LACTIC ACID: CPT

## 2023-11-25 RX ORDER — NITROGLYCERIN 0.4 MG/1
0.4 TABLET SUBLINGUAL EVERY 5 MIN PRN
Status: DISCONTINUED | OUTPATIENT
Start: 2023-11-25 | End: 2023-11-26 | Stop reason: HOSPADM

## 2023-11-25 RX ORDER — ASPIRIN 325 MG
325 TABLET ORAL ONCE
Status: COMPLETED | OUTPATIENT
Start: 2023-11-25 | End: 2023-11-25

## 2023-11-25 RX ORDER — POTASSIUM CHLORIDE 20 MEQ/1
20 TABLET, EXTENDED RELEASE ORAL 2 TIMES DAILY
Qty: 6 TABLET | Refills: 0 | Status: ON HOLD | OUTPATIENT
Start: 2023-11-25 | End: 2023-12-02

## 2023-11-25 RX ORDER — POTASSIUM CHLORIDE 20 MEQ/1
40 TABLET, EXTENDED RELEASE ORAL ONCE
Status: COMPLETED | OUTPATIENT
Start: 2023-11-25 | End: 2023-11-25

## 2023-11-25 RX ORDER — POTASSIUM CHLORIDE 7.45 MG/ML
10 INJECTION INTRAVENOUS ONCE
Status: COMPLETED | OUTPATIENT
Start: 2023-11-25 | End: 2023-11-25

## 2023-11-25 RX ORDER — 0.9 % SODIUM CHLORIDE 0.9 %
1000 INTRAVENOUS SOLUTION INTRAVENOUS ONCE
Status: COMPLETED | OUTPATIENT
Start: 2023-11-25 | End: 2023-11-25

## 2023-11-25 RX ORDER — LANOLIN ALCOHOL/MO/W.PET/CERES
800 CREAM (GRAM) TOPICAL ONCE
Status: COMPLETED | OUTPATIENT
Start: 2023-11-25 | End: 2023-11-25

## 2023-11-25 RX ORDER — POTASSIUM CHLORIDE 20 MEQ/1
20 TABLET, EXTENDED RELEASE ORAL 2 TIMES DAILY
Qty: 8 TABLET | Refills: 0 | Status: SHIPPED | OUTPATIENT
Start: 2023-11-25 | End: 2023-11-25 | Stop reason: SDUPTHER

## 2023-11-25 RX ORDER — MAGNESIUM SULFATE 1 G/100ML
1000 INJECTION INTRAVENOUS ONCE
Status: COMPLETED | OUTPATIENT
Start: 2023-11-25 | End: 2023-11-25

## 2023-11-25 RX ADMIN — SODIUM CHLORIDE 1000 ML: 9 INJECTION, SOLUTION INTRAVENOUS at 17:20

## 2023-11-25 RX ADMIN — POTASSIUM CHLORIDE 10 MEQ: 7.46 INJECTION, SOLUTION INTRAVENOUS at 21:15

## 2023-11-25 RX ADMIN — ASPIRIN 325 MG: 325 TABLET ORAL at 17:19

## 2023-11-25 RX ADMIN — SODIUM CHLORIDE 1000 ML: 9 INJECTION, SOLUTION INTRAVENOUS at 20:37

## 2023-11-25 RX ADMIN — POTASSIUM CHLORIDE 40 MEQ: 1500 TABLET, EXTENDED RELEASE ORAL at 20:39

## 2023-11-25 RX ADMIN — Medication 800 MG: at 20:39

## 2023-11-25 RX ADMIN — MAGNESIUM SULFATE HEPTAHYDRATE 1000 MG: 1 INJECTION, SOLUTION INTRAVENOUS at 20:41

## 2023-11-25 RX ADMIN — NITROGLYCERIN 0.4 MG: 0.4 TABLET, ORALLY DISINTEGRATING SUBLINGUAL at 17:28

## 2023-11-25 RX ADMIN — NITROGLYCERIN 0.4 MG: 0.4 TABLET, ORALLY DISINTEGRATING SUBLINGUAL at 17:33

## 2023-11-25 ASSESSMENT — ENCOUNTER SYMPTOMS
SORE THROAT: 0
COLOR CHANGE: 0
RHINORRHEA: 0
ABDOMINAL PAIN: 0
ABDOMINAL DISTENTION: 0
CONSTIPATION: 0
EYE DISCHARGE: 0
SHORTNESS OF BREATH: 1

## 2023-11-25 ASSESSMENT — PAIN SCALES - GENERAL
PAINLEVEL_OUTOF10: 4
PAINLEVEL_OUTOF10: 3

## 2023-11-25 ASSESSMENT — PAIN DESCRIPTION - LOCATION: LOCATION: CHEST;BACK

## 2023-11-25 NOTE — ED TRIAGE NOTES
The patient came to the ED for chest pain that began on Wednesday and continued through the day today. Pt states she recently had a cardiac stent placed. Pt states she also \"has to breath more frequently\" and has occasional dizziness.

## 2023-11-25 NOTE — ED NOTES
The patient states her pain is \"about the same\" after taking her second dose of nitro. Aron GILMORE is aware that her blood pressure decreased after the second dose and instructed to hold the third nitro dose.         Kaylan Carmen RN  11/25/23 1319

## 2023-11-25 NOTE — ED PROVIDER NOTES
Received patient in signout at 18.    52-year-old female presents to ED for evaluation of chest pain. Patient is afebrile, hemodynamically stable, nontoxic. Patient given 1 L IV NS, p.o. ASA, sublingual nitro x2 doses while in ED. EKG shows sinus tachycardia at 112 bpm left ventricular hypertrophy, T wave inversions in leads I to no ventricular ectopy QTc 480 ms chest x-ray per radiologist interpretation demonstrates no acute cardiopulmonary process. Labs remarkable for WBC 16 potassium 2.7. Initial troponin 14. Repeat troponin 12. Patient given IV potassium, IV magnesium, p.o. magnesium, p.o. potassium while in ED. Patient reassessed; updated on results, findings, plan. Patient was offered admission given chest pain and patient states that she has a follow-up appointment scheduled with Dr. Igor Vigil (cardiology) on Tuesday, 11/28/2023 and is declining admission at this time. Patient states that she actually has low concern that this is actually cardiac because the nitro did not substantially help with her pain and she believes that this is more musculoskeletal.  Patient reports that she was recently lifting stacks of paper work and states that pain started after this. On reexamination, chest pain is noted to be reproducible with light palpation. I did discuss hypokalemia with ED attending, Dr. Justyna Reyna, whom is recommending p.o. K-Lyte twice daily x3 days and follow-up with PCP for repeat potassium level. Patient verbalized understanding of this. Patient given prescription for K-Lyte. Patient given strict return to ED precautions. Patient educated on supportive care. Patient given standard anticipatory guidance, return to ED warning signs, strict follow-up guidelines with PCP, cardiology. Patient verbalized understanding of education, instruction. Patient is agreeable to plan. Patient discharged home in stable condition.     Problems Addressed:  Chest pain, unspecified type: acute illness or injury  H/O

## 2023-11-26 NOTE — ED NOTES
Patient alert and oriented at time of discharge. Patient skin p,w,d at time of discharge. Patient respirations are even and unlabored at time of discharge. Discharge instructions reviewed with patient. Patient verbalized understanding at time of discharge. Patient stated no questions at time of discharge.       Dottie Perez RN  11/26/23 2295

## 2023-11-26 NOTE — DISCHARGE INSTRUCTIONS
Take medications as directed. Follow-up with PCP, cardiology on Tuesday as discussed. Return to ED if any new, or worsening symptoms.

## 2023-11-26 NOTE — ED NOTES
Patient states she is \"feeling much better\". Patient states pain is 0/10 unless she is sneezing or coughing.       Cole York RN  11/25/23 2010

## 2023-11-29 LAB
EKG ATRIAL RATE: 112 BPM
EKG P AXIS: 20 DEGREES
EKG P-R INTERVAL: 152 MS
EKG Q-T INTERVAL: 352 MS
EKG QRS DURATION: 78 MS
EKG QTC CALCULATION (BAZETT): 480 MS
EKG R AXIS: -4 DEGREES
EKG T AXIS: 77 DEGREES
EKG VENTRICULAR RATE: 112 BPM

## 2023-12-02 ENCOUNTER — HOSPITAL ENCOUNTER (OUTPATIENT)
Age: 53
Setting detail: OBSERVATION
Discharge: HOME | End: 2023-12-04
Attending: EMERGENCY MEDICINE | Admitting: INTERNAL MEDICINE
Payer: COMMERCIAL

## 2023-12-02 ENCOUNTER — APPOINTMENT (OUTPATIENT)
Dept: GENERAL RADIOLOGY | Age: 53
End: 2023-12-02
Payer: COMMERCIAL

## 2023-12-02 ENCOUNTER — APPOINTMENT (OUTPATIENT)
Age: 53
End: 2023-12-02
Attending: INTERNAL MEDICINE
Payer: COMMERCIAL

## 2023-12-02 ENCOUNTER — APPOINTMENT (OUTPATIENT)
Dept: CT IMAGING | Age: 53
End: 2023-12-02
Payer: COMMERCIAL

## 2023-12-02 DIAGNOSIS — J90 PLEURAL EFFUSION: ICD-10-CM

## 2023-12-02 DIAGNOSIS — I20.0 UNSTABLE ANGINA (HCC): Primary | ICD-10-CM

## 2023-12-02 DIAGNOSIS — R07.9 CHEST PAIN, UNSPECIFIED TYPE: ICD-10-CM

## 2023-12-02 DIAGNOSIS — I31.39 PERICARDIAL EFFUSION: ICD-10-CM

## 2023-12-02 LAB
ALBUMIN SERPL-MCNC: 3.5 G/DL (ref 3.5–4.6)
ALP SERPL-CCNC: 167 U/L (ref 40–130)
ALT SERPL-CCNC: <5 U/L (ref 0–33)
ANION GAP SERPL CALCULATED.3IONS-SCNC: 13 MEQ/L (ref 9–15)
APTT PPP: 37.8 SEC (ref 24.4–36.8)
AST SERPL-CCNC: 14 U/L (ref 0–35)
BASOPHILS # BLD: 0.1 K/UL (ref 0–0.2)
BASOPHILS NFR BLD: 0.4 %
BILIRUB SERPL-MCNC: 0.5 MG/DL (ref 0.2–0.7)
BUN SERPL-MCNC: 7 MG/DL (ref 6–20)
CALCIUM SERPL-MCNC: 8.7 MG/DL (ref 8.5–9.9)
CHLORIDE SERPL-SCNC: 96 MEQ/L (ref 95–107)
CO2 SERPL-SCNC: 30 MEQ/L (ref 20–31)
CREAT SERPL-MCNC: 0.63 MG/DL (ref 0.5–0.9)
D DIMER PPP FEU-MCNC: 4.58 MG/L FEU (ref 0–0.5)
ECHO BSA: 1.89 M2
ECHO LA VOL A-L A2C: 58 ML (ref 22–52)
ECHO LA VOL A-L A4C: 88 ML (ref 22–52)
ECHO LA VOL MOD A2C: 54 ML (ref 22–52)
ECHO LA VOL MOD A4C: 84 ML (ref 22–52)
ECHO LA VOLUME AREA LENGTH: 73 ML
ECHO LA VOLUME INDEX A-L A2C: 32 ML/M2 (ref 16–34)
ECHO LA VOLUME INDEX A-L A4C: 48 ML/M2 (ref 16–34)
ECHO LA VOLUME INDEX AREA LENGTH: 40 ML/M2 (ref 16–34)
ECHO LA VOLUME INDEX MOD A2C: 29 ML/M2 (ref 16–34)
ECHO LA VOLUME INDEX MOD A4C: 46 ML/M2 (ref 16–34)
ECHO LV E' LATERAL VELOCITY: 9 CM/S
ECHO LV E' SEPTAL VELOCITY: 10 CM/S
ECHO LV EDV A2C: 91 ML
ECHO LV EDV A4C: 92 ML
ECHO LV EDV BP: 95 ML (ref 56–104)
ECHO LV EDV INDEX A4C: 50 ML/M2
ECHO LV EDV INDEX BP: 52 ML/M2
ECHO LV EDV NDEX A2C: 49 ML/M2
ECHO LV EJECTION FRACTION A2C: 59 %
ECHO LV EJECTION FRACTION A4C: 54 %
ECHO LV EJECTION FRACTION BIPLANE: 56 % (ref 55–100)
ECHO LV ESV A2C: 37 ML
ECHO LV ESV A4C: 43 ML
ECHO LV ESV BP: 42 ML (ref 19–49)
ECHO LV ESV INDEX A2C: 20 ML/M2
ECHO LV ESV INDEX A4C: 23 ML/M2
ECHO LV ESV INDEX BP: 23 ML/M2
ECHO LV FRACTIONAL SHORTENING: 32 % (ref 28–44)
ECHO LV INTERNAL DIMENSION DIASTOLE INDEX: 2.39 CM/M2
ECHO LV INTERNAL DIMENSION DIASTOLIC: 4.4 CM (ref 3.9–5.3)
ECHO LV INTERNAL DIMENSION SYSTOLIC INDEX: 1.63 CM/M2
ECHO LV INTERNAL DIMENSION SYSTOLIC: 3 CM
ECHO LV IVSD: 1 CM (ref 0.6–0.9)
ECHO LV IVSS: 1.2 CM
ECHO LV MASS 2D: 168.9 G (ref 67–162)
ECHO LV MASS INDEX 2D: 91.8 G/M2 (ref 43–95)
ECHO LV POSTERIOR WALL DIASTOLIC: 1.2 CM (ref 0.6–0.9)
ECHO LV POSTERIOR WALL SYSTOLIC: 1.5 CM
ECHO LV RELATIVE WALL THICKNESS RATIO: 0.55
ECHO MV A VELOCITY: 1 M/S
ECHO MV E DECELERATION TIME (DT): 148.7 MS
ECHO MV E VELOCITY: 1.23 M/S
ECHO MV E/A RATIO: 1.23
ECHO MV E/E' LATERAL: 13.67
ECHO MV E/E' RATIO (AVERAGED): 12.98
ECHO RV INTERNAL DIMENSION: 2.1 CM
ECHO RV TAPSE: 2.3 CM (ref 1.7–?)
EOSINOPHIL # BLD: 0.2 K/UL (ref 0–0.7)
EOSINOPHIL NFR BLD: 1.7 %
ERYTHROCYTE [DISTWIDTH] IN BLOOD BY AUTOMATED COUNT: 12.6 % (ref 11.5–14.5)
GLOBULIN SER CALC-MCNC: 4.5 G/DL (ref 2.3–3.5)
GLUCOSE BLD-MCNC: 243 MG/DL (ref 70–99)
GLUCOSE BLD-MCNC: 344 MG/DL (ref 70–99)
GLUCOSE SERPL-MCNC: 372 MG/DL (ref 70–99)
HCT VFR BLD AUTO: 26.6 % (ref 37–47)
HGB BLD-MCNC: 8.9 G/DL (ref 12–16)
INR PPP: 1.1
LYMPHOCYTES # BLD: 1.8 K/UL (ref 1–4.8)
LYMPHOCYTES NFR BLD: 12.9 %
MCH RBC QN AUTO: 29.8 PG (ref 27–31.3)
MCHC RBC AUTO-ENTMCNC: 33.5 % (ref 33–37)
MCV RBC AUTO: 89 FL (ref 79.4–94.8)
MONOCYTES # BLD: 0.9 K/UL (ref 0.2–0.8)
MONOCYTES NFR BLD: 6.6 %
NEUTROPHILS # BLD: 10.6 K/UL (ref 1.4–6.5)
NEUTS SEG NFR BLD: 77.8 %
PERFORMED ON: ABNORMAL
PERFORMED ON: ABNORMAL
PLATELET # BLD AUTO: 407 K/UL (ref 130–400)
POTASSIUM SERPL-SCNC: 3.6 MEQ/L (ref 3.4–4.9)
PROT SERPL-MCNC: 8 G/DL (ref 6.3–8)
PROTHROMBIN TIME: 15.2 SEC (ref 12.3–14.9)
RBC # BLD AUTO: 2.99 M/UL (ref 4.2–5.4)
SODIUM SERPL-SCNC: 139 MEQ/L (ref 135–144)
TROPONIN, HIGH SENSITIVITY: 13 NG/L (ref 0–19)
TSH SERPL-MCNC: 0.7 UIU/ML (ref 0.44–3.86)
WBC # BLD AUTO: 13.7 K/UL (ref 4.8–10.8)

## 2023-12-02 PROCEDURE — 71275 CT ANGIOGRAPHY CHEST: CPT

## 2023-12-02 PROCEDURE — 96372 THER/PROPH/DIAG INJ SC/IM: CPT

## 2023-12-02 PROCEDURE — 36415 COLL VENOUS BLD VENIPUNCTURE: CPT

## 2023-12-02 PROCEDURE — 93308 TTE F-UP OR LMTD: CPT

## 2023-12-02 PROCEDURE — 6360000004 HC RX CONTRAST MEDICATION: Performed by: EMERGENCY MEDICINE

## 2023-12-02 PROCEDURE — G0378 HOSPITAL OBSERVATION PER HR: HCPCS

## 2023-12-02 PROCEDURE — 93005 ELECTROCARDIOGRAM TRACING: CPT | Performed by: EMERGENCY MEDICINE

## 2023-12-02 PROCEDURE — 85379 FIBRIN DEGRADATION QUANT: CPT

## 2023-12-02 PROCEDURE — 6370000000 HC RX 637 (ALT 250 FOR IP): Performed by: NURSE PRACTITIONER

## 2023-12-02 PROCEDURE — 85610 PROTHROMBIN TIME: CPT

## 2023-12-02 PROCEDURE — 99285 EMERGENCY DEPT VISIT HI MDM: CPT

## 2023-12-02 PROCEDURE — 93308 TTE F-UP OR LMTD: CPT | Performed by: INTERNAL MEDICINE

## 2023-12-02 PROCEDURE — 80053 COMPREHEN METABOLIC PANEL: CPT

## 2023-12-02 PROCEDURE — 6360000002 HC RX W HCPCS: Performed by: INTERNAL MEDICINE

## 2023-12-02 PROCEDURE — 85730 THROMBOPLASTIN TIME PARTIAL: CPT

## 2023-12-02 PROCEDURE — 93321 DOPPLER ECHO F-UP/LMTD STD: CPT | Performed by: INTERNAL MEDICINE

## 2023-12-02 PROCEDURE — 71045 X-RAY EXAM CHEST 1 VIEW: CPT

## 2023-12-02 PROCEDURE — 84443 ASSAY THYROID STIM HORMONE: CPT

## 2023-12-02 PROCEDURE — 93325 DOPPLER ECHO COLOR FLOW MAPG: CPT | Performed by: INTERNAL MEDICINE

## 2023-12-02 PROCEDURE — 85025 COMPLETE CBC W/AUTO DIFF WBC: CPT

## 2023-12-02 PROCEDURE — 84484 ASSAY OF TROPONIN QUANT: CPT

## 2023-12-02 RX ORDER — SODIUM CHLORIDE 0.9 % (FLUSH) 0.9 %
5-40 SYRINGE (ML) INJECTION EVERY 12 HOURS SCHEDULED
Status: DISCONTINUED | OUTPATIENT
Start: 2023-12-02 | End: 2023-12-04 | Stop reason: HOSPADM

## 2023-12-02 RX ORDER — INSULIN LISPRO 100 [IU]/ML
0-8 INJECTION, SOLUTION INTRAVENOUS; SUBCUTANEOUS
Status: DISCONTINUED | OUTPATIENT
Start: 2023-12-03 | End: 2023-12-04 | Stop reason: HOSPADM

## 2023-12-02 RX ORDER — CLOPIDOGREL BISULFATE 75 MG/1
75 TABLET ORAL DAILY
Status: DISCONTINUED | OUTPATIENT
Start: 2023-12-02 | End: 2023-12-04 | Stop reason: HOSPADM

## 2023-12-02 RX ORDER — MAGNESIUM SULFATE IN WATER 40 MG/ML
2000 INJECTION, SOLUTION INTRAVENOUS PRN
Status: DISCONTINUED | OUTPATIENT
Start: 2023-12-02 | End: 2023-12-04 | Stop reason: HOSPADM

## 2023-12-02 RX ORDER — ASPIRIN 81 MG/1
81 TABLET ORAL DAILY
Status: DISCONTINUED | OUTPATIENT
Start: 2023-12-02 | End: 2023-12-04 | Stop reason: HOSPADM

## 2023-12-02 RX ORDER — ASPIRIN 81 MG/1
81 TABLET ORAL DAILY
COMMUNITY

## 2023-12-02 RX ORDER — ATORVASTATIN CALCIUM 40 MG/1
40 TABLET, FILM COATED ORAL DAILY
Status: DISCONTINUED | OUTPATIENT
Start: 2023-12-02 | End: 2023-12-04 | Stop reason: HOSPADM

## 2023-12-02 RX ORDER — ONDANSETRON 4 MG/1
4 TABLET, ORALLY DISINTEGRATING ORAL EVERY 8 HOURS PRN
Status: DISCONTINUED | OUTPATIENT
Start: 2023-12-02 | End: 2023-12-04 | Stop reason: HOSPADM

## 2023-12-02 RX ORDER — ACETAMINOPHEN 650 MG/1
650 SUPPOSITORY RECTAL EVERY 6 HOURS PRN
Status: DISCONTINUED | OUTPATIENT
Start: 2023-12-02 | End: 2023-12-04 | Stop reason: HOSPADM

## 2023-12-02 RX ORDER — ENOXAPARIN SODIUM 100 MG/ML
40 INJECTION SUBCUTANEOUS DAILY
Status: DISCONTINUED | OUTPATIENT
Start: 2023-12-02 | End: 2023-12-04 | Stop reason: HOSPADM

## 2023-12-02 RX ORDER — TRAZODONE HYDROCHLORIDE 100 MG/1
100 TABLET ORAL NIGHTLY
Status: DISCONTINUED | OUTPATIENT
Start: 2023-12-02 | End: 2023-12-04 | Stop reason: HOSPADM

## 2023-12-02 RX ORDER — SODIUM CHLORIDE 9 MG/ML
INJECTION, SOLUTION INTRAVENOUS PRN
Status: DISCONTINUED | OUTPATIENT
Start: 2023-12-02 | End: 2023-12-04 | Stop reason: HOSPADM

## 2023-12-02 RX ORDER — POTASSIUM CHLORIDE 20 MEQ/1
40 TABLET, EXTENDED RELEASE ORAL PRN
Status: DISCONTINUED | OUTPATIENT
Start: 2023-12-02 | End: 2023-12-04 | Stop reason: HOSPADM

## 2023-12-02 RX ORDER — ONDANSETRON 2 MG/ML
4 INJECTION INTRAMUSCULAR; INTRAVENOUS EVERY 6 HOURS PRN
Status: DISCONTINUED | OUTPATIENT
Start: 2023-12-02 | End: 2023-12-04 | Stop reason: HOSPADM

## 2023-12-02 RX ORDER — GLUCAGON 1 MG/ML
1 KIT INJECTION PRN
Status: DISCONTINUED | OUTPATIENT
Start: 2023-12-02 | End: 2023-12-04 | Stop reason: HOSPADM

## 2023-12-02 RX ORDER — ESCITALOPRAM OXALATE 20 MG/1
20 TABLET ORAL DAILY
Status: DISCONTINUED | OUTPATIENT
Start: 2023-12-02 | End: 2023-12-04 | Stop reason: HOSPADM

## 2023-12-02 RX ORDER — ACETAMINOPHEN 325 MG/1
650 TABLET ORAL EVERY 6 HOURS PRN
Status: DISCONTINUED | OUTPATIENT
Start: 2023-12-02 | End: 2023-12-04 | Stop reason: HOSPADM

## 2023-12-02 RX ORDER — DEXTROSE MONOHYDRATE 100 MG/ML
INJECTION, SOLUTION INTRAVENOUS CONTINUOUS PRN
Status: DISCONTINUED | OUTPATIENT
Start: 2023-12-02 | End: 2023-12-04 | Stop reason: HOSPADM

## 2023-12-02 RX ORDER — POTASSIUM CHLORIDE 7.45 MG/ML
10 INJECTION INTRAVENOUS PRN
Status: DISCONTINUED | OUTPATIENT
Start: 2023-12-02 | End: 2023-12-04 | Stop reason: HOSPADM

## 2023-12-02 RX ORDER — POLYETHYLENE GLYCOL 3350 17 G/17G
17 POWDER, FOR SOLUTION ORAL DAILY PRN
Status: DISCONTINUED | OUTPATIENT
Start: 2023-12-02 | End: 2023-12-04 | Stop reason: HOSPADM

## 2023-12-02 RX ORDER — SODIUM CHLORIDE 0.9 % (FLUSH) 0.9 %
5-40 SYRINGE (ML) INJECTION PRN
Status: DISCONTINUED | OUTPATIENT
Start: 2023-12-02 | End: 2023-12-04 | Stop reason: HOSPADM

## 2023-12-02 RX ORDER — INSULIN LISPRO 100 [IU]/ML
0-4 INJECTION, SOLUTION INTRAVENOUS; SUBCUTANEOUS NIGHTLY
Status: DISCONTINUED | OUTPATIENT
Start: 2023-12-02 | End: 2023-12-04 | Stop reason: HOSPADM

## 2023-12-02 RX ADMIN — ATORVASTATIN CALCIUM 40 MG: 40 TABLET, FILM COATED ORAL at 20:41

## 2023-12-02 RX ADMIN — ESCITALOPRAM OXALATE 20 MG: 20 TABLET ORAL at 20:41

## 2023-12-02 RX ADMIN — METOPROLOL TARTRATE 25 MG: 25 TABLET, FILM COATED ORAL at 20:41

## 2023-12-02 RX ADMIN — IOPAMIDOL 75 ML: 755 INJECTION, SOLUTION INTRAVENOUS at 11:17

## 2023-12-02 RX ADMIN — TRAZODONE HYDROCHLORIDE 100 MG: 100 TABLET ORAL at 20:41

## 2023-12-02 RX ADMIN — CARIPRAZINE 3 MG: 1.5 CAPSULE, GELATIN COATED ORAL at 20:40

## 2023-12-02 RX ADMIN — INSULIN LISPRO 4 UNITS: 100 INJECTION, SOLUTION INTRAVENOUS; SUBCUTANEOUS at 20:40

## 2023-12-02 RX ADMIN — ENOXAPARIN SODIUM 40 MG: 100 INJECTION SUBCUTANEOUS at 16:17

## 2023-12-02 ASSESSMENT — ENCOUNTER SYMPTOMS
VOMITING: 0
RHINORRHEA: 0
ABDOMINAL PAIN: 0
WHEEZING: 0
SHORTNESS OF BREATH: 1
CHEST TIGHTNESS: 1
EYES NEGATIVE: 1
TROUBLE SWALLOWING: 0
NAUSEA: 0
ALLERGIC/IMMUNOLOGIC NEGATIVE: 1

## 2023-12-02 ASSESSMENT — PAIN SCALES - GENERAL
PAINLEVEL_OUTOF10: 0
PAINLEVEL_OUTOF10: 2
PAINLEVEL_OUTOF10: 2

## 2023-12-02 ASSESSMENT — PAIN - FUNCTIONAL ASSESSMENT: PAIN_FUNCTIONAL_ASSESSMENT: ACTIVITIES ARE NOT PREVENTED

## 2023-12-02 ASSESSMENT — PAIN DESCRIPTION - ORIENTATION
ORIENTATION: LEFT
ORIENTATION: LEFT

## 2023-12-02 ASSESSMENT — PAIN DESCRIPTION - DESCRIPTORS: DESCRIPTORS: SPASM

## 2023-12-02 ASSESSMENT — PAIN DESCRIPTION - LOCATION
LOCATION: CHEST
LOCATION: CHEST;BACK

## 2023-12-02 NOTE — DISCHARGE INSTRUCTIONS
[employer]*  (Call 211 if need more resources.)      Medical:      [facility]   What they offer: [employer] discounts fees for qualifying insured and uninsured persons. We can assist you in signing up for Medicaid, Medicare, and [employer]. They offer 4 locations in [address] [facility], 2 locations in [place] and 1 in [address]. Phone Number: [phone]  Website: Giritech.ee. [facility]:  What they offer: We provide health care services to the uninsured and underinsured. From providing quality care to connecting patients to critical community resources, our patients and their needs are our highest priority. Phone number: [phone]  Website: [email or web address]://[email or web address]-MAPI.TapCrowd      Utility:         211: What they offer: Help find lower cost options for phone or internet as well as help paying utility bills. Phone Number: 451  Website: [email or web address]     [facility]  What they offer:  Assistance with utilities  Phone:  [phone]  Website: [facility]                       [address] they Offer: Assistance with utilities  Phone: 60 97 03: http://www.Sitefly.org/    [address]  What they Offer: Assistance with utilities  Phone: 197.455.2422  Website: https://Cellworks.org/      MEDICATIONS:   Good [facility]  What they offer: Good [employer] tracks prescription drug prices and provides free drug coupons for discounts on medications. Website: VipAnalysis.is. com/    [employer]:  What they offer: [employer] offers free information on medications and healthcare cost savings programs including prescription assistance programs, coupons, and discount programs. Helpline: 850.275.3639  Website: PaymentBack.[employer]. org    [employer] Assist:  What they offer: Medication assistance  Website: https://gilmaFannabeewander.net/

## 2023-12-02 NOTE — ACP (ADVANCE CARE PLANNING)
Advance Care Planning     Advance Care Planning Activator (Inpatient)  Conversation Note      Date of ACP Conversation: 12/2/2023     Conversation Conducted with: Patient with Decision Making Capacity    ACP Activator: Danni Gordon RN        Health Care Decision Maker:     Current Designated Health Care Decision Maker:     Primary Decision Maker: German Contreras Child - [phone]    Secondary Decision Maker: Daxa Rowe - Parent - [phone] Preferences    Ventilation: \"If you were in your present state of health and suddenly became very ill and were unable to breathe on your own, what would your preference be about the use of a ventilator (breathing machine) if it were available to you? \"      Would the patient desire the use of ventilator (breathing machine)?: yes    \"If your health worsens and it becomes clear that your chance of recovery is unlikely, what would your preference be about the use of a ventilator (breathing machine) if it were available to you? \"     Would the patient desire the use of ventilator (breathing machine)?: No      Resuscitation  \"CPR works best to restart the heart when there is a sudden event, like a heart attack, in someone who is otherwise healthy. Unfortunately, CPR does not typically restart the heart for people who have serious health conditions or who are very sick. \"    \"In the event your heart stopped as a result of an underlying serious health condition, would you want attempts to be made to restart your heart (answer \"yes\" for attempt to resuscitate) or would you prefer a natural death (answer \"no\" for do not attempt to resuscitate)? \" yes       [x] Yes   [] No   Educated Patient / Gissel Elizabeth regarding differences between Advance Directives and portable DNR orders.     Length of ACP Conversation in minutes:  10    Conversation Outcomes:  ACP discussion completed and Existing advance directive reviewed with patient; no changes to patient's previously

## 2023-12-02 NOTE — ED PROVIDER NOTES
Hematocrit 26.6 (*)     Platelets 402 (*)     Neutrophils Absolute 10.6 (*)     Monocytes Absolute 0.9 (*)     All other components within normal limits   D-DIMER, QUANTITATIVE - Abnormal; Notable for the following components:    D-Dimer, Quant 4.58 (*)     All other components within normal limits    Narrative:     CALL  [facility]  [facility] tel. [phone],  DIMER results called to and read back by 1211 Th , 12/02/2023 11:01, by  [address] - Abnormal; Notable for the following components:    Glucose 372 (*)     Alkaline Phosphatase 167 (*)     Globulin 4.5 (*)     All other components within normal limits   APTT - Abnormal; Notable for the following components:    aPTT 37.8 (*)     All other components within normal limits   PROTIME-INR - Abnormal; Notable for the following components:    Protime 15.2 (*)     All other components within normal limits   TROPONIN   TSH       All other labs were within normal range or not returned as of this dictation. EMERGENCY DEPARTMENT COURSE and DIFFERENTIAL DIAGNOSIS/MDM:   Vitals:    Vitals:    12/02/23 1042 12/02/23 1108 12/02/23 1225 12/02/23 1247   BP:       Pulse: 99 97     Resp: 21 19     Temp:       TempSrc:       SpO2: 95% 97%     Weight:   80.7 kg (177 lb 14.6 oz) 80.7 kg (177 lb 14.6 oz)   Height:   1.6 m (5' 2.99\") 1.6 m (5' 2.99\")       Patient made with Dr. Yesica Miles. Patient is established with [facility] cardiology. Consideration is made for potential underlying circumflex artery disease. Recommendation for admission to hospital service with consultation to cardiology. Will facilitate this time. Patient in good agreement. Medical Decision Making  Amount and/or Complexity of Data Reviewed  Labs: ordered. Radiology: ordered. ECG/medicine tests: ordered. Risk  Prescription drug management. Decision regarding hospitalization.       Coding     CONSULTS:  None    PROCEDURES:  Unless otherwise noted below, none     Procedures    FINAL

## 2023-12-02 NOTE — H&P
ppx  Disposition: Dependent on hospital course. Will discharge once medically stable. SW on board for discharge planning.      Patient Active Problem List   Diagnosis Code    Mixed hyperlipidemia E78.2    Essential hypertension I10    GERD (gastroesophageal reflux disease) K21.9    Leukocytosis D72.829    Cardiac disease I51.9    Mixed anxiety and depressive disorder F41.8    Type 2 diabetes mellitus with right diabetic foot infection (HCC) E11.628, L08.9    Headache R51.9    Acute diffuse otitis externa of left ear H60.312    Breast mass, right N63.10    Obesity, Class I, BMI 30-34.9 E66.9    Elevated BP without diagnosis of hypertension R03.0    Hypokalemia E87.6    CAD (coronary artery disease) I25.10    Hypomagnesemia E83.42    Chest pain R07.9    Dyspnea R06.00    Pneumonia due to infectious organism J18.9    Diabetic foot infection (HCC) E11.628, L08.9    Cellulitis of foot L03.119    Diabetic ulcer of toe of right foot associated with diabetes mellitus due to underlying condition, with fat layer exposed ([address]) I67.474, L97.512    Acute kidney injury (HCC) N17.9    Renal failure N19    Unstable angina (HCC) I20.0    Angina, class IV (HCC) I20.9    Cellulitis and abscess of toe of right foot L03.031, L02.611    Hypoxia R09.02    Nausea R11.0    Hyperglycemia R73.9    Hypotension I95.9       Jayda Duvall MD, MD

## 2023-12-02 NOTE — ED NOTES
Call to cardio @ [address] Jaky Morton responded @ [address] [facility]  12/02/23 8088

## 2023-12-03 LAB
GLUCOSE BLD-MCNC: 273 MG/DL (ref 70–99)
GLUCOSE BLD-MCNC: 339 MG/DL (ref 70–99)
GLUCOSE BLD-MCNC: 346 MG/DL (ref 70–99)
GLUCOSE BLD-MCNC: 350 MG/DL (ref 70–99)
PERFORMED ON: ABNORMAL

## 2023-12-03 PROCEDURE — 96372 THER/PROPH/DIAG INJ SC/IM: CPT

## 2023-12-03 PROCEDURE — 99221 1ST HOSP IP/OBS SF/LOW 40: CPT | Performed by: INTERNAL MEDICINE

## 2023-12-03 PROCEDURE — 6360000002 HC RX W HCPCS: Performed by: INTERNAL MEDICINE

## 2023-12-03 PROCEDURE — 6370000000 HC RX 637 (ALT 250 FOR IP): Performed by: NURSE PRACTITIONER

## 2023-12-03 PROCEDURE — 2580000003 HC RX 258: Performed by: INTERNAL MEDICINE

## 2023-12-03 PROCEDURE — G0378 HOSPITAL OBSERVATION PER HR: HCPCS

## 2023-12-03 RX ADMIN — CLOPIDOGREL BISULFATE 75 MG: 75 TABLET ORAL at 09:00

## 2023-12-03 RX ADMIN — INSULIN LISPRO 6 UNITS: 100 INJECTION, SOLUTION INTRAVENOUS; SUBCUTANEOUS at 09:00

## 2023-12-03 RX ADMIN — INSULIN LISPRO 4 UNITS: 100 INJECTION, SOLUTION INTRAVENOUS; SUBCUTANEOUS at 12:08

## 2023-12-03 RX ADMIN — INSULIN LISPRO 4 UNITS: 100 INJECTION, SOLUTION INTRAVENOUS; SUBCUTANEOUS at 20:43

## 2023-12-03 RX ADMIN — ASPIRIN 81 MG: 81 TABLET, COATED ORAL at 08:59

## 2023-12-03 RX ADMIN — METOPROLOL TARTRATE 25 MG: 25 TABLET, FILM COATED ORAL at 09:00

## 2023-12-03 RX ADMIN — ATORVASTATIN CALCIUM 40 MG: 40 TABLET, FILM COATED ORAL at 09:00

## 2023-12-03 RX ADMIN — TRAZODONE HYDROCHLORIDE 100 MG: 100 TABLET ORAL at 20:44

## 2023-12-03 RX ADMIN — Medication 10 ML: at 06:10

## 2023-12-03 RX ADMIN — Medication 10 ML: at 20:44

## 2023-12-03 RX ADMIN — INSULIN LISPRO 6 UNITS: 100 INJECTION, SOLUTION INTRAVENOUS; SUBCUTANEOUS at 16:42

## 2023-12-03 RX ADMIN — Medication 10 ML: at 09:03

## 2023-12-03 RX ADMIN — ENOXAPARIN SODIUM 40 MG: 100 INJECTION SUBCUTANEOUS at 09:00

## 2023-12-03 RX ADMIN — CARIPRAZINE 3 MG: 1.5 CAPSULE, GELATIN COATED ORAL at 08:59

## 2023-12-03 RX ADMIN — ESCITALOPRAM OXALATE 20 MG: 20 TABLET ORAL at 09:00

## 2023-12-03 ASSESSMENT — PAIN SCALES - GENERAL: PAINLEVEL_OUTOF10: 0

## 2023-12-03 NOTE — CONSULTS
Family History  Family History   Problem Relation Age of Onset    Heart Disease Mother     Diabetes Father     Heart Disease Father     Cancer Father         thyroid    No Known Problems Sister     No Known Problems Paternal Grandfather     No Known Problems Paternal Grandmother     No Known Problems Maternal Grandmother     No Known Problems Maternal Grandfather     No Known Problems Brother     No Known Problems Other     Breast Cancer Neg Hx     Colon Cancer Neg Hx     Eclampsia Neg Hx     Hypertension Neg Hx     Ovarian Cancer Neg Hx      Labor Neg Hx     Spont Abortions Neg Hx     Stroke Neg Hx      [] Unable to obtain due to ventilated and/ or neurologic status  Social History     Socioeconomic History    Marital status:      Spouse name: Not on file    Number of children: Not on file    Years of education: Not on file    Highest education level: Not on file   Occupational History    Not on file   Tobacco Use    Smoking status: Never    Smokeless tobacco: Never   Vaping Use    Vaping Use: Never used   Substance and Sexual Activity    Alcohol use:  Yes     Alcohol/week: 0.0 standard drinks of alcohol     Comment: ocassionally    Drug use: No    Sexual activity: Not Currently     Partners: Male   Other Topics Concern    Not on file   Social History Narrative    Not on file     Social Determinants of Health     Financial Resource Strain: Medium Risk (2023)    Overall Financial Resource Strain (CARDIA)     Difficulty of Paying Living Expenses: Somewhat hard   Food Insecurity: No Food Insecurity (2023)    Hunger Vital Sign     Worried About Running Out of Food in the Last Year: Never true     Ran Out of Food in the Last Year: Never true   Recent Concern: Food Insecurity - Food Insecurity Present (2023)    Hunger Vital Sign     Worried About Running Out of Food in the Last Year: Sometimes true     Ran Out of Food in the Last Year: Sometimes true   Transportation Needs: No

## 2023-12-03 NOTE — PLAN OF CARE
Problem: Discharge Planning  Goal: Discharge to home or other facility with appropriate resources  Outcome: Progressing  Flowsheets (Taken 12/2/2023 1525 by Kathi Royal, RN)  Discharge to home or other facility with appropriate resources:   Identify barriers to discharge with patient and caregiver   Arrange for needed discharge resources and transportation as appropriate   Identify discharge learning needs (meds, wound care, etc)   Arrange for interpreters to assist at discharge as needed   Refer to discharge planning if patient needs post-hospital services based on physician order or complex needs related to functional status, cognitive ability or social support system     Problem: Pain  Goal: Verbalizes/displays adequate comfort level or baseline comfort level  Outcome: Progressing  Flowsheets (Taken 12/2/2023 1430 by Kathi Royal, RN)  Verbalizes/displays adequate comfort level or baseline comfort level:   Encourage patient to monitor pain and request assistance   Assess pain using appropriate pain scale   Implement non-pharmacological measures as appropriate and evaluate response   Administer analgesics based on type and severity of pain and evaluate response   Consider cultural and social influences on pain and pain management   Notify Licensed Independent Practitioner if interventions unsuccessful or patient reports new pain     Problem: ABCDS Injury Assessment  Goal: Absence of physical injury  Outcome: Progressing  Flowsheets (Taken 12/2/2023 1539 by Kathi Royal, RN)  Absence of Physical Injury: Implement safety measures based on patient assessment     Problem: Safety - Adult  Goal: Free from fall injury  Outcome: Progressing  Flowsheets (Taken 12/2/2023 1539 by Kathi Royal RN)  Free From Fall Injury:   Instruct family/caregiver on patient safety   Based on caregiver fall risk screen, instruct family/caregiver to ask for assistance with transferring infant if caregiver noted to have fall risk factors

## 2023-12-04 ENCOUNTER — APPOINTMENT (OUTPATIENT)
Dept: NUCLEAR MEDICINE | Age: 53
End: 2023-12-04
Payer: COMMERCIAL

## 2023-12-04 ENCOUNTER — HOSPITAL ENCOUNTER (OUTPATIENT)
Age: 53
Setting detail: OBSERVATION
Discharge: HOME | End: 2023-12-06
Payer: COMMERCIAL

## 2023-12-04 VITALS
HEART RATE: 102 BPM | TEMPERATURE: 98.6 F | WEIGHT: 194 LBS | RESPIRATION RATE: 18 BRPM | BODY MASS INDEX: 34.38 KG/M2 | DIASTOLIC BLOOD PRESSURE: 71 MMHG | SYSTOLIC BLOOD PRESSURE: 123 MMHG | OXYGEN SATURATION: 97 % | HEIGHT: 63 IN

## 2023-12-04 LAB
ECHO BSA: 1.89 M2
EKG ATRIAL RATE: 99 BPM
EKG P AXIS: 41 DEGREES
EKG P-R INTERVAL: 148 MS
EKG Q-T INTERVAL: 336 MS
EKG QRS DURATION: 76 MS
EKG QTC CALCULATION (BAZETT): 431 MS
EKG R AXIS: -2 DEGREES
EKG T AXIS: 157 DEGREES
EKG VENTRICULAR RATE: 99 BPM
GLUCOSE BLD-MCNC: 306 MG/DL (ref 70–99)
GLUCOSE BLD-MCNC: 408 MG/DL (ref 70–99)
NUC STRESS EJECTION FRACTION: 69 %
PERFORMED ON: ABNORMAL
PERFORMED ON: ABNORMAL
STRESS BASELINE DIAS BP: 59 MMHG
STRESS BASELINE HR: 100 BPM
STRESS BASELINE ST DEPRESSION: 0 MM
STRESS BASELINE SYS BP: 126 MMHG
STRESS ESTIMATED WORKLOAD: 1 METS
STRESS PEAK DIAS BP: 78 MMHG
STRESS PEAK SYS BP: 137 MMHG
STRESS PERCENT HR ACHIEVED: 70 %
STRESS POST PEAK HR: 118 BPM
STRESS RATE PRESSURE PRODUCT: NORMAL BPM*MMHG
STRESS ST DEPRESSION: 0 MM
STRESS TARGET HR: 169 BPM
TID: 1.07

## 2023-12-04 PROCEDURE — 78452 HT MUSCLE IMAGE SPECT MULT: CPT

## 2023-12-04 PROCEDURE — G0378 HOSPITAL OBSERVATION PER HR: HCPCS

## 2023-12-04 PROCEDURE — 6370000000 HC RX 637 (ALT 250 FOR IP): Performed by: NURSE PRACTITIONER

## 2023-12-04 PROCEDURE — 93018 CV STRESS TEST I&R ONLY: CPT | Performed by: INTERNAL MEDICINE

## 2023-12-04 PROCEDURE — 3430000000 HC RX DIAGNOSTIC RADIOPHARMACEUTICAL: Performed by: INTERNAL MEDICINE

## 2023-12-04 PROCEDURE — 2580000003 HC RX 258: Performed by: INTERNAL MEDICINE

## 2023-12-04 PROCEDURE — 99232 SBSQ HOSP IP/OBS MODERATE 35: CPT | Performed by: INTERNAL MEDICINE

## 2023-12-04 PROCEDURE — 6360000002 HC RX W HCPCS: Performed by: INTERNAL MEDICINE

## 2023-12-04 PROCEDURE — APPSS30 APP SPLIT SHARED TIME 16-30 MINUTES: Performed by: PHYSICIAN ASSISTANT

## 2023-12-04 PROCEDURE — 6370000000 HC RX 637 (ALT 250 FOR IP): Performed by: INTERNAL MEDICINE

## 2023-12-04 PROCEDURE — 93010 ELECTROCARDIOGRAM REPORT: CPT | Performed by: INTERNAL MEDICINE

## 2023-12-04 PROCEDURE — 93017 CV STRESS TEST TRACING ONLY: CPT

## 2023-12-04 PROCEDURE — A9502 TC99M TETROFOSMIN: HCPCS | Performed by: INTERNAL MEDICINE

## 2023-12-04 PROCEDURE — 96372 THER/PROPH/DIAG INJ SC/IM: CPT

## 2023-12-04 RX ORDER — METOPROLOL SUCCINATE 25 MG/1
25 TABLET, EXTENDED RELEASE ORAL DAILY
Status: DISCONTINUED | OUTPATIENT
Start: 2023-12-05 | End: 2023-12-04 | Stop reason: HOSPADM

## 2023-12-04 RX ORDER — SODIUM CHLORIDE 0.9 % (FLUSH) 0.9 %
10 SYRINGE (ML) INJECTION PRN
Status: DISCONTINUED | OUTPATIENT
Start: 2023-12-04 | End: 2023-12-04 | Stop reason: HOSPADM

## 2023-12-04 RX ORDER — METOPROLOL SUCCINATE 25 MG/1
25 TABLET, EXTENDED RELEASE ORAL DAILY
Qty: 30 TABLET | Refills: 3 | Status: SHIPPED | OUTPATIENT
Start: 2023-12-05

## 2023-12-04 RX ORDER — REGADENOSON 0.08 MG/ML
0.4 INJECTION, SOLUTION INTRAVENOUS
Status: COMPLETED | OUTPATIENT
Start: 2023-12-04 | End: 2023-12-04

## 2023-12-04 RX ADMIN — TETROFOSMIN 11.5 MILLICURIE: 1.38 INJECTION, POWDER, LYOPHILIZED, FOR SOLUTION INTRAVENOUS at 09:20

## 2023-12-04 RX ADMIN — INSULIN LISPRO 8 UNITS: 100 INJECTION, SOLUTION INTRAVENOUS; SUBCUTANEOUS at 16:54

## 2023-12-04 RX ADMIN — ONDANSETRON 4 MG: 4 TABLET, ORALLY DISINTEGRATING ORAL at 12:33

## 2023-12-04 RX ADMIN — SODIUM CHLORIDE, PRESERVATIVE FREE 10 ML: 5 INJECTION INTRAVENOUS at 10:21

## 2023-12-04 RX ADMIN — ASPIRIN 81 MG: 81 TABLET, COATED ORAL at 12:33

## 2023-12-04 RX ADMIN — CLOPIDOGREL BISULFATE 75 MG: 75 TABLET ORAL at 12:33

## 2023-12-04 RX ADMIN — TETROFOSMIN 35.2 MILLICURIE: 1.38 INJECTION, POWDER, LYOPHILIZED, FOR SOLUTION INTRAVENOUS at 10:20

## 2023-12-04 RX ADMIN — ATORVASTATIN CALCIUM 40 MG: 40 TABLET, FILM COATED ORAL at 12:33

## 2023-12-04 RX ADMIN — ESCITALOPRAM OXALATE 20 MG: 20 TABLET ORAL at 12:33

## 2023-12-04 RX ADMIN — Medication 10 ML: at 12:35

## 2023-12-04 RX ADMIN — CARIPRAZINE 3 MG: 1.5 CAPSULE, GELATIN COATED ORAL at 12:33

## 2023-12-04 RX ADMIN — SODIUM CHLORIDE, PRESERVATIVE FREE 10 ML: 5 INJECTION INTRAVENOUS at 10:20

## 2023-12-04 RX ADMIN — REGADENOSON 0.4 MG: 0.08 INJECTION, SOLUTION INTRAVENOUS at 10:20

## 2023-12-04 RX ADMIN — ENOXAPARIN SODIUM 40 MG: 100 INJECTION SUBCUTANEOUS at 12:36

## 2023-12-04 ASSESSMENT — ENCOUNTER SYMPTOMS
NAUSEA: 0
ABDOMINAL PAIN: 0
SHORTNESS OF BREATH: 0
COLOR CHANGE: 0
CHEST TIGHTNESS: 0
VOMITING: 0

## 2023-12-04 NOTE — CARE COORDINATION
FINANCIAL RESOURCES INCLUDING MEDICATION RESOURCES PLACED ON DISCHARGE INSTRUCTIONS. NOTE LEFT FOR LOW COST MEDS.
Quality round completed with care management team. Pt lives at home alone. Aware that pt has difficulty affording her medications. Financial resources including medication resources was placed on discharge instruction by CM. Pt would like to return home alone. Denies any needs. Pt's father is supportive. LSW will follow.      Electronically signed by Lurene Opitz, LSW on 12/4/2023 at 11:45 AM
[phone]      Notes:    Factors facilitating achievement of predicted outcomes: Family support, Motivated, Cooperative, Pleasant, Sense of humor, and Good insight into deficits    Barriers to discharge: medical clearance    Additional Case Management Notes: pt lives alone. She states that she has difficulty affording her medications, printed information on resources was sent to the nursing unit. She denied using any durable medical equipment, home O2 or being on dialysis. She denied any other d/c needs at this time. The Plan for Transition of Care is related to the following treatment goals of Unstable angina ([address]) [I20.0]  Pericardial effusion [I31.39]  Chest pain [R07.9]  Pleural effusion [J90]  Chest pain, unspecified type [D60.6]    IF APPLICABLE: The Patient and/or patient representative Dylan Gonzales and her family were provided with a choice of provider and agrees with the discharge plan. Freedom of choice list with basic dialogue that supports the patient's individualized plan of care/goals and shares the quality data associated with the providers was provided to:     Patient Representative Name:       The Patient and/or Patient Representative Agree with the Discharge Plan?       Alex Barrera RN  Case Management Department

## 2023-12-04 NOTE — PLAN OF CARE
Care plans completed.   Pt [address] [facility]

## 2023-12-07 PROBLEM — E66.9 CLASS 1 OBESITY WITH BODY MASS INDEX (BMI) OF 30.0 TO 30.9 IN ADULT: Status: RESOLVED | Noted: 2023-02-04 | Resolved: 2023-12-07

## 2023-12-07 PROBLEM — E66.811 CLASS 1 OBESITY WITH BODY MASS INDEX (BMI) OF 30.0 TO 30.9 IN ADULT: Status: RESOLVED | Noted: 2023-02-04 | Resolved: 2023-12-07

## 2023-12-12 ENCOUNTER — APPOINTMENT (OUTPATIENT)
Dept: PRIMARY CARE | Facility: CLINIC | Age: 53
End: 2023-12-12
Payer: COMMERCIAL

## 2023-12-15 ENCOUNTER — HOSPITAL ENCOUNTER (OUTPATIENT)
Dept: WOMENS IMAGING | Age: 53
Discharge: HOME | End: 2023-12-15
Payer: COMMERCIAL

## 2023-12-15 VITALS — BODY MASS INDEX: 34.38 KG/M2 | HEIGHT: 63 IN

## 2023-12-15 DIAGNOSIS — Z12.31 ENCOUNTER FOR SCREENING MAMMOGRAM FOR MALIGNANT NEOPLASM OF BREAST: ICD-10-CM

## 2023-12-15 PROCEDURE — 77063 BREAST TOMOSYNTHESIS BI: CPT

## 2024-01-29 DIAGNOSIS — F31.9 BIPOLAR DEPRESSION (MULTI): ICD-10-CM

## 2024-02-02 ENCOUNTER — TELEPHONE (OUTPATIENT)
Dept: PRIMARY CARE | Facility: CLINIC | Age: 54
End: 2024-02-02
Payer: COMMERCIAL

## 2024-02-02 DIAGNOSIS — E11.65 TYPE 2 DIABETES MELLITUS WITH HYPERGLYCEMIA, WITH LONG-TERM CURRENT USE OF INSULIN (MULTI): ICD-10-CM

## 2024-02-02 DIAGNOSIS — Z79.4 TYPE 2 DIABETES MELLITUS WITH HYPERGLYCEMIA, WITH LONG-TERM CURRENT USE OF INSULIN (MULTI): ICD-10-CM

## 2024-02-02 RX ORDER — INSULIN LISPRO 200 [IU]/ML
INJECTION, SOLUTION SUBCUTANEOUS
Qty: 15 ML | Refills: 3 | Status: SHIPPED | OUTPATIENT
Start: 2024-02-02 | End: 2024-02-13 | Stop reason: SDUPTHER

## 2024-02-13 ENCOUNTER — PATIENT MESSAGE (OUTPATIENT)
Dept: PRIMARY CARE | Facility: CLINIC | Age: 54
End: 2024-02-13

## 2024-02-13 DIAGNOSIS — F31.9 BIPOLAR DEPRESSION (MULTI): ICD-10-CM

## 2024-02-13 DIAGNOSIS — Z79.4 TYPE 2 DIABETES MELLITUS WITH HYPERGLYCEMIA, WITH LONG-TERM CURRENT USE OF INSULIN (MULTI): ICD-10-CM

## 2024-02-13 DIAGNOSIS — E11.65 TYPE 2 DIABETES MELLITUS WITH HYPERGLYCEMIA, WITH LONG-TERM CURRENT USE OF INSULIN (MULTI): ICD-10-CM

## 2024-02-13 DIAGNOSIS — I25.118 CORONARY ARTERY DISEASE OF NATIVE ARTERY OF NATIVE HEART WITH STABLE ANGINA PECTORIS (CMS-HCC): ICD-10-CM

## 2024-02-13 RX ORDER — INSULIN LISPRO 200 [IU]/ML
INJECTION, SOLUTION SUBCUTANEOUS
Qty: 15 ML | Refills: 3 | Status: SHIPPED | OUTPATIENT
Start: 2024-02-13 | End: 2024-02-16

## 2024-02-14 ENCOUNTER — TELEPHONE (OUTPATIENT)
Dept: PRIMARY CARE | Facility: CLINIC | Age: 54
End: 2024-02-14
Payer: COMMERCIAL

## 2024-02-16 ENCOUNTER — TELEMEDICINE (OUTPATIENT)
Dept: PHARMACY | Facility: HOSPITAL | Age: 54
End: 2024-02-16
Payer: COMMERCIAL

## 2024-02-16 DIAGNOSIS — E11.65 TYPE 2 DIABETES MELLITUS WITH HYPERGLYCEMIA, WITH LONG-TERM CURRENT USE OF INSULIN (MULTI): Primary | ICD-10-CM

## 2024-02-16 DIAGNOSIS — E11.65 TYPE 2 DIABETES MELLITUS WITH HYPERGLYCEMIA, WITH LONG-TERM CURRENT USE OF INSULIN (MULTI): ICD-10-CM

## 2024-02-16 DIAGNOSIS — Z79.4 TYPE 2 DIABETES MELLITUS WITH HYPERGLYCEMIA, WITH LONG-TERM CURRENT USE OF INSULIN (MULTI): Primary | ICD-10-CM

## 2024-02-16 DIAGNOSIS — Z79.4 TYPE 2 DIABETES MELLITUS WITH HYPERGLYCEMIA, WITH LONG-TERM CURRENT USE OF INSULIN (MULTI): ICD-10-CM

## 2024-02-16 RX ORDER — BLOOD-GLUCOSE TRANSMITTER
EACH MISCELLANEOUS
Qty: 1 EACH | Refills: 3 | Status: SHIPPED | OUTPATIENT
Start: 2024-02-16

## 2024-02-16 RX ORDER — BLOOD-GLUCOSE SENSOR
EACH MISCELLANEOUS
Qty: 3 EACH | Refills: 11 | Status: SHIPPED | OUTPATIENT
Start: 2024-02-16

## 2024-02-16 RX ORDER — INSULIN LISPRO 100 [IU]/ML
INJECTION, SOLUTION INTRAVENOUS; SUBCUTANEOUS
Qty: 60 ML | Refills: 0 | Status: SHIPPED | OUTPATIENT
Start: 2024-02-16 | End: 2024-03-15 | Stop reason: ALTCHOICE

## 2024-02-16 RX ORDER — BLOOD-GLUCOSE,RECEIVER,CONT
EACH MISCELLANEOUS
Qty: 1 EACH | Refills: 0 | Status: SHIPPED | OUTPATIENT
Start: 2024-02-16

## 2024-02-16 NOTE — PROGRESS NOTES
Pharmacist Clinic: Diabetes Management  Ruth Zambrano is a 53 y.o. female was referred to Clinical Pharmacy Team for diabetes management.     Referring Provider: Ruth Fonseca MD     HISTORY OF PRESENT ILLNESS  Spoke with patient today regarding diabetes management. Patient is established on U-200 Humalog (states uses about half a pen (1.5mL) every 1.5 days). Patient uses Omnipod 5 pump, however does not have a CGM. Patient previously used Dexcom, however has been using glucometer for now.    Patient not currently following endocrinologist. Prior authorization denied for Humalog U-200, therefore will plan to transition patient to U-100 for now, as patient is out of insulin.    Patient also needs Vraylar 3 mg, as PA was denied. Will provide samples at [facility] for now. Patient reports currently taking escitalopram and trazodone. Previous history includes Latuda, quetiapine, and lamotrigine. Patient has been established on Vraylar 3 mg for multiple years, and may cause patient harm to discontinue current bipolar regimen.    LAB REVIEW   Glucose (mg/dL)   Date Value   03/04/2023 82   05/23/2022 319 (H)   05/20/2021 218 (H)     Hemoglobin A1C (%)   Date Value   11/09/2023 12.5 (H)   10/20/2023 14.0 (H)   03/18/2023 7.7 (H)     Bicarbonate (mmol/L)   Date Value   03/04/2023 31   05/23/2022 32   05/20/2021 30     Urea Nitrogen (mg/dL)   Date Value   03/04/2023 11   05/23/2022 9   05/20/2021 9     Creatinine (mg/dL)   Date Value   03/04/2023 0.47 (L)   05/23/2022 0.40 (L)   05/20/2021 0.43 (L)     Lab Results   Component Value Date    HGBA1C 12.5 (H) 11/09/2023    HGBA1C 14.0 (H) 10/20/2023    HGBA1C 7.7 (H) 03/18/2023     Lab Results   Component Value Date    CREATININE 0.47 (L) 03/04/2023     Lab Results   Component Value Date    CHOL 270 (H) 03/04/2023    CHOL 268 (H) 05/23/2022    CHOL 213 (H) 05/20/2021     Lab Results   Component Value Date    HDL 37.9 (A) 03/04/2023    HDL 34.0 (A) 05/23/2022  "   HDL 35.0 (A) 05/20/2021     No results found for: \"LDLCALC\"  Lab Results   Component Value Date    TRIG 132 03/04/2023    TRIG 375 (H) 05/23/2022    TRIG 206 (H) 05/20/2021     No components found for: \"CHOLHDL\"  No results found for: \"LDLCALC\"    DIABETES ASSESSMENT    CURRENT PHARMACOTHERAPY  - Humalog U-200 - Inject up to 400 units/day in insulin pump    SECONDARY PREVENTION  - Statin? No  - ACE-I/ARB? No  - Aspirin? Yes    SMBG MEASUREMENTS  No readings provided at time of appt    Patient does not report symptoms of hypoglycemia. Patient does not report symptoms of hyperglycemia.     RECOMMENDATIONS/PLAN  1. Patients diabetes is poorly controlled with most recent A1c of 12.5% (goal < 7 %).   - Start: Dexcom G6 - Prescription for reader, transmitter, and sensor sent to [facility] on [address]  - Switch: to Humalog U-100 - Max of 200 units/day  - Continue: Continue all meds under the continuation of care with the referring provider and clinical pharmacy team.    2. Education:   - Given difficulties with prior authorization, will switch patient to Humalog U-100 for now. Patient aware will go through Omnipods about every day due to increased volume.  - Given patient was using about 1.5 mL/1.5 days of Humalog U-200, this is about 200 units/day. Advised patient will prescribe U-100 with a max of 200 units per day. Patient will need to closely monitor blood glucose with Humalog adjustment.   - [facility] to submit more information for U-200 and Vraylar 3 mg to insurance    Clinical Pharmacist follow up: 2/19/2024 @ 10 am     Thank you,  Lisa Charles, PharmD    Verbal consent to manage patient's drug therapy was obtained from patient. They were informed they may decline to participate or withdraw from participation in pharmacy services at any time.    "

## 2024-02-19 ENCOUNTER — HOSPITAL ENCOUNTER (OUTPATIENT)
Dept: ULTRASOUND IMAGING | Age: 54
Discharge: HOME | End: 2024-02-21
Attending: INTERNAL MEDICINE
Payer: MEDICAID

## 2024-02-19 ENCOUNTER — TELEMEDICINE (OUTPATIENT)
Dept: PHARMACY | Facility: HOSPITAL | Age: 54
End: 2024-02-19
Payer: COMMERCIAL

## 2024-02-19 DIAGNOSIS — E11.65 TYPE 2 DIABETES MELLITUS WITH HYPERGLYCEMIA, WITH LONG-TERM CURRENT USE OF INSULIN (MULTI): ICD-10-CM

## 2024-02-19 DIAGNOSIS — Z79.4 TYPE 2 DIABETES MELLITUS WITH HYPERGLYCEMIA, WITH LONG-TERM CURRENT USE OF INSULIN (MULTI): Primary | ICD-10-CM

## 2024-02-19 DIAGNOSIS — Z79.4 TYPE 2 DIABETES MELLITUS WITH HYPERGLYCEMIA, WITH LONG-TERM CURRENT USE OF INSULIN (MULTI): ICD-10-CM

## 2024-02-19 DIAGNOSIS — E11.65 TYPE 2 DIABETES MELLITUS WITH HYPERGLYCEMIA, WITH LONG-TERM CURRENT USE OF INSULIN (MULTI): Primary | ICD-10-CM

## 2024-02-19 DIAGNOSIS — R09.89 BILATERAL CAROTID BRUITS: ICD-10-CM

## 2024-02-19 LAB
VAS LEFT CCA DIST EDV: 34.2 CM/S
VAS LEFT CCA DIST PSV: 96.9 CM/S
VAS LEFT CCA MID EDV: 29.2 CM/S
VAS LEFT CCA MID PSV: 111 CM/S
VAS LEFT CCA PROX EDV: 28.6 CM/S
VAS LEFT CCA PROX PSV: 146 CM/S
VAS LEFT ECA EDV: 21.1 CM/S
VAS LEFT ECA PSV: 104 CM/S
VAS LEFT ICA DIST EDV: 27.2 CM/S
VAS LEFT ICA DIST PSV: 84.4 CM/S
VAS LEFT ICA MID EDV: 26.9 CM/S
VAS LEFT ICA MID PSV: 75.2 CM/S
VAS LEFT ICA PROX EDV: 509.9 CM/S
VAS LEFT ICA PROX PSV: 107 CM/S
VAS LEFT ICA/CCA PSV: 0.96
VAS LEFT VERTEBRAL EDV: 13.8 CM/S
VAS LEFT VERTEBRAL PSV: 36.7 CM/S
VAS RIGHT CCA DIST EDV: 25.2 CM/S
VAS RIGHT CCA DIST PSV: 120 CM/S
VAS RIGHT CCA MID EDV: 20.3 CM/S
VAS RIGHT CCA MID PSV: 120 CM/S
VAS RIGHT CCA PROX EDV: 18.2 CM/S
VAS RIGHT CCA PROX PSV: 130 CM/S
VAS RIGHT ECA EDV: 16.1 CM/S
VAS RIGHT ECA PSV: 121 CM/S
VAS RIGHT ICA DIST EDV: 31.5 CM/S
VAS RIGHT ICA DIST PSV: 96 CM/S
VAS RIGHT ICA MID EDV: 29.4 CM/S
VAS RIGHT ICA MID PSV: 70.8 CM/S
VAS RIGHT ICA PROX EDV: 25.2 CM/S
VAS RIGHT ICA PROX PSV: 84.8 CM/S
VAS RIGHT ICA/CCA PSV: 0.7
VAS RIGHT VERTEBRAL EDV: 23.8 CM/S
VAS RIGHT VERTEBRAL PSV: 84.1 CM/S

## 2024-02-19 PROCEDURE — 93880 EXTRACRANIAL BILAT STUDY: CPT | Performed by: INTERNAL MEDICINE

## 2024-02-19 PROCEDURE — 93880 EXTRACRANIAL BILAT STUDY: CPT

## 2024-02-23 ENCOUNTER — APPOINTMENT (OUTPATIENT)
Dept: PHARMACY | Facility: HOSPITAL | Age: 54
End: 2024-02-23
Payer: COMMERCIAL

## 2024-02-28 ENCOUNTER — DOCUMENTATION (OUTPATIENT)
Dept: CARE COORDINATION | Facility: CLINIC | Age: 54
End: 2024-02-28
Payer: COMMERCIAL

## 2024-02-28 NOTE — PROGRESS NOTES
Referral received for care management services. Outreach to patient to assess needs and provide support completed.  CM left a VM with contact information for further follow up.     NETTA McgeeN, RN

## 2024-03-08 ENCOUNTER — TELEMEDICINE (OUTPATIENT)
Dept: PHARMACY | Facility: HOSPITAL | Age: 54
End: 2024-03-08
Payer: COMMERCIAL

## 2024-03-08 DIAGNOSIS — E11.65 TYPE 2 DIABETES MELLITUS WITH HYPERGLYCEMIA, WITH LONG-TERM CURRENT USE OF INSULIN (MULTI): ICD-10-CM

## 2024-03-08 DIAGNOSIS — Z79.4 TYPE 2 DIABETES MELLITUS WITH HYPERGLYCEMIA, WITH LONG-TERM CURRENT USE OF INSULIN (MULTI): ICD-10-CM

## 2024-03-08 NOTE — PROGRESS NOTES
Pharmacist Clinic: Diabetes Management  Ruth Zambrano is a 53 y.o. female was referred to Clinical Pharmacy Team for diabetes management.     Referring Provider: Ruth Fonseca MD     HISTORY OF PRESENT ILLNESS  Spoke with patient today regarding diabetes management. Since last visit, patient was switched from Humalog U-200 to U-100 due to insurance coverage. Today, patient states insurance has changed to [employer] and is interested on going back to U-200. Report blood glucose readings vary (100-300s). Patient not currently following endocrinologist. Patient was able to start Dexcom.    Patient also needs Vraylar 3 mg, as PA was denied. Will provide samples at [facility] for two weeks. Patient reports currently taking escitalopram and trazodone. Previous history includes Latuda, quetiapine, and lamotrigine. Patient has been established on Vraylar 3 mg for multiple years, and may cause patient harm to discontinue current bipolar regimen.    [employer] Insurance:   BIN: [id number]  PCN: [id number]  ID: [id number]     LAB REVIEW   Glucose (mg/dL)   Date Value   03/04/2023 82   05/23/2022 319 (H)   05/20/2021 218 (H)     Hemoglobin A1C (%)   Date Value   11/09/2023 12.5 (H)   10/20/2023 14.0 (H)   03/18/2023 7.7 (H)     Bicarbonate (mmol/L)   Date Value   03/04/2023 31   05/23/2022 32   05/20/2021 30     Urea Nitrogen (mg/dL)   Date Value   03/04/2023 11   05/23/2022 9   05/20/2021 9     Creatinine (mg/dL)   Date Value   03/04/2023 0.47 (L)   05/23/2022 0.40 (L)   05/20/2021 0.43 (L)     Lab Results   Component Value Date    HGBA1C 12.5 (H) 11/09/2023    HGBA1C 14.0 (H) 10/20/2023    HGBA1C 7.7 (H) 03/18/2023     Lab Results   Component Value Date    CREATININE 0.47 (L) 03/04/2023     Lab Results   Component Value Date    CHOL 270 (H) 03/04/2023    CHOL 268 (H) 05/23/2022    CHOL 213 (H) 05/20/2021     Lab Results   Component Value Date    HDL 37.9 (A) 03/04/2023    HDL 34.0 (A) 05/23/2022    HDL 35.0  "(A) 05/20/2021     No results found for: \"LDLCALC\"  Lab Results   Component Value Date    TRIG 132 03/04/2023    TRIG 375 (H) 05/23/2022    TRIG 206 (H) 05/20/2021     No components found for: \"CHOLHDL\"  No results found for: \"LDLCALC\"    DIABETES ASSESSMENT    CURRENT PHARMACOTHERAPY  - Humalog U-100 - Inject up to 400 units/day in insulin pump    SECONDARY PREVENTION  - Statin? No  - ACE-I/ARB? No  - Aspirin? Yes    SMBG MEASUREMENTS  No exact readings provided at time of appt  Highest reading - ~300  No lows - 85 lowest reading    Patient does not report symptoms of hypoglycemia. Patient does not report symptoms of hyperglycemia.     RECOMMENDATIONS/PLAN  1. Patients diabetes is poorly controlled with most recent A1c of 12.5% (goal < 7 %).   - Continue: Continue all meds under the continuation of care with the referring provider and clinical pharmacy team.    2. Education:   - [facility] Humalog U-[facility] and Vraylar 3 mg  - Patient to receive 2 weeks of Vraylar samples through [facility]    Clinical Pharmacist follow up: 3/15/2024 @ 11 am     Thank you,  Lisa Charles, PharmD    Verbal consent to manage patient's drug therapy was obtained from patient. They were informed they may decline to participate or withdraw from participation in pharmacy services at any time.  "

## 2024-03-14 ENCOUNTER — TELEPHONE (OUTPATIENT)
Dept: PRIMARY CARE | Facility: CLINIC | Age: 54
End: 2024-03-14
Payer: COMMERCIAL

## 2024-03-14 NOTE — TELEPHONE ENCOUNTER
Prior authorization approved for Insulin Lispro and Cariprazine. LVM to patient to notify of approval. Patient is to call pharmacy to notify of prior authorization approval. If additional assistance is needed, advised patient to call [facility] at [phone].

## 2024-03-15 ENCOUNTER — TELEMEDICINE (OUTPATIENT)
Dept: PHARMACY | Facility: HOSPITAL | Age: 54
End: 2024-03-15
Payer: COMMERCIAL

## 2024-03-15 DIAGNOSIS — Z79.4 TYPE 2 DIABETES MELLITUS WITH HYPERGLYCEMIA, WITH LONG-TERM CURRENT USE OF INSULIN (MULTI): ICD-10-CM

## 2024-03-15 DIAGNOSIS — E11.65 TYPE 2 DIABETES MELLITUS WITH HYPERGLYCEMIA, WITH LONG-TERM CURRENT USE OF INSULIN (MULTI): ICD-10-CM

## 2024-03-15 RX ORDER — INSULIN LISPRO 200 [IU]/ML
INJECTION, SOLUTION SUBCUTANEOUS
Qty: 60 ML | Refills: 2 | Status: SHIPPED | OUTPATIENT
Start: 2024-03-15

## 2024-03-15 NOTE — PROGRESS NOTES
"Pharmacist Clinic: Diabetes Management  Ruth Zambrano is a 53 y.o. female was referred to Clinical Pharmacy Team for diabetes management.     Referring Provider: Ruth Fonseca MD     HISTORY OF PRESENT ILLNESS  Spoke with patient today regarding diabetes management. Since last visit, patient was switched from Humalog U-200 to U-100 due to insurance coverage. Today, prior auth was approved for U-200.     Additionally, prior auth for Vraylar 3 mg was approved.    LAB REVIEW   Glucose (mg/dL)   Date Value   03/04/2023 82   05/23/2022 319 (H)   05/20/2021 218 (H)     Hemoglobin A1C (%)   Date Value   11/09/2023 12.5 (H)   10/20/2023 14.0 (H)   03/18/2023 7.7 (H)     Bicarbonate (mmol/L)   Date Value   03/04/2023 31   05/23/2022 32   05/20/2021 30     Urea Nitrogen (mg/dL)   Date Value   03/04/2023 11   05/23/2022 9   05/20/2021 9     Creatinine (mg/dL)   Date Value   03/04/2023 0.47 (L)   05/23/2022 0.40 (L)   05/20/2021 0.43 (L)     Lab Results   Component Value Date    HGBA1C 12.5 (H) 11/09/2023    HGBA1C 14.0 (H) 10/20/2023    HGBA1C 7.7 (H) 03/18/2023     Lab Results   Component Value Date    CREATININE 0.47 (L) 03/04/2023     Lab Results   Component Value Date    CHOL 270 (H) 03/04/2023    CHOL 268 (H) 05/23/2022    CHOL 213 (H) 05/20/2021     Lab Results   Component Value Date    HDL 37.9 (A) 03/04/2023    HDL 34.0 (A) 05/23/2022    HDL 35.0 (A) 05/20/2021     No results found for: \"LDLCALC\"  Lab Results   Component Value Date    TRIG 132 03/04/2023    TRIG 375 (H) 05/23/2022    TRIG 206 (H) 05/20/2021     No components found for: \"CHOLHDL\"  No results found for: \"LDLCALC\"    DIABETES ASSESSMENT    CURRENT PHARMACOTHERAPY  - Humalog U-100 - Inject up to 400 units/day in insulin pump    SECONDARY PREVENTION  - Statin? No  - ACE-I/ARB? No  - Aspirin? Yes    SMBG MEASUREMENTS  No exact readings provided at time of appt    Patient does not report symptoms of hypoglycemia. Patient does not report " symptoms of hyperglycemia.     RECOMMENDATIONS/PLAN  1. Patients diabetes is poorly controlled with most recent A1c of 12.5% (goal < 7 %).   - Stop: Humalog U-100  - Start: Humalog U-200 (up to 400 units/day)- Prescription sent to patient's home pharmacy  - Continue: Continue all meds under the continuation of care with the referring provider and clinical pharmacy team.    2. Education:   - Patient is to go back to Humalog U-200 through insulin pump  - Patient's diabetes is poorly controlled, however patient not interested in establishing with endocrinology.   - Patient to receive updated labwork at next PCP appointment in May 2024. Will defer management to PCP  - Patient may benefit from endocrinology pharmacy referral in the future, if patient is interested  - Provided pharmacist's phone number if any future problems/issues arise    Clinical Pharmacist follow up: As needed    Thank you,  Lisa Charles, PharmD    Verbal consent to manage patient's drug therapy was obtained from patient. They were informed they may decline to participate or withdraw from participation in pharmacy services at any time.

## 2024-03-26 DIAGNOSIS — E78.2 MIXED HYPERLIPIDEMIA: ICD-10-CM

## 2024-03-26 RX ORDER — CLOPIDOGREL BISULFATE 75 MG/1
75 TABLET ORAL DAILY
Qty: 90 TABLET | Refills: 3 | Status: SHIPPED | OUTPATIENT
Start: 2024-03-26

## 2024-03-26 NOTE — TELEPHONE ENCOUNTER
Pt requesting medication refill. Please approve or deny this request.    Rx requested:  Requested Prescriptions     Pending Prescriptions Disp Refills    clopidogrel (PLAVIX) 75 MG tablet 90 tablet 3     Sig: Take 1 tablet by mouth daily         Last Office Visit:   12/19/2023      Next Visit Date:  Future Appointments   Date Time Provider Department Center   4/24/2024  3:45 PM Emigdio Rivera MD [facility]

## 2024-04-04 ENCOUNTER — PATIENT OUTREACH (OUTPATIENT)
Dept: CARE COORDINATION | Facility: CLINIC | Age: 54
End: 2024-04-04
Payer: COMMERCIAL

## 2024-04-04 NOTE — PROGRESS NOTES
Unsuccessful outreach to patient.  Patient currently receiving support for her DMII through telemedicine visits with the Pharmacist clinic.     NETTA McgeeN, RN   I have reviewed and confirmed nurses' notes...

## 2024-04-08 RX ORDER — ESCITALOPRAM OXALATE 20 MG/1
20 TABLET ORAL DAILY
Qty: 90 TABLET | Refills: 3 | Status: SHIPPED | OUTPATIENT
Start: 2024-04-08

## 2024-04-08 RX ORDER — TRAZODONE HYDROCHLORIDE 100 MG/1
100 TABLET ORAL NIGHTLY
Qty: 90 TABLET | Refills: 3 | Status: SHIPPED | OUTPATIENT
Start: 2024-04-08

## 2024-04-08 RX ORDER — ASPIRIN 81 MG/1
81 TABLET ORAL DAILY
Qty: 90 TABLET | Refills: 3 | Status: SHIPPED | OUTPATIENT
Start: 2024-04-08

## 2024-04-08 RX ORDER — ATORVASTATIN CALCIUM 40 MG/1
40 TABLET, FILM COATED ORAL NIGHTLY
Qty: 90 TABLET | Refills: 3 | Status: SHIPPED | OUTPATIENT
Start: 2024-04-08

## 2024-04-16 ENCOUNTER — TELEPHONE (OUTPATIENT)
Dept: PRIMARY CARE | Facility: CLINIC | Age: 54
End: 2024-04-16
Payer: COMMERCIAL

## 2024-04-16 NOTE — TELEPHONE ENCOUNTER
Prior authorization approved for Dexcom G6. LVM to patient to notify of approval. Patient is to call pharmacy to notify of prior authorization approval. If additional assistance is needed, advised patient to call [facility] at [phone].

## 2024-04-18 ENCOUNTER — OFFICE VISIT (OUTPATIENT)
Dept: FAMILY MEDICINE CLINIC | Age: 54
End: 2024-04-18
Payer: COMMERCIAL

## 2024-04-18 VITALS
SYSTOLIC BLOOD PRESSURE: 126 MMHG | HEIGHT: 63 IN | DIASTOLIC BLOOD PRESSURE: 82 MMHG | HEART RATE: 84 BPM | OXYGEN SATURATION: 97 % | BODY MASS INDEX: 32.07 KG/M2 | TEMPERATURE: 97.6 F | RESPIRATION RATE: 16 BRPM | WEIGHT: 181 LBS

## 2024-04-18 DIAGNOSIS — R35.0 FREQUENCY OF MICTURITION: ICD-10-CM

## 2024-04-18 DIAGNOSIS — J02.9 SORE THROAT: Primary | ICD-10-CM

## 2024-04-18 DIAGNOSIS — J30.1 SEASONAL ALLERGIC RHINITIS DUE TO POLLEN: ICD-10-CM

## 2024-04-18 LAB
BILIRUBIN, POC: ABNORMAL
BLOOD URINE, POC: ABNORMAL
CLARITY, POC: ABNORMAL
COLOR, POC: YELLOW
GLUCOSE URINE, POC: ABNORMAL
KETONES, POC: ABNORMAL
LEUKOCYTE EST, POC: ABNORMAL
NITRITE, POC: ABNORMAL
PH, POC: 6
PROTEIN, POC: ABNORMAL
S PYO AG THROAT QL: NORMAL
SPECIFIC GRAVITY, POC: 1.01
UROBILINOGEN, POC: ABNORMAL

## 2024-04-18 PROCEDURE — 87880 STREP A ASSAY W/OPTIC: CPT | Performed by: PHYSICIAN ASSISTANT

## 2024-04-18 PROCEDURE — 3017F COLORECTAL CA SCREEN DOC REV: CPT | Performed by: PHYSICIAN ASSISTANT

## 2024-04-18 PROCEDURE — 81003 URINALYSIS AUTO W/O SCOPE: CPT | Performed by: PHYSICIAN ASSISTANT

## 2024-04-18 PROCEDURE — 1036F TOBACCO NON-USER: CPT | Performed by: PHYSICIAN ASSISTANT

## 2024-04-18 PROCEDURE — G8417 CALC BMI ABV UP PARAM F/U: HCPCS | Performed by: PHYSICIAN ASSISTANT

## 2024-04-18 PROCEDURE — 3079F DIAST BP 80-89 MM HG: CPT | Performed by: PHYSICIAN ASSISTANT

## 2024-04-18 PROCEDURE — 99213 OFFICE O/P EST LOW 20 MIN: CPT | Performed by: PHYSICIAN ASSISTANT

## 2024-04-18 PROCEDURE — G8427 DOCREV CUR MEDS BY ELIG CLIN: HCPCS | Performed by: PHYSICIAN ASSISTANT

## 2024-04-18 PROCEDURE — 3074F SYST BP LT 130 MM HG: CPT | Performed by: PHYSICIAN ASSISTANT

## 2024-04-18 RX ORDER — METHYLPREDNISOLONE 4 MG/1
TABLET ORAL
Qty: 1 KIT | Refills: 0 | Status: SHIPPED | OUTPATIENT
Start: 2024-04-18 | End: 2024-04-24

## 2024-04-18 RX ORDER — FLUTICASONE PROPIONATE 50 MCG
2 SPRAY, SUSPENSION (ML) NASAL DAILY
Qty: 16 G | Refills: 0 | Status: SHIPPED | OUTPATIENT
Start: 2024-04-18 | End: 2024-05-02

## 2024-04-18 ASSESSMENT — ENCOUNTER SYMPTOMS
SORE THROAT: 1
DIARRHEA: 0
APNEA: 0
WHEEZING: 0
ABDOMINAL PAIN: 0
VOMITING: 0
SINUS PRESSURE: 1
COUGH: 1
RHINORRHEA: 0

## 2024-04-18 ASSESSMENT — PATIENT HEALTH QUESTIONNAIRE - PHQ9
4. FEELING TIRED OR HAVING LITTLE ENERGY: NOT AT ALL
8. MOVING OR SPEAKING SO SLOWLY THAT OTHER PEOPLE COULD HAVE NOTICED. OR THE OPPOSITE, BEING SO FIGETY OR RESTLESS THAT YOU HAVE BEEN MOVING AROUND A LOT MORE THAN USUAL: NOT AT ALL
2. FEELING DOWN, DEPRESSED OR HOPELESS: NOT AT ALL
10. IF YOU CHECKED OFF ANY PROBLEMS, HOW DIFFICULT HAVE THESE PROBLEMS MADE IT FOR YOU TO DO YOUR WORK, TAKE CARE OF THINGS AT HOME, OR GET ALONG WITH OTHER PEOPLE: NOT DIFFICULT AT ALL
SUM OF ALL RESPONSES TO PHQ QUESTIONS 1-9: 0
7. TROUBLE CONCENTRATING ON THINGS, SUCH AS READING THE NEWSPAPER OR WATCHING TELEVISION: NOT AT ALL
1. LITTLE INTEREST OR PLEASURE IN DOING THINGS: NOT AT ALL
9. THOUGHTS THAT YOU WOULD BE BETTER OFF DEAD, OR OF HURTING YOURSELF: NOT AT ALL
3. TROUBLE FALLING OR STAYING ASLEEP: NOT AT ALL
6. FEELING BAD ABOUT YOURSELF - OR THAT YOU ARE A FAILURE OR HAVE LET YOURSELF OR YOUR FAMILY DOWN: NOT AT ALL
SUM OF ALL RESPONSES TO PHQ QUESTIONS 1-9: 0
SUM OF ALL RESPONSES TO PHQ QUESTIONS 1-9: 0
5. POOR APPETITE OR OVEREATING: NOT AT ALL
SUM OF ALL RESPONSES TO PHQ QUESTIONS 1-9: 0
SUM OF ALL RESPONSES TO PHQ9 QUESTIONS 1 & 2: 0

## 2024-04-18 ASSESSMENT — VISUAL ACUITY: OU: 1

## 2024-04-18 NOTE — PROGRESS NOTES
S2 normal.   Pulmonary:      Effort: Pulmonary effort is normal.      Breath sounds: Normal breath sounds and air entry.   Musculoskeletal:      Cervical back: Normal range of motion.   Skin:     General: Skin is warm.      Capillary Refill: Capillary refill takes less than 2 seconds.   Neurological:      Mental Status: She is alert and oriented to person, place, and time.      Gait: Gait is intact.   Psychiatric:         Attention and Perception: Attention normal.         Mood and Affect: Mood normal.         Speech: Speech normal.         Behavior: Behavior normal. Behavior is cooperative.       Assessment & Plan   1. Sore throat  -     POCT rapid strep A  2. Seasonal allergic rhinitis due to pollen  -     fluticasone (FLONASE) 50 MCG/ACT nasal spray; 2 sprays by Nasal route daily for 14 days, Disp-16 g, R-0Normal  -     methylPREDNISolone (MEDROL, ALFONSO,) 4 MG tablet; Take by mouth., Disp-1 kit, R-0Normal  3. Frequency of micturition  -     POCT Urinalysis No Micro (Auto)     Orders Placed This Encounter   Procedures    POCT rapid strep A    POCT Urinalysis No Micro (Auto)     Orders Placed This Encounter   Medications    fluticasone (FLONASE) 50 MCG/ACT nasal spray     Si sprays by Nasal route daily for 14 days     Dispense:  16 g     Refill:  0    methylPREDNISolone (MEDROL, ALFONSO,) 4 MG tablet     Sig: Take by mouth.     Dispense:  1 kit     Refill:  0     There are no discontinued medications.  No follow-ups on file.        Reviewed with the patient: current clinical status, medications, activities and diet.     Side effects, adverse effects of the medication prescribed today, as well as treatment plan/ rationale and result expectations have been discussed with the patient who expresses understanding and desires to proceed.    Close follow up to evaluate treatment results and for coordination of care.  I have reviewed the patient's medical history in detail and updated the computerized patient

## 2024-04-19 RX ORDER — INSULIN PMP CART,AUT,G6/7,CNTR
1 EACH SUBCUTANEOUS DAILY
Qty: 90 EACH | Refills: 3 | Status: SHIPPED | OUTPATIENT
Start: 2024-04-19

## 2024-04-24 ENCOUNTER — OFFICE VISIT (OUTPATIENT)
Dept: CARDIOLOGY CLINIC | Age: 54
End: 2024-04-24
Payer: COMMERCIAL

## 2024-04-24 VITALS
DIASTOLIC BLOOD PRESSURE: 80 MMHG | HEART RATE: 80 BPM | WEIGHT: 190.2 LBS | SYSTOLIC BLOOD PRESSURE: 126 MMHG | HEIGHT: 63 IN | BODY MASS INDEX: 33.7 KG/M2 | OXYGEN SATURATION: 93 %

## 2024-04-24 DIAGNOSIS — I25.10 CORONARY ARTERY DISEASE INVOLVING NATIVE CORONARY ARTERY OF NATIVE HEART WITHOUT ANGINA PECTORIS: ICD-10-CM

## 2024-04-24 DIAGNOSIS — E78.2 MIXED HYPERLIPIDEMIA: Primary | ICD-10-CM

## 2024-04-24 DIAGNOSIS — G47.33 OSA (OBSTRUCTIVE SLEEP APNEA): ICD-10-CM

## 2024-04-24 DIAGNOSIS — R09.89 BILATERAL CAROTID BRUITS: ICD-10-CM

## 2024-04-24 DIAGNOSIS — E78.5 DYSLIPIDEMIA: ICD-10-CM

## 2024-04-24 DIAGNOSIS — I10 ESSENTIAL HYPERTENSION: ICD-10-CM

## 2024-04-24 PROCEDURE — 3017F COLORECTAL CA SCREEN DOC REV: CPT | Performed by: INTERNAL MEDICINE

## 2024-04-24 PROCEDURE — G8417 CALC BMI ABV UP PARAM F/U: HCPCS | Performed by: INTERNAL MEDICINE

## 2024-04-24 PROCEDURE — 99214 OFFICE O/P EST MOD 30 MIN: CPT | Performed by: INTERNAL MEDICINE

## 2024-04-24 PROCEDURE — 1036F TOBACCO NON-USER: CPT | Performed by: INTERNAL MEDICINE

## 2024-04-24 PROCEDURE — 3079F DIAST BP 80-89 MM HG: CPT | Performed by: INTERNAL MEDICINE

## 2024-04-24 PROCEDURE — 3074F SYST BP LT 130 MM HG: CPT | Performed by: INTERNAL MEDICINE

## 2024-04-24 PROCEDURE — G8427 DOCREV CUR MEDS BY ELIG CLIN: HCPCS | Performed by: INTERNAL MEDICINE

## 2024-04-24 RX ORDER — METOPROLOL SUCCINATE 25 MG/1
25 TABLET, EXTENDED RELEASE ORAL DAILY
Qty: 90 TABLET | Refills: 3 | Status: SHIPPED | OUTPATIENT
Start: 2024-04-24

## 2024-04-24 RX ORDER — CLOPIDOGREL BISULFATE 75 MG/1
75 TABLET ORAL DAILY
Qty: 90 TABLET | Refills: 3 | Status: SHIPPED | OUTPATIENT
Start: 2024-04-24

## 2024-04-24 ASSESSMENT — ENCOUNTER SYMPTOMS
EYES NEGATIVE: 1
RESPIRATORY NEGATIVE: 1
GASTROINTESTINAL NEGATIVE: 1
SHORTNESS OF BREATH: 0
CHEST TIGHTNESS: 0
WHEEZING: 0
NAUSEA: 0
STRIDOR: 0
BLOOD IN STOOL: 0
COUGH: 0

## 2024-04-24 NOTE — PROGRESS NOTES
Component Value Date    HDL 37.9 (A) 03/04/2023    HDL 29 (L) 03/09/2019    HDL 30 (L) 09/01/2018     Lab Results   Component Value Date    LDLCHOLESTEROL 206 (H) 03/04/2023    LDLCALC 153 (H) 03/09/2019    LDLCALC 194 (H) 09/01/2018    LDLCALC 119 11/16/2016     No results found for: \"VLDL\"    Lab Results   Component Value Date    CHOLHDLRATIO 7.1 (A) 03/04/2023     CMP:    Lab Results   Component Value Date/Time     12/02/2023 09:45 AM    K 3.6 12/02/2023 09:45 AM    K 3.1 11/09/2023 03:15 AM    CL 96 12/02/2023 09:45 AM    CO2 30 12/02/2023 09:45 AM    BUN 7 12/02/2023 09:45 AM    CREATININE 0.63 12/02/2023 09:45 AM    GFRAA >60.0 06/26/2022 08:08 AM    LABGLOM >60.0 12/02/2023 09:45 AM    GLUCOSE 372 12/02/2023 09:45 AM    GLUCOSE 82 03/04/2023 09:36 AM    PROT 8.0 12/02/2023 09:45 AM    CALCIUM 8.7 12/02/2023 09:45 AM    BILITOT 0.5 12/02/2023 09:45 AM    ALKPHOS 167 12/02/2023 09:45 AM    AST 14 12/02/2023 09:45 AM    ALT <5 12/02/2023 09:45 AM     BMP:    Lab Results   Component Value Date/Time     12/02/2023 09:45 AM    K 3.6 12/02/2023 09:45 AM    K 3.1 11/09/2023 03:15 AM    CL 96 12/02/2023 09:45 AM    CO2 30 12/02/2023 09:45 AM    BUN 7 12/02/2023 09:45 AM    CREATININE 0.63 12/02/2023 09:45 AM    CALCIUM 8.7 12/02/2023 09:45 AM    GFRAA >60.0 06/26/2022 08:08 AM    LABGLOM >60.0 12/02/2023 09:45 AM    GLUCOSE 372 12/02/2023 09:45 AM    GLUCOSE 82 03/04/2023 09:36 AM     Magnesium:    Lab Results   Component Value Date/Time    MG 1.2 11/09/2023 03:15 AM     TSH:  Lab Results   Component Value Date    TSH 0.699 12/02/2023       Patient Active Problem List   Diagnosis    Mixed hyperlipidemia    Essential hypertension    GERD (gastroesophageal reflux disease)    Leukocytosis    Cardiac disease    Mixed anxiety and depressive disorder    Type 2 diabetes mellitus with right diabetic foot infection (HCC)    Headache    Acute diffuse otitis externa of left ear    Breast mass, right    Obesity,

## 2024-05-01 ENCOUNTER — HOSPITAL ENCOUNTER (EMERGENCY)
Age: 54
Discharge: HOME | End: 2024-05-01
Payer: COMMERCIAL

## 2024-05-01 VITALS
HEIGHT: 63 IN | DIASTOLIC BLOOD PRESSURE: 80 MMHG | RESPIRATION RATE: 15 BRPM | TEMPERATURE: 98.6 F | WEIGHT: 181 LBS | OXYGEN SATURATION: 95 % | HEART RATE: 97 BPM | BODY MASS INDEX: 32.07 KG/M2 | SYSTOLIC BLOOD PRESSURE: 179 MMHG

## 2024-05-01 DIAGNOSIS — N30.01 ACUTE CYSTITIS WITH HEMATURIA: Primary | ICD-10-CM

## 2024-05-01 LAB
BACTERIA URNS QL MICRO: ABNORMAL /HPF
BILIRUB UR QL STRIP: NEGATIVE
CLARITY UR: ABNORMAL
COLOR UR: YELLOW
EPI CELLS #/AREA URNS AUTO: ABNORMAL /HPF (ref 0–5)
GLUCOSE UR STRIP-MCNC: NEGATIVE MG/DL
HGB UR QL STRIP: ABNORMAL
HYALINE CASTS #/AREA URNS AUTO: ABNORMAL /HPF (ref 0–5)
KETONES UR STRIP-MCNC: NEGATIVE MG/DL
LEUKOCYTE ESTERASE UR QL STRIP: ABNORMAL
NITRITE UR QL STRIP: NEGATIVE
PH UR STRIP: 7.5 [PH] (ref 5–9)
PROT UR STRIP-MCNC: 30 MG/DL
RBC #/AREA URNS AUTO: ABNORMAL /HPF (ref 0–5)
SP GR UR STRIP: 1 (ref 1–1.03)
URINE REFLEX TO CULTURE: YES
UROBILINOGEN UR STRIP-ACNC: 1 E.U./DL
WBC #/AREA URNS AUTO: >100 /HPF (ref 0–5)

## 2024-05-01 PROCEDURE — 87086 URINE CULTURE/COLONY COUNT: CPT

## 2024-05-01 PROCEDURE — 81001 URINALYSIS AUTO W/SCOPE: CPT

## 2024-05-01 PROCEDURE — 86403 PARTICLE AGGLUT ANTBDY SCRN: CPT

## 2024-05-01 PROCEDURE — 99283 EMERGENCY DEPT VISIT LOW MDM: CPT

## 2024-05-01 PROCEDURE — 6370000000 HC RX 637 (ALT 250 FOR IP)

## 2024-05-01 RX ORDER — AMOXICILLIN AND CLAVULANATE POTASSIUM 875; 125 MG/1; MG/1
1 TABLET, FILM COATED ORAL ONCE
Status: COMPLETED | OUTPATIENT
Start: 2024-05-01 | End: 2024-05-01

## 2024-05-01 RX ORDER — PHENAZOPYRIDINE HYDROCHLORIDE 100 MG/1
200 TABLET, FILM COATED ORAL 3 TIMES DAILY PRN
Qty: 18 TABLET | Refills: 0 | Status: SHIPPED | OUTPATIENT
Start: 2024-05-01 | End: 2024-05-04

## 2024-05-01 RX ORDER — AMOXICILLIN AND CLAVULANATE POTASSIUM 875; 125 MG/1; MG/1
1 TABLET, FILM COATED ORAL 2 TIMES DAILY
Qty: 20 TABLET | Refills: 0 | Status: SHIPPED | OUTPATIENT
Start: 2024-05-01 | End: 2024-05-11

## 2024-05-01 RX ADMIN — AMOXICILLIN AND CLAVULANATE POTASSIUM 1 TABLET: 875; 125 TABLET, FILM COATED ORAL at 19:42

## 2024-05-01 ASSESSMENT — PAIN DESCRIPTION - DESCRIPTORS: DESCRIPTORS: DISCOMFORT

## 2024-05-01 ASSESSMENT — PAIN - FUNCTIONAL ASSESSMENT: PAIN_FUNCTIONAL_ASSESSMENT: 0-10

## 2024-05-01 ASSESSMENT — PAIN SCALES - GENERAL: PAINLEVEL_OUTOF10: 1

## 2024-05-01 ASSESSMENT — PAIN DESCRIPTION - LOCATION: LOCATION: VAGINA

## 2024-05-03 ENCOUNTER — APPOINTMENT (OUTPATIENT)
Dept: PRIMARY CARE | Facility: CLINIC | Age: 54
End: 2024-05-03
Payer: COMMERCIAL

## 2024-05-03 LAB
BACTERIA UR CULT: ABNORMAL
BACTERIA UR CULT: ABNORMAL
ORGANISM: ABNORMAL

## 2024-05-28 ENCOUNTER — HOSPITAL ENCOUNTER (INPATIENT)
Age: 54
LOS: 1 days | Discharge: HOME | DRG: 194 | End: 2024-05-30
Attending: EMERGENCY MEDICINE | Admitting: FAMILY MEDICINE
Payer: COMMERCIAL

## 2024-05-28 ENCOUNTER — APPOINTMENT (OUTPATIENT)
Dept: GENERAL RADIOLOGY | Age: 54
DRG: 194 | End: 2024-05-28
Payer: COMMERCIAL

## 2024-05-28 DIAGNOSIS — E83.42 HYPOMAGNESEMIA: ICD-10-CM

## 2024-05-28 DIAGNOSIS — R07.9 CHEST PAIN, UNSPECIFIED TYPE: Primary | ICD-10-CM

## 2024-05-28 DIAGNOSIS — E87.6 HYPOKALEMIA: ICD-10-CM

## 2024-05-28 DIAGNOSIS — I50.33 ACUTE ON CHRONIC DIASTOLIC CONGESTIVE HEART FAILURE (HCC): ICD-10-CM

## 2024-05-28 LAB
ALBUMIN SERPL-MCNC: 3.9 G/DL (ref 3.5–4.6)
ALP SERPL-CCNC: 148 U/L (ref 40–130)
ALT SERPL-CCNC: 11 U/L (ref 0–33)
ANION GAP SERPL CALCULATED.3IONS-SCNC: 11 MEQ/L (ref 9–15)
AST SERPL-CCNC: 10 U/L (ref 0–35)
BASOPHILS # BLD: 0.1 K/UL (ref 0–0.2)
BASOPHILS NFR BLD: 0.5 %
BILIRUB SERPL-MCNC: 0.4 MG/DL (ref 0.2–0.7)
BNP BLD-MCNC: 329 PG/ML
BUN SERPL-MCNC: 9 MG/DL (ref 6–20)
CALCIUM SERPL-MCNC: 8.7 MG/DL (ref 8.5–9.9)
CHLORIDE SERPL-SCNC: 93 MEQ/L (ref 95–107)
CO2 SERPL-SCNC: 34 MEQ/L (ref 20–31)
CREAT SERPL-MCNC: 0.51 MG/DL (ref 0.5–0.9)
D DIMER PPP FEU-MCNC: 0.27 MG/L FEU (ref 0–0.5)
EKG ATRIAL RATE: 81 BPM
EKG P AXIS: 17 DEGREES
EKG P-R INTERVAL: 160 MS
EKG Q-T INTERVAL: 396 MS
EKG QRS DURATION: 76 MS
EKG QTC CALCULATION (BAZETT): 460 MS
EKG R AXIS: -17 DEGREES
EKG T AXIS: 70 DEGREES
EKG VENTRICULAR RATE: 81 BPM
EOSINOPHIL # BLD: 0.2 K/UL (ref 0–0.7)
EOSINOPHIL NFR BLD: 1.6 %
ERYTHROCYTE [DISTWIDTH] IN BLOOD BY AUTOMATED COUNT: 14.2 % (ref 11.5–14.5)
GLOBULIN SER CALC-MCNC: 3.7 G/DL (ref 2.3–3.5)
GLUCOSE BLD-MCNC: 350 MG/DL (ref 70–99)
GLUCOSE SERPL-MCNC: 317 MG/DL (ref 70–99)
HCT VFR BLD AUTO: 32.5 % (ref 37–47)
HGB BLD-MCNC: 11.1 G/DL (ref 12–16)
LYMPHOCYTES # BLD: 2.7 K/UL (ref 1–4.8)
LYMPHOCYTES NFR BLD: 20.5 %
MAGNESIUM SERPL-MCNC: 1.6 MG/DL (ref 1.7–2.4)
MCH RBC QN AUTO: 30.2 PG (ref 27–31.3)
MCHC RBC AUTO-ENTMCNC: 34.2 % (ref 33–37)
MCV RBC AUTO: 88.6 FL (ref 79.4–94.8)
MONOCYTES # BLD: 0.8 K/UL (ref 0.2–0.8)
MONOCYTES NFR BLD: 5.9 %
NEUTROPHILS # BLD: 9.4 K/UL (ref 1.4–6.5)
NEUTS SEG NFR BLD: 71 %
PERFORMED ON: ABNORMAL
PLATELET # BLD AUTO: 309 K/UL (ref 130–400)
POTASSIUM SERPL-SCNC: 2.5 MEQ/L (ref 3.4–4.9)
PROT SERPL-MCNC: 7.6 G/DL (ref 6.3–8)
RBC # BLD AUTO: 3.67 M/UL (ref 4.2–5.4)
SODIUM SERPL-SCNC: 138 MEQ/L (ref 135–144)
TROPONIN, HIGH SENSITIVITY: 24 NG/L (ref 0–19)
TROPONIN, HIGH SENSITIVITY: 25 NG/L (ref 0–19)
WBC # BLD AUTO: 13.2 K/UL (ref 4.8–10.8)

## 2024-05-28 PROCEDURE — G0378 HOSPITAL OBSERVATION PER HR: HCPCS

## 2024-05-28 PROCEDURE — 36415 COLL VENOUS BLD VENIPUNCTURE: CPT

## 2024-05-28 PROCEDURE — 2580000003 HC RX 258

## 2024-05-28 PROCEDURE — 6370000000 HC RX 637 (ALT 250 FOR IP): Performed by: EMERGENCY MEDICINE

## 2024-05-28 PROCEDURE — 6360000002 HC RX W HCPCS: Performed by: FAMILY MEDICINE

## 2024-05-28 PROCEDURE — 6360000002 HC RX W HCPCS: Performed by: EMERGENCY MEDICINE

## 2024-05-28 PROCEDURE — 6370000000 HC RX 637 (ALT 250 FOR IP): Performed by: NURSE PRACTITIONER

## 2024-05-28 PROCEDURE — 80053 COMPREHEN METABOLIC PANEL: CPT

## 2024-05-28 PROCEDURE — 2580000003 HC RX 258: Performed by: FAMILY MEDICINE

## 2024-05-28 PROCEDURE — 83735 ASSAY OF MAGNESIUM: CPT

## 2024-05-28 PROCEDURE — 85025 COMPLETE CBC W/AUTO DIFF WBC: CPT

## 2024-05-28 PROCEDURE — 83880 ASSAY OF NATRIURETIC PEPTIDE: CPT

## 2024-05-28 PROCEDURE — 93010 ELECTROCARDIOGRAM REPORT: CPT | Performed by: INTERNAL MEDICINE

## 2024-05-28 PROCEDURE — 93005 ELECTROCARDIOGRAM TRACING: CPT | Performed by: EMERGENCY MEDICINE

## 2024-05-28 PROCEDURE — 96375 TX/PRO/DX INJ NEW DRUG ADDON: CPT

## 2024-05-28 PROCEDURE — 84484 ASSAY OF TROPONIN QUANT: CPT

## 2024-05-28 PROCEDURE — 85379 FIBRIN DEGRADATION QUANT: CPT

## 2024-05-28 PROCEDURE — 71045 X-RAY EXAM CHEST 1 VIEW: CPT

## 2024-05-28 PROCEDURE — 96374 THER/PROPH/DIAG INJ IV PUSH: CPT

## 2024-05-28 PROCEDURE — 99285 EMERGENCY DEPT VISIT HI MDM: CPT

## 2024-05-28 RX ORDER — ENOXAPARIN SODIUM 100 MG/ML
40 INJECTION SUBCUTANEOUS DAILY
Status: DISCONTINUED | OUTPATIENT
Start: 2024-05-28 | End: 2024-05-30 | Stop reason: HOSPADM

## 2024-05-28 RX ORDER — ATORVASTATIN CALCIUM 40 MG/1
40 TABLET, FILM COATED ORAL NIGHTLY
Status: DISCONTINUED | OUTPATIENT
Start: 2024-05-28 | End: 2024-05-30 | Stop reason: HOSPADM

## 2024-05-28 RX ORDER — POTASSIUM CHLORIDE 20 MEQ/1
40 TABLET, EXTENDED RELEASE ORAL ONCE
Status: COMPLETED | OUTPATIENT
Start: 2024-05-28 | End: 2024-05-28

## 2024-05-28 RX ORDER — GLUCAGON 1 MG/ML
1 KIT INJECTION PRN
Status: DISCONTINUED | OUTPATIENT
Start: 2024-05-28 | End: 2024-05-30 | Stop reason: HOSPADM

## 2024-05-28 RX ORDER — MAGNESIUM SULFATE IN WATER 40 MG/ML
2000 INJECTION, SOLUTION INTRAVENOUS PRN
Status: DISCONTINUED | OUTPATIENT
Start: 2024-05-28 | End: 2024-05-28

## 2024-05-28 RX ORDER — INSULIN LISPRO 100 [IU]/ML
0-4 INJECTION, SOLUTION INTRAVENOUS; SUBCUTANEOUS NIGHTLY
Status: DISCONTINUED | OUTPATIENT
Start: 2024-05-28 | End: 2024-05-30 | Stop reason: HOSPADM

## 2024-05-28 RX ORDER — CLOPIDOGREL BISULFATE 75 MG/1
75 TABLET ORAL DAILY
Status: DISCONTINUED | OUTPATIENT
Start: 2024-05-29 | End: 2024-05-30 | Stop reason: HOSPADM

## 2024-05-28 RX ORDER — FUROSEMIDE 10 MG/ML
40 INJECTION INTRAMUSCULAR; INTRAVENOUS 2 TIMES DAILY
Status: DISCONTINUED | OUTPATIENT
Start: 2024-05-28 | End: 2024-05-29

## 2024-05-28 RX ORDER — ESCITALOPRAM OXALATE 20 MG/1
20 TABLET ORAL DAILY
Status: DISCONTINUED | OUTPATIENT
Start: 2024-05-29 | End: 2024-05-30 | Stop reason: HOSPADM

## 2024-05-28 RX ORDER — SODIUM CHLORIDE 0.9 % (FLUSH) 0.9 %
5-40 SYRINGE (ML) INJECTION EVERY 12 HOURS SCHEDULED
Status: DISCONTINUED | OUTPATIENT
Start: 2024-05-28 | End: 2024-05-30 | Stop reason: HOSPADM

## 2024-05-28 RX ORDER — TRAZODONE HYDROCHLORIDE 100 MG/1
100 TABLET ORAL NIGHTLY
Status: DISCONTINUED | OUTPATIENT
Start: 2024-05-28 | End: 2024-05-30 | Stop reason: HOSPADM

## 2024-05-28 RX ORDER — POTASSIUM CHLORIDE 7.45 MG/ML
10 INJECTION INTRAVENOUS PRN
Status: DISCONTINUED | OUTPATIENT
Start: 2024-05-28 | End: 2024-05-28 | Stop reason: SDUPTHER

## 2024-05-28 RX ORDER — MAGNESIUM SULFATE IN WATER 40 MG/ML
2000 INJECTION, SOLUTION INTRAVENOUS PRN
Status: DISCONTINUED | OUTPATIENT
Start: 2024-05-28 | End: 2024-05-30 | Stop reason: HOSPADM

## 2024-05-28 RX ORDER — POLYETHYLENE GLYCOL 3350 17 G/17G
17 POWDER, FOR SOLUTION ORAL DAILY PRN
Status: DISCONTINUED | OUTPATIENT
Start: 2024-05-28 | End: 2024-05-30 | Stop reason: HOSPADM

## 2024-05-28 RX ORDER — POTASSIUM CHLORIDE 7.45 MG/ML
10 INJECTION INTRAVENOUS PRN
Status: DISCONTINUED | OUTPATIENT
Start: 2024-05-28 | End: 2024-05-30 | Stop reason: HOSPADM

## 2024-05-28 RX ORDER — ACETAMINOPHEN 325 MG/1
650 TABLET ORAL EVERY 6 HOURS PRN
Status: DISCONTINUED | OUTPATIENT
Start: 2024-05-28 | End: 2024-05-30 | Stop reason: HOSPADM

## 2024-05-28 RX ORDER — MAGNESIUM SULFATE IN WATER 40 MG/ML
2000 INJECTION, SOLUTION INTRAVENOUS ONCE
Status: COMPLETED | OUTPATIENT
Start: 2024-05-28 | End: 2024-05-28

## 2024-05-28 RX ORDER — POTASSIUM CHLORIDE 7.45 MG/ML
10 INJECTION INTRAVENOUS ONCE
Status: COMPLETED | OUTPATIENT
Start: 2024-05-28 | End: 2024-05-28

## 2024-05-28 RX ORDER — 0.9 % SODIUM CHLORIDE 0.9 %
500 INTRAVENOUS SOLUTION INTRAVENOUS ONCE
Status: COMPLETED | OUTPATIENT
Start: 2024-05-28 | End: 2024-05-28

## 2024-05-28 RX ORDER — ATORVASTATIN CALCIUM 40 MG/1
40 TABLET, FILM COATED ORAL DAILY
COMMUNITY

## 2024-05-28 RX ORDER — INSULIN LISPRO 200 [IU]/ML
INJECTION, SOLUTION SUBCUTANEOUS SEE ADMIN INSTRUCTIONS
COMMUNITY

## 2024-05-28 RX ORDER — DEXTROSE MONOHYDRATE 100 MG/ML
INJECTION, SOLUTION INTRAVENOUS CONTINUOUS PRN
Status: DISCONTINUED | OUTPATIENT
Start: 2024-05-28 | End: 2024-05-30 | Stop reason: HOSPADM

## 2024-05-28 RX ORDER — ONDANSETRON 2 MG/ML
4 INJECTION INTRAMUSCULAR; INTRAVENOUS EVERY 6 HOURS PRN
Status: DISCONTINUED | OUTPATIENT
Start: 2024-05-28 | End: 2024-05-30 | Stop reason: HOSPADM

## 2024-05-28 RX ORDER — SODIUM CHLORIDE 0.9 % (FLUSH) 0.9 %
5-40 SYRINGE (ML) INJECTION PRN
Status: DISCONTINUED | OUTPATIENT
Start: 2024-05-28 | End: 2024-05-30 | Stop reason: HOSPADM

## 2024-05-28 RX ORDER — FLUTICASONE PROPIONATE 50 MCG
1 SPRAY, SUSPENSION (ML) NASAL DAILY
Status: ON HOLD | COMMUNITY
End: 2024-05-28

## 2024-05-28 RX ORDER — ONDANSETRON 4 MG/1
4 TABLET, ORALLY DISINTEGRATING ORAL EVERY 8 HOURS PRN
Status: DISCONTINUED | OUTPATIENT
Start: 2024-05-28 | End: 2024-05-30 | Stop reason: HOSPADM

## 2024-05-28 RX ORDER — ASPIRIN 81 MG/1
81 TABLET ORAL DAILY
Status: DISCONTINUED | OUTPATIENT
Start: 2024-05-29 | End: 2024-05-30 | Stop reason: HOSPADM

## 2024-05-28 RX ORDER — SODIUM CHLORIDE 9 MG/ML
INJECTION, SOLUTION INTRAVENOUS PRN
Status: DISCONTINUED | OUTPATIENT
Start: 2024-05-28 | End: 2024-05-30 | Stop reason: HOSPADM

## 2024-05-28 RX ORDER — METOPROLOL SUCCINATE 25 MG/1
25 TABLET, EXTENDED RELEASE ORAL DAILY
Status: DISCONTINUED | OUTPATIENT
Start: 2024-05-29 | End: 2024-05-30 | Stop reason: HOSPADM

## 2024-05-28 RX ORDER — POTASSIUM CHLORIDE 20 MEQ/1
40 TABLET, EXTENDED RELEASE ORAL PRN
Status: DISCONTINUED | OUTPATIENT
Start: 2024-05-28 | End: 2024-05-28 | Stop reason: SDUPTHER

## 2024-05-28 RX ORDER — SODIUM CHLORIDE 9 MG/ML
INJECTION, SOLUTION INTRAVENOUS
Status: COMPLETED
Start: 2024-05-28 | End: 2024-05-28

## 2024-05-28 RX ORDER — ACETAMINOPHEN 650 MG/1
650 SUPPOSITORY RECTAL EVERY 6 HOURS PRN
Status: DISCONTINUED | OUTPATIENT
Start: 2024-05-28 | End: 2024-05-30 | Stop reason: HOSPADM

## 2024-05-28 RX ORDER — NITROGLYCERIN 0.4 MG/1
0.4 TABLET SUBLINGUAL EVERY 5 MIN PRN
Status: DISCONTINUED | OUTPATIENT
Start: 2024-05-28 | End: 2024-05-30 | Stop reason: HOSPADM

## 2024-05-28 RX ORDER — INSULIN LISPRO 100 [IU]/ML
0-8 INJECTION, SOLUTION INTRAVENOUS; SUBCUTANEOUS
Status: DISCONTINUED | OUTPATIENT
Start: 2024-05-29 | End: 2024-05-30 | Stop reason: HOSPADM

## 2024-05-28 RX ORDER — POTASSIUM CHLORIDE 20 MEQ/1
40 TABLET, EXTENDED RELEASE ORAL PRN
Status: DISCONTINUED | OUTPATIENT
Start: 2024-05-28 | End: 2024-05-30 | Stop reason: HOSPADM

## 2024-05-28 RX ADMIN — TRAZODONE HYDROCHLORIDE 100 MG: 100 TABLET ORAL at 22:43

## 2024-05-28 RX ADMIN — SODIUM CHLORIDE 500 ML: 9 INJECTION, SOLUTION INTRAVENOUS at 13:12

## 2024-05-28 RX ADMIN — MAGNESIUM SULFATE HEPTAHYDRATE 2000 MG: 40 INJECTION, SOLUTION INTRAVENOUS at 13:09

## 2024-05-28 RX ADMIN — FUROSEMIDE 40 MG: 10 INJECTION, SOLUTION INTRAMUSCULAR; INTRAVENOUS at 18:09

## 2024-05-28 RX ADMIN — NITROGLYCERIN 0.4 MG: 0.4 TABLET, ORALLY DISINTEGRATING SUBLINGUAL at 10:31

## 2024-05-28 RX ADMIN — ATORVASTATIN CALCIUM 40 MG: 40 TABLET, FILM COATED ORAL at 22:43

## 2024-05-28 RX ADMIN — Medication 10 ML: at 21:09

## 2024-05-28 RX ADMIN — POTASSIUM CHLORIDE 10 MEQ: 7.46 INJECTION, SOLUTION INTRAVENOUS at 11:41

## 2024-05-28 RX ADMIN — INSULIN LISPRO 4 UNITS: 100 INJECTION, SOLUTION INTRAVENOUS; SUBCUTANEOUS at 22:51

## 2024-05-28 RX ADMIN — Medication 500 ML: at 13:12

## 2024-05-28 RX ADMIN — POTASSIUM CHLORIDE 40 MEQ: 1500 TABLET, EXTENDED RELEASE ORAL at 11:28

## 2024-05-28 RX ADMIN — POTASSIUM CHLORIDE 10 MEQ: 10 INJECTION, SOLUTION INTRAVENOUS at 18:05

## 2024-05-28 RX ADMIN — POTASSIUM CHLORIDE 10 MEQ: 10 INJECTION, SOLUTION INTRAVENOUS at 19:35

## 2024-05-28 ASSESSMENT — LIFESTYLE VARIABLES
HOW OFTEN DO YOU HAVE A DRINK CONTAINING ALCOHOL: NEVER
HOW OFTEN DO YOU HAVE A DRINK CONTAINING ALCOHOL: MONTHLY OR LESS
HOW MANY STANDARD DRINKS CONTAINING ALCOHOL DO YOU HAVE ON A TYPICAL DAY: 1 OR 2

## 2024-05-28 ASSESSMENT — ENCOUNTER SYMPTOMS
COUGH: 0
ABDOMINAL DISTENTION: 0
VOMITING: 0
ABDOMINAL PAIN: 0
CHEST TIGHTNESS: 1
PHOTOPHOBIA: 0
SHORTNESS OF BREATH: 1
SORE THROAT: 0
EYE DISCHARGE: 0
WHEEZING: 0

## 2024-05-28 ASSESSMENT — PAIN SCALES - GENERAL
PAINLEVEL_OUTOF10: 0
PAINLEVEL_OUTOF10: 4

## 2024-05-28 ASSESSMENT — PAIN DESCRIPTION - LOCATION: LOCATION: CHEST

## 2024-05-28 ASSESSMENT — PAIN - FUNCTIONAL ASSESSMENT: PAIN_FUNCTIONAL_ASSESSMENT: 0-10

## 2024-05-28 NOTE — PROGRESS NOTES
DVT / VTE PROPHYLAXIS EVALUATION    Recent Labs     05/28/24  1030 05/28/24  1043   BUN  --  9   CREATININE  --  0.51     --    HGB 11.1*  --    HCT 32.5*  --      ADMITTING DX OR CHIEF COMPLAINT? hypokalemia  WARFARIN? DOAC'S? no  ANY APPARENT BLEEDING? no  SCHEDULED SURGERY? no     If yes to following, excluded from auto adjustment in Table 1 of policy - please contact provider with recommendations as appropriate.  Include condition/exception in scratch notes. Yes No   Trauma Service or Ortho Surgery []  []    COVID []  []    Pregnancy []  []        Current order:  Enoxaparin 40 mg SUBQ once daily      Height: 160 cm (5' 3\"), Weight - Scale: 84.8 kg (187 lb)  Estimated Creatinine Clearance: 133 mL/min (based on SCr of 0.51 mg/dL).    Plan:  No intervention recommended, continue current VTE prophylaxis as ordered     Patient Weight (kg)      50.9 and below .9 101-150.9 151-174.9 175 or greater   Estimated   CrCl  (ml/min) 30 or greater []   30 mg   SUBQ daily   [x]   40 mg   SUBQ daily (or 30 mg BID for orthopedic cases) []  30 mg SUBQ   BID  []  40 mg   SUBQ   BID []  60mg SUBQ BID    15-29.9 []  UFH 5000   units SUBQ BID []  30 mg   SUBQ daily [] 30 mg SUBQ   daily []  40 mg SUBQ   daily [] 60 mg SUBQ   daily    Less than 15 or dialysis []  UFH 5000   units SUBQ BID [] UFH 5000 units SUBQ TID []  UFH 7500   units   SUBQ TID         Deneen Vargas, H PharmD

## 2024-05-28 NOTE — ACP (ADVANCE CARE PLANNING)
Advance Care Planning     Advance Care Planning Activator (Inpatient)  Conversation Note      Date of ACP Conversation: 5/28/2024     Conversation Conducted with: Patient with Decision Making Capacity    ACP Activator: Ricarda Berumen, RN        Health Care Decision Maker:     Current Designated Health Care Decision Maker:     Primary Decision Maker: Starr Palma - Child - [phone]    Secondary Decision Maker: Madeline Stapleton - Parent - [phone]

## 2024-05-28 NOTE — ED NOTES
[facility]  Pharmacy Home Medication Reconciliation Note      Medication reconciliation was attempted for patient Paty Sheridan 1970.   Admit date: 5/28/2024  Consult: No    Med rec status: Medrec Status: In process    Information provided by: MED REC HISTORY: Review of EMR    Pharmacy: [facility] ([id number]), Phone [phone]   Pharmacy Updated: No    MEDICATIONS     Prior to Admission medications    Medication Sig Start Date End Date Taking? Authorizing Provider   atorvastatin (LIPITOR) 40 MG tablet Take 1 tablet by mouth daily   Yes Lyn Luna MD   insulin lispro (HUMALOG KWIKPEN) 200 UNIT/ML SOPN pen Inject into the skin See Admin Instructions Up to 400 units daily per insulin pump   Yes Lyn Luna MD   fluticasone (FLONASE) 50 MCG/ACT nasal spray 1 spray by Each Nostril route daily   Yes Lyn Luna MD   Insulin Pump - insulin lispro Inject into the skin continuous   Yes Lyn Luna MD   metoprolol succinate (TOPROL XL) 25 MG extended release tablet Take 1 tablet by mouth daily 4/24/24   Emigdio Rivera MD   clopidogrel (PLAVIX) 75 MG tablet Take 1 tablet by mouth daily 4/24/24   Emigdio Rivera MD   fluticasone (FLONASE) 50 MCG/ACT nasal spray 2 sprays by Nasal route daily for 14 days 4/18/24 5/28/24  Hallie Richards PA   atorvastatin (LIPITOR) 20 MG tablet Take 2 tablets by mouth daily 12/19/23 5/28/24  Emigdio Rivera MD   aspirin 81 MG EC tablet Take 1 tablet by mouth daily    Lyn Luna MD   insulin lispro (HUMALOG KWIKPEN) 200 UNIT/ML SOPN pen Use 3 times a day max dose 200 units/day 9/8/22 5/28/24  Gareth Bell MD   escitalopram (LEXAPRO) 20 MG tablet Take 1 tablet by mouth daily 1/4/19   Lyn Luna MD   traZODone (DESYREL) 100 MG tablet Take 1 tablet by mouth nightly 2/4/19   Lyn Luna MD   VRAYLAR 3 MG CAPS Take 1 capsule by mouth daily 6/16/16   Lyn Luna MD

## 2024-05-28 NOTE — ED TRIAGE NOTES
Pt comes to er with   c/o sob. States that she was unable to sleep laying down yesterday and had to ssleep sitting up. Pt also c/o chest pressure that radiates  down both arms as well as being felt in between the shoulder blades..  pt also  c/o swelling in bilateral feet. No distress noted for triage

## 2024-05-28 NOTE — FLOWSHEET NOTE
Report taken from ER room assigned an waiting for the room to be cleaned, then pt will be heading up. [id number]

## 2024-05-28 NOTE — H&P
SYSTEMS:  Ten systems reviewed and negative except for as above.     Physical Exam:    Vitals: BP (!) 153/79   Pulse 89   Temp 98.6 °F (37 °C) (Oral)   Resp 16   Ht 1.6 m (5' 3\")   Wt 84.8 kg (187 lb)   LMP  (LMP Unknown)   SpO2 93%   BMI 33.13 kg/m²   Constitutional: alert, appears stated age and cooperative  Skin: Skin color, texture, turgor normal. No rashes or lesions  Eyes:Eye: Normal external eye, conjunctiva, DIAMOND.  ENT: Head: Normocephalic, no lesions, without obvious abnormality.  Neck: no adenopathy, no carotid bruit, no JVD, supple, symmetrical, trachea midline and thyroid not enlarged, symmetric, no tenderness/mass/nodules  Respiratory: clear to auscultation bilaterally  Cardiovascular: regular rate and rhythm, S1, S2 normal, no murmur, click, rub or gallop  Gastrointestinal: soft, non-tender; bowel sounds normal; no masses,  no organomegaly  Genitourinary: Deferred  Musculoskeletal:extremities normal, atraumatic, no cyanosis or edema  Neurologic: Mental status AAOx3 No facial asymmetry or droop. Normal muscle strength b/l. CN II-XII grossly intact.      Recent Labs     05/28/24  1030   WBC 13.2*   HGB 11.1*        Recent Labs     05/28/24  1043      K 2.5*   CL 93*   CO2 34*   BUN 9   CREATININE 0.51   GLUCOSE 317*   AST 10   ALT 11   BILITOT 0.4   ALKPHOS 148*       -----------------------------------------------------------------   XR CHEST PORTABLE    Result Date: 5/28/2024  EXAMINATION: ONE XRAY VIEW OF THE CHEST 5/28/2024 10:20 am COMPARISON: December 2, 2023 0945 hours HISTORY: ORDERING SYSTEM PROVIDED HISTORY: chest pressure TECHNOLOGIST PROVIDED HISTORY: Reason for exam:->chest pressure Is the patient pregnant?->No What reading provider will be dictating this exam?->CRC FINDINGS: Single view of the chest is submitted. The cardiac silhouette enlarged unchanged configuration.. Pulmonary vasculature appears mildly congested. Right-sided trachea. Atelectasis left lower lobe No

## 2024-05-28 NOTE — ED PROVIDER NOTES
oriented to person, place, and time.   Psychiatric:         Mood and Affect: Mood normal.         DIAGNOSTIC RESULTS     EKG: All EKG's are interpreted by the Emergency Department Physician who either signs or Co-signsthis chart in the absence of a cardiologist.    EKG shows sinus rhythm with LVH by criteria.  Nonspecific ST changes but no ischemia.  Normal axis.  Abnormal EKG    RADIOLOGY:   Non-plain filmimages such as CT, Ultrasound and MRI are read by the radiologist. Plain radiographic images are visualized and preliminarily interpreted by the emergency physician with the below findings:        Interpretation per the Radiologist below, if available at the time ofthis note:    XR CHEST PORTABLE   Final Result   Pulmonary vasculature is mildly congested.  Could suggest component of early   CHF.  Correlate clinically               ED BEDSIDE ULTRASOUND:   Performed by ED Physician - none    LABS:  Labs Reviewed   COMPREHENSIVE METABOLIC PANEL - Abnormal; Notable for the following components:       Result Value    Potassium 2.5 (*)     Chloride 93 (*)     CO2 34 (*)     Glucose 317 (*)     Alkaline Phosphatase 148 (*)     Globulin 3.7 (*)     All other components within normal limits    Narrative:     CALL  [facility]  [facility] tel. [phone],  K results called to and read back by KATYA CAMERON, 05/28/2024 11:10, by PALSO   CBC WITH AUTO DIFFERENTIAL - Abnormal; Notable for the following components:    WBC 13.2 (*)     RBC 3.67 (*)     Hemoglobin 11.1 (*)     Hematocrit 32.5 (*)     Neutrophils Absolute 9.4 (*)     All other components within normal limits   MAGNESIUM - Abnormal; Notable for the following components:    Magnesium 1.6 (*)     All other components within normal limits    Narrative:     CALL  [facility]  [facility] tel. [phone],  K results called to and read back by KATYA CAMERON, 05/28/2024 11:10, by [employer]   TROPONIN - Abnormal; Notable for the following components:    Troponin, High Sensitivity 24 (*)     All other

## 2024-05-28 NOTE — PLAN OF CARE
Problem: Discharge Planning  Goal: Discharge to home or other facility with appropriate resources  Outcome: Progressing  Flowsheets (Taken 5/28/2024 1722)  Discharge to home or other facility with appropriate resources:   Identify barriers to discharge with patient and caregiver   Identify discharge learning needs (meds, wound care, etc)

## 2024-05-29 ENCOUNTER — APPOINTMENT (OUTPATIENT)
Age: 54
DRG: 194 | End: 2024-05-29
Attending: FAMILY MEDICINE
Payer: COMMERCIAL

## 2024-05-29 LAB
ANION GAP SERPL CALCULATED.3IONS-SCNC: 12 MEQ/L (ref 9–15)
BUN SERPL-MCNC: 9 MG/DL (ref 6–20)
CALCIUM SERPL-MCNC: 8.8 MG/DL (ref 8.5–9.9)
CHLORIDE SERPL-SCNC: 97 MEQ/L (ref 95–107)
CHOLEST SERPL-MCNC: 219 MG/DL (ref 0–199)
CO2 SERPL-SCNC: 32 MEQ/L (ref 20–31)
CREAT SERPL-MCNC: 0.43 MG/DL (ref 0.5–0.9)
ECHO AO ROOT DIAM: 2.9 CM
ECHO AO ROOT INDEX: 1.53 CM/M2
ECHO AV AREA PEAK VELOCITY: 1.8 CM2
ECHO AV AREA VTI: 2.4 CM2
ECHO AV AREA/BSA PEAK VELOCITY: 0.9 CM2/M2
ECHO AV AREA/BSA VTI: 1.3 CM2/M2
ECHO AV CUSP MM: 1.9 CM
ECHO AV MEAN GRADIENT: 7 MMHG
ECHO AV MEAN VELOCITY: 1.3 M/S
ECHO AV PEAK GRADIENT: 14 MMHG
ECHO AV PEAK VELOCITY: 2.1 M/S
ECHO AV VELOCITY RATIO: 0.52
ECHO AV VTI: 30.1 CM
ECHO BSA: 1.97 M2
ECHO EST RA PRESSURE: 3 MMHG
ECHO LA DIAMETER INDEX: 1.89 CM/M2
ECHO LA DIAMETER: 3.6 CM
ECHO LA TO AORTIC ROOT RATIO: 1.24
ECHO LA VOL A-L A2C: 43 ML (ref 22–52)
ECHO LA VOL A-L A4C: 53 ML (ref 22–52)
ECHO LA VOL MOD A2C: 42 ML (ref 22–52)
ECHO LA VOL MOD A4C: 51 ML (ref 22–52)
ECHO LA VOLUME AREA LENGTH: 51 ML
ECHO LA VOLUME INDEX A-L A2C: 23 ML/M2 (ref 16–34)
ECHO LA VOLUME INDEX A-L A4C: 28 ML/M2 (ref 16–34)
ECHO LA VOLUME INDEX AREA LENGTH: 27 ML/M2 (ref 16–34)
ECHO LA VOLUME INDEX MOD A2C: 22 ML/M2 (ref 16–34)
ECHO LA VOLUME INDEX MOD A4C: 27 ML/M2 (ref 16–34)
ECHO LV E' LATERAL VELOCITY: 8 CM/S
ECHO LV E' SEPTAL VELOCITY: 6 CM/S
ECHO LV EDV A2C: 55 ML
ECHO LV EDV A4C: 86 ML
ECHO LV EDV BP: 70 ML (ref 56–104)
ECHO LV EDV INDEX A4C: 45 ML/M2
ECHO LV EDV INDEX BP: 37 ML/M2
ECHO LV EDV NDEX A2C: 29 ML/M2
ECHO LV EJECTION FRACTION A2C: 79 %
ECHO LV EJECTION FRACTION A4C: 73 %
ECHO LV EJECTION FRACTION BIPLANE: 76 % (ref 55–100)
ECHO LV ESV A2C: 12 ML
ECHO LV ESV A4C: 23 ML
ECHO LV ESV BP: 16 ML (ref 19–49)
ECHO LV ESV INDEX A2C: 6 ML/M2
ECHO LV ESV INDEX A4C: 12 ML/M2
ECHO LV ESV INDEX BP: 8 ML/M2
ECHO LV FRACTIONAL SHORTENING: 35 % (ref 28–44)
ECHO LV INTERNAL DIMENSION DIASTOLE INDEX: 1.95 CM/M2
ECHO LV INTERNAL DIMENSION DIASTOLIC: 3.7 CM (ref 3.9–5.3)
ECHO LV INTERNAL DIMENSION SYSTOLIC INDEX: 1.26 CM/M2
ECHO LV INTERNAL DIMENSION SYSTOLIC: 2.4 CM
ECHO LV IVSD: 1.3 CM (ref 0.6–0.9)
ECHO LV IVSS: 1.7 CM
ECHO LV MASS 2D: 147.3 G (ref 67–162)
ECHO LV MASS INDEX 2D: 77.5 G/M2 (ref 43–95)
ECHO LV POSTERIOR WALL DIASTOLIC: 1.1 CM (ref 0.6–0.9)
ECHO LV POSTERIOR WALL SYSTOLIC: 1.4 CM
ECHO LV RELATIVE WALL THICKNESS RATIO: 0.59
ECHO LVOT AREA: 3.5 CM2
ECHO LVOT AV VTI INDEX: 0.68
ECHO LVOT DIAM: 2.1 CM
ECHO LVOT MEAN GRADIENT: 3 MMHG
ECHO LVOT PEAK GRADIENT: 5 MMHG
ECHO LVOT PEAK VELOCITY: 1.1 M/S
ECHO LVOT STROKE VOLUME INDEX: 37.2 ML/M2
ECHO LVOT SV: 70.6 ML
ECHO LVOT VTI: 20.4 CM
ECHO MV A VELOCITY: 1.06 M/S
ECHO MV AREA PHT: 2.4 CM2
ECHO MV AREA VTI: 3 CM2
ECHO MV E DECELERATION TIME (DT): 125.7 MS
ECHO MV E VELOCITY: 0.47 M/S
ECHO MV E/A RATIO: 0.44
ECHO MV E/E' LATERAL: 5.88
ECHO MV E/E' RATIO (AVERAGED): 6.85
ECHO MV E/E' SEPTAL: 7.83
ECHO MV LVOT VTI INDEX: 1.17
ECHO MV MAX VELOCITY: 1.2 M/S
ECHO MV MEAN GRADIENT: 3 MMHG
ECHO MV MEAN VELOCITY: 0.8 M/S
ECHO MV PEAK GRADIENT: 6 MMHG
ECHO MV PRESSURE HALF TIME (PHT): 90.2 MS
ECHO MV VTI: 23.8 CM
ECHO PV MAX VELOCITY: 0.9 M/S
ECHO PV PEAK GRADIENT: 3 MMHG
ECHO RIGHT VENTRICULAR SYSTOLIC PRESSURE (RVSP): 31 MMHG
ECHO TV REGURGITANT MAX VELOCITY: 2.65 M/S
ECHO TV REGURGITANT PEAK GRADIENT: 28 MMHG
GLUCOSE BLD-MCNC: 279 MG/DL (ref 70–99)
GLUCOSE BLD-MCNC: 313 MG/DL (ref 70–99)
GLUCOSE BLD-MCNC: 325 MG/DL (ref 70–99)
GLUCOSE BLD-MCNC: 354 MG/DL (ref 70–99)
GLUCOSE SERPL-MCNC: 335 MG/DL (ref 70–99)
HDLC SERPL-MCNC: 30 MG/DL (ref 40–59)
LDLC SERPL CALC-MCNC: 112 MG/DL (ref 0–129)
MAGNESIUM SERPL-MCNC: 1.7 MG/DL (ref 1.7–2.4)
PERFORMED ON: ABNORMAL
POTASSIUM SERPL-SCNC: 3.2 MEQ/L (ref 3.4–4.9)
SODIUM SERPL-SCNC: 141 MEQ/L (ref 135–144)
TRIGL SERPL-MCNC: 385 MG/DL (ref 0–150)
TROPONIN, HIGH SENSITIVITY: 19 NG/L (ref 0–19)

## 2024-05-29 PROCEDURE — 2580000003 HC RX 258: Performed by: FAMILY MEDICINE

## 2024-05-29 PROCEDURE — G0378 HOSPITAL OBSERVATION PER HR: HCPCS

## 2024-05-29 PROCEDURE — 6370000000 HC RX 637 (ALT 250 FOR IP): Performed by: NURSE PRACTITIONER

## 2024-05-29 PROCEDURE — 6370000000 HC RX 637 (ALT 250 FOR IP): Performed by: INTERNAL MEDICINE

## 2024-05-29 PROCEDURE — 93306 TTE W/DOPPLER COMPLETE: CPT | Performed by: INTERNAL MEDICINE

## 2024-05-29 PROCEDURE — 6360000002 HC RX W HCPCS: Performed by: FAMILY MEDICINE

## 2024-05-29 PROCEDURE — 84484 ASSAY OF TROPONIN QUANT: CPT

## 2024-05-29 PROCEDURE — 80061 LIPID PANEL: CPT

## 2024-05-29 PROCEDURE — 93306 TTE W/DOPPLER COMPLETE: CPT

## 2024-05-29 PROCEDURE — 83735 ASSAY OF MAGNESIUM: CPT

## 2024-05-29 PROCEDURE — 36415 COLL VENOUS BLD VENIPUNCTURE: CPT

## 2024-05-29 PROCEDURE — 99223 1ST HOSP IP/OBS HIGH 75: CPT | Performed by: INTERNAL MEDICINE

## 2024-05-29 PROCEDURE — 80048 BASIC METABOLIC PNL TOTAL CA: CPT

## 2024-05-29 PROCEDURE — 6370000000 HC RX 637 (ALT 250 FOR IP): Performed by: FAMILY MEDICINE

## 2024-05-29 RX ORDER — FUROSEMIDE 20 MG/1
20 TABLET ORAL DAILY
Status: DISCONTINUED | OUTPATIENT
Start: 2024-05-30 | End: 2024-05-30 | Stop reason: HOSPADM

## 2024-05-29 RX ORDER — POTASSIUM CHLORIDE 750 MG/1
10 TABLET, FILM COATED, EXTENDED RELEASE ORAL ONCE
Status: COMPLETED | OUTPATIENT
Start: 2024-05-29 | End: 2024-05-29

## 2024-05-29 RX ORDER — POTASSIUM CHLORIDE 750 MG/1
10 TABLET, FILM COATED, EXTENDED RELEASE ORAL ONCE
Status: COMPLETED | OUTPATIENT
Start: 2024-05-30 | End: 2024-05-30

## 2024-05-29 RX ADMIN — POTASSIUM CHLORIDE 40 MEQ: 1500 TABLET, EXTENDED RELEASE ORAL at 08:32

## 2024-05-29 RX ADMIN — CLOPIDOGREL BISULFATE 75 MG: 75 TABLET ORAL at 08:28

## 2024-05-29 RX ADMIN — Medication 10 ML: at 08:31

## 2024-05-29 RX ADMIN — INSULIN LISPRO 4 UNITS: 100 INJECTION, SOLUTION INTRAVENOUS; SUBCUTANEOUS at 21:15

## 2024-05-29 RX ADMIN — INSULIN LISPRO 8 UNITS: 100 INJECTION, SOLUTION INTRAVENOUS; SUBCUTANEOUS at 08:00

## 2024-05-29 RX ADMIN — ASPIRIN 81 MG: 81 TABLET, COATED ORAL at 08:28

## 2024-05-29 RX ADMIN — TRAZODONE HYDROCHLORIDE 100 MG: 100 TABLET ORAL at 21:16

## 2024-05-29 RX ADMIN — METOPROLOL SUCCINATE 25 MG: 25 TABLET, EXTENDED RELEASE ORAL at 08:28

## 2024-05-29 RX ADMIN — POTASSIUM CHLORIDE 10 MEQ: 750 TABLET, FILM COATED, EXTENDED RELEASE ORAL at 21:15

## 2024-05-29 RX ADMIN — INSULIN LISPRO 6 UNITS: 100 INJECTION, SOLUTION INTRAVENOUS; SUBCUTANEOUS at 12:30

## 2024-05-29 RX ADMIN — FUROSEMIDE 40 MG: 10 INJECTION, SOLUTION INTRAMUSCULAR; INTRAVENOUS at 08:28

## 2024-05-29 RX ADMIN — ATORVASTATIN CALCIUM 40 MG: 40 TABLET, FILM COATED ORAL at 21:15

## 2024-05-29 RX ADMIN — INSULIN LISPRO 4 UNITS: 100 INJECTION, SOLUTION INTRAVENOUS; SUBCUTANEOUS at 17:10

## 2024-05-29 RX ADMIN — CARIPRAZINE 3 MG: 1.5 CAPSULE, GELATIN COATED ORAL at 08:28

## 2024-05-29 RX ADMIN — FUROSEMIDE 40 MG: 10 INJECTION, SOLUTION INTRAMUSCULAR; INTRAVENOUS at 17:05

## 2024-05-29 RX ADMIN — ESCITALOPRAM OXALATE 20 MG: 20 TABLET ORAL at 08:28

## 2024-05-29 RX ADMIN — ACETAMINOPHEN 325MG 650 MG: 325 TABLET ORAL at 06:12

## 2024-05-29 RX ADMIN — Medication 10 ML: at 22:02

## 2024-05-29 ASSESSMENT — PAIN SCALES - GENERAL
PAINLEVEL_OUTOF10: 3
PAINLEVEL_OUTOF10: 0

## 2024-05-29 NOTE — CONSULTS
[place], [place]    CARDIOLOGY CONSULTATION REPORT    DATE OF CONSULTATION  5/29/2024    CONSULTANT  Norma Amaya DO     REQUESTING PHYSICIAN  Harry Rodriguez MD     PRIMARY CARDIOLOGIST  Emigdio Rivera MD    REASON FOR CONSULTATION  Diastolic CHF exacerbation    HISTORY OF PRESENT ILLNESS  Paty Sheridan is a 53 y.o. female with history of CAD, prior stent to the LAD (October 2023), hypertension, hyperlipidemia, diabetes mellitus type 2, and family history of heart disease who presents with shortness of breath.  She says the shortness of breath started 2 days ago when she started noticing dyspnea laying flat consistent with orthopnea.  She denies any PND.  She has noticed leg edema.  The next day she started noticing more dyspnea with her physical activities like walking.  No overt anginal chest pain.  She reports occasional palpitations only when nervous.  No near-syncope or syncope.  She reports compliance with medications.  She was noted to have mild edema on chest x-ray.  She was given IV diuresis and today feels much better.  She says her leg edema has significantly improved.  Her shortness of breath has improved.  She denies any prior history of CHF    Allergies  Allergies   Allergen Reactions    Metformin Diarrhea    Nabumetone Rash and Other (See Comments)       Medications   sodium chloride flush, 5-40 mL, 2 times per day  enoxaparin, 40 mg, Daily  furosemide, 40 mg, BID  insulin lispro, 0-8 Units, TID WC  insulin lispro, 0-4 Units, Nightly  aspirin, 81 mg, Daily  atorvastatin, 40 mg, Nightly  clopidogrel, 75 mg, Daily  escitalopram, 20 mg, Daily  metoprolol succinate, 25 mg, Daily  traZODone, 100 mg, Nightly  cariprazine hcl, 3 mg, Daily        Past Medical History:   Diagnosis Date    Acute kidney injury (HCC) 10/24/2023    CAD (coronary artery disease) 06/25/2022    CAD (coronary artery disease) 06/25/2022    Depression     Environmental allergies     Gastroparesis     GERD

## 2024-05-29 NOTE — CONSULTS
Spiritual Care Services     Summary of Visit:   visited patient for an advanced directive consult.  gave patient an advanced directive packet and explained its purpose. Patient vocalized familiarity with advanced directives.  answered patient's questions and explained the process for completing the packet.    Encounter Summary  Encounter Overview/Reason: Advance Care Planning  Service Provided For: Patient  Referral/Consult From: Nurse  Support System: Parent, Children, Family members  Complexity of Encounter: Moderate  Begin Time: 1345  End Time : 1415  Total Time Calculated: 30 min                               Spiritual Assessment/Intervention/Outcomes:                     Care Plan:         Assist with advanced directives as requested      Spiritual Care Services   Electronically signed by Chaplain Eryn on 5/29/2024 at 2:19 PM.    To reach a  for emotional and spiritual support, place an EPIC consult request.   If a  is needed immediately, dial “0” and ask to page the on-call .

## 2024-05-29 NOTE — PROGRESS NOTES
Hospitalist Daily Progress Note  Name: Paty Sheridan  Age: 53 y.o.  Gender: female  CodeStatus: Full Code  Allergies: Metformin  Nabumetone    Chief Complaint:Shortness of Breath (Of and on, could not sleep laying down.  Chest pressure this am)      Primary Care Provider: Paty Jennings MD    InpatientTreatment Team: Treatment Team: Attending Provider: Harry Rodriguez MD; Consulting Physician: Norma Amaya DO; Patient Care Tech: Westley Sierra; Respiratory Therapist (Day): Yamilka Perales RCP; Registered Nurse: Jena Stapleton RN; : Claudia Narvaez; : Paty Bower LSW; Utilization Reviewer: Negin Centeno RN    Admission Date: 5/28/2024      Subjective: No chest pain, nausea.  SOB improved, chest pain resolved, feels improved overall.    Physical Exam  Vitals and nursing note reviewed.   Constitutional:       Appearance: Normal appearance.   Cardiovascular:      Rate and Rhythm: Normal rate and regular rhythm.   Pulmonary:      Effort: Pulmonary effort is normal.      Breath sounds: Normal breath sounds.   Abdominal:      General: Bowel sounds are normal.      Palpations: Abdomen is soft.   Musculoskeletal:         General: Normal range of motion.   Skin:     General: Skin is warm and dry.   Neurological:      Mental Status: She is alert and oriented to person, place, and time. Mental status is at baseline.         Medications:  Reviewed    Infusion Medications:    sodium chloride      dextrose       Scheduled Medications:    sodium chloride flush  5-40 mL IntraVENous 2 times per day    enoxaparin  40 mg SubCUTAneous Daily    furosemide  40 mg IntraVENous BID    insulin lispro  0-8 Units SubCUTAneous TID     insulin lispro  0-4 Units SubCUTAneous Nightly    aspirin  81 mg Oral Daily    atorvastatin  40 mg Oral Nightly    clopidogrel  75 mg Oral Daily    escitalopram  20 mg Oral Daily    metoprolol succinate  25 mg Oral Daily    traZODone  100 mg Oral Nightly

## 2024-05-29 NOTE — PLAN OF CARE
Problem: Discharge Planning  Goal: Discharge to home or other facility with appropriate resources  5/29/2024 1036 by Jena Stapleton RN  Outcome: Progressing  Flowsheets (Taken 5/29/2024 0837)  Discharge to home or other facility with appropriate resources:   Identify barriers to discharge with patient and caregiver   Arrange for needed discharge resources and transportation as appropriate  5/29/2024 0429 by Sujey Rollins RN  Outcome: Progressing     Problem: Safety - Adult  Goal: Free from fall injury  5/29/2024 1036 by Jena Stapleton RN  Outcome: Progressing  5/29/2024 0429 by Sujey Rollins RN  Outcome: Progressing     Problem: Pain  Goal: Verbalizes/displays adequate comfort level or baseline comfort level  Outcome: Progressing     Problem: Chronic Conditions and Co-morbidities  Goal: Patient's chronic conditions and co-morbidity symptoms are monitored and maintained or improved  Recent Flowsheet Documentation  Taken 5/29/2024 0837 by Jena Stapleton RN  Care Plan - Patient's Chronic Conditions and Co-Morbidity Symptoms are Monitored and Maintained or Improved:   Monitor and assess patient's chronic conditions and comorbid symptoms for stability, deterioration, or improvement   Collaborate with multidisciplinary team to address chronic and comorbid conditions and prevent exacerbation or deterioration

## 2024-05-29 NOTE — CARE COORDINATION
CM ENTERED ROOM TO DISCUSS DC PLAN.  PT DENIES ANY HOME GOING NEEDS.  PER DR ADDISON PT WILL BE HERE 1-2 DAYS.  WILL CONTINUE TO FOLLOW.   
Definition of CHF discussed with patient.  Symptoms of heart failure and decompensation reviewed: weight gain of >3 #, edema, difficulty breathing, cough, issues with appetite, fatigue, or difficulty with sleep.  Common causes of CHF reviewed: CAD, arrhythmias, MI, HTN, valve dz., infection, ETOH or drug abuse, or genetic abnormalities.  Importance of daily weight and B/P monitoring discussed. Pt to use a calender or notebook to record daily weight and call physician immediately with 3 # weight gain.  Low sodium diet and fluid intake discussed. Pt taught about a fluid restriction and advised to discuss this with the cardiologist prior to limiting oral intake.  Shown how to read labels for sodium levels, recommended food list provided.  I emphasized the importance of following their physician's orders for medication administration.  Importance of flu and pneumonia vaccinations reinforced.  Common CHF medications reviewed as well as avoiding certain other meds (decongestants, NSAIDS)  Instructed to discuss activity recommendations with physician.  Pt. denies smoking.  Smoking cessation discussed with patient. 1-800-quit-now number provided.  Sample CHF weight documentation form provided.  CHF Zones discussed. Importance of staying in \"green\" area stressed. Pt verbalized understanding to call MD ASAP when she reaches the yellow zone, and to call 911 when reaching the red zone.  Booklet and zone pamphlet provided to the pt.  Patient denies any further questions at this time.    
discuss the discharge plan with any other family members/significant others, and if so, who? (P) No  Plans to Return to Present Housing: (P) Yes  Other Identified Issues/Barriers to RETURNING to current housing: none  Potential Assistance needed at discharge: (P) Other (Comment) (pt denied any d/c needs in ER.)            Potential DME:    Patient expects to discharge to: (P) Trailer/mobile home  Plan for transportation at discharge:      Financial    Payor: [facility] / Plan: [facility] MEDICAID / Product Type: *No Product type* /     Does insurance require precert for SNF: Yes    Potential assistance Purchasing Medications: (P) No  Meds-to-Beds request: Yes      [facility] [address] [place] [address] [phone] - F [phone]  [address] [place] [address]  Phone: [phone] Fax: [phone]    [id number] [address] [facility] [phone] -  [phone]  [address] [place] [id number]  Phone: [phone] Fax: [phone]      Notes:    Factors facilitating achievement of predicted outcomes: Family support, Motivated, Cooperative, Pleasant, Sense of humor, and Good insight into deficits    Barriers to discharge: none    Additional Case Management Notes: the patient lives alone. She denied using any durable medical equipment, being on dialysis or home O2. She denied any d/c needs in ER.    The Plan for Transition of Care is related to the following treatment goals of Hypokalemia [E87.6]  Hypomagnesemia [E83.42]  Acute on chronic diastolic congestive heart failure (HCC) [I50.33]  Chest pain, unspecified type [R07.9]    IF APPLICABLE: The Patient and/or patient representative Paty and her family were provided with a choice of provider and agrees with the discharge plan. Freedom of choice list with basic dialogue that supports the patient's individualized plan of care/goals and shares the quality data associated with

## 2024-05-29 NOTE — PLAN OF CARE
Problem: Discharge Planning  Goal: Discharge to home or other facility with appropriate resources  5/29/2024 0429 by Sujey Rollins RN  Outcome: Progressing  5/28/2024 1734 by Jena Stapleton RN  Outcome: Progressing  Flowsheets (Taken 5/28/2024 1722)  Discharge to home or other facility with appropriate resources:   Identify barriers to discharge with patient and caregiver   Identify discharge learning needs (meds, wound care, etc)     Problem: Safety - Adult  Goal: Free from fall injury  Outcome: Progressing     Problem: Discharge Planning  Goal: Discharge to home or other facility with appropriate resources  5/29/2024 0429 by Sujey Rollins RN  Outcome: Progressing  5/28/2024 1734 by Jena Stapleton RN  Outcome: Progressing  Flowsheets (Taken 5/28/2024 1722)  Discharge to home or other facility with appropriate resources:   Identify barriers to discharge with patient and caregiver   Identify discharge learning needs (meds, wound care, etc)     Problem: Safety - Adult  Goal: Free from fall injury  Outcome: Progressing     Problem: Discharge Planning  Goal: Discharge to home or other facility with appropriate resources  5/29/2024 0429 by Sujey Rollins RN  Outcome: Progressing  5/28/2024 1734 by Jena Stapleton RN  Outcome: Progressing  Flowsheets (Taken 5/28/2024 1722)  Discharge to home or other facility with appropriate resources:   Identify barriers to discharge with patient and caregiver   Identify discharge learning needs (meds, wound care, etc)     Problem: Safety - Adult  Goal: Free from fall injury  Outcome: Progressing

## 2024-05-30 VITALS
BODY MASS INDEX: 34.14 KG/M2 | HEIGHT: 63 IN | TEMPERATURE: 98.2 F | DIASTOLIC BLOOD PRESSURE: 54 MMHG | RESPIRATION RATE: 16 BRPM | WEIGHT: 192.7 LBS | HEART RATE: 84 BPM | OXYGEN SATURATION: 92 % | SYSTOLIC BLOOD PRESSURE: 108 MMHG

## 2024-05-30 PROBLEM — I50.33 ACUTE ON CHRONIC DIASTOLIC CHF (CONGESTIVE HEART FAILURE) (HCC): Status: ACTIVE | Noted: 2024-05-30

## 2024-05-30 LAB
ANION GAP SERPL CALCULATED.3IONS-SCNC: 11 MEQ/L (ref 9–15)
BUN SERPL-MCNC: 13 MG/DL (ref 6–20)
CALCIUM SERPL-MCNC: 8.5 MG/DL (ref 8.5–9.9)
CHLORIDE SERPL-SCNC: 93 MEQ/L (ref 95–107)
CO2 SERPL-SCNC: 34 MEQ/L (ref 20–31)
CREAT SERPL-MCNC: 0.57 MG/DL (ref 0.5–0.9)
GLUCOSE BLD-MCNC: 327 MG/DL (ref 70–99)
GLUCOSE BLD-MCNC: 371 MG/DL (ref 70–99)
GLUCOSE SERPL-MCNC: 379 MG/DL (ref 70–99)
MAGNESIUM SERPL-MCNC: 1.6 MG/DL (ref 1.7–2.4)
PERFORMED ON: ABNORMAL
PERFORMED ON: ABNORMAL
POTASSIUM SERPL-SCNC: 2.9 MEQ/L (ref 3.4–4.9)
SODIUM SERPL-SCNC: 138 MEQ/L (ref 135–144)

## 2024-05-30 PROCEDURE — 2580000003 HC RX 258: Performed by: FAMILY MEDICINE

## 2024-05-30 PROCEDURE — G0378 HOSPITAL OBSERVATION PER HR: HCPCS

## 2024-05-30 PROCEDURE — 6370000000 HC RX 637 (ALT 250 FOR IP): Performed by: NURSE PRACTITIONER

## 2024-05-30 PROCEDURE — 6370000000 HC RX 637 (ALT 250 FOR IP): Performed by: FAMILY MEDICINE

## 2024-05-30 PROCEDURE — 6360000002 HC RX W HCPCS: Performed by: FAMILY MEDICINE

## 2024-05-30 PROCEDURE — 36415 COLL VENOUS BLD VENIPUNCTURE: CPT

## 2024-05-30 PROCEDURE — 83735 ASSAY OF MAGNESIUM: CPT

## 2024-05-30 PROCEDURE — 6370000000 HC RX 637 (ALT 250 FOR IP): Performed by: INTERNAL MEDICINE

## 2024-05-30 PROCEDURE — 80048 BASIC METABOLIC PNL TOTAL CA: CPT

## 2024-05-30 RX ORDER — FUROSEMIDE 20 MG/1
20 TABLET ORAL DAILY
Qty: 60 TABLET | Refills: 3 | Status: SHIPPED | OUTPATIENT
Start: 2024-05-31

## 2024-05-30 RX ORDER — POTASSIUM CHLORIDE 20 MEQ/1
20 TABLET, EXTENDED RELEASE ORAL DAILY
Qty: 30 TABLET | Refills: 0 | Status: SHIPPED | OUTPATIENT
Start: 2024-05-30

## 2024-05-30 RX ORDER — POTASSIUM CHLORIDE 20 MEQ/1
40 TABLET, EXTENDED RELEASE ORAL ONCE
Status: DISCONTINUED | OUTPATIENT
Start: 2024-05-30 | End: 2024-05-30

## 2024-05-30 RX ORDER — NITROGLYCERIN 0.4 MG/1
0.4 TABLET SUBLINGUAL EVERY 5 MIN PRN
Qty: 25 TABLET | Refills: 3 | Status: SHIPPED | OUTPATIENT
Start: 2024-05-30

## 2024-05-30 RX ORDER — LANOLIN ALCOHOL/MO/W.PET/CERES
400 CREAM (GRAM) TOPICAL DAILY
Qty: 30 TABLET | Refills: 0 | Status: SHIPPED | OUTPATIENT
Start: 2024-05-30

## 2024-05-30 RX ADMIN — ONDANSETRON 4 MG: 4 TABLET, ORALLY DISINTEGRATING ORAL at 06:19

## 2024-05-30 RX ADMIN — FUROSEMIDE 20 MG: 20 TABLET ORAL at 08:12

## 2024-05-30 RX ADMIN — INSULIN LISPRO 8 UNITS: 100 INJECTION, SOLUTION INTRAVENOUS; SUBCUTANEOUS at 08:31

## 2024-05-30 RX ADMIN — POTASSIUM BICARBONATE 40 MEQ: 782 TABLET, EFFERVESCENT ORAL at 12:10

## 2024-05-30 RX ADMIN — Medication 10 ML: at 08:11

## 2024-05-30 RX ADMIN — POTASSIUM CHLORIDE 10 MEQ: 750 TABLET, FILM COATED, EXTENDED RELEASE ORAL at 03:34

## 2024-05-30 RX ADMIN — METOPROLOL SUCCINATE 25 MG: 25 TABLET, EXTENDED RELEASE ORAL at 08:12

## 2024-05-30 RX ADMIN — ACETAMINOPHEN 325MG 650 MG: 325 TABLET ORAL at 06:19

## 2024-05-30 RX ADMIN — INSULIN LISPRO 6 UNITS: 100 INJECTION, SOLUTION INTRAVENOUS; SUBCUTANEOUS at 12:15

## 2024-05-30 RX ADMIN — CLOPIDOGREL BISULFATE 75 MG: 75 TABLET ORAL at 08:12

## 2024-05-30 RX ADMIN — ESCITALOPRAM OXALATE 20 MG: 20 TABLET ORAL at 08:12

## 2024-05-30 RX ADMIN — ASPIRIN 81 MG: 81 TABLET, COATED ORAL at 08:12

## 2024-05-30 RX ADMIN — POTASSIUM CHLORIDE 10 MEQ: 10 INJECTION, SOLUTION INTRAVENOUS at 06:54

## 2024-05-30 RX ADMIN — CARIPRAZINE 3 MG: 1.5 CAPSULE, GELATIN COATED ORAL at 08:12

## 2024-05-30 ASSESSMENT — PAIN SCALES - GENERAL: PAINLEVEL_OUTOF10: 6

## 2024-05-30 NOTE — DISCHARGE SUMMARY
HISTORY:  Reason for exam:->cough  Is the patient pregnant?->No  What reading provider will be dictating this exam?->CRC    FINDINGS:  There is cardiomegaly with vascular congestion.  Strandy and hazy perihilar  densities are noted extending to lung bases with some infiltrates in the left  lower lobe.  There is pleural effusions.  A calcified right breast mass is  noted.    Impression  CHF.  Developing superimposed left lower lobe pneumonia is not excluded      Results for orders placed during the hospital encounter of 08/06/19    XR CHEST STANDARD (2 VW)    Narrative  EXAMINATION: XR CHEST (2 VW)    CLINICAL HISTORY: Midsternal chest pain.    COMPARISONS: December 7, 2018    FINDINGS:    Two views of the chest are submitted.  The cardiac silhouette is borderline enlarged. Unchanged.  The mediastinum is unremarkable.  Pulmonary vascular appears mildly congested.  Right sided trachea.  No focal infiltrates.  No effusions.  No Pneumothoraces.  Calcification overlying the right breast. It measures approximately 2.8 cm.    Impression  PROBABLY VASCULAR APPEARS MILDLY CONGESTED. COULD REPRESENT COMPONENT OF EARLY CHF. CORRELATE CLINICALLY.  CALCULATION  RIGHT BREAST. CORRELATE WITH CLINICAL EXAM AND PATIENT MAMMOGRAPHIC HISTORY.       Portable: Results for orders placed during the hospital encounter of 05/28/24    XR CHEST PORTABLE    Narrative  EXAMINATION:  ONE XRAY VIEW OF THE CHEST    5/28/2024 10:20 am    COMPARISON:  December 2, 2023 0945 hours    HISTORY:  ORDERING SYSTEM PROVIDED HISTORY: chest pressure  TECHNOLOGIST PROVIDED HISTORY:  Reason for exam:->chest pressure  Is the patient pregnant?->No  What reading provider will be dictating this exam?->CRC    FINDINGS:  Single view of the chest is submitted.    The cardiac silhouette enlarged unchanged configuration..    Pulmonary vasculature appears mildly congested.    Right-sided trachea.    Atelectasis left lower lobe    No focal infiltrate    No effusion    No

## 2024-05-30 NOTE — PROGRESS NOTES
Nutrition Education    Consult received for 2g Na diet instruction for CHF.  Pt states CHF is a farily new diagnosis, Not previously trying to lower the sodium content in her diet, Frequently adds salt to her food during cooking and at the table, eats out occasionally, Pt states she is comfortable in reading food labels. Basic DM dietary guidlines discussed, pt reports ' good' undertanding of what she needs ' to do' but often fails to put her knowledge into practice    Diet education completed per protocol.  Explained the role of sodium and it's relation to fluid retention. Discussed limiting daily sodium intake to 1500-2,000 mg per day  with a goal of 500-600 mg sodium, or less per meal.  Reviewed high sodium foods to avoid and provided with lower sodium alternatives. Instructed on how to read food labels and use the information to select foods within the recommended guidelines. Discussed avoiding salt during cooking and at the table and discussed salt free and low sodium spices/seasonings .   Basic Diabetes Diet Guidelines reviewed with patient.  Reviewed CHO containing foods, portion sizes, and carbohydrate counting to meet CHO targets for each meal. Also provided information on food label reading and sample meal plans for reference.  Discussed importance of meal spacing and not skipping meals and following a balanced diet rich in fruits, vegetables, whole grains and lean meats.  RD contact number provided for further questions prior to discharge as needed.    Learners: Patient  Readiness: Acceptance  Method: Explanation and Handout  Response: Verbalizes Understanding  Contact name and number provided.    ARI THURMAN, RD, LD

## 2024-05-30 NOTE — PROGRESS NOTES
Patient assessment and vitals complete and per flowsheets. Patient potassium infusion discontinued per order from Dr Rodriguez, asked for oral replacement. Made aware of blood sugar being 371. Okay to just give 8 units ordered. Patient denies shortness of breath, endorses feeling better.

## 2024-05-30 NOTE — PROGRESS NOTES
CLINICAL PHARMACY NOTE: MEDS TO BEDS    Total # of Prescriptions Filled: 5   The following medications were delivered to the patient:  Nitro 0.4 mg Tab  Furosemide 20 mg Tab  Potassium Chlor 20 meq Tab  Mag-Ox 400 mg Tab  Jardiance 10 mg Tab    Additional Documentation:

## 2024-06-03 ENCOUNTER — TELEPHONE (OUTPATIENT)
Dept: CARDIOLOGY CLINIC | Age: 54
End: 2024-06-03

## 2024-06-03 ENCOUNTER — PATIENT OUTREACH (OUTPATIENT)
Dept: CARE COORDINATION | Facility: CLINIC | Age: 54
End: 2024-06-03
Payer: COMMERCIAL

## 2024-06-03 DIAGNOSIS — I10 ESSENTIAL HYPERTENSION: Primary | Chronic | ICD-10-CM

## 2024-06-03 NOTE — TELEPHONE ENCOUNTER
Patient was D/C'd with lasix 20mg qd and K+20meq daily. States she is concerned that she is not urinating and also wondering if her K+ needs to be a higher dose now that she is taking lasix. Please advise      Any new RX send to [facility] on [facility]

## 2024-06-03 NOTE — PROGRESS NOTES
Discharge Facility: [facility]  Discharge Diagnosis: Hypokalemia   Hypomagnesemia   Acute on chronic diastolic congestive heart failure (HCC)   Chest pain, unspecified type  Admission Date: 5/28/24   Discharge Date:  5/30/24                            PCP Appointment Date: Ruth Fonseca MD 7/16/24/tasked to office  Specialist Appointment Date:   Hospital Encounter and Summary: Linked       Two attempts were made to reach patient within two business days after discharge. Voicemail left with contact information for patient to call back with any non-emergent questions or concerns.

## 2024-06-04 DIAGNOSIS — I10 ESSENTIAL HYPERTENSION: Chronic | ICD-10-CM

## 2024-06-04 LAB
ANION GAP SERPL CALCULATED.3IONS-SCNC: 16 MEQ/L (ref 9–15)
BUN SERPL-MCNC: 13 MG/DL (ref 6–20)
CALCIUM SERPL-MCNC: 9 MG/DL (ref 8.5–9.9)
CHLORIDE SERPL-SCNC: 94 MEQ/L (ref 95–107)
CO2 SERPL-SCNC: 28 MEQ/L (ref 20–31)
CREAT SERPL-MCNC: 0.57 MG/DL (ref 0.5–0.9)
GLUCOSE SERPL-MCNC: 359 MG/DL (ref 70–99)
POTASSIUM SERPL-SCNC: 3.2 MEQ/L (ref 3.4–4.9)
SODIUM SERPL-SCNC: 138 MEQ/L (ref 135–144)

## 2024-06-05 ENCOUNTER — TELEPHONE (OUTPATIENT)
Dept: CARDIOLOGY CLINIC | Age: 54
End: 2024-06-05

## 2024-06-05 RX ORDER — POTASSIUM CHLORIDE 20 MEQ/1
40 TABLET, EXTENDED RELEASE ORAL DAILY
Qty: 180 TABLET | Refills: 3 | Status: SHIPPED | OUTPATIENT
Start: 2024-06-05

## 2024-06-05 NOTE — TELEPHONE ENCOUNTER
Attempted to call patient to schedule her for a sooner office appointment with Dr. Rivera. No answer from patient, voicemail left for patient to call back to office to schedule.

## 2024-06-05 NOTE — TELEPHONE ENCOUNTER
Called patient to notify her that her K is still low and to increase her KCL to 40mEq daily per Dr. Rivera. Patient verbalized understanding. Medication list updated.     Patient also wants Dr. Rivera to know that she feels like she isn't necessarily urinating any more than normal. She states she is using the restroom about 3 times a day. She also states that she is continuing to feel SOB with just walking and has to stop to catch her breath when walking at work. Patient is concerned that she is retaining fluid and does not want to end up back in the hospital. Message sent to Dr. Rivera.    ----- Message from Emigdio KAM MD sent at 6/5/2024 10:39 AM EDT -----  K is still low at 3.2.  advance KCL to 40 mEq QD  ----- Message -----  From: Siria Boyd Incoming Lab Results From [facility]  Sent: 6/4/2024   4:44 PM EDT  To: Emigdio KAM MD

## 2024-06-11 ENCOUNTER — OFFICE VISIT (OUTPATIENT)
Dept: CARDIOLOGY CLINIC | Age: 54
End: 2024-06-11
Payer: COMMERCIAL

## 2024-06-11 VITALS
BODY MASS INDEX: 34.02 KG/M2 | SYSTOLIC BLOOD PRESSURE: 128 MMHG | DIASTOLIC BLOOD PRESSURE: 86 MMHG | OXYGEN SATURATION: 96 % | HEART RATE: 89 BPM | WEIGHT: 192 LBS

## 2024-06-11 DIAGNOSIS — G47.33 OSA (OBSTRUCTIVE SLEEP APNEA): ICD-10-CM

## 2024-06-11 DIAGNOSIS — I25.10 CORONARY ARTERY DISEASE INVOLVING NATIVE CORONARY ARTERY OF NATIVE HEART WITHOUT ANGINA PECTORIS: ICD-10-CM

## 2024-06-11 DIAGNOSIS — E78.2 MIXED HYPERLIPIDEMIA: ICD-10-CM

## 2024-06-11 DIAGNOSIS — R06.09 DOE (DYSPNEA ON EXERTION): ICD-10-CM

## 2024-06-11 DIAGNOSIS — R09.89 BILATERAL CAROTID BRUITS: ICD-10-CM

## 2024-06-11 DIAGNOSIS — I10 ESSENTIAL HYPERTENSION: Primary | ICD-10-CM

## 2024-06-11 DIAGNOSIS — E78.5 DYSLIPIDEMIA: ICD-10-CM

## 2024-06-11 PROCEDURE — 3017F COLORECTAL CA SCREEN DOC REV: CPT | Performed by: INTERNAL MEDICINE

## 2024-06-11 PROCEDURE — 3074F SYST BP LT 130 MM HG: CPT | Performed by: INTERNAL MEDICINE

## 2024-06-11 PROCEDURE — G8427 DOCREV CUR MEDS BY ELIG CLIN: HCPCS | Performed by: INTERNAL MEDICINE

## 2024-06-11 PROCEDURE — 3079F DIAST BP 80-89 MM HG: CPT | Performed by: INTERNAL MEDICINE

## 2024-06-11 PROCEDURE — 1036F TOBACCO NON-USER: CPT | Performed by: INTERNAL MEDICINE

## 2024-06-11 PROCEDURE — G8417 CALC BMI ABV UP PARAM F/U: HCPCS | Performed by: INTERNAL MEDICINE

## 2024-06-11 PROCEDURE — 1111F DSCHRG MED/CURRENT MED MERGE: CPT | Performed by: INTERNAL MEDICINE

## 2024-06-11 PROCEDURE — 99214 OFFICE O/P EST MOD 30 MIN: CPT | Performed by: INTERNAL MEDICINE

## 2024-06-11 RX ORDER — FUROSEMIDE 40 MG/1
40 TABLET ORAL DAILY
Qty: 90 TABLET | Refills: 3 | Status: SHIPPED | OUTPATIENT
Start: 2024-06-11

## 2024-06-11 ASSESSMENT — ENCOUNTER SYMPTOMS
EYES NEGATIVE: 1
WHEEZING: 0
NAUSEA: 0
COUGH: 0
SHORTNESS OF BREATH: 0
RESPIRATORY NEGATIVE: 1
BLOOD IN STOOL: 0
STRIDOR: 0
CHEST TIGHTNESS: 0
GASTROINTESTINAL NEGATIVE: 1

## 2024-06-11 NOTE — PROGRESS NOTES
Subsequent Progress Note    Patient: Paty Sheridan  YOB: 1970  MRN: [id number]    Chief Complaint: HTN cp sob   Chief Complaint   Patient presents with    Follow-Up from Hospital       CV Data:  2008 Cath negative  8/2016 spect negative   1/21 spect negative  6/23 Echo EF 60  10/27/23 LAD BENJAMIN x1  RCA 60-70. CX 50%  12/2/23 Echo EF 50-55 Trace PE  12/23 SPECT negative 69%  2/24 CUS - mild   5/24 Echo EF 55-60    Subjective/HPI: no cp breathing ok no falls no bleed     2/3/21 TELEHEALTH EVALUATION -- Audio/Visual (During COVID-19 public health emergency)    Less sob no cp no falls no bleed takes meds    7/21/23 recent hosp for N/V hypoK palps. [facility]. Had PNA. Finished ABX.  Still HERNANDEZ. No cp no falls no bleed.     12/19/23 recent LAD BENJAMIN. Doing much better no cp no sob no falls no bleed takes meds.     4/24/24 doing well no cp no sob no falls no bleed.     6/11/24 [facility] hosp for CHF. On low dose Lasix 20 + K. K wasa low so was advanced. No cp no falls no blee.d     strong FH  Nonsmoker  Work - [employer]   Lives alone       EKG:     Past Medical History:   Diagnosis Date    Acute kidney injury (HCC) 10/24/2023    CAD (coronary artery disease) 06/25/2022    CAD (coronary artery disease) 06/25/2022    Depression     Environmental allergies     Gastroparesis     GERD (gastroesophageal reflux disease)     Headache     Dr. Neri    HPV in female     Hyperlipidemia     Hypertension     Leukocytosis     Migraines     Type II or unspecified type diabetes mellitus without mention of complication, not stated as uncontrolled        Past Surgical History:   Procedure Laterality Date    BONE MARROW BIOPSY  2012    (-)[facility] [place]    BREAST REDUCTION SURGERY Bilateral 1999    BREAST SURGERY  1999    CARDIAC CATHETERIZATION  2009    (-)STEPHANIE    CARDIAC PROCEDURE N/A 10/30/2023    Left heart cath / coronary angiography performed by Salazar Brower DO at [facility] CARDIAC CATH LAB    CARDIAC PROCEDURE N/A

## 2024-06-12 ENCOUNTER — PATIENT MESSAGE (OUTPATIENT)
Dept: PRIMARY CARE | Facility: CLINIC | Age: 54
End: 2024-06-12
Payer: COMMERCIAL

## 2024-06-12 DIAGNOSIS — F31.9 BIPOLAR DEPRESSION (MULTI): ICD-10-CM

## 2024-06-12 DIAGNOSIS — B37.31 CANDIDA VAGINITIS: ICD-10-CM

## 2024-06-13 RX ORDER — ESCITALOPRAM OXALATE 20 MG/1
20 TABLET ORAL DAILY
Qty: 90 TABLET | Refills: 0 | Status: SHIPPED | OUTPATIENT
Start: 2024-06-13

## 2024-06-13 RX ORDER — FLUCONAZOLE 150 MG/1
150 TABLET ORAL
Qty: 15 TABLET | Refills: 0 | Status: SHIPPED | OUTPATIENT
Start: 2024-06-16

## 2024-06-13 RX ORDER — TRAZODONE HYDROCHLORIDE 100 MG/1
100 TABLET ORAL NIGHTLY
Qty: 90 TABLET | Refills: 0 | Status: SHIPPED | OUTPATIENT
Start: 2024-06-13

## 2024-06-13 NOTE — TELEPHONE ENCOUNTER
From: Ruth Zambrano  To: Ruth Fonseca  Sent: 6/12/2024 10:30 PM EDT  Subject: Refills    Hi Dr. Fonseca. Can you call me in 90 day refills to [facility] for the following medication? Thank you!    Trazodone  Lexapro  Diflucan

## 2024-06-20 ENCOUNTER — PATIENT OUTREACH (OUTPATIENT)
Dept: CARE COORDINATION | Facility: CLINIC | Age: 54
End: 2024-06-20
Payer: COMMERCIAL

## 2024-06-20 NOTE — PROGRESS NOTES
Call regarding appt. with PCP after hospitalization.  At time of outreach call the patient feels as if their condition has improved since last visit. Pt states she saw her cardiologist. Pt still has s ome shortness of breath when up and ambulating at work. Medications have been changed and the pt is watching her sodium intake. No new complaints at this time.  Reviewed the PCP appointment with the pt and addressed any questions or concerns.

## 2024-06-28 ENCOUNTER — PATIENT OUTREACH (OUTPATIENT)
Dept: CARE COORDINATION | Facility: CLINIC | Age: 54
End: 2024-06-28
Payer: COMMERCIAL

## 2024-07-16 ENCOUNTER — APPOINTMENT (OUTPATIENT)
Dept: PRIMARY CARE | Facility: CLINIC | Age: 54
End: 2024-07-16

## 2024-07-16 DIAGNOSIS — I25.118 CORONARY ARTERY DISEASE OF NATIVE ARTERY OF NATIVE HEART WITH STABLE ANGINA PECTORIS (CMS-HCC): ICD-10-CM

## 2024-07-16 DIAGNOSIS — F31.9 BIPOLAR DEPRESSION (MULTI): ICD-10-CM

## 2024-07-16 RX ORDER — TRAZODONE HYDROCHLORIDE 100 MG/1
100 TABLET ORAL NIGHTLY
Qty: 90 TABLET | Refills: 3 | Status: SHIPPED | OUTPATIENT
Start: 2024-07-16

## 2024-07-16 RX ORDER — ATORVASTATIN CALCIUM 40 MG/1
40 TABLET, FILM COATED ORAL DAILY
Qty: 90 TABLET | Refills: 3 | Status: SHIPPED | OUTPATIENT
Start: 2024-07-16

## 2024-07-16 RX ORDER — ESCITALOPRAM OXALATE 20 MG/1
20 TABLET ORAL DAILY
Qty: 90 TABLET | Refills: 3 | Status: SHIPPED | OUTPATIENT
Start: 2024-07-16

## 2024-07-25 DIAGNOSIS — I10 ESSENTIAL HYPERTENSION: ICD-10-CM

## 2024-07-25 RX ORDER — METOPROLOL SUCCINATE 25 MG/1
25 TABLET, EXTENDED RELEASE ORAL DAILY
Qty: 90 TABLET | Refills: 3 | Status: SHIPPED | OUTPATIENT
Start: 2024-07-25

## 2024-07-25 NOTE — TELEPHONE ENCOUNTER
Requesting medication refill. Please approve or deny this request.    Rx requested:  Requested Prescriptions     Pending Prescriptions Disp Refills    metoprolol succinate (TOPROL XL) 25 MG extended release tablet [Pharmacy Med Name: METOPROLOL SUCCINATE ER TABS 25MG]  0         Last Office Visit:   6/11/2024      Next Visit Date:  Future Appointments   Date Time Provider Department Center   8/8/2024  3:45 PM Emigdio Rivera MD [facility]   8/12/2024  3:45 PM Emigdio Rivera MD [facility]   8/27/2024  3:15 PM Jay, Shasta A, MD [facility]               Last refill 04/24/2024. Please approve or deny.

## 2024-08-22 ENCOUNTER — PATIENT OUTREACH (OUTPATIENT)
Dept: PRIMARY CARE | Facility: CLINIC | Age: 54
End: 2024-08-22
Payer: COMMERCIAL

## 2024-08-27 ENCOUNTER — APPOINTMENT (OUTPATIENT)
Dept: PRIMARY CARE | Facility: CLINIC | Age: 54
End: 2024-08-27
Payer: COMMERCIAL

## 2024-08-27 ENCOUNTER — OFFICE VISIT (OUTPATIENT)
Dept: OBGYN CLINIC | Age: 54
End: 2024-08-27
Payer: COMMERCIAL

## 2024-08-27 VITALS
BODY MASS INDEX: 32.96 KG/M2 | DIASTOLIC BLOOD PRESSURE: 88 MMHG | HEIGHT: 63 IN | WEIGHT: 186 LBS | SYSTOLIC BLOOD PRESSURE: 138 MMHG

## 2024-08-27 DIAGNOSIS — Z11.51 SCREENING FOR HUMAN PAPILLOMAVIRUS: ICD-10-CM

## 2024-08-27 DIAGNOSIS — Z12.4 CERVICAL CANCER SCREENING: ICD-10-CM

## 2024-08-27 DIAGNOSIS — B37.9 YEAST INFECTION: ICD-10-CM

## 2024-08-27 DIAGNOSIS — Z01.419 ENCOUNTER FOR WELL WOMAN EXAM WITH ROUTINE GYNECOLOGICAL EXAM: Primary | ICD-10-CM

## 2024-08-27 DIAGNOSIS — Z12.31 SCREENING MAMMOGRAM FOR BREAST CANCER: ICD-10-CM

## 2024-08-27 DIAGNOSIS — B36.9 FUNGAL DERMATITIS: ICD-10-CM

## 2024-08-27 DIAGNOSIS — Z01.419 ENCOUNTER FOR WELL WOMAN EXAM WITH ROUTINE GYNECOLOGICAL EXAM: ICD-10-CM

## 2024-08-27 PROCEDURE — 1036F TOBACCO NON-USER: CPT | Performed by: OBSTETRICS & GYNECOLOGY

## 2024-08-27 PROCEDURE — 99203 OFFICE O/P NEW LOW 30 MIN: CPT | Performed by: OBSTETRICS & GYNECOLOGY

## 2024-08-27 PROCEDURE — 3017F COLORECTAL CA SCREEN DOC REV: CPT | Performed by: OBSTETRICS & GYNECOLOGY

## 2024-08-27 PROCEDURE — 99386 PREV VISIT NEW AGE 40-64: CPT | Performed by: OBSTETRICS & GYNECOLOGY

## 2024-08-27 PROCEDURE — G8417 CALC BMI ABV UP PARAM F/U: HCPCS | Performed by: OBSTETRICS & GYNECOLOGY

## 2024-08-27 PROCEDURE — 3075F SYST BP GE 130 - 139MM HG: CPT | Performed by: OBSTETRICS & GYNECOLOGY

## 2024-08-27 PROCEDURE — G8427 DOCREV CUR MEDS BY ELIG CLIN: HCPCS | Performed by: OBSTETRICS & GYNECOLOGY

## 2024-08-27 PROCEDURE — 3079F DIAST BP 80-89 MM HG: CPT | Performed by: OBSTETRICS & GYNECOLOGY

## 2024-08-27 RX ORDER — NYSTATIN AND TRIAMCINOLONE ACETONIDE 100000; 1 [USP'U]/G; MG/G
CREAM TOPICAL
Qty: 30 G | Refills: 3 | Status: SHIPPED | OUTPATIENT
Start: 2024-08-27

## 2024-08-27 RX ORDER — FLUCONAZOLE 150 MG/1
150 TABLET ORAL
Qty: 3 TABLET | Refills: 5 | Status: SHIPPED | OUTPATIENT
Start: 2024-08-27 | End: 2024-10-18

## 2024-08-27 SDOH — ECONOMIC STABILITY: FOOD INSECURITY: WITHIN THE PAST 12 MONTHS, YOU WORRIED THAT YOUR FOOD WOULD RUN OUT BEFORE YOU GOT MONEY TO BUY MORE.: NEVER TRUE

## 2024-08-27 SDOH — ECONOMIC STABILITY: FOOD INSECURITY: WITHIN THE PAST 12 MONTHS, THE FOOD YOU BOUGHT JUST DIDN'T LAST AND YOU DIDN'T HAVE MONEY TO GET MORE.: NEVER TRUE

## 2024-08-27 SDOH — ECONOMIC STABILITY: INCOME INSECURITY: HOW HARD IS IT FOR YOU TO PAY FOR THE VERY BASICS LIKE FOOD, HOUSING, MEDICAL CARE, AND HEATING?: NOT HARD AT ALL

## 2024-08-27 ASSESSMENT — ENCOUNTER SYMPTOMS
ABDOMINAL PAIN: 0
CONSTIPATION: 0
DIARRHEA: 0
VOMITING: 0
BLOOD IN STOOL: 0
RESPIRATORY NEGATIVE: 1
ABDOMINAL DISTENTION: 0
ANAL BLEEDING: 0
NAUSEA: 0
ALLERGIC/IMMUNOLOGIC NEGATIVE: 1
EYES NEGATIVE: 1
RECTAL PAIN: 0

## 2024-08-27 ASSESSMENT — VISUAL ACUITY: OU: 1

## 2024-08-27 NOTE — PROGRESS NOTES
Subjective:      Patient ID: Paty Sheridan is a 53 y.o. female    Annual exam. New patient.  Reviewed medical, surgical, social and family history.  Also reviewed current medications and allergies.  No PMB.  Pap performed.  Screening mammogram ordered.  Monthly SBE encouraged.  Screening colonoscopy recommended per routine.  F/U annual exam or prn.      Pt also wished to discuss irritation and occasional spotting from clitoral area. Patient is diabetic and has uncontrolled Bilateral Salpingectomy.  Discussed relation between uncontrolled DM and frequent yeast/fungal infections.  Exam with moderate erythema entire vulvar and vlad-clitoral area c/w fungal dermatitis and white discharge.  Denies recent abx or steroids.  No new detergents or soaps.  No frequent swimming or hot tubs.  Discussed decreasing carbs and sugars in diet.  Daily probiotic ( Acidophilus ).  Instructed to keep perineal and vaginal area clean and dry.  Cotton underwear and loose fitting clothing.  No douching.  No bubble baths or bath bombs.  Rx:  Diflucan 150 mg q 3 days x 3 doses and Mycolog Cream as directed.  Provided refills as well.  Also discussed stable area of fatty necrosis 11:00 right breast.  Mammogram 12/23 negative and recommendation for F/U mammogram 12/24 ( orders placed ).  All questions answered.         Vitals:  /88   Ht 1.6 m (5' 3\")   Wt 84.4 kg (186 lb)   LMP  (LMP Unknown)   BMI 32.95 kg/m²   Past Medical History:   Diagnosis Date    Acute kidney injury (HCC) 10/24/2023    CAD (coronary artery disease) 06/25/2022    CAD (coronary artery disease) 06/25/2022    CHF (congestive heart failure) ([facility])     Depression     Environmental allergies     Gastroparesis     GERD (gastroesophageal reflux disease)     Headache     Dr. Neri    Heart attack ([facility])     HPV in female     Hyperlipidemia     Hypertension     Leukocytosis     Migraines     Type II or unspecified type diabetes mellitus without mention of complication,      Breast Cancer Maternal Great Grandmother     Colon Cancer Neg Hx     Eclampsia Neg Hx     Hypertension Neg Hx     Ovarian Cancer Neg Hx      Labor Neg Hx     Spont Abortions Neg Hx     Stroke Neg Hx        Review of Systems   Constitutional: Negative.  Negative for activity change, appetite change, chills, diaphoresis, fatigue, fever and unexpected weight change.   HENT: Negative.     Eyes: Negative.    Respiratory: Negative.     Cardiovascular: Negative.    Gastrointestinal:  Negative for abdominal distention, abdominal pain, anal bleeding, blood in stool, constipation, diarrhea, nausea, rectal pain and vomiting.   Endocrine: Negative.    Genitourinary:  Negative for decreased urine volume, difficulty urinating, dyspareunia, dysuria, enuresis, flank pain, frequency, genital sores, hematuria, menstrual problem, pelvic pain, urgency, vaginal bleeding, vaginal discharge and vaginal pain.   Musculoskeletal: Negative.    Skin:  Positive for rash.   Allergic/Immunologic: Negative.    Neurological: Negative.    Hematological: Negative.    Psychiatric/Behavioral: Negative.         Objective:     Physical Exam  Constitutional:       Appearance: She is well-developed.   HENT:      Head: Normocephalic.   Eyes:      General: Lids are normal. Vision grossly intact.   Neck:      Thyroid: No thyromegaly.   Cardiovascular:      Rate and Rhythm: Normal rate and regular rhythm.      Heart sounds: Normal heart sounds.   Pulmonary:      Effort: Pulmonary effort is normal. No respiratory distress.      Breath sounds: Normal breath sounds. No wheezing or rales.   Chest:      Chest wall: No tenderness.   Breasts:     Right: Normal. No swelling, bleeding, inverted nipple, mass, nipple discharge, skin change or tenderness.      Left: Normal. No swelling, bleeding, inverted nipple, mass, nipple discharge, skin change or tenderness.   Abdominal:      General: There is no distension.      Palpations: Abdomen is soft. There is no

## 2024-08-27 NOTE — PROGRESS NOTES
The patient was asked if she would like a chaperone present for her intimate exam. She  Declined the chaperone. Talib Carlson CMA (AAMA)

## 2024-08-29 ENCOUNTER — APPOINTMENT (OUTPATIENT)
Dept: PRIMARY CARE | Facility: CLINIC | Age: 54
End: 2024-08-29
Payer: COMMERCIAL

## 2024-08-29 ENCOUNTER — LAB (OUTPATIENT)
Dept: LAB | Facility: LAB | Age: 54
End: 2024-08-29
Payer: COMMERCIAL

## 2024-08-29 VITALS
HEART RATE: 81 BPM | TEMPERATURE: 97.4 F | WEIGHT: 189 LBS | BODY MASS INDEX: 33.49 KG/M2 | OXYGEN SATURATION: 97 % | HEIGHT: 63 IN | DIASTOLIC BLOOD PRESSURE: 80 MMHG | RESPIRATION RATE: 18 BRPM | SYSTOLIC BLOOD PRESSURE: 122 MMHG

## 2024-08-29 DIAGNOSIS — R07.9 CHEST PAIN, UNSPECIFIED TYPE: ICD-10-CM

## 2024-08-29 DIAGNOSIS — E87.6 HYPOKALEMIA: ICD-10-CM

## 2024-08-29 DIAGNOSIS — D64.9 ANEMIA, UNSPECIFIED TYPE: ICD-10-CM

## 2024-08-29 DIAGNOSIS — G47.33 OSA (OBSTRUCTIVE SLEEP APNEA): ICD-10-CM

## 2024-08-29 DIAGNOSIS — R07.9 CHEST PAIN, UNSPECIFIED TYPE: Primary | ICD-10-CM

## 2024-08-29 LAB
ALBUMIN SERPL BCP-MCNC: 3.6 G/DL (ref 3.4–5)
ALP SERPL-CCNC: 111 U/L (ref 33–110)
ALT SERPL W P-5'-P-CCNC: 9 U/L (ref 7–45)
ANION GAP SERPL CALC-SCNC: 13 MMOL/L (ref 10–20)
AST SERPL W P-5'-P-CCNC: 11 U/L (ref 9–39)
BILIRUB SERPL-MCNC: 0.4 MG/DL (ref 0–1.2)
BUN SERPL-MCNC: 10 MG/DL (ref 6–23)
CALCIUM SERPL-MCNC: 9.2 MG/DL (ref 8.6–10.3)
CHLORIDE SERPL-SCNC: 95 MMOL/L (ref 98–107)
CO2 SERPL-SCNC: 32 MMOL/L (ref 21–32)
CREAT SERPL-MCNC: 0.57 MG/DL (ref 0.5–1.05)
EGFRCR SERPLBLD CKD-EPI 2021: >90 ML/MIN/1.73M*2
ERYTHROCYTE [DISTWIDTH] IN BLOOD BY AUTOMATED COUNT: 13 % (ref 11.5–14.5)
GLUCOSE SERPL-MCNC: 306 MG/DL (ref 74–99)
HCT VFR BLD AUTO: 36.3 % (ref 36–46)
HGB BLD-MCNC: 12.2 G/DL (ref 12–16)
IRON SATN MFR SERPL: 21 % (ref 25–45)
IRON SERPL-MCNC: 69 UG/DL (ref 35–150)
MCH RBC QN AUTO: 30.3 PG (ref 26–34)
MCHC RBC AUTO-ENTMCNC: 33.6 G/DL (ref 32–36)
MCV RBC AUTO: 90 FL (ref 80–100)
NRBC BLD-RTO: 0 /100 WBCS (ref 0–0)
PLATELET # BLD AUTO: 280 X10*3/UL (ref 150–450)
POTASSIUM SERPL-SCNC: 3 MMOL/L (ref 3.5–5.3)
PROT SERPL-MCNC: 6.9 G/DL (ref 6.4–8.2)
RBC # BLD AUTO: 4.02 X10*6/UL (ref 4–5.2)
SODIUM SERPL-SCNC: 137 MMOL/L (ref 136–145)
TIBC SERPL-MCNC: 334 UG/DL (ref 240–445)
UIBC SERPL-MCNC: 265 UG/DL (ref 110–370)
VIT B12 SERPL-MCNC: 206 PG/ML (ref 211–911)
WBC # BLD AUTO: 10.9 X10*3/UL (ref 4.4–11.3)

## 2024-08-29 PROCEDURE — 83550 IRON BINDING TEST: CPT

## 2024-08-29 PROCEDURE — 83540 ASSAY OF IRON: CPT

## 2024-08-29 PROCEDURE — 3008F BODY MASS INDEX DOCD: CPT | Performed by: PHYSICIAN ASSISTANT

## 2024-08-29 PROCEDURE — 99214 OFFICE O/P EST MOD 30 MIN: CPT | Performed by: PHYSICIAN ASSISTANT

## 2024-08-29 PROCEDURE — 93000 ELECTROCARDIOGRAM COMPLETE: CPT | Performed by: PHYSICIAN ASSISTANT

## 2024-08-29 PROCEDURE — 80053 COMPREHEN METABOLIC PANEL: CPT

## 2024-08-29 PROCEDURE — 3074F SYST BP LT 130 MM HG: CPT | Performed by: PHYSICIAN ASSISTANT

## 2024-08-29 PROCEDURE — 36415 COLL VENOUS BLD VENIPUNCTURE: CPT

## 2024-08-29 PROCEDURE — 82607 VITAMIN B-12: CPT

## 2024-08-29 PROCEDURE — 3079F DIAST BP 80-89 MM HG: CPT | Performed by: PHYSICIAN ASSISTANT

## 2024-08-29 PROCEDURE — 85027 COMPLETE CBC AUTOMATED: CPT

## 2024-08-29 RX ORDER — FUROSEMIDE 40 MG/1
40 TABLET ORAL DAILY
COMMUNITY

## 2024-08-29 RX ORDER — POTASSIUM CHLORIDE 20 MEQ/1
20 TABLET, EXTENDED RELEASE ORAL DAILY
COMMUNITY
End: 2024-08-29 | Stop reason: DRUGHIGH

## 2024-08-29 RX ORDER — NYSTATIN AND TRIAMCINOLONE ACETONIDE 100000; 1 [USP'U]/G; MG/G
CREAM TOPICAL 2 TIMES DAILY
COMMUNITY

## 2024-08-29 RX ORDER — FLUCONAZOLE 150 MG/1
150 TABLET ORAL ONCE
COMMUNITY

## 2024-08-29 RX ORDER — POTASSIUM CHLORIDE 20 MEQ/1
40 TABLET, EXTENDED RELEASE ORAL DAILY
Qty: 180 TABLET | Refills: 1 | Status: SHIPPED | OUTPATIENT
Start: 2024-08-29

## 2024-08-29 ASSESSMENT — ENCOUNTER SYMPTOMS
HEADACHES: 1
PALPITATIONS: 0
SHORTNESS OF BREATH: 0

## 2024-08-29 NOTE — PROGRESS NOTES
Subjective   Patient ID: Ruth Zambrano is a 53 y.o. female who presents for Follow-up (Dr Fonseca pt here today for sleep apnea that she had a study for years ago and wants new order for sleep study. Her mom heard her snoring and hasn't been on her CPAP over 10 years now. ) and Headache (Then has been getting headache all over head but mainly in back of head, feels like a brain freeze that has been going on for a couple weeks that is there everyday comes/goes; bad when she leans over. ).    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 Her mom heard her snoring and witnessed apneic episode and is worried about her. Pt wants new sleep study  STOP-BANG Score for Obstructive Sleep Apnea  Do you snore loudly? (Louder than talking or loud enough to be heard heard through closed doors): yes   Do you often feel tired, fatigued, or sleepy during the daytime? Yes    Has anyone observed you stop breathing during sleep? Yes   Do you have (or are you being treated for) high blood pressure? Yes   Objective Measures:  Is the BMI > 35 kg/m2? No   Is the age > 50 years old? Yes   Is the neck circumference > 40 cm? No   Is the pt male? No      SCORE 5 - HIGH RISK     Headache:   - MAY BE RELATED TO HTN - BP TODAY 150/100 MMHG   - Chest hurting, feeling winded, thinks potassium may be low. Needs visit with her cardio  - Onset: feel like a brain freeze, gets in the back of her head early in the morning as soon as she wakes up and will migrate around her head and go behind her eye. When she leans over it gets worse. No feeling congested. Has had these headaches, they come and go. Unsure why. Feels like a head rush. Notices it worse when weather changes.   - Taking Tylenol arthritis frequently for the pain     Review of Systems   Respiratory:  Negative for shortness of breath.   "  Cardiovascular:  Negative for chest pain, palpitations and leg swelling.   Neurological:  Positive for headaches.       Objective   /80   Pulse 81   Temp 36.3 °C (97.4 °F)   Resp 18   Ht 1.6 m (5' 3\")   Wt 85.7 kg (189 lb)   SpO2 97%   BMI 33.48 kg/m²     Physical Exam  Constitutional:       Appearance: Normal appearance. She is obese.   Cardiovascular:      Rate and Rhythm: Normal rate and regular rhythm.      Pulses: Normal pulses.      Heart sounds: Normal heart sounds. No murmur heard.  Pulmonary:      Effort: Pulmonary effort is normal.      Breath sounds: Normal breath sounds.   Neurological:      Mental Status: She is alert.   Psychiatric:         Mood and Affect: Mood and affect normal.       Assessment/Plan     Problem List Items Addressed This Visit       Hypokalemia    Relevant Medications    potassium chloride CR 20 mEq ER tablet     Other Visit Diagnoses       Chest pain, unspecified type    -  Primary    Relevant Orders    CBC    Comprehensive Metabolic Panel    ECG 12 Lead    Anemia, unspecified type        Relevant Orders    Iron and TIBC    Vitamin B12    SHAVONNE (obstructive sleep apnea)        Relevant Orders    In-Center Sleep Study            Chest pain  - EKG today showing   - Has h/o sometimes severe hypokalemia so will check labs today, encouraged she restart her K supplement (just got refill from her cardio)   - To ER if sxs worsening or if severe hypokalemia in the labs   - She has untreated sleep apnea which is likely complicating - new order for sleep study done so we can restart tx   - Would like her to reach out to cardio soon as well       "

## 2024-08-30 LAB
HPV HR 12 DNA SPEC QL NAA+PROBE: DETECTED
HPV16 DNA SPEC QL NAA+PROBE: NOT DETECTED
HPV16+18+H RISK 12 DNA SPEC-IMP: ABNORMAL
HPV18 DNA SPEC QL NAA+PROBE: NOT DETECTED

## 2024-09-09 ENCOUNTER — HOSPITAL ENCOUNTER (EMERGENCY)
Age: 54
Discharge: HOME | End: 2024-09-09
Attending: STUDENT IN AN ORGANIZED HEALTH CARE EDUCATION/TRAINING PROGRAM
Payer: COMMERCIAL

## 2024-09-09 ENCOUNTER — APPOINTMENT (OUTPATIENT)
Dept: GENERAL RADIOLOGY | Age: 54
End: 2024-09-09
Payer: COMMERCIAL

## 2024-09-09 VITALS
HEIGHT: 63 IN | BODY MASS INDEX: 33.49 KG/M2 | TEMPERATURE: 98.4 F | WEIGHT: 189 LBS | SYSTOLIC BLOOD PRESSURE: 150 MMHG | DIASTOLIC BLOOD PRESSURE: 82 MMHG | OXYGEN SATURATION: 94 % | HEART RATE: 83 BPM | RESPIRATION RATE: 21 BRPM

## 2024-09-09 DIAGNOSIS — R07.9 CHEST PAIN, UNSPECIFIED TYPE: Primary | ICD-10-CM

## 2024-09-09 LAB
ANION GAP SERPL CALCULATED.3IONS-SCNC: 13 MEQ/L (ref 9–15)
B-OH-BUTYR SERPL-SCNC: 1.8 MG/DL (ref 0.2–2.8)
BASOPHILS # BLD: 0.1 K/UL (ref 0–0.2)
BASOPHILS NFR BLD: 0.6 %
BNP BLD-MCNC: 98 PG/ML
BUN SERPL-MCNC: 10 MG/DL (ref 6–20)
CALCIUM SERPL-MCNC: 9.6 MG/DL (ref 8.5–9.9)
CHLORIDE SERPL-SCNC: 95 MEQ/L (ref 95–107)
CO2 SERPL-SCNC: 27 MEQ/L (ref 20–31)
CREAT SERPL-MCNC: 0.52 MG/DL (ref 0.5–0.9)
EKG ATRIAL RATE: 85 BPM
EKG P AXIS: 30 DEGREES
EKG P-R INTERVAL: 176 MS
EKG Q-T INTERVAL: 360 MS
EKG QRS DURATION: 68 MS
EKG QTC CALCULATION (BAZETT): 428 MS
EKG R AXIS: -4 DEGREES
EKG T AXIS: 37 DEGREES
EKG VENTRICULAR RATE: 85 BPM
EOSINOPHIL # BLD: 0.3 K/UL (ref 0–0.7)
EOSINOPHIL NFR BLD: 2.7 %
ERYTHROCYTE [DISTWIDTH] IN BLOOD BY AUTOMATED COUNT: 12.7 % (ref 11.5–14.5)
GLUCOSE BLD-MCNC: 339 MG/DL (ref 70–99)
GLUCOSE SERPL-MCNC: 446 MG/DL (ref 70–99)
HCT VFR BLD AUTO: 37.9 % (ref 37–47)
HGB BLD-MCNC: 12.6 G/DL (ref 12–16)
LYMPHOCYTES # BLD: 2.9 K/UL (ref 1–4.8)
LYMPHOCYTES NFR BLD: 28.1 %
MCH RBC QN AUTO: 30.1 PG (ref 27–31.3)
MCHC RBC AUTO-ENTMCNC: 33.2 % (ref 33–37)
MCV RBC AUTO: 90.5 FL (ref 79.4–94.8)
MONOCYTES # BLD: 0.7 K/UL (ref 0.2–0.8)
MONOCYTES NFR BLD: 6.8 %
NEUTROPHILS # BLD: 6.3 K/UL (ref 1.4–6.5)
NEUTS SEG NFR BLD: 61.1 %
PERFORMED ON: ABNORMAL
PLATELET # BLD AUTO: 301 K/UL (ref 130–400)
POTASSIUM SERPL-SCNC: 3.5 MEQ/L (ref 3.4–4.9)
RBC # BLD AUTO: 4.19 M/UL (ref 4.2–5.4)
SODIUM SERPL-SCNC: 135 MEQ/L (ref 135–144)
TROPONIN, HIGH SENSITIVITY: 7 NG/L (ref 0–19)
TROPONIN, HIGH SENSITIVITY: <6 NG/L (ref 0–19)
WBC # BLD AUTO: 10.3 K/UL (ref 4.8–10.8)

## 2024-09-09 PROCEDURE — 6370000000 HC RX 637 (ALT 250 FOR IP): Performed by: STUDENT IN AN ORGANIZED HEALTH CARE EDUCATION/TRAINING PROGRAM

## 2024-09-09 PROCEDURE — 2580000003 HC RX 258: Performed by: STUDENT IN AN ORGANIZED HEALTH CARE EDUCATION/TRAINING PROGRAM

## 2024-09-09 PROCEDURE — 84484 ASSAY OF TROPONIN QUANT: CPT

## 2024-09-09 PROCEDURE — 83880 ASSAY OF NATRIURETIC PEPTIDE: CPT

## 2024-09-09 PROCEDURE — 99285 EMERGENCY DEPT VISIT HI MDM: CPT

## 2024-09-09 PROCEDURE — 6360000002 HC RX W HCPCS: Performed by: STUDENT IN AN ORGANIZED HEALTH CARE EDUCATION/TRAINING PROGRAM

## 2024-09-09 PROCEDURE — 80048 BASIC METABOLIC PNL TOTAL CA: CPT

## 2024-09-09 PROCEDURE — 96375 TX/PRO/DX INJ NEW DRUG ADDON: CPT

## 2024-09-09 PROCEDURE — 93005 ELECTROCARDIOGRAM TRACING: CPT | Performed by: STUDENT IN AN ORGANIZED HEALTH CARE EDUCATION/TRAINING PROGRAM

## 2024-09-09 PROCEDURE — 71046 X-RAY EXAM CHEST 2 VIEWS: CPT

## 2024-09-09 PROCEDURE — 85025 COMPLETE CBC W/AUTO DIFF WBC: CPT

## 2024-09-09 PROCEDURE — 82010 KETONE BODYS QUAN: CPT

## 2024-09-09 PROCEDURE — 36415 COLL VENOUS BLD VENIPUNCTURE: CPT

## 2024-09-09 PROCEDURE — 96374 THER/PROPH/DIAG INJ IV PUSH: CPT

## 2024-09-09 PROCEDURE — 93010 ELECTROCARDIOGRAM REPORT: CPT | Performed by: INTERNAL MEDICINE

## 2024-09-09 RX ORDER — FENTANYL CITRATE 50 UG/ML
25 INJECTION, SOLUTION INTRAMUSCULAR; INTRAVENOUS ONCE
Status: COMPLETED | OUTPATIENT
Start: 2024-09-09 | End: 2024-09-09

## 2024-09-09 RX ORDER — KETOROLAC TROMETHAMINE 15 MG/ML
15 INJECTION, SOLUTION INTRAMUSCULAR; INTRAVENOUS ONCE
Status: COMPLETED | OUTPATIENT
Start: 2024-09-09 | End: 2024-09-09

## 2024-09-09 RX ORDER — 0.9 % SODIUM CHLORIDE 0.9 %
1000 INTRAVENOUS SOLUTION INTRAVENOUS ONCE
Status: COMPLETED | OUTPATIENT
Start: 2024-09-09 | End: 2024-09-09

## 2024-09-09 RX ORDER — NAPROXEN 500 MG/1
1 TABLET, DELAYED RELEASE ORAL 2 TIMES DAILY PRN
Qty: 20 TABLET | Refills: 0 | Status: SHIPPED | OUTPATIENT
Start: 2024-09-09 | End: 2024-09-09

## 2024-09-09 RX ORDER — INSULIN LISPRO 100 [IU]/ML
10 INJECTION, SOLUTION INTRAVENOUS; SUBCUTANEOUS ONCE
Status: COMPLETED | OUTPATIENT
Start: 2024-09-09 | End: 2024-09-09

## 2024-09-09 RX ADMIN — KETOROLAC TROMETHAMINE 15 MG: 15 INJECTION, SOLUTION INTRAMUSCULAR; INTRAVENOUS at 07:30

## 2024-09-09 RX ADMIN — FENTANYL CITRATE 25 MCG: 50 INJECTION, SOLUTION INTRAMUSCULAR; INTRAVENOUS at 06:19

## 2024-09-09 RX ADMIN — SODIUM CHLORIDE 1000 ML: 9 INJECTION, SOLUTION INTRAVENOUS at 06:19

## 2024-09-09 RX ADMIN — INSULIN LISPRO 10 UNITS: 100 INJECTION, SOLUTION INTRAVENOUS; SUBCUTANEOUS at 06:19

## 2024-09-09 ASSESSMENT — ENCOUNTER SYMPTOMS
NAUSEA: 0
EYE ITCHING: 0
RHINORRHEA: 0
EYE REDNESS: 0
SINUS PAIN: 0
COUGH: 0
ABDOMINAL PAIN: 0
PHOTOPHOBIA: 0
COLOR CHANGE: 0
DIARRHEA: 0
WHEEZING: 0
EYE DISCHARGE: 0
SINUS PRESSURE: 0
VOMITING: 0
EYE PAIN: 0
CHEST TIGHTNESS: 0

## 2024-09-09 ASSESSMENT — PAIN - FUNCTIONAL ASSESSMENT: PAIN_FUNCTIONAL_ASSESSMENT: 0-10

## 2024-09-09 ASSESSMENT — PAIN DESCRIPTION - LOCATION
LOCATION: CHEST
LOCATION: CHEST

## 2024-09-09 ASSESSMENT — PAIN SCALES - GENERAL
PAINLEVEL_OUTOF10: 3
PAINLEVEL_OUTOF10: 4

## 2024-09-09 ASSESSMENT — PAIN DESCRIPTION - DESCRIPTORS: DESCRIPTORS: SHARP

## 2024-09-15 ENCOUNTER — APPOINTMENT (OUTPATIENT)
Dept: GENERAL RADIOLOGY | Age: 54
End: 2024-09-15
Payer: COMMERCIAL

## 2024-09-15 ENCOUNTER — HOSPITAL ENCOUNTER (EMERGENCY)
Age: 54
Discharge: HOME | End: 2024-09-15
Attending: EMERGENCY MEDICINE
Payer: COMMERCIAL

## 2024-09-15 VITALS
BODY MASS INDEX: 33.49 KG/M2 | OXYGEN SATURATION: 94 % | WEIGHT: 189 LBS | HEART RATE: 102 BPM | SYSTOLIC BLOOD PRESSURE: 125 MMHG | RESPIRATION RATE: 20 BRPM | HEIGHT: 63 IN | TEMPERATURE: 98.5 F | DIASTOLIC BLOOD PRESSURE: 75 MMHG

## 2024-09-15 DIAGNOSIS — J06.9 VIRAL UPPER RESPIRATORY INFECTION: Primary | ICD-10-CM

## 2024-09-15 LAB
ALBUMIN SERPL-MCNC: 3.8 G/DL (ref 3.5–4.6)
ALP SERPL-CCNC: 144 U/L (ref 40–130)
ALT SERPL-CCNC: 14 U/L (ref 0–33)
ANION GAP SERPL CALCULATED.3IONS-SCNC: 12 MEQ/L (ref 9–15)
AST SERPL-CCNC: 15 U/L (ref 0–35)
BASOPHILS # BLD: 0 K/UL (ref 0–0.2)
BASOPHILS NFR BLD: 0.4 %
BILIRUB SERPL-MCNC: <0.2 MG/DL (ref 0.2–0.7)
BNP BLD-MCNC: <36 PG/ML
BUN SERPL-MCNC: 15 MG/DL (ref 6–20)
CALCIUM SERPL-MCNC: 9 MG/DL (ref 8.5–9.9)
CHLORIDE SERPL-SCNC: 95 MEQ/L (ref 95–107)
CO2 SERPL-SCNC: 29 MEQ/L (ref 20–31)
CREAT SERPL-MCNC: 0.65 MG/DL (ref 0.5–0.9)
EOSINOPHIL # BLD: 0.1 K/UL (ref 0–0.7)
EOSINOPHIL NFR BLD: 0.8 %
ERYTHROCYTE [DISTWIDTH] IN BLOOD BY AUTOMATED COUNT: 12.9 % (ref 11.5–14.5)
GLOBULIN SER CALC-MCNC: 3.7 G/DL (ref 2.3–3.5)
GLUCOSE SERPL-MCNC: 232 MG/DL (ref 70–99)
HCT VFR BLD AUTO: 38.3 % (ref 37–47)
HGB BLD-MCNC: 13.5 G/DL (ref 12–16)
INFLUENZA A BY PCR: NEGATIVE
INFLUENZA B BY PCR: NEGATIVE
LYMPHOCYTES # BLD: 2.7 K/UL (ref 1–4.8)
LYMPHOCYTES NFR BLD: 35.9 %
MCH RBC QN AUTO: 31.5 PG (ref 27–31.3)
MCHC RBC AUTO-ENTMCNC: 35.2 % (ref 33–37)
MCV RBC AUTO: 89.3 FL (ref 79.4–94.8)
MONOCYTES # BLD: 1 K/UL (ref 0.2–0.8)
MONOCYTES NFR BLD: 13.7 %
NEUTROPHILS # BLD: 3.7 K/UL (ref 1.4–6.5)
NEUTS SEG NFR BLD: 48.7 %
PLATELET # BLD AUTO: 223 K/UL (ref 130–400)
POTASSIUM SERPL-SCNC: 2.8 MEQ/L (ref 3.4–4.9)
PROT SERPL-MCNC: 7.5 G/DL (ref 6.3–8)
RBC # BLD AUTO: 4.29 M/UL (ref 4.2–5.4)
SARS-COV-2 RDRP RESP QL NAA+PROBE: NOT DETECTED
SODIUM SERPL-SCNC: 136 MEQ/L (ref 135–144)
WBC # BLD AUTO: 7.6 K/UL (ref 4.8–10.8)

## 2024-09-15 PROCEDURE — 99284 EMERGENCY DEPT VISIT MOD MDM: CPT

## 2024-09-15 PROCEDURE — 87635 SARS-COV-2 COVID-19 AMP PRB: CPT

## 2024-09-15 PROCEDURE — 83880 ASSAY OF NATRIURETIC PEPTIDE: CPT

## 2024-09-15 PROCEDURE — 80053 COMPREHEN METABOLIC PANEL: CPT

## 2024-09-15 PROCEDURE — 87502 INFLUENZA DNA AMP PROBE: CPT

## 2024-09-15 PROCEDURE — 71045 X-RAY EXAM CHEST 1 VIEW: CPT

## 2024-09-15 PROCEDURE — 6370000000 HC RX 637 (ALT 250 FOR IP): Performed by: EMERGENCY MEDICINE

## 2024-09-15 PROCEDURE — 2580000003 HC RX 258: Performed by: EMERGENCY MEDICINE

## 2024-09-15 PROCEDURE — 85025 COMPLETE CBC W/AUTO DIFF WBC: CPT

## 2024-09-15 RX ORDER — 0.9 % SODIUM CHLORIDE 0.9 %
500 INTRAVENOUS SOLUTION INTRAVENOUS ONCE
Status: DISCONTINUED | OUTPATIENT
Start: 2024-09-15 | End: 2024-09-15 | Stop reason: HOSPADM

## 2024-09-15 RX ORDER — POTASSIUM CHLORIDE 1500 MG/1
40 TABLET, EXTENDED RELEASE ORAL ONCE
Status: COMPLETED | OUTPATIENT
Start: 2024-09-15 | End: 2024-09-15

## 2024-09-15 RX ORDER — GUAIFENESIN 600 MG/1
600 TABLET, EXTENDED RELEASE ORAL ONCE
Status: COMPLETED | OUTPATIENT
Start: 2024-09-15 | End: 2024-09-15

## 2024-09-15 RX ADMIN — GUAIFENESIN 600 MG: 600 TABLET ORAL at 16:00

## 2024-09-15 RX ADMIN — POTASSIUM CHLORIDE 40 MEQ: 1500 TABLET, EXTENDED RELEASE ORAL at 16:46

## 2024-09-15 ASSESSMENT — LIFESTYLE VARIABLES
HOW MANY STANDARD DRINKS CONTAINING ALCOHOL DO YOU HAVE ON A TYPICAL DAY: PATIENT DOES NOT DRINK
HOW OFTEN DO YOU HAVE A DRINK CONTAINING ALCOHOL: NEVER

## 2024-09-15 ASSESSMENT — PAIN - FUNCTIONAL ASSESSMENT: PAIN_FUNCTIONAL_ASSESSMENT: NONE - DENIES PAIN

## 2024-09-18 ENCOUNTER — HOSPITAL ENCOUNTER (EMERGENCY)
Age: 54
Discharge: HOME | End: 2024-09-18
Attending: STUDENT IN AN ORGANIZED HEALTH CARE EDUCATION/TRAINING PROGRAM
Payer: COMMERCIAL

## 2024-09-18 ENCOUNTER — APPOINTMENT (OUTPATIENT)
Dept: GENERAL RADIOLOGY | Age: 54
End: 2024-09-18
Payer: COMMERCIAL

## 2024-09-18 VITALS
WEIGHT: 189 LBS | HEIGHT: 63 IN | TEMPERATURE: 98.2 F | SYSTOLIC BLOOD PRESSURE: 144 MMHG | RESPIRATION RATE: 16 BRPM | OXYGEN SATURATION: 95 % | HEART RATE: 85 BPM | BODY MASS INDEX: 33.49 KG/M2 | DIASTOLIC BLOOD PRESSURE: 77 MMHG

## 2024-09-18 DIAGNOSIS — G62.9 NEUROPATHY: ICD-10-CM

## 2024-09-18 DIAGNOSIS — L08.9 TOE INFECTION: Primary | ICD-10-CM

## 2024-09-18 PROCEDURE — 99283 EMERGENCY DEPT VISIT LOW MDM: CPT

## 2024-09-18 PROCEDURE — 6370000000 HC RX 637 (ALT 250 FOR IP): Performed by: STUDENT IN AN ORGANIZED HEALTH CARE EDUCATION/TRAINING PROGRAM

## 2024-09-18 PROCEDURE — 73630 X-RAY EXAM OF FOOT: CPT

## 2024-09-18 RX ORDER — DOXYCYCLINE HYCLATE 100 MG
100 TABLET ORAL 2 TIMES DAILY
Qty: 14 TABLET | Refills: 0 | Status: SHIPPED | OUTPATIENT
Start: 2024-09-18 | End: 2024-09-25

## 2024-09-18 RX ORDER — DOXYCYCLINE 100 MG/1
100 CAPSULE ORAL ONCE
Status: COMPLETED | OUTPATIENT
Start: 2024-09-18 | End: 2024-09-18

## 2024-09-18 RX ADMIN — DOXYCYCLINE HYCLATE 100 MG: 100 CAPSULE ORAL at 17:36

## 2024-09-18 ASSESSMENT — PAIN DESCRIPTION - LOCATION: LOCATION: TOE (COMMENT WHICH ONE)

## 2024-09-18 ASSESSMENT — PAIN - FUNCTIONAL ASSESSMENT: PAIN_FUNCTIONAL_ASSESSMENT: NONE - DENIES PAIN

## 2024-10-07 ENCOUNTER — PROCEDURE VISIT (OUTPATIENT)
Dept: OBGYN CLINIC | Age: 54
End: 2024-10-07

## 2024-10-07 VITALS
DIASTOLIC BLOOD PRESSURE: 82 MMHG | BODY MASS INDEX: 33.31 KG/M2 | HEIGHT: 63 IN | WEIGHT: 188 LBS | SYSTOLIC BLOOD PRESSURE: 138 MMHG

## 2024-10-07 DIAGNOSIS — R87.610 ASCUS WITH POSITIVE HIGH RISK HPV CERVICAL: ICD-10-CM

## 2024-10-07 DIAGNOSIS — R87.810 ASCUS WITH POSITIVE HIGH RISK HPV CERVICAL: Primary | ICD-10-CM

## 2024-10-07 DIAGNOSIS — R87.810 ASCUS WITH POSITIVE HIGH RISK HPV CERVICAL: ICD-10-CM

## 2024-10-07 DIAGNOSIS — R87.610 ASCUS WITH POSITIVE HIGH RISK HPV CERVICAL: Primary | ICD-10-CM

## 2024-10-07 DIAGNOSIS — Z32.02 URINE PREGNANCY TEST NEGATIVE: ICD-10-CM

## 2024-10-07 ASSESSMENT — ENCOUNTER SYMPTOMS
ABDOMINAL DISTENTION: 0
EYES NEGATIVE: 1
ANAL BLEEDING: 0
ABDOMINAL PAIN: 0
BLOOD IN STOOL: 0
RESPIRATORY NEGATIVE: 1
NAUSEA: 0
CONSTIPATION: 0
DIARRHEA: 0
ALLERGIC/IMMUNOLOGIC NEGATIVE: 1
VOMITING: 0
RECTAL PAIN: 0

## 2024-10-07 NOTE — PROGRESS NOTES
90 tablet 3    furosemide (LASIX) 40 MG tablet Take 1 tablet by mouth daily 90 tablet 3    potassium chloride (KLOR-CON M) 20 MEQ extended release tablet Take 2 tablets by mouth daily 180 tablet 3    nitroGLYCERIN (NITROSTAT) 0.4 MG SL tablet Place 1 tablet under the tongue every 5 minutes as needed for Chest pain up to max of 3 total doses. If no relief after 1 dose, call 911. 25 tablet 3    magnesium oxide (MAGOX 400) 400 (240 Mg) MG tablet Take 1 tablet by mouth daily 30 tablet 0    atorvastatin (LIPITOR) 40 MG tablet Take 1 tablet by mouth daily      insulin lispro (HUMALOG KWIKPEN) 200 UNIT/ML SOPN pen Inject into the skin See Admin Instructions Up to 400 units daily per insulin pump      Insulin Pump - insulin lispro Inject into the skin continuous      clopidogrel (PLAVIX) 75 MG tablet Take 1 tablet by mouth daily 90 tablet 3    aspirin 81 MG EC tablet Take 1 tablet by mouth daily      escitalopram (LEXAPRO) 20 MG tablet Take 1 tablet by mouth daily  1    traZODone (DESYREL) 100 MG tablet Take 1 tablet by mouth nightly      VRAYLAR 3 MG CAPS Take 1 capsule by mouth daily  1     No current facility-administered medications for this visit.     Social History     Socioeconomic History    Marital status:      Spouse name: Not on file    Number of children: Not on file    Years of education: Not on file    Highest education level: Not on file   Occupational History    Not on file   Tobacco Use    Smoking status: Never    Smokeless tobacco: Never   Vaping Use    Vaping status: Never Used   Substance and Sexual Activity    Alcohol use: Yes     Alcohol/week: 0.0 standard drinks of alcohol     Comment: ocassionally    Drug use: No    Sexual activity: Not Currently     Partners: Male   Other Topics Concern    Not on file   Social History Narrative    Not on file     Social Determinants of Health     Financial Resource Strain: Low Risk  (8/27/2024)    Overall Financial Resource Strain (CARDIA)     Difficulty of

## 2024-10-11 DIAGNOSIS — F31.9 BIPOLAR DEPRESSION (MULTI): ICD-10-CM

## 2024-10-17 ENCOUNTER — OFFICE VISIT (OUTPATIENT)
Dept: OBGYN CLINIC | Age: 54
End: 2024-10-17
Payer: COMMERCIAL

## 2024-10-17 VITALS
WEIGHT: 191 LBS | SYSTOLIC BLOOD PRESSURE: 124 MMHG | DIASTOLIC BLOOD PRESSURE: 88 MMHG | BODY MASS INDEX: 33.84 KG/M2 | HEIGHT: 63 IN

## 2024-10-17 DIAGNOSIS — R87.610 ASCUS WITH POSITIVE HIGH RISK HPV CERVICAL: Primary | ICD-10-CM

## 2024-10-17 DIAGNOSIS — R87.810 ASCUS WITH POSITIVE HIGH RISK HPV CERVICAL: Primary | ICD-10-CM

## 2024-10-17 PROCEDURE — 1036F TOBACCO NON-USER: CPT | Performed by: OBSTETRICS & GYNECOLOGY

## 2024-10-17 PROCEDURE — 3074F SYST BP LT 130 MM HG: CPT | Performed by: OBSTETRICS & GYNECOLOGY

## 2024-10-17 PROCEDURE — G8417 CALC BMI ABV UP PARAM F/U: HCPCS | Performed by: OBSTETRICS & GYNECOLOGY

## 2024-10-17 PROCEDURE — 99213 OFFICE O/P EST LOW 20 MIN: CPT | Performed by: OBSTETRICS & GYNECOLOGY

## 2024-10-17 PROCEDURE — 3079F DIAST BP 80-89 MM HG: CPT | Performed by: OBSTETRICS & GYNECOLOGY

## 2024-10-17 PROCEDURE — 3017F COLORECTAL CA SCREEN DOC REV: CPT | Performed by: OBSTETRICS & GYNECOLOGY

## 2024-10-17 PROCEDURE — G8484 FLU IMMUNIZE NO ADMIN: HCPCS | Performed by: OBSTETRICS & GYNECOLOGY

## 2024-10-17 PROCEDURE — G8427 DOCREV CUR MEDS BY ELIG CLIN: HCPCS | Performed by: OBSTETRICS & GYNECOLOGY

## 2024-10-17 ASSESSMENT — ENCOUNTER SYMPTOMS
NAUSEA: 0
BLOOD IN STOOL: 0
CONSTIPATION: 0
RECTAL PAIN: 0
DIARRHEA: 0
EYES NEGATIVE: 1
ABDOMINAL PAIN: 0
ABDOMINAL DISTENTION: 0
ANAL BLEEDING: 0
RESPIRATORY NEGATIVE: 1
ALLERGIC/IMMUNOLOGIC NEGATIVE: 1
VOMITING: 0

## 2024-10-17 NOTE — PROGRESS NOTES
Patient here to discuss results of colposcopy.  Pap ASCUS/HPV and cervical bx negative for dysplasia.  Reviewed medical, surgical, social and family history.  Also reviewed current medications and allergies.  Discussed results and all questions answered.  Instructed to F/U with repeat pap in 1 year according to ASCCP/ACOG Guidelines.  All questions answered.       Vitals:  /88   Ht 1.6 m (5' 3\")   Wt 86.6 kg (191 lb)   LMP  (LMP Unknown)   BMI 33.83 kg/m²   Past Medical History:   Diagnosis Date    Acute kidney injury (HCC) 10/24/2023    CAD (coronary artery disease) 06/25/2022    CAD (coronary artery disease) 06/25/2022    CHF (congestive heart failure) ([facility])     Depression     Environmental allergies     Gastroparesis     GERD (gastroesophageal reflux disease)     Headache     Dr. Neri    Heart attack ([facility])     HPV in female     Hyperlipidemia     Hypertension     Leukocytosis     Migraines     Type II or unspecified type diabetes mellitus without mention of complication, not stated as uncontrolled      Past Surgical History:   Procedure Laterality Date    BONE MARROW BIOPSY  2012    (-)[facility] [place]    BREAST REDUCTION SURGERY Bilateral 1999    BREAST SURGERY  1999    CARDIAC CATHETERIZATION  2009    (-)SALKA    CARDIAC PROCEDURE N/A 10/30/2023    Left heart cath / coronary angiography performed by Salazar Brower DO at [facility] [place]ARDIAC C[place] LAB    CARDIAC PROCEDURE N/A 10/30/2023    Percutaneous coronary intervention performed by Salazar Brower DO at [facility] [place] CARDIAC CATH LAB    CHOLECYSTECTOMY      ENDOMETRIAL ABLATION  2012?    GALLBLADDER SURGERY  1999     Allergies:  Metformin and Nabumetone  Current Outpatient Medications   Medication Sig Dispense Refill    fluconazole (DIFLUCAN) 150 MG tablet Take 1 tablet by mouth every 3 days for 18 doses 3 tablet 5    nystatin-triamcinolone (MYCOLOG II) 323201-3.1 UNIT/GM-% cream Apply topically 2 times daily. 30 g 3    metoprolol succinate (TOPROL XL) 25 MG

## 2024-10-24 DIAGNOSIS — E78.2 MIXED HYPERLIPIDEMIA: ICD-10-CM

## 2024-10-24 RX ORDER — FUROSEMIDE 40 MG/1
40 TABLET ORAL DAILY
Qty: 90 TABLET | Refills: 1 | Status: SHIPPED | OUTPATIENT
Start: 2024-10-24

## 2024-10-24 RX ORDER — CLOPIDOGREL BISULFATE 75 MG/1
75 TABLET ORAL DAILY
Qty: 90 TABLET | Refills: 1 | Status: SHIPPED | OUTPATIENT
Start: 2024-10-24

## 2024-10-24 NOTE — TELEPHONE ENCOUNTER
Requesting medication refill. Please approve or deny this request.    Rx requested:  Requested Prescriptions     Pending Prescriptions Disp Refills    furosemide (LASIX) 40 MG tablet [Pharmacy Med Name: FUROSEMIDE TABS 40MG]  0    clopidogrel (PLAVIX) 75 MG tablet [Pharmacy Med Name: CLOPIDOGREL BISULFATE TABS 75MG]  0         Last Office Visit:   6/11/2024      Next Visit Date:  Future Appointments   Date Time Provider Department Center   12/16/2024  3:20 PM [facility] ROOM 1 [facility]   9/2/2025  3:15 PM Jay, Shasta A, MD [facility]

## 2024-12-11 ENCOUNTER — DOCUMENTATION (OUTPATIENT)
Dept: PHARMACY | Facility: HOSPITAL | Age: 54
End: 2024-12-11
Payer: COMMERCIAL

## 2024-12-11 NOTE — PROGRESS NOTES
Call from [facility] regarding chart notes for Omnipod. Chart notes/labs faxed to [phone].    Thank you,  Lisa Charles, PharmD

## 2024-12-17 ENCOUNTER — APPOINTMENT (OUTPATIENT)
Dept: PRIMARY CARE | Facility: CLINIC | Age: 54
End: 2024-12-17
Payer: COMMERCIAL

## 2024-12-27 ENCOUNTER — APPOINTMENT (OUTPATIENT)
Dept: GENERAL RADIOLOGY | Age: 54
DRG: 303 | End: 2024-12-27
Payer: COMMERCIAL

## 2024-12-27 ENCOUNTER — APPOINTMENT (OUTPATIENT)
Dept: CT IMAGING | Age: 54
DRG: 303 | End: 2024-12-27
Payer: COMMERCIAL

## 2024-12-27 ENCOUNTER — HOSPITAL ENCOUNTER (INPATIENT)
Age: 54
LOS: 1 days | Discharge: HOME | DRG: 303 | End: 2024-12-28
Attending: INTERNAL MEDICINE
Payer: COMMERCIAL

## 2024-12-27 DIAGNOSIS — R07.9 CHEST PAIN: ICD-10-CM

## 2024-12-27 DIAGNOSIS — R73.9 HYPERGLYCEMIA: ICD-10-CM

## 2024-12-27 DIAGNOSIS — E87.6 HYPOKALEMIA: ICD-10-CM

## 2024-12-27 DIAGNOSIS — R07.9 CHEST PAIN, UNSPECIFIED TYPE: Primary | ICD-10-CM

## 2024-12-27 DIAGNOSIS — K59.00 CONSTIPATION, UNSPECIFIED CONSTIPATION TYPE: ICD-10-CM

## 2024-12-27 LAB
ALBUMIN SERPL-MCNC: 3.6 G/DL (ref 3.5–4.6)
ALBUMIN SERPL-MCNC: 3.9 G/DL (ref 3.5–4.6)
ALP SERPL-CCNC: 146 U/L (ref 40–130)
ALP SERPL-CCNC: 193 U/L (ref 40–130)
ALT SERPL-CCNC: 10 U/L (ref 0–33)
ALT SERPL-CCNC: 11 U/L (ref 0–33)
ANION GAP SERPL CALCULATED.3IONS-SCNC: 12 MEQ/L (ref 9–15)
ANION GAP SERPL CALCULATED.3IONS-SCNC: 13 MEQ/L (ref 9–15)
AST SERPL-CCNC: 10 U/L (ref 0–35)
AST SERPL-CCNC: 14 U/L (ref 0–35)
BACTERIA URNS QL MICRO: ABNORMAL /HPF
BASOPHILS # BLD: 0 K/UL (ref 0–0.2)
BASOPHILS # BLD: 0.1 K/UL (ref 0–0.2)
BASOPHILS NFR BLD: 0.4 %
BASOPHILS NFR BLD: 0.5 %
BILIRUB SERPL-MCNC: 0.3 MG/DL (ref 0.2–0.7)
BILIRUB SERPL-MCNC: 0.3 MG/DL (ref 0.2–0.7)
BILIRUB UR QL STRIP: NEGATIVE
BNP BLD-MCNC: <36 PG/ML
BUN SERPL-MCNC: 7 MG/DL (ref 6–20)
BUN SERPL-MCNC: 9 MG/DL (ref 6–20)
CALCIUM SERPL-MCNC: 8.4 MG/DL (ref 8.5–9.9)
CALCIUM SERPL-MCNC: 9 MG/DL (ref 8.5–9.9)
CHLORIDE SERPL-SCNC: 89 MEQ/L (ref 95–107)
CHLORIDE SERPL-SCNC: 92 MEQ/L (ref 95–107)
CLARITY UR: CLEAR
CO2 SERPL-SCNC: 29 MEQ/L (ref 20–31)
CO2 SERPL-SCNC: 31 MEQ/L (ref 20–31)
COLOR UR: YELLOW
CREAT SERPL-MCNC: 0.41 MG/DL (ref 0.5–0.9)
CREAT SERPL-MCNC: 0.44 MG/DL (ref 0.5–0.9)
ECHO BSA: 1.98 M2
EKG ATRIAL RATE: 77 BPM
EKG P AXIS: 8 DEGREES
EKG P-R INTERVAL: 156 MS
EKG Q-T INTERVAL: 400 MS
EKG QRS DURATION: 88 MS
EKG QTC CALCULATION (BAZETT): 452 MS
EKG R AXIS: -1 DEGREES
EKG T AXIS: -9 DEGREES
EKG VENTRICULAR RATE: 77 BPM
EOSINOPHIL # BLD: 0.3 K/UL (ref 0–0.7)
EOSINOPHIL # BLD: 0.3 K/UL (ref 0–0.7)
EOSINOPHIL NFR BLD: 2.3 %
EOSINOPHIL NFR BLD: 2.5 %
EPI CELLS #/AREA URNS AUTO: ABNORMAL /HPF (ref 0–5)
ERYTHROCYTE [DISTWIDTH] IN BLOOD BY AUTOMATED COUNT: 12.8 % (ref 11.5–14.5)
ERYTHROCYTE [DISTWIDTH] IN BLOOD BY AUTOMATED COUNT: 13.1 % (ref 11.5–14.5)
GLOBULIN SER CALC-MCNC: 3.2 G/DL (ref 2.3–3.5)
GLOBULIN SER CALC-MCNC: 3.6 G/DL (ref 2.3–3.5)
GLUCOSE BLD-MCNC: 285 MG/DL (ref 70–99)
GLUCOSE BLD-MCNC: 334 MG/DL (ref 70–99)
GLUCOSE BLD-MCNC: 413 MG/DL (ref 70–99)
GLUCOSE SERPL-MCNC: 371 MG/DL (ref 70–99)
GLUCOSE SERPL-MCNC: 438 MG/DL (ref 70–99)
GLUCOSE UR STRIP-MCNC: >=1000 MG/DL
HCT VFR BLD AUTO: 33.8 % (ref 37–47)
HCT VFR BLD AUTO: 34 % (ref 37–47)
HGB BLD-MCNC: 11.9 G/DL (ref 12–16)
HGB BLD-MCNC: 12.2 G/DL (ref 12–16)
HGB UR QL STRIP: ABNORMAL
HYALINE CASTS #/AREA URNS AUTO: ABNORMAL /HPF (ref 0–5)
KETONES UR STRIP-MCNC: NEGATIVE MG/DL
LEUKOCYTE ESTERASE UR QL STRIP: ABNORMAL
LIPASE SERPL-CCNC: 11 U/L (ref 12–95)
LYMPHOCYTES # BLD: 2.9 K/UL (ref 1–4.8)
LYMPHOCYTES # BLD: 3.2 K/UL (ref 1–4.8)
LYMPHOCYTES NFR BLD: 25.7 %
LYMPHOCYTES NFR BLD: 29.3 %
MAGNESIUM SERPL-MCNC: 1.6 MG/DL (ref 1.7–2.4)
MAGNESIUM SERPL-MCNC: 1.6 MG/DL (ref 1.7–2.4)
MCH RBC QN AUTO: 30.3 PG (ref 27–31.3)
MCH RBC QN AUTO: 31 PG (ref 27–31.3)
MCHC RBC AUTO-ENTMCNC: 35 % (ref 33–37)
MCHC RBC AUTO-ENTMCNC: 36.1 % (ref 33–37)
MCV RBC AUTO: 85.8 FL (ref 79.4–94.8)
MCV RBC AUTO: 86.5 FL (ref 79.4–94.8)
MONOCYTES # BLD: 0.8 K/UL (ref 0.2–0.8)
MONOCYTES # BLD: 0.9 K/UL (ref 0.2–0.8)
MONOCYTES NFR BLD: 6.9 %
MONOCYTES NFR BLD: 8.2 %
NEUTROPHILS # BLD: 6.5 K/UL (ref 1.4–6.5)
NEUTROPHILS # BLD: 7.1 K/UL (ref 1.4–6.5)
NEUTS SEG NFR BLD: 58.6 %
NEUTS SEG NFR BLD: 64.2 %
NITRITE UR QL STRIP: NEGATIVE
PERFORMED ON: ABNORMAL
PH UR STRIP: 6.5 [PH] (ref 5–9)
PLATELET # BLD AUTO: 269 K/UL (ref 130–400)
PLATELET # BLD AUTO: 290 K/UL (ref 130–400)
POC CREATININE WHOLE BLOOD: 0.5
POTASSIUM SERPL-SCNC: 2.5 MEQ/L (ref 3.4–4.9)
POTASSIUM SERPL-SCNC: 2.9 MEQ/L (ref 3.4–4.9)
PROT SERPL-MCNC: 6.8 G/DL (ref 6.3–8)
PROT SERPL-MCNC: 7.5 G/DL (ref 6.3–8)
PROT UR STRIP-MCNC: NEGATIVE MG/DL
RBC # BLD AUTO: 3.93 M/UL (ref 4.2–5.4)
RBC # BLD AUTO: 3.94 M/UL (ref 4.2–5.4)
RBC #/AREA URNS AUTO: ABNORMAL /HPF (ref 0–5)
REASON FOR REJECTION: NORMAL
REJECTED TEST: NORMAL
SODIUM SERPL-SCNC: 133 MEQ/L (ref 135–144)
SODIUM SERPL-SCNC: 133 MEQ/L (ref 135–144)
SP GR UR STRIP: 1.03 (ref 1–1.03)
TROPONIN, HIGH SENSITIVITY: 7 NG/L (ref 0–19)
TROPONIN, HIGH SENSITIVITY: 9 NG/L (ref 0–19)
URINE REFLEX TO CULTURE: YES
UROBILINOGEN UR STRIP-ACNC: 0.2 E.U./DL
WBC # BLD AUTO: 11 K/UL (ref 4.8–10.8)
WBC # BLD AUTO: 11.1 K/UL (ref 4.8–10.8)
WBC #/AREA URNS AUTO: ABNORMAL /HPF (ref 0–5)

## 2024-12-27 PROCEDURE — 83880 ASSAY OF NATRIURETIC PEPTIDE: CPT

## 2024-12-27 PROCEDURE — 85025 COMPLETE CBC W/AUTO DIFF WBC: CPT

## 2024-12-27 PROCEDURE — C1887 CATHETER, GUIDING: HCPCS | Performed by: INTERNAL MEDICINE

## 2024-12-27 PROCEDURE — 2060000000 HC ICU INTERMEDIATE R&B

## 2024-12-27 PROCEDURE — 6370000000 HC RX 637 (ALT 250 FOR IP): Performed by: INTERNAL MEDICINE

## 2024-12-27 PROCEDURE — 87086 URINE CULTURE/COLONY COUNT: CPT

## 2024-12-27 PROCEDURE — 6370000000 HC RX 637 (ALT 250 FOR IP): Performed by: STUDENT IN AN ORGANIZED HEALTH CARE EDUCATION/TRAINING PROGRAM

## 2024-12-27 PROCEDURE — 85347 COAGULATION TIME ACTIVATED: CPT

## 2024-12-27 PROCEDURE — 84484 ASSAY OF TROPONIN QUANT: CPT

## 2024-12-27 PROCEDURE — 7100000010 HC PHASE II RECOVERY - FIRST 15 MIN: Performed by: INTERNAL MEDICINE

## 2024-12-27 PROCEDURE — 99285 EMERGENCY DEPT VISIT HI MDM: CPT

## 2024-12-27 PROCEDURE — C1769 GUIDE WIRE: HCPCS | Performed by: INTERNAL MEDICINE

## 2024-12-27 PROCEDURE — C1725 CATH, TRANSLUMIN NON-LASER: HCPCS | Performed by: INTERNAL MEDICINE

## 2024-12-27 PROCEDURE — 2500000003 HC RX 250 WO HCPCS: Performed by: INTERNAL MEDICINE

## 2024-12-27 PROCEDURE — 87077 CULTURE AEROBIC IDENTIFY: CPT

## 2024-12-27 PROCEDURE — APPSS60 APP SPLIT SHARED TIME 46-60 MINUTES

## 2024-12-27 PROCEDURE — 92928 PRQ TCAT PLMT NTRAC ST 1 LES: CPT | Performed by: INTERNAL MEDICINE

## 2024-12-27 PROCEDURE — 99152 MOD SED SAME PHYS/QHP 5/>YRS: CPT | Performed by: INTERNAL MEDICINE

## 2024-12-27 PROCEDURE — 74174 CTA ABD&PLVS W/CONTRAST: CPT

## 2024-12-27 PROCEDURE — 6360000002 HC RX W HCPCS: Performed by: INTERNAL MEDICINE

## 2024-12-27 PROCEDURE — 83690 ASSAY OF LIPASE: CPT

## 2024-12-27 PROCEDURE — B2151ZZ FLUOROSCOPY OF LEFT HEART USING LOW OSMOLAR CONTRAST: ICD-10-PCS | Performed by: INTERNAL MEDICINE

## 2024-12-27 PROCEDURE — 7100000011 HC PHASE II RECOVERY - ADDTL 15 MIN: Performed by: INTERNAL MEDICINE

## 2024-12-27 PROCEDURE — 99153 MOD SED SAME PHYS/QHP EA: CPT | Performed by: INTERNAL MEDICINE

## 2024-12-27 PROCEDURE — 6360000002 HC RX W HCPCS: Performed by: PERSONAL EMERGENCY RESPONSE ATTENDANT

## 2024-12-27 PROCEDURE — 71275 CT ANGIOGRAPHY CHEST: CPT

## 2024-12-27 PROCEDURE — 2580000003 HC RX 258: Performed by: PERSONAL EMERGENCY RESPONSE ATTENDANT

## 2024-12-27 PROCEDURE — 81001 URINALYSIS AUTO W/SCOPE: CPT

## 2024-12-27 PROCEDURE — B2111ZZ FLUOROSCOPY OF MULTIPLE CORONARY ARTERIES USING LOW OSMOLAR CONTRAST: ICD-10-PCS | Performed by: INTERNAL MEDICINE

## 2024-12-27 PROCEDURE — 6370000000 HC RX 637 (ALT 250 FOR IP): Performed by: PERSONAL EMERGENCY RESPONSE ATTENDANT

## 2024-12-27 PROCEDURE — 027034Z DILATION OF CORONARY ARTERY, ONE ARTERY WITH DRUG-ELUTING INTRALUMINAL DEVICE, PERCUTANEOUS APPROACH: ICD-10-PCS | Performed by: INTERNAL MEDICINE

## 2024-12-27 PROCEDURE — 83735 ASSAY OF MAGNESIUM: CPT

## 2024-12-27 PROCEDURE — 93005 ELECTROCARDIOGRAM TRACING: CPT | Performed by: INTERNAL MEDICINE

## 2024-12-27 PROCEDURE — 93010 ELECTROCARDIOGRAM REPORT: CPT | Performed by: INTERNAL MEDICINE

## 2024-12-27 PROCEDURE — 71045 X-RAY EXAM CHEST 1 VIEW: CPT

## 2024-12-27 PROCEDURE — 36415 COLL VENOUS BLD VENIPUNCTURE: CPT

## 2024-12-27 PROCEDURE — C1894 INTRO/SHEATH, NON-LASER: HCPCS | Performed by: INTERNAL MEDICINE

## 2024-12-27 PROCEDURE — 6360000004 HC RX CONTRAST MEDICATION: Performed by: INTERNAL MEDICINE

## 2024-12-27 PROCEDURE — 2580000003 HC RX 258: Performed by: INTERNAL MEDICINE

## 2024-12-27 PROCEDURE — 2709999900 HC NON-CHARGEABLE SUPPLY: Performed by: INTERNAL MEDICINE

## 2024-12-27 PROCEDURE — 4A023N7 MEASUREMENT OF CARDIAC SAMPLING AND PRESSURE, LEFT HEART, PERCUTANEOUS APPROACH: ICD-10-PCS | Performed by: INTERNAL MEDICINE

## 2024-12-27 PROCEDURE — 6360000004 HC RX CONTRAST MEDICATION: Performed by: PERSONAL EMERGENCY RESPONSE ATTENDANT

## 2024-12-27 PROCEDURE — 80053 COMPREHEN METABOLIC PANEL: CPT

## 2024-12-27 PROCEDURE — 93458 L HRT ARTERY/VENTRICLE ANGIO: CPT | Performed by: INTERNAL MEDICINE

## 2024-12-27 PROCEDURE — C1874 STENT, COATED/COV W/DEL SYS: HCPCS | Performed by: INTERNAL MEDICINE

## 2024-12-27 DEVICE — STENT ONYXNG25030UX ONYX 2.50X30RX
Type: IMPLANTABLE DEVICE | Status: FUNCTIONAL
Brand: ONYX FRONTIER™

## 2024-12-27 RX ORDER — ENOXAPARIN SODIUM 100 MG/ML
40 INJECTION SUBCUTANEOUS DAILY
Status: DISCONTINUED | OUTPATIENT
Start: 2024-12-27 | End: 2024-12-28 | Stop reason: HOSPADM

## 2024-12-27 RX ORDER — SODIUM CHLORIDE 0.9 % (FLUSH) 0.9 %
5-40 SYRINGE (ML) INJECTION EVERY 12 HOURS SCHEDULED
Status: DISCONTINUED | OUTPATIENT
Start: 2024-12-27 | End: 2024-12-27 | Stop reason: HOSPADM

## 2024-12-27 RX ORDER — MIDAZOLAM HYDROCHLORIDE 1 MG/ML
INJECTION, SOLUTION INTRAMUSCULAR; INTRAVENOUS PRN
Status: DISCONTINUED | OUTPATIENT
Start: 2024-12-27 | End: 2024-12-27 | Stop reason: HOSPADM

## 2024-12-27 RX ORDER — ONDANSETRON 4 MG/1
4 TABLET, ORALLY DISINTEGRATING ORAL EVERY 8 HOURS PRN
Status: DISCONTINUED | OUTPATIENT
Start: 2024-12-27 | End: 2024-12-28 | Stop reason: HOSPADM

## 2024-12-27 RX ORDER — SODIUM CHLORIDE 0.9 % (FLUSH) 0.9 %
5-40 SYRINGE (ML) INJECTION PRN
Status: DISCONTINUED | OUTPATIENT
Start: 2024-12-27 | End: 2024-12-27 | Stop reason: HOSPADM

## 2024-12-27 RX ORDER — POTASSIUM CHLORIDE 1500 MG/1
40 TABLET, EXTENDED RELEASE ORAL ONCE
Status: COMPLETED | OUTPATIENT
Start: 2024-12-27 | End: 2024-12-27

## 2024-12-27 RX ORDER — HEPARIN SODIUM 1000 [USP'U]/ML
INJECTION, SOLUTION INTRAVENOUS; SUBCUTANEOUS PRN
Status: DISCONTINUED | OUTPATIENT
Start: 2024-12-27 | End: 2024-12-27 | Stop reason: HOSPADM

## 2024-12-27 RX ORDER — POTASSIUM CHLORIDE 1500 MG/1
40 TABLET, EXTENDED RELEASE ORAL PRN
Status: DISCONTINUED | OUTPATIENT
Start: 2024-12-27 | End: 2024-12-28

## 2024-12-27 RX ORDER — ACETAMINOPHEN 325 MG/1
650 TABLET ORAL EVERY 6 HOURS PRN
Status: DISCONTINUED | OUTPATIENT
Start: 2024-12-27 | End: 2024-12-28 | Stop reason: HOSPADM

## 2024-12-27 RX ORDER — 0.9 % SODIUM CHLORIDE 0.9 %
1000 INTRAVENOUS SOLUTION INTRAVENOUS ONCE
Status: COMPLETED | OUTPATIENT
Start: 2024-12-27 | End: 2024-12-27

## 2024-12-27 RX ORDER — NITROGLYCERIN 0.4 MG/1
0.4 TABLET SUBLINGUAL EVERY 5 MIN PRN
Status: DISCONTINUED | OUTPATIENT
Start: 2024-12-27 | End: 2024-12-28 | Stop reason: HOSPADM

## 2024-12-27 RX ORDER — ESCITALOPRAM OXALATE 20 MG/1
20 TABLET ORAL DAILY
Status: DISCONTINUED | OUTPATIENT
Start: 2024-12-27 | End: 2024-12-28 | Stop reason: HOSPADM

## 2024-12-27 RX ORDER — SODIUM CHLORIDE 0.9 % (FLUSH) 0.9 %
5-40 SYRINGE (ML) INJECTION PRN
Status: DISCONTINUED | OUTPATIENT
Start: 2024-12-27 | End: 2024-12-28 | Stop reason: HOSPADM

## 2024-12-27 RX ORDER — ATORVASTATIN CALCIUM 40 MG/1
40 TABLET, FILM COATED ORAL DAILY
Status: DISCONTINUED | OUTPATIENT
Start: 2024-12-27 | End: 2024-12-28 | Stop reason: HOSPADM

## 2024-12-27 RX ORDER — POTASSIUM CHLORIDE 7.45 MG/ML
10 INJECTION INTRAVENOUS ONCE
Status: COMPLETED | OUTPATIENT
Start: 2024-12-27 | End: 2024-12-27

## 2024-12-27 RX ORDER — SODIUM CHLORIDE 9 MG/ML
INJECTION, SOLUTION INTRAVENOUS CONTINUOUS
Status: DISPENSED | OUTPATIENT
Start: 2024-12-27 | End: 2024-12-28

## 2024-12-27 RX ORDER — HEPARIN SODIUM 200 [USP'U]/100ML
INJECTION, SOLUTION INTRAVENOUS CONTINUOUS PRN
Status: COMPLETED | OUTPATIENT
Start: 2024-12-27 | End: 2024-12-27

## 2024-12-27 RX ORDER — SODIUM CHLORIDE 9 MG/ML
INJECTION, SOLUTION INTRAVENOUS PRN
Status: DISCONTINUED | OUTPATIENT
Start: 2024-12-27 | End: 2024-12-27 | Stop reason: HOSPADM

## 2024-12-27 RX ORDER — ACETAMINOPHEN 650 MG/1
650 SUPPOSITORY RECTAL EVERY 6 HOURS PRN
Status: DISCONTINUED | OUTPATIENT
Start: 2024-12-27 | End: 2024-12-28 | Stop reason: HOSPADM

## 2024-12-27 RX ORDER — IOPAMIDOL 612 MG/ML
INJECTION, SOLUTION INTRAVASCULAR PRN
Status: DISCONTINUED | OUTPATIENT
Start: 2024-12-27 | End: 2024-12-27 | Stop reason: HOSPADM

## 2024-12-27 RX ORDER — DOCUSATE SODIUM 100 MG/1
100 CAPSULE, LIQUID FILLED ORAL 2 TIMES DAILY
Status: DISCONTINUED | OUTPATIENT
Start: 2024-12-27 | End: 2024-12-28 | Stop reason: HOSPADM

## 2024-12-27 RX ORDER — LANOLIN ALCOHOL/MO/W.PET/CERES
400 CREAM (GRAM) TOPICAL DAILY
Status: DISCONTINUED | OUTPATIENT
Start: 2024-12-27 | End: 2024-12-28 | Stop reason: HOSPADM

## 2024-12-27 RX ORDER — ASPIRIN 81 MG/1
81 TABLET ORAL DAILY
Status: DISCONTINUED | OUTPATIENT
Start: 2024-12-27 | End: 2024-12-28 | Stop reason: HOSPADM

## 2024-12-27 RX ORDER — POTASSIUM CHLORIDE 7.45 MG/ML
10 INJECTION INTRAVENOUS PRN
Status: DISCONTINUED | OUTPATIENT
Start: 2024-12-27 | End: 2024-12-27

## 2024-12-27 RX ORDER — SODIUM CHLORIDE 0.9 % (FLUSH) 0.9 %
5-40 SYRINGE (ML) INJECTION EVERY 12 HOURS SCHEDULED
Status: DISCONTINUED | OUTPATIENT
Start: 2024-12-27 | End: 2024-12-28 | Stop reason: HOSPADM

## 2024-12-27 RX ORDER — METOPROLOL SUCCINATE 25 MG/1
25 TABLET, EXTENDED RELEASE ORAL DAILY
Status: DISCONTINUED | OUTPATIENT
Start: 2024-12-27 | End: 2024-12-28 | Stop reason: HOSPADM

## 2024-12-27 RX ORDER — MAGNESIUM SULFATE IN WATER 40 MG/ML
2000 INJECTION, SOLUTION INTRAVENOUS PRN
Status: DISCONTINUED | OUTPATIENT
Start: 2024-12-27 | End: 2024-12-28 | Stop reason: HOSPADM

## 2024-12-27 RX ORDER — CLOPIDOGREL BISULFATE 75 MG/1
75 TABLET ORAL DAILY
Status: DISCONTINUED | OUTPATIENT
Start: 2024-12-27 | End: 2024-12-28 | Stop reason: HOSPADM

## 2024-12-27 RX ORDER — IOPAMIDOL 755 MG/ML
100 INJECTION, SOLUTION INTRAVASCULAR
Status: COMPLETED | OUTPATIENT
Start: 2024-12-27 | End: 2024-12-27

## 2024-12-27 RX ORDER — SODIUM CHLORIDE 9 MG/ML
INJECTION, SOLUTION INTRAVENOUS PRN
Status: DISCONTINUED | OUTPATIENT
Start: 2024-12-27 | End: 2024-12-28 | Stop reason: HOSPADM

## 2024-12-27 RX ORDER — INSULIN LISPRO 100 [IU]/ML
0-8 INJECTION, SOLUTION INTRAVENOUS; SUBCUTANEOUS
Status: DISCONTINUED | OUTPATIENT
Start: 2024-12-27 | End: 2024-12-28 | Stop reason: HOSPADM

## 2024-12-27 RX ORDER — FENTANYL CITRATE 50 UG/ML
INJECTION, SOLUTION INTRAMUSCULAR; INTRAVENOUS PRN
Status: DISCONTINUED | OUTPATIENT
Start: 2024-12-27 | End: 2024-12-27 | Stop reason: HOSPADM

## 2024-12-27 RX ORDER — POLYETHYLENE GLYCOL 3350 17 G/17G
17 POWDER, FOR SOLUTION ORAL DAILY PRN
Status: DISCONTINUED | OUTPATIENT
Start: 2024-12-27 | End: 2024-12-28 | Stop reason: HOSPADM

## 2024-12-27 RX ORDER — ONDANSETRON 2 MG/ML
4 INJECTION INTRAMUSCULAR; INTRAVENOUS EVERY 6 HOURS PRN
Status: DISCONTINUED | OUTPATIENT
Start: 2024-12-27 | End: 2024-12-28 | Stop reason: HOSPADM

## 2024-12-27 RX ORDER — INSULIN GLARGINE 100 [IU]/ML
10 INJECTION, SOLUTION SUBCUTANEOUS 2 TIMES DAILY
Status: DISCONTINUED | OUTPATIENT
Start: 2024-12-27 | End: 2024-12-28 | Stop reason: HOSPADM

## 2024-12-27 RX ADMIN — POTASSIUM BICARBONATE 50 MEQ: 978 TABLET, EFFERVESCENT ORAL at 09:44

## 2024-12-27 RX ADMIN — SODIUM CHLORIDE, PRESERVATIVE FREE 10 ML: 5 INJECTION INTRAVENOUS at 20:41

## 2024-12-27 RX ADMIN — POTASSIUM CHLORIDE 10 MEQ: 7.46 INJECTION, SOLUTION INTRAVENOUS at 05:27

## 2024-12-27 RX ADMIN — IOPAMIDOL 100 ML: 755 INJECTION, SOLUTION INTRAVENOUS at 05:06

## 2024-12-27 RX ADMIN — SODIUM CHLORIDE 1000 ML: 9 INJECTION, SOLUTION INTRAVENOUS at 05:28

## 2024-12-27 RX ADMIN — ATORVASTATIN CALCIUM 40 MG: 40 TABLET, FILM COATED ORAL at 08:23

## 2024-12-27 RX ADMIN — ACETAMINOPHEN 650 MG: 325 TABLET ORAL at 20:40

## 2024-12-27 RX ADMIN — Medication 400 MG: at 08:24

## 2024-12-27 RX ADMIN — INSULIN GLARGINE 10 UNITS: 100 INJECTION, SOLUTION SUBCUTANEOUS at 20:40

## 2024-12-27 RX ADMIN — INSULIN GLARGINE 10 UNITS: 100 INJECTION, SOLUTION SUBCUTANEOUS at 08:24

## 2024-12-27 RX ADMIN — NITROGLYCERIN 0.4 MG: 0.4 TABLET SUBLINGUAL at 05:55

## 2024-12-27 RX ADMIN — SODIUM CHLORIDE, PRESERVATIVE FREE 10 ML: 5 INJECTION INTRAVENOUS at 08:25

## 2024-12-27 RX ADMIN — DOCUSATE SODIUM 100 MG: 100 CAPSULE, LIQUID FILLED ORAL at 20:40

## 2024-12-27 RX ADMIN — METOPROLOL SUCCINATE 25 MG: 25 TABLET, FILM COATED, EXTENDED RELEASE ORAL at 08:24

## 2024-12-27 RX ADMIN — ENOXAPARIN SODIUM 40 MG: 100 INJECTION SUBCUTANEOUS at 08:24

## 2024-12-27 RX ADMIN — ESCITALOPRAM OXALATE 20 MG: 20 TABLET, FILM COATED ORAL at 08:24

## 2024-12-27 RX ADMIN — MAGNESIUM SULFATE HEPTAHYDRATE 2000 MG: 40 INJECTION, SOLUTION INTRAVENOUS at 10:38

## 2024-12-27 RX ADMIN — NITROGLYCERIN 0.4 MG: 0.4 TABLET SUBLINGUAL at 06:16

## 2024-12-27 RX ADMIN — CLOPIDOGREL BISULFATE 75 MG: 75 TABLET ORAL at 08:23

## 2024-12-27 RX ADMIN — POTASSIUM CHLORIDE 40 MEQ: 1500 TABLET, EXTENDED RELEASE ORAL at 06:16

## 2024-12-27 RX ADMIN — POTASSIUM BICARBONATE 50 MEQ: 978 TABLET, EFFERVESCENT ORAL at 05:21

## 2024-12-27 RX ADMIN — DOCUSATE SODIUM 100 MG: 100 CAPSULE, LIQUID FILLED ORAL at 08:24

## 2024-12-27 RX ADMIN — INSULIN LISPRO 8 UNITS: 100 INJECTION, SOLUTION INTRAVENOUS; SUBCUTANEOUS at 08:35

## 2024-12-27 RX ADMIN — SODIUM CHLORIDE: 9 INJECTION, SOLUTION INTRAVENOUS at 20:47

## 2024-12-27 RX ADMIN — ASPIRIN 81 MG: 81 TABLET, COATED ORAL at 08:23

## 2024-12-27 RX ADMIN — INSULIN LISPRO 4 UNITS: 100 INJECTION, SOLUTION INTRAVENOUS; SUBCUTANEOUS at 12:05

## 2024-12-27 RX ADMIN — INSULIN LISPRO 6 UNITS: 100 INJECTION, SOLUTION INTRAVENOUS; SUBCUTANEOUS at 20:52

## 2024-12-27 ASSESSMENT — PAIN - FUNCTIONAL ASSESSMENT
PAIN_FUNCTIONAL_ASSESSMENT: NONE - DENIES PAIN
PAIN_FUNCTIONAL_ASSESSMENT: 0-10

## 2024-12-27 ASSESSMENT — ENCOUNTER SYMPTOMS
CHEST TIGHTNESS: 0
SHORTNESS OF BREATH: 0
VOMITING: 0
RHINORRHEA: 0
COLOR CHANGE: 0
DIARRHEA: 0
SORE THROAT: 0
ABDOMINAL PAIN: 1
COUGH: 0
BACK PAIN: 1
NAUSEA: 0
BLOOD IN STOOL: 0

## 2024-12-27 ASSESSMENT — PAIN DESCRIPTION - ORIENTATION: ORIENTATION: RIGHT

## 2024-12-27 ASSESSMENT — PAIN SCALES - GENERAL
PAINLEVEL_OUTOF10: 4
PAINLEVEL_OUTOF10: 3
PAINLEVEL_OUTOF10: 0
PAINLEVEL_OUTOF10: 3
PAINLEVEL_OUTOF10: 3

## 2024-12-27 ASSESSMENT — PAIN DESCRIPTION - LOCATION
LOCATION: CHEST
LOCATION: ARM
LOCATION: CHEST

## 2024-12-27 NOTE — PRE SEDATION
Sedation Plan  ASA: class 2 - patient with mild systemic disease     Mallampati class: I - soft palate, uvula, fauces, pillars visible.    Sedation plan: local anesthesia and moderate (conscious sedation)    Risks, benefits, and alternatives discussed with patient.  Use of blood products discussed with patient who.       Immediate reassessment prior to sedation:  Patient's status reviewed and vital signs assessed; acceptable to perform procedure and proceed to administer sedation as planned.

## 2024-12-27 NOTE — H&P
Patient 54-year-old woman with past medical history of coronary disease status post stents placement, CHF on p.o. Lasix, depression, GERD, hyperlipidemia, hypertension, type 2 diabetes on insulin pump, chronic hypokalemia comes in here for chest pain for the past few days.  Intermittent in of the chest radiating to the right and left chest.  Also the pain radiate to shoulder blades.  Lasting 5 minutes worse with leaning over the today.  Pressure-like symptoms.  Feels similar likely as she when she had MI .she sees Dr. Rivera as  outpatient patient also had abdominal pain CT abdomen shows constipation pending report.  October 2023 had cardiac cath with LAD BENJAMIN x1 RCA 60-70. CX 50%.      Past Medical History:   Diagnosis Date    Acute kidney injury (HCC) 10/24/2023    CAD (coronary artery disease) 06/25/2022    CAD (coronary artery disease) 06/25/2022    CHF (congestive heart failure) (HCC)     Depression     Environmental allergies     Gastroparesis     GERD (gastroesophageal reflux disease)     Headache     Dr. Neri    Heart attack ([facility])     HPV in female     Hyperlipidemia     Hypertension     Leukocytosis     Migraines     Type II or unspecified type diabetes mellitus without mention of complication, not stated as uncontrolled      Past Surgical History:   Procedure Laterality Date    BONE MARROW BIOPSY  2012    (-)[facility]    BREAST REDUCTION SURGERY Bilateral 1999    BREAST SURGERY  1999    CARDIAC CATHETERIZATION  2009    (-)SALKA    CARDIAC PROCEDURE N/A 10/30/2023    Left heart cath / coronary angiography performed by Salazar Brower DO at [facility] [place] [facility]    CARDIAC PROCEDURE N/A 10/30/2023    Percutaneous coronary intervention performed by Salazar Brower DO at [facility] [place] [facility]ARDIAC CATH LAB    CHOLECYSTECTOMY      ENDOMETRIAL ABLATION  2012?    GALLBLADDER SURGERY  1999     Social History       Tobacco History       Smoking Status  Never      Smokeless Tobacco Use  Never              Alcohol History

## 2024-12-27 NOTE — CARE COORDINATION
Case Management Assessment  Initial Evaluation    Date/Time of Evaluation: 12/27/2024 11:42 AM  Assessment Completed by: Za Lisa RN    If patient is discharged prior to next notation, then this note serves as note for discharge by case management.    Patient Name: Paty Sheridan                   YOB: 1970  Diagnosis: Hypokalemia [E87.6]  Chest pain [R07.9]  Hyperglycemia [R73.9]  Constipation, unspecified constipation type [K59.00]  Chest pain, unspecified type [R07.9]                   Date / Time: 12/27/2024  3:51 AM    Patient Admission Status: Inpatient   Readmission Risk (Low < 19, Mod (19-27), High > 27): Readmission Risk Score: 9.5    Current PCP: Paty Jennings MD  PCP verified by CM? Yes    Chart Reviewed: Yes      History Provided by: Patient  Patient Orientation: Alert and Oriented, Person, Situation, Self, Place    Patient Cognition: Alert    Hospitalization in the last 30 days (Readmission):  Yes    If yes, Readmission Assessment in  Navigator will be completed.    Advance Directives:      Code Status: Full Code   Patient's Primary Decision Maker is: Legal Next of Kin    Primary Decision Maker: Starr Palma - Child - [phone]    Secondary Decision Maker: Madeline Stapleton - Parent - [phone]    Discharge Planning:    Patient lives with:   Type of Home:    Primary Care Giver: Self  Patient Support Systems include: Family Members, Children   Current Financial resources:    Current community resources:    Current services prior to admission:              Current DME:              Type of Home Care services:       ADLS  Prior functional level: Independent in ADLs/IADLs  Current functional level: Independent in ADLs/IADLs    PT AM-PAC:   /24  OT AM-PAC:   /24    Family can provide assistance at DC: Yes  Would you like Case Management to discuss the discharge plan with any other family members/significant others, and if so, who? No  Plans to Return to Present  Housing: Yes  Other Identified Issues/Barriers to RETURNING to current housing:   Potential Assistance needed at discharge:              Potential DME:    Patient expects to discharge to:    Plan for transportation at discharge:      Financial    Payor: [employer] / Plan: [employer] [address] / Product Type: *No Product type* /     Does insurance require precert for SNF: Yes    Potential assistance Purchasing Medications:    Meds-to-Beds request: Yes      [facility] - [facility] [address] - P [phone] - F [phone]  [address] [address]  Phone: [phone] Fax: [phone]    [id number] [place], [place] - [address] -  [phone] - F [phone]  [address] [place] [id number]  Phone: [phone] Fax: [phone]    [facility] - [address] - [facility] [phone] - F [phone]  [address] [place] [address]  Phone: [phone] Fax: [phone]      Notes:    Factors facilitating achievement of predicted outcomes: Cooperative and Pleasant    Barriers to discharge: Medical complications    Additional Case Management Notes: FROM HOME ALONE, INDEPENDENT AND DRIVES. NO DME/02. PT STATES SHE HAS A NEW PCP APPT WITH DR RIVER 1/21/24. DENIED NEEDS.     The Plan for Transition of Care is related to the following treatment goals of Hypokalemia [E87.6]  Chest pain [R07.9]  Hyperglycemia [R73.9]  Constipation, unspecified constipation type [K59.00]  Chest pain, unspecified type [R07.9]    IF APPLICABLE: The Patient and/or patient representative Paty and her family were provided with a choice of provider and agrees with the discharge plan. Freedom of choice list with basic dialogue that supports the patient's individualized plan of care/goals and shares the quality data associated with the providers was provided to:     Patient Representative Name:       The

## 2024-12-27 NOTE — CONSULTS
DATE OF CONSULTATION  12/27/2024    CONSULTANT  Salazar Henderson DO    REQUESTING PHYSICIAN  Alyssa Treviño MD     PRIMARY CARDIOLOGIST  Dr. Cunningham    REASON FOR CONSULTATION  Chest pain        Hospital Day: 0       Patient is a 54 y.o. female who presents with a chief complaint of chest pain. Patient is followed on a regular basis by Elana Cason DO.   Patient with past medical history of CAD status post most recent PCI of LAD x 1, RCA 60 to 70%, circumflex 50%, hypertension, hyperlipidemia, diabetes mellitus, obstructive sleep apnea who presented to [facility] ER for complaints of chest pain.  Patient reports intermittent chest pain lasting about 3 to 5 minutes.  Episodes started yesterday at rest.  She said they did worsen when she went to bend down and tie her shoes.  Reports pain in the center of her chest and feels pressure-like rating pain 4 out of 10.  Reports that the chest pressure radiates to her left arm and left jaw.  Denies shortness of breath, nausea, vomiting, lightheadedness, dizziness, fever, chills.    In the ER, sodium 133, potassium 2.9, creatinine 0.41, glucose 371, calcium 8.4, WBC 11.1, hemoglobin 12.2  Troponins negative x 2  proBNP less than 36  Chest x-ray showing no acute cardiopulmonary process  EKG showing normal sinus rhythm with nonspecific T wave abnormality worse in inferior leads when compared to prior EKG  Most recent echo on 5/29/2024 showing EF of 55 to 60% with no significant valvular abnormalities  Most recent cardiac testing including SPECT showing no signs of ischemia    At the time of my evaluation, patient resting in bed comfortably.  Still reports that the chest pressure is intermittent.  She does report that it was relieved by second dose of nitro.     Past Medical History:   Diagnosis Date    Acute kidney injury (HCC) 10/24/2023    CAD (coronary artery disease) 06/25/2022    CAD (coronary artery disease) 06/25/2022    CHF (congestive heart failure) ([facility])      History     Socioeconomic History    Marital status:    Tobacco Use    Smoking status: Never    Smokeless tobacco: Never   Vaping Use    Vaping status: Never Used   Substance and Sexual Activity    Alcohol use: Yes     Alcohol/week: 0.0 standard drinks of alcohol     Comment: ocassionally    Drug use: No    Sexual activity: Not Currently     Partners: Male     Social Determinants of Health     Financial Resource Strain: Low Risk  (2024)    Overall Financial Resource Strain (CARDIA)     Difficulty of Paying Living Expenses: Not hard at all   Food Insecurity: No Food Insecurity (2024)    Hunger Vital Sign     Worried About Running Out of Food in the Last Year: Never true     Ran Out of Food in the Last Year: Never true   Transportation Needs: No Transportation Needs (2024)    PRAPARE - Transportation     Lack of Transportation (Medical): No     Lack of Transportation (Non-Medical): No   Physical Activity: Unknown (2024)    Exercise Vital Sign     Days of Exercise per Week: 0 days   Housing Stability: Unknown (2024)    Housing Stability Vital Sign     Unable to Pay for Housing in the Last Year: No     Homeless in the Last Year: No       Family History   Problem Relation Age of Onset    Heart Disease Mother     Diabetes Father     Heart Disease Father     Cancer Father         thyroid    No Known Problems Sister     No Known Problems Brother     No Known Problems Maternal Grandmother     No Known Problems Maternal Grandfather     No Known Problems Paternal Grandmother     No Known Problems Paternal Grandfather     No Known Problems Other     Breast Cancer Maternal Great Grandmother     Colon Cancer Neg Hx     Eclampsia Neg Hx     Hypertension Neg Hx     Ovarian Cancer Neg Hx      Labor Neg Hx     Spont Abortions Neg Hx     Stroke Neg Hx        Current Facility-Administered Medications   Medication Dose Route Frequency Provider Last Rate Last Admin    nitroGLYCERIN (NITROSTAT) SL  tablet 0.4 mg  0.4 mg SubLINGual Q5 Min PRN Enedina Ibarra PA   0.4 mg at 12/27/24 0616    sodium chloride flush 0.9 % injection 5-40 mL  5-40 mL IntraVENous 2 times per day Jasmyne Cunningham MD   10 mL at 12/27/24 0825    sodium chloride flush 0.9 % injection 5-40 mL  5-40 mL IntraVENous PRN Jasmyne Cunningham MD        0.9 % sodium chloride infusion   IntraVENous PRN Jasmyne Cunningham MD        potassium chloride (KLOR-CON M) extended release tablet 40 mEq  40 mEq Oral PRN Jasmyne Cunningham MD        Or    potassium bicarb-citric acid (EFFER-K) effervescent tablet 40 mEq  40 mEq Oral PRN Jasmyne Cunningham MD        magnesium sulfate 2000 mg in 50 mL IVPB premix  2,000 mg IntraVENous PRN Jasmyne Cunningham MD   Stopped at 12/27/24 1205    enoxaparin (LOVENOX) injection 40 mg  40 mg SubCUTAneous Daily Jasmyne Cunningham MD   40 mg at 12/27/24 0824    ondansetron (ZOFRAN-ODT) disintegrating tablet 4 mg  4 mg Oral Q8H PRN Jasmyne Cunningham MD        Or    ondansetron (ZOFRAN) injection 4 mg  4 mg IntraVENous Q6H PRN Jasmyne Cunningham MD        polyethylene glycol (GLYCOLAX) packet 17 g  17 g Oral Daily PRN Jasmyne Cunningham MD        acetaminophen (TYLENOL) tablet 650 mg  650 mg Oral Q6H PRN Jasmyne Cunningham MD        Or    acetaminophen (TYLENOL) suppository 650 mg  650 mg Rectal Q6H PRN Jasmyne Cunningham MD        insulin lispro (HUMALOG,ADMELOG) injection vial 0-8 Units  0-8 Units SubCUTAneous 4x Daily AC & HS Jasmyne Cunningham MD   4 Units at 12/27/24 1205    aspirin EC tablet 81 mg  81 mg Oral Daily Jasmyne Cunningham MD   81 mg at 12/27/24 0823    atorvastatin (LIPITOR) tablet 40 mg  40 mg Oral Daily Jasmyne Cunningham MD   40 mg at 12/27/24 0823    clopidogrel (PLAVIX) tablet 75 mg  75 mg Oral Daily Jasmyne Cunningham MD   75 mg at 12/27/24 0823    escitalopram (LEXAPRO) tablet 20 mg  20 mg Oral Daily Jasmyne Cunningham MD   20 mg at 12/27/24 0824    magnesium oxide (MAG-OX) tablet 400 mg  400 mg Oral Daily Jasmyne Cunningham MD   400 mg at 12/27/24 0824    metoprolol

## 2024-12-27 NOTE — PROGRESS NOTES
Arrived to pre/post cath from [facility], alert and oriented. Vital signs obtained. Consent for procedure signed. Prepping patient for procedure.

## 2024-12-27 NOTE — PLAN OF CARE
Problem: Chronic Conditions and Co-morbidities  Goal: Patient's chronic conditions and co-morbidity symptoms are monitored and maintained or improved  Outcome: Progressing  Flowsheets (Taken 12/27/2024 0800)  Care Plan - Patient's Chronic Conditions and Co-Morbidity Symptoms are Monitored and Maintained or Improved: Monitor and assess patient's chronic conditions and comorbid symptoms for stability, deterioration, or improvement     Problem: Discharge Planning  Goal: Discharge to home or other facility with appropriate resources  Outcome: Progressing  Flowsheets (Taken 12/27/2024 0800)  Discharge to home or other facility with appropriate resources: Identify barriers to discharge with patient and caregiver     Problem: Pain  Goal: Verbalizes/displays adequate comfort level or baseline comfort level  Outcome: Progressing     Problem: ABCDS Injury Assessment  Goal: Absence of physical injury  Outcome: Progressing

## 2024-12-27 NOTE — PROGRESS NOTES
Sedation Pre- Procedure Evaluation    Patient name: Paty Sheridan  YOB: 1970  MRN: [id number]   Allergies:   Metformin and Nabumetone        Type Of Sedation  []local anesthesia  [x]moderate (conscious sedation)  []deep/analgesia      RN Focused History    Yes        No  N/A  []   [x]  Previous problem with airway anesthesia, sedation, or family history of the same    []   [x]  History Of tobacco, alcohol, or substance abuse     [x]   []  Problems associated with stridor, snoring, or sleep apnea    []   [] [x] Pediatric patient, history of premature birth or ventilator support (Moderate Sedation Only)     []   [x] [] Moderate Sedation: Clear liquids within 6 hours of sedation and NPO within 2 hours    []   [] [x] Deep Sedation:Clear liquids within 6 hours of sedation and NPO within 2 hours      NPO since: Ate lunch today at 1115       Vitals, Cardiac Rhythm, Pain:   Vitals  Temp: 97.3 °F (36.3 °C)  Temp Source: Axillary  Pulse: 79  Heart Rate Source: Monitor  Respirations: 16  BP: 138/83  MAP (Calculated): 101  MAP (mmHg): 110  BP Location: Left upper arm  Patient Position: Supine  Cardiac Rhythm: Sinus rhythm  Pain Assessment  Pain Assessment: 0-10  Pain Level: 1  Pain Location: Chest  Pain Descriptors: Pressure      EKG:  EKG Interpretation: normal EKG, normal sinus rhythm, normal sinus rhythm, unchanged from previous tracings.      Richard Scale: Activity: Able to move four extremities voluntarily or on command  Respiration: Able to breathe and cough freely  Circulation: Blood pressure within 20% of preanesthesia level  Consciousness: Fully awake  Oxygen: SAO2 > 92% on room air   Richard Score: 10     Activity:  2 - Able to move 4 extremities voluntarily on command  Respiration:  2 - Able to breathe deeply and cough freely  Circulation:  2 - BP+/- 20mmHg of normal  Consciousness:  2 - Fully awake  Oxygen Saturation (color):  2 - Able to maintain oxygen saturation >92% on room air      Prior to

## 2024-12-27 NOTE — PROGRESS NOTES
Patient arrived from ED on stretcher, patient transferred with standby to bed, vitals completed. Patient A&Ox4, call light provided, breakfast ordered.

## 2024-12-27 NOTE — BRIEF OP NOTE
Section of Cardiology  Adult Brief Cardiac Cath Procedure Note        Procedure(s):  LHC, b/l coronary angio, PCI of the mid circumflex with drug-eluting stent x 1    Pre-operative Diagnosis: Angina class IV, non-STEMI    H&P Status: Completed and reviewed.     Post-operative Diagnosis:      LV ejection fraction of 60 to 65%.  Normal LVEDP  Left main large-caliber vessel.  No stenosis  LAD large caliber vessel.  Widely patent stent in mid segment.  Distal 40 to 50% stenosis  Circumflex moderate caliber vessel.  Proximal 30% stenosis.  Mid 80% hazy stenosis  RCA large caliber vessel.  Mid 50% stenosis.  Distal 50% stenosis    Findings:  See full report    Complications:  none    Primary Proceduralist:   Dr.Wes Brower DO  Plan:  Dual antiplatelet therapy  Risk factor modification  DC home tomorrow.     Cardiac rehab nurse will follow-up with the patient and provide further education.  Phase 1 and 2 CR order was initiated and face to order sent outpatient cardiac rehab program, ADLs, smoking cessation, home exercise program, cardiac risk factor modification, heart healthy nutrition and cardiac medication education   Full procedure note to follow

## 2024-12-27 NOTE — ED PROVIDER NOTES
[facility] ED  eMERGENCY dEPARTMENT eNCOUnter      Pt Name: Paty Sheridan  MRN: [id number]  Birthdate 1970  Date of evaluation: 12/27/2024  Provider: MANDO CLEVELAND  3:47 AM EST     My attending is Dr. Ortiz.    HISTORY OF PRESENT ILLNESS    Paty Sheridan is a 54 y.o. female with PMHx of CAD, CHF, depression, GERD, hyperlipidemia, hypertension, DM2, anxiety presents to the emergency department with chest pain.  For couple days patient has had intermittent midsternal chest pressure radiating minimally to right and left chest, between shoulder blades, and down left arm. Lasting 5 minutes, worse with leaning over today. Pressure worsened today. Denies it being exertional. On the way to the ED she then started with periumbilical abdominal pain radiating up to chest. States it feel similar to her previous MI last year.  She did not take anything for the pain.  She denies fevers, upper respiratory symptoms, shortness of breath, nausea, vomiting, diarrhea, constipation, urinary symptoms, leg swelling. Takes lasix but has not been taking her potassium PO d/t large pill size     Per chart review: October 2023 had cardiac cath with LAD BENJAMIN x1 RCA 60-70. CX 50%.     HPI    Nursing Notes were reviewed.    REVIEW OF SYSTEMS       Review of Systems   Constitutional:  Negative for appetite change, chills and fever.   HENT:  Negative for congestion, rhinorrhea and sore throat.    Respiratory:  Negative for cough and shortness of breath.    Cardiovascular:  Positive for chest pain.   Gastrointestinal:  Positive for abdominal pain. Negative for blood in stool, diarrhea, nausea and vomiting.   Genitourinary:  Negative for difficulty urinating.   Musculoskeletal:  Positive for back pain. Negative for neck stiffness.   Skin:  Negative for color change and rash.   Neurological:  Negative for dizziness, syncope, weakness, light-headedness, numbness and headaches.   All other systems reviewed and are    Psychiatric:         Behavior: Behavior normal.         Thought Content: Thought content normal.         Judgment: Judgment normal.         DIAGNOSTIC RESULTS     EKG:All EKG's are interpreted by the Emergency Department Physician who either signs or Co-signs this chart in the absence of a cardiologist.    Personal interpretation:    Normal sinus rhythm, rate 77, normal intervals, no ST segment changes. Flattening T waves     RADIOLOGY:   Non-plain film images such as CT, Ultrasound and MRI are read by theradiologist. Plain radiographic images are visualized and preliminarily interpreted by the emergency physician with the below findings:    Interpretation per theRadiologist below, if available at the time of this note:    CTA CHEST ABDOMEN PELVIS W WO CONTRAST   Final Result   1. No evidence of thoracic or abdominal aortic aneurysm or dissection.   2. No acute cardiopulmonary process.   3. No acute intra-abdominal or pelvic process.   4. Constipation/fecal stasis.   5. Cholecystectomy.   6. Tiny, fatty umbilical hernia.   7. Chronic mild anterior wedge compression deformity of L1 segment.         XR CHEST PORTABLE   Final Result   No acute cardiopulmonary process.                 LABS:  Labs Reviewed   COMPREHENSIVE METABOLIC PANEL - Abnormal; Notable for the following components:       Result Value    Sodium 133 (*)     Potassium 2.5 (*)     Chloride 89 (*)     Glucose 438 (*)     Creatinine 0.44 (*)     Alkaline Phosphatase 193 (*)     Globulin 3.6 (*)     All other components within normal limits    Narrative:     CALL  Ortiz  [facility] tel. [phone],  Chemistry results called to and read back by germán navarro, 12/27/2024 05:07, by JANEE   URINALYSIS WITH REFLEX TO CULTURE - Abnormal; Notable for the following components:    Glucose, Ur >=1000 (*)     Blood, Urine TRACE (*)     Leukocyte Esterase, Urine SMALL (*)     All other components within normal limits   LIPASE - Abnormal; Notable for the following components:

## 2024-12-27 NOTE — PROGRESS NOTES
Bedside handoff report received from Shruthi HONG. Patient received 1 stent Cx via right radial artery.  Radial band in place holding pressure, patient alert and oriented sitting up in bed no pain, Hr NSR

## 2024-12-28 VITALS
DIASTOLIC BLOOD PRESSURE: 78 MMHG | SYSTOLIC BLOOD PRESSURE: 149 MMHG | HEART RATE: 88 BPM | BODY MASS INDEX: 34.48 KG/M2 | WEIGHT: 194.6 LBS | TEMPERATURE: 98.1 F | OXYGEN SATURATION: 94 % | RESPIRATION RATE: 18 BRPM | HEIGHT: 63 IN

## 2024-12-28 LAB
ANION GAP SERPL CALCULATED.3IONS-SCNC: 8 MEQ/L (ref 9–15)
BACTERIA UR CULT: ABNORMAL
BACTERIA UR CULT: ABNORMAL
BASOPHILS # BLD: 0.1 K/UL (ref 0–0.2)
BASOPHILS NFR BLD: 0.5 %
BUN SERPL-MCNC: 7 MG/DL (ref 6–20)
CALCIUM SERPL-MCNC: 8.3 MG/DL (ref 8.5–9.9)
CHLORIDE SERPL-SCNC: 96 MEQ/L (ref 95–107)
CO2 SERPL-SCNC: 33 MEQ/L (ref 20–31)
CREAT SERPL-MCNC: 0.41 MG/DL (ref 0.5–0.9)
EOSINOPHIL # BLD: 0.3 K/UL (ref 0–0.7)
EOSINOPHIL NFR BLD: 3.2 %
ERYTHROCYTE [DISTWIDTH] IN BLOOD BY AUTOMATED COUNT: 12.8 % (ref 11.5–14.5)
GLUCOSE BLD-MCNC: 332 MG/DL (ref 70–99)
GLUCOSE BLD-MCNC: 353 MG/DL (ref 70–99)
GLUCOSE SERPL-MCNC: 334 MG/DL (ref 70–99)
HCT VFR BLD AUTO: 35.1 % (ref 37–47)
HGB BLD-MCNC: 12.4 G/DL (ref 12–16)
LYMPHOCYTES # BLD: 2.8 K/UL (ref 1–4.8)
LYMPHOCYTES NFR BLD: 28.5 %
MAGNESIUM SERPL-MCNC: 1.9 MG/DL (ref 1.7–2.4)
MCH RBC QN AUTO: 31.3 PG (ref 27–31.3)
MCHC RBC AUTO-ENTMCNC: 35.3 % (ref 33–37)
MCV RBC AUTO: 88.6 FL (ref 79.4–94.8)
MONOCYTES # BLD: 0.7 K/UL (ref 0.2–0.8)
MONOCYTES NFR BLD: 7.4 %
NEUTROPHILS # BLD: 5.9 K/UL (ref 1.4–6.5)
NEUTS SEG NFR BLD: 60 %
ORGANISM: ABNORMAL
PERFORMED ON: ABNORMAL
PLATELET # BLD AUTO: 265 K/UL (ref 130–400)
POC CREATININE: 0.5 MG/DL (ref 0.6–1.2)
POC SAMPLE TYPE: ABNORMAL
POTASSIUM SERPL-SCNC: 3.2 MEQ/L (ref 3.4–4.9)
RBC # BLD AUTO: 3.96 M/UL (ref 4.2–5.4)
SODIUM SERPL-SCNC: 137 MEQ/L (ref 135–144)
WBC # BLD AUTO: 9.9 K/UL (ref 4.8–10.8)

## 2024-12-28 PROCEDURE — 80048 BASIC METABOLIC PNL TOTAL CA: CPT

## 2024-12-28 PROCEDURE — 2500000003 HC RX 250 WO HCPCS: Performed by: INTERNAL MEDICINE

## 2024-12-28 PROCEDURE — 85025 COMPLETE CBC W/AUTO DIFF WBC: CPT

## 2024-12-28 PROCEDURE — 83735 ASSAY OF MAGNESIUM: CPT

## 2024-12-28 PROCEDURE — 36415 COLL VENOUS BLD VENIPUNCTURE: CPT

## 2024-12-28 PROCEDURE — 6370000000 HC RX 637 (ALT 250 FOR IP): Performed by: INTERNAL MEDICINE

## 2024-12-28 PROCEDURE — 6370000000 HC RX 637 (ALT 250 FOR IP): Performed by: STUDENT IN AN ORGANIZED HEALTH CARE EDUCATION/TRAINING PROGRAM

## 2024-12-28 RX ORDER — POTASSIUM CHLORIDE 1500 MG/1
40 TABLET, EXTENDED RELEASE ORAL EVERY 4 HOURS
Status: COMPLETED | OUTPATIENT
Start: 2024-12-28 | End: 2024-12-28

## 2024-12-28 RX ADMIN — POTASSIUM CHLORIDE 40 MEQ: 1500 TABLET, EXTENDED RELEASE ORAL at 11:40

## 2024-12-28 RX ADMIN — Medication 400 MG: at 08:21

## 2024-12-28 RX ADMIN — SODIUM CHLORIDE, PRESERVATIVE FREE 10 ML: 5 INJECTION INTRAVENOUS at 08:23

## 2024-12-28 RX ADMIN — INSULIN GLARGINE 10 UNITS: 100 INJECTION, SOLUTION SUBCUTANEOUS at 08:22

## 2024-12-28 RX ADMIN — DOCUSATE SODIUM 100 MG: 100 CAPSULE, LIQUID FILLED ORAL at 08:22

## 2024-12-28 RX ADMIN — INSULIN LISPRO 6 UNITS: 100 INJECTION, SOLUTION INTRAVENOUS; SUBCUTANEOUS at 08:22

## 2024-12-28 RX ADMIN — ESCITALOPRAM OXALATE 20 MG: 20 TABLET, FILM COATED ORAL at 08:21

## 2024-12-28 RX ADMIN — POTASSIUM CHLORIDE 40 MEQ: 1500 TABLET, EXTENDED RELEASE ORAL at 08:20

## 2024-12-28 RX ADMIN — METOPROLOL SUCCINATE 25 MG: 25 TABLET, FILM COATED, EXTENDED RELEASE ORAL at 08:22

## 2024-12-28 RX ADMIN — ASPIRIN 81 MG: 81 TABLET, COATED ORAL at 08:20

## 2024-12-28 RX ADMIN — ATORVASTATIN CALCIUM 40 MG: 40 TABLET, FILM COATED ORAL at 08:21

## 2024-12-28 RX ADMIN — CLOPIDOGREL BISULFATE 75 MG: 75 TABLET ORAL at 08:21

## 2024-12-28 RX ADMIN — INSULIN LISPRO 4 UNITS: 100 INJECTION, SOLUTION INTRAVENOUS; SUBCUTANEOUS at 11:40

## 2024-12-28 RX ADMIN — ACETAMINOPHEN 650 MG: 325 TABLET ORAL at 08:21

## 2024-12-28 ASSESSMENT — PAIN DESCRIPTION - DESCRIPTORS: DESCRIPTORS: TENDER

## 2024-12-28 ASSESSMENT — PAIN SCALES - GENERAL: PAINLEVEL_OUTOF10: 1

## 2024-12-28 ASSESSMENT — PAIN DESCRIPTION - ORIENTATION: ORIENTATION: RIGHT

## 2024-12-28 ASSESSMENT — PAIN DESCRIPTION - LOCATION: LOCATION: ARM

## 2024-12-28 NOTE — DISCHARGE SUMMARY
Discharge Summary    Date: 12/28/2024  Patient Name: Paty Sheridan    YOB: 1970     Age: 54 y.o.    Admit Date: 12/27/2024  Discharge Date: 12/28/2024  Discharge Condition: Good    Admission Diagnosis  Hypokalemia [E87.6];Chest pain [R07.9];Hyperglycemia [R73.9];Constipation, unspecified constipation type [K59.00];Chest pain, unspecified type [R07.9]      Discharge Diagnosis  Principal Problem:    Chest pain  Resolved Problems:    * No resolved hospital problems. *      Hospital Stay  Narrative of Hospital Course:  Patient presented with anginal symptoms.  Underwent cardiac catheterization and PCI of the mid circumflex with drug-eluting stent x 1.  She was maximized on cardiac medications.  She was referred to cardiac rehab.  She was discharged home in stable and satisfactory condition    Consultants:  IP CONSULT TO CARDIOLOGY  IP CONSULT TO CARDIAC REHAB    Surgeries/procedures Performed:  PCI of the circumflex and     Treatments:    Cardiac Medications        Discharge Plan/Disposition:  Home    Hospital/Incidental Findings Requiring Follow Up:    Patient Instructions:    Diet: Cardiac Diet    Activity:Activity as Tolerated  For number of days (if applicable):      Other Instructions:    Provider Follow-Up:   No follow-ups on file.     Significant Diagnostic Studies:    Recent Labs:  Admission on 12/27/2024  Ventricular Rate                              Date: 12/27/2024  Value: 77          Ref range: BPM                Status: Final  Atrial Rate                                   Date: 12/27/2024  Value: 77          Ref range: BPM                Status: Final  P-R Interval                                  Date: 12/27/2024  Value: 156         Ref range: ms                 Status: Final  QRS Duration                                  Date: 12/27/2024  Value: 88          Ref range: ms                 Status: Final  Q-T Interval                                  Date: 12/27/2024  Value: 400         Ref  race factor using the 2021 CKD-EPI equation.  Careful  clinical correlation is recommended, particularly when  comparing to results calculated using previous equations.  The CKD-EPI equation is less accurate in patients with  extremes of muscle mass, extra-renal metabolism of  creatinine, excessive creatinine ingestion, or following  therapy that affects renal tubular secretion.    Calcium                                       Date: 12/27/2024  Value: 9.0         Ref range: 8.5 - 9.9 mg/dL    Status: Final  Total Protein                                 Date: 12/27/2024  Value: 7.5         Ref range: 6.3 - 8.0 g/dL     Status: Final  Albumin                                       Date: 12/27/2024  Value: 3.9         Ref range: 3.5 - 4.6 g/dL     Status: Final  Total Bilirubin                               Date: 12/27/2024  Value: 0.3         Ref range: 0.2 - 0.7 mg/dL    Status: Final  Alkaline Phosphatase                          Date: 12/27/2024  Value: 193 (H)     Ref range: 40 - 130 U/L       Status: Final  ALT                                           Date: 12/27/2024  Value: 10          Ref range: 0 - 33 U/L         Status: Final  AST                                           Date: 12/27/2024  Value: 14          Ref range: 0 - 35 U/L         Status: Final                Comment: Specimen hemolysis has exceeded the interference as defined  by Roche.  Value may be falsely increased. Suggest  recollection if clinically indicated.    Globulin                                      Date: 12/27/2024  Value: 3.6 (H)     Ref range: 2.3 - 3.5 g/dL     Status: Final  Troponin, High Sensitivity                    Date: 12/27/2024  Value: 9           Ref range: 0 - 19 ng/L        Status: Final                Comment: High Sensitivity Troponin values cannot be compared with  other Troponin methodologies.    NT Pro-BNP                                    Date: 12/27/2024  Value: <36         Ref range: pg/mL              Status:  Date: 12/28/2024  Value: 60.0        Ref range: %                  Status: Final  Lymphocytes %                                 Date: 12/28/2024  Value: 28.5        Ref range: %                  Status: Final  Monocytes %                                   Date: 12/28/2024  Value: 7.4         Ref range: %                  Status: Final  Eosinophils %                                 Date: 12/28/2024  Value: 3.2         Ref range: %                  Status: Final  Basophils %                                   Date: 12/28/2024  Value: 0.5         Ref range: %                  Status: Final  Neutrophils Absolute                          Date: 12/28/2024  Value: 5.9         Ref range: 1.4 - 6.5 K/uL     Status: Final  Lymphocytes Absolute                          Date: 12/28/2024  Value: 2.8         Ref range: 1.0 - 4.8 K/uL     Status: Final  Monocytes Absolute                            Date: 12/28/2024  Value: 0.7         Ref range: 0.2 - 0.8 K/uL     Status: Final  Eosinophils Absolute                          Date: 12/28/2024  Value: 0.3         Ref range: 0.0 - 0.7 K/uL     Status: Final  Basophils Absolute                            Date: 12/28/2024  Value: 0.1         Ref range: 0.0 - 0.2 K/uL     Status: Final  POC Glucose                                   Date: 12/27/2024  Value: 334 (H)     Ref range: 70 - 99 mg/dl      Status: Final  Performed on                                  Date: 12/27/2024  Value: ACCU-CHEK     Status: Final                Comment: Notified RN or MD  Sodium                                        Date: 12/28/2024  Value: 137         Ref range: 135 - 144 mEq/L    Status: Final  Chloride                                      Date: 12/28/2024  Value: 96          Ref range: 95 - 107 mEq/L     Status: Final  CO2                                           Date: 12/28/2024  Value: 33 (H)      Ref range: 20 - 31 mEq/L      Status: Final  Anion Gap                                     Date:

## 2024-12-28 NOTE — PLAN OF CARE
Problem: Chronic Conditions and Co-morbidities  Goal: Patient's chronic conditions and co-morbidity symptoms are monitored and maintained or improved  12/28/2024 1118 by Anahi Pickering RN  Outcome: Progressing  Flowsheets (Taken 12/28/2024 0800)  Care Plan - Patient's Chronic Conditions and Co-Morbidity Symptoms are Monitored and Maintained or Improved: Monitor and assess patient's chronic conditions and comorbid symptoms for stability, deterioration, or improvement  12/28/2024 0246 by Nikki Trevizo RN  Outcome: Progressing     Problem: Discharge Planning  Goal: Discharge to home or other facility with appropriate resources  12/28/2024 1118 by Anahi Pickering RN  Outcome: Progressing  Flowsheets (Taken 12/28/2024 0800)  Discharge to home or other facility with appropriate resources: Identify barriers to discharge with patient and caregiver  12/28/2024 0246 by Nikki Trevizo RN  Outcome: Progressing     Problem: Pain  Goal: Verbalizes/displays adequate comfort level or baseline comfort level  12/28/2024 1118 by Anahi Pickering RN  Outcome: Progressing  12/28/2024 0246 by Nikki Trevizo RN  Outcome: Progressing     Problem: ABCDS Injury Assessment  Goal: Absence of physical injury  12/28/2024 1118 by Anahi Pickering RN  Outcome: Progressing  12/28/2024 0246 by Nikki Trevizo RN  Outcome: Progressing     Problem: Safety - Adult  Goal: Free from fall injury  Outcome: Progressing

## 2024-12-28 NOTE — PROGRESS NOTES
Began removing air from radial band, no bleeding or hematoma.     1900: Report given to HAL Christian.     1915: All air removed from R wrist. Radial band removed from R wrist, quik clot and tegaderm dressing applied, no bleeding or hematoma.     1925: R wrist remains stable. Transport requested for patient to return to [facility].

## 2024-12-30 ENCOUNTER — TELEPHONE (OUTPATIENT)
Dept: CARDIOLOGY CLINIC | Age: 54
End: 2024-12-30

## 2024-12-30 ENCOUNTER — PATIENT OUTREACH (OUTPATIENT)
Dept: PRIMARY CARE | Facility: CLINIC | Age: 54
End: 2024-12-30
Payer: COMMERCIAL

## 2024-12-30 DIAGNOSIS — F31.9 BIPOLAR DEPRESSION (MULTI): ICD-10-CM

## 2024-12-30 DIAGNOSIS — D64.9 ANEMIA, UNSPECIFIED TYPE: ICD-10-CM

## 2024-12-30 DIAGNOSIS — E11.65 TYPE 2 DIABETES MELLITUS WITH HYPERGLYCEMIA, WITH LONG-TERM CURRENT USE OF INSULIN: ICD-10-CM

## 2024-12-30 DIAGNOSIS — Z79.4 TYPE 2 DIABETES MELLITUS WITH HYPERGLYCEMIA, WITH LONG-TERM CURRENT USE OF INSULIN: ICD-10-CM

## 2024-12-30 DIAGNOSIS — I25.118 CORONARY ARTERY DISEASE OF NATIVE ARTERY OF NATIVE HEART WITH STABLE ANGINA PECTORIS: ICD-10-CM

## 2024-12-30 LAB — POC ACT LR: 236 SEC

## 2024-12-30 RX ORDER — TRAZODONE HYDROCHLORIDE 100 MG/1
100 TABLET ORAL NIGHTLY
Qty: 90 TABLET | Refills: 0 | Status: SHIPPED | OUTPATIENT
Start: 2024-12-30

## 2024-12-30 RX ORDER — ESCITALOPRAM OXALATE 20 MG/1
20 TABLET ORAL DAILY
Qty: 90 TABLET | Refills: 0 | Status: SHIPPED | OUTPATIENT
Start: 2024-12-30

## 2024-12-30 RX ORDER — ATORVASTATIN CALCIUM 40 MG/1
40 TABLET, FILM COATED ORAL DAILY
Qty: 90 TABLET | Refills: 3 | Status: SHIPPED | OUTPATIENT
Start: 2024-12-30

## 2024-12-30 NOTE — PROGRESS NOTES
Discharge Facility: [facility]   Discharge Diagnosis: Chest pain, unspecified type   Admission Date: 12/27/24  Discharge Date: 12/28/24    PCP Appointment Date: Ruth Fonseca MD tasked to office  Specialist Appointment Date: TBD  Hospital Encounter and Summary Linked: Yes  See discharge assessment below for further details     Engagement  Call Start Time: 1144 (12/30/2024  2:45 PM)    Medications  Medications reviewed with patient/caregiver?: Yes (12/30/2024  2:45 PM)  Is the patient having any side effects they believe may be caused by any medication additions or changes?: No (12/30/2024  2:45 PM)  Does the patient have all medications ordered at discharge?: Yes (12/30/2024  2:45 PM)  Care Management Interventions: No intervention needed (12/30/2024  2:45 PM)  Prescription Comments: No new prescription medications (12/30/2024  2:45 PM)  Is the patient taking all medications as directed (includes completed medication regime)?: Yes (12/30/2024  2:45 PM)  Medication Comments: no noted issues obtaining medications (12/30/2024  2:45 PM)    Appointments  Does the patient have a primary care provider?: Yes (12/30/2024  2:45 PM)  Care Management Interventions: Educated patient on importance of making appointment; Advised patient to make appointment (12/30/2024  2:45 PM)  Has the patient kept scheduled appointments due by today?: Yes (12/30/2024  2:45 PM)  Care Management Interventions: Advised patient to keep appointment (12/30/2024  2:45 PM)    Self Management  What is the home health agency?: denies need (12/30/2024  2:45 PM)  What Durable Medical Equipment (DME) was ordered?: denies need (12/30/2024  2:45 PM)    Patient Teaching  Does the patient have access to their discharge instructions?: Yes (12/30/2024  2:45 PM)  Care Management Interventions: Reviewed instructions with patient (12/30/2024  2:45 PM)  What is the patient's perception of their health status since discharge?: Improving  (12/30/2024  2:45 PM)  Is the patient/caregiver able to teach back the hierarchy of who to call/visit for symptoms/problems? PCP, Specialist, Home Health nurse, Urgent Care, ED, 911: Yes (12/30/2024  2:45 PM)  Patient/Caregiver Education Comments: CM discussed referencing discharge paperwork to follow detailed daily medication schedule (12/30/2024  2:45 PM)    Wrap Up  Wrap Up Additional Comments: This CM spoke with patient via phone. Successful transition of care outreach complete. Pt reports doing well at home since discharge. Pt denies any prescription medication questions. Patient denies any further discharge questions/needs at this time. Emphasized that Follow up is needed after discharge to review the hospital recommendations, assess your response to your treatment.  Pt aware of my availability for non-emergent concerns. Contact info provided to patient. (12/30/2024  2:45 PM)

## 2024-12-30 NOTE — TELEPHONE ENCOUNTER
[facility] requested refills on Escitalopram and Trazodone.  A 90 day supply has been sent to Dr Fonseca for approval.  Patient is due for fasting labs and an appointment before running out.  Lab orders have been placed in the system.    Thank you

## 2024-12-30 NOTE — TELEPHONE ENCOUNTER
Pt had a stent placed on 12/27/24 with Dr Brower.     Pt has appt with Dr Rivera on 1/3/2025.    Pt returned to work today.  Pt needs to know what her restrictions are?    Pt is a .      Pt # [phone]

## 2024-12-30 NOTE — TELEPHONE ENCOUNTER
Requesting medication refill. Please approve or deny this request.    Rx requested:  Requested Prescriptions     Pending Prescriptions Disp Refills    atorvastatin (LIPITOR) 40 MG tablet [Pharmacy Med Name: ATORVASTATIN TABS 40MG]  0         Last Office Visit:   6/11/2024      Next Visit Date:  Future Appointments   Date Time Provider Department Center   1/3/2025  1:30 PM Emigdio Rivera MD [facility]   1/21/2025  2:45 PM Elana Márquez DO [facility]   9/2/2025  3:15 PM Jay, Shasta A, MD [facility]

## 2024-12-31 LAB
EKG ATRIAL RATE: 77 BPM
EKG P-R INTERVAL: 144 MS
EKG Q-T INTERVAL: 392 MS
EKG QRS DURATION: 82 MS
EKG QTC CALCULATION (BAZETT): 443 MS
EKG R AXIS: 180 DEGREES
EKG T AXIS: 172 DEGREES
EKG VENTRICULAR RATE: 77 BPM

## 2025-01-03 ENCOUNTER — OFFICE VISIT (OUTPATIENT)
Dept: CARDIOLOGY CLINIC | Age: 55
End: 2025-01-03

## 2025-01-03 VITALS
OXYGEN SATURATION: 97 % | BODY MASS INDEX: 33.13 KG/M2 | WEIGHT: 187 LBS | HEART RATE: 88 BPM | SYSTOLIC BLOOD PRESSURE: 128 MMHG | DIASTOLIC BLOOD PRESSURE: 78 MMHG

## 2025-01-03 DIAGNOSIS — J96.01 ACUTE RESPIRATORY FAILURE WITH HYPOXIA: ICD-10-CM

## 2025-01-03 DIAGNOSIS — I50.33 ACUTE ON CHRONIC DIASTOLIC CONGESTIVE HEART FAILURE (HCC): ICD-10-CM

## 2025-01-03 DIAGNOSIS — I25.10 CORONARY ARTERY DISEASE INVOLVING NATIVE CORONARY ARTERY OF NATIVE HEART WITHOUT ANGINA PECTORIS: ICD-10-CM

## 2025-01-03 DIAGNOSIS — G47.33 OSA (OBSTRUCTIVE SLEEP APNEA): ICD-10-CM

## 2025-01-03 DIAGNOSIS — R09.89 BILATERAL CAROTID BRUITS: ICD-10-CM

## 2025-01-03 DIAGNOSIS — E78.2 MIXED HYPERLIPIDEMIA: Primary | ICD-10-CM

## 2025-01-03 DIAGNOSIS — E78.5 DYSLIPIDEMIA: ICD-10-CM

## 2025-01-03 DIAGNOSIS — I10 ESSENTIAL HYPERTENSION: ICD-10-CM

## 2025-01-03 ASSESSMENT — ENCOUNTER SYMPTOMS
COUGH: 0
STRIDOR: 0
EYES NEGATIVE: 1
SHORTNESS OF BREATH: 0
RESPIRATORY NEGATIVE: 1
WHEEZING: 0
CHEST TIGHTNESS: 0
GASTROINTESTINAL NEGATIVE: 1
NAUSEA: 0
BLOOD IN STOOL: 0

## 2025-01-03 NOTE — PROGRESS NOTES
Subsequent Progress Note    Patient: Paty Sheridan  YOB: 1970  MRN: [id number]    Chief Complaint: HTN cp sob   Chief Complaint   Patient presents with    Follow-up    Hypertension       CV Data:  2008 Cath negative  8/2016 spect negative   1/21 spect negative  6/23 Echo EF 60  10/27/23 LAD BENJAMIN x1  RCA 60-70. CX 50%  12/2/23 Echo EF 50-55 Trace PE  12/23 SPECT negative 69%  2/24 CUS - mild   5/24 Echo EF 55-60  12/27/24 Cath LAD stent patent distal 40%.  CX BENJAMIN     Subjective/HPI: no cp breathing ok no falls no bleed     2/3/21 TELEHEALTH EVALUATION -- Audio/Visual (During COVID-19 public health emergency)    Less sob no cp no falls no bleed takes meds    7/21/23 recent hosp for N/V hypoK palps. [facility]NDE[facility]. Had PNA. Finished ABX.  Still [facility]Z. No cp no falls no bleed.     12/19/23 recent LAD BENJAMIN. Doing much better no cp no sob no falls no bleed takes meds.     4/24/24 doing well no cp no sob no falls no bleed.     6/11/24 [facility] hosp for CHF. On low dose Lasix 20 + K. K wasa low so was advanced. No cp no falls no blee.d     1/3/25  recent ACS and received CX BENJAMIN. Doing better. Stil has shoulder and neck discomfort.  Some HERNANDEZ    strong FH  Nonsmoker  Work - head start  Lives alone       EKG:     Past Medical History:   Diagnosis Date    Acute kidney injury (HCC) 10/24/2023    CAD (coronary artery disease) 06/25/2022    CAD (coronary artery disease) 06/25/2022    CHF (congestive heart failure) (HCC)     Depression     Environmental allergies     Gastroparesis     GERD (gastroesophageal reflux disease)     Headache     Dr. Neri    Heart attack (HCC)     HPV in female     Hyperlipidemia     Hypertension     Leukocytosis     Migraines     Type II or unspecified type diabetes mellitus without mention of complication, not stated as uncontrolled        Past Surgical History:   Procedure Laterality Date    BONE MARROW BIOPSY  2012    (-)[facility]    BREAST REDUCTION SURGERY Bilateral 1999    BREAST SURGERY

## 2025-01-07 LAB — ECHO BSA: 1.98 M2

## 2025-01-08 ENCOUNTER — OFFICE VISIT (OUTPATIENT)
Dept: FAMILY MEDICINE CLINIC | Age: 55
End: 2025-01-08

## 2025-01-08 VITALS
BODY MASS INDEX: 33.13 KG/M2 | DIASTOLIC BLOOD PRESSURE: 80 MMHG | OXYGEN SATURATION: 96 % | HEART RATE: 102 BPM | TEMPERATURE: 98.5 F | HEIGHT: 63 IN | WEIGHT: 187 LBS | SYSTOLIC BLOOD PRESSURE: 128 MMHG

## 2025-01-08 DIAGNOSIS — R53.83 FATIGUE, UNSPECIFIED TYPE: ICD-10-CM

## 2025-01-08 DIAGNOSIS — R52 GENERALIZED BODY ACHES: ICD-10-CM

## 2025-01-08 DIAGNOSIS — R19.7 DIARRHEA, UNSPECIFIED TYPE: ICD-10-CM

## 2025-01-08 DIAGNOSIS — R11.2 NAUSEA AND VOMITING, UNSPECIFIED VOMITING TYPE: ICD-10-CM

## 2025-01-08 DIAGNOSIS — A08.4 VIRAL GASTROENTERITIS: Primary | ICD-10-CM

## 2025-01-08 DIAGNOSIS — R68.83 CHILLS: ICD-10-CM

## 2025-01-08 DIAGNOSIS — R05.1 ACUTE COUGH: ICD-10-CM

## 2025-01-08 DIAGNOSIS — E78.2 MIXED HYPERLIPIDEMIA: ICD-10-CM

## 2025-01-08 LAB
INFLUENZA A ANTIBODY: NORMAL
INFLUENZA B ANTIBODY: NORMAL
Lab: NORMAL
PERFORMING INSTRUMENT: NORMAL
QC PASS/FAIL: NORMAL
SARS-COV-2, POC: NORMAL

## 2025-01-08 RX ORDER — CLOPIDOGREL BISULFATE 75 MG/1
75 TABLET ORAL DAILY
Qty: 90 TABLET | Refills: 3 | Status: SHIPPED | OUTPATIENT
Start: 2025-01-08

## 2025-01-08 RX ORDER — FUROSEMIDE 40 MG/1
40 TABLET ORAL DAILY
Qty: 90 TABLET | Refills: 3 | Status: SHIPPED | OUTPATIENT
Start: 2025-01-08

## 2025-01-08 RX ORDER — ONDANSETRON 4 MG/1
4 TABLET, ORALLY DISINTEGRATING ORAL
Qty: 21 TABLET | Refills: 0 | Status: SHIPPED | OUTPATIENT
Start: 2025-01-08

## 2025-01-08 ASSESSMENT — ENCOUNTER SYMPTOMS
SORE THROAT: 0
COUGH: 1
NAUSEA: 1
ABDOMINAL PAIN: 0
RHINORRHEA: 0

## 2025-01-08 NOTE — TELEPHONE ENCOUNTER
Requesting medication refill. Please approve or deny this request.    Rx requested:  Requested Prescriptions     Pending Prescriptions Disp Refills    furosemide (LASIX) 40 MG tablet [Pharmacy Med Name: FUROSEMIDE TABS 40MG]  0    clopidogrel (PLAVIX) 75 MG tablet [Pharmacy Med Name: CLOPIDOGREL BISULFATE TABS 75MG]  0         Last Office Visit:   1/3/2025      Next Visit Date:  Future Appointments   Date Time Provider Department Center   1/21/2025  2:45 PM Elana Márquez DO [facility]   7/15/2025  4:00 PM Emigdio Rivera MD [facility]   9/2/2025  3:15 PM Jay, Shasta A, MD [facility]

## 2025-01-08 NOTE — PROGRESS NOTES
Subjective  Paty Sheridan, 54 y.o. female presents today with:  Chief Complaint   Patient presents with    URI     States Sunday night started vomiting, diarrhea, body aches and today she feels very tired, chills at night, sweating a lot and a little bit of a cough.        HPI  Presents to walk-in clinic for acute illness   Symptoms began Sunday   C/o N/V/D, body aches, chills, sweating, otalgia, feeling lightheaded & cough   Fatigued   Cough is dry   Denies chest tightness or SOB   Able to keep food down last night   Hydrating over last couple of days well   Not monitoring temp                           Past Medical History:   Diagnosis Date    Acute kidney injury (HCC) 10/24/2023    CAD (coronary artery disease) 06/25/2022    CAD (coronary artery disease) 06/25/2022    CHF (congestive heart failure) (HCC)     Depression     Environmental allergies     Gastroparesis     GERD (gastroesophageal reflux disease)     Headache     Dr. Neri    Heart attack (HCC)     HPV in female     Hyperlipidemia     Hypertension     Leukocytosis     Migraines     Type II or unspecified type diabetes mellitus without mention of complication, not stated as uncontrolled       Past Surgical History:   Procedure Laterality Date    BONE MARROW BIOPSY  2012    (-)[facility]    BREAST REDUCTION SURGERY Bilateral 1999    BREAST SURGERY  1999    CARDIAC CATHETERIZATION  2009    (-)[facility]    CARDIAC PROCEDURE N/A 10/30/2023    Left heart cath / coronary angiography performed by Salazar Brower DO at [facility] CARDIAC CATH LAB    CARDIAC PROCEDURE N/A 10/30/2023    Percutaneous coronary intervention performed by Salazar Brower DO at [facility] CARDIAC CATH LAB    CARDIAC PROCEDURE N/A 12/27/2024    Left heart cath / coronary angiography performed by Salazar Brower DO at [facility] CARDIAC CATH LAB    CARDIAC PROCEDURE N/A 12/27/2024    Percutaneous coronary intervention performed by Salazar Brower DO at [facility] CARDIAC CATH LAB    CHOLECYSTECTOMY

## 2025-01-14 ENCOUNTER — PATIENT OUTREACH (OUTPATIENT)
Dept: PRIMARY CARE | Facility: CLINIC | Age: 55
End: 2025-01-14
Payer: COMMERCIAL

## 2025-01-20 SDOH — HEALTH STABILITY: PHYSICAL HEALTH: ON AVERAGE, HOW MANY DAYS PER WEEK DO YOU ENGAGE IN MODERATE TO STRENUOUS EXERCISE (LIKE A BRISK WALK)?: 0 DAYS

## 2025-01-20 SDOH — HEALTH STABILITY: PHYSICAL HEALTH: ON AVERAGE, HOW MANY MINUTES DO YOU ENGAGE IN EXERCISE AT THIS LEVEL?: 0 MIN

## 2025-01-21 ENCOUNTER — OFFICE VISIT (OUTPATIENT)
Age: 55
End: 2025-01-21
Payer: COMMERCIAL

## 2025-01-21 VITALS
HEART RATE: 94 BPM | HEIGHT: 63 IN | OXYGEN SATURATION: 96 % | TEMPERATURE: 97.6 F | SYSTOLIC BLOOD PRESSURE: 136 MMHG | DIASTOLIC BLOOD PRESSURE: 64 MMHG | BODY MASS INDEX: 34.02 KG/M2 | WEIGHT: 192 LBS

## 2025-01-21 DIAGNOSIS — E78.2 MIXED HYPERLIPIDEMIA: Chronic | ICD-10-CM

## 2025-01-21 DIAGNOSIS — R00.2 PALPITATIONS: ICD-10-CM

## 2025-01-21 DIAGNOSIS — I25.10 CORONARY ARTERY DISEASE INVOLVING NATIVE HEART, UNSPECIFIED VESSEL OR LESION TYPE, UNSPECIFIED WHETHER ANGINA PRESENT: Chronic | ICD-10-CM

## 2025-01-21 DIAGNOSIS — F31.81 BIPOLAR 2 DISORDER (HCC): ICD-10-CM

## 2025-01-21 DIAGNOSIS — E11.69 TYPE 2 DIABETES MELLITUS WITH OTHER SPECIFIED COMPLICATION, UNSPECIFIED WHETHER LONG TERM INSULIN USE (HCC): ICD-10-CM

## 2025-01-21 DIAGNOSIS — Z76.89 ENCOUNTER TO ESTABLISH CARE WITH NEW DOCTOR: Primary | ICD-10-CM

## 2025-01-21 DIAGNOSIS — E83.42 HYPOMAGNESEMIA: ICD-10-CM

## 2025-01-21 DIAGNOSIS — K21.9 GASTROESOPHAGEAL REFLUX DISEASE, UNSPECIFIED WHETHER ESOPHAGITIS PRESENT: ICD-10-CM

## 2025-01-21 DIAGNOSIS — K59.00 CONSTIPATION, UNSPECIFIED CONSTIPATION TYPE: ICD-10-CM

## 2025-01-21 DIAGNOSIS — E87.6 HYPOKALEMIA: ICD-10-CM

## 2025-01-21 LAB — TSH REFLEX: 0.32 UIU/ML (ref 0.44–3.86)

## 2025-01-21 PROCEDURE — 1036F TOBACCO NON-USER: CPT | Performed by: STUDENT IN AN ORGANIZED HEALTH CARE EDUCATION/TRAINING PROGRAM

## 2025-01-21 PROCEDURE — 1111F DSCHRG MED/CURRENT MED MERGE: CPT | Performed by: STUDENT IN AN ORGANIZED HEALTH CARE EDUCATION/TRAINING PROGRAM

## 2025-01-21 PROCEDURE — 3078F DIAST BP <80 MM HG: CPT | Performed by: STUDENT IN AN ORGANIZED HEALTH CARE EDUCATION/TRAINING PROGRAM

## 2025-01-21 PROCEDURE — 2022F DILAT RTA XM EVC RTNOPTHY: CPT | Performed by: STUDENT IN AN ORGANIZED HEALTH CARE EDUCATION/TRAINING PROGRAM

## 2025-01-21 PROCEDURE — 3075F SYST BP GE 130 - 139MM HG: CPT | Performed by: STUDENT IN AN ORGANIZED HEALTH CARE EDUCATION/TRAINING PROGRAM

## 2025-01-21 PROCEDURE — 99215 OFFICE O/P EST HI 40 MIN: CPT | Performed by: STUDENT IN AN ORGANIZED HEALTH CARE EDUCATION/TRAINING PROGRAM

## 2025-01-21 PROCEDURE — G8427 DOCREV CUR MEDS BY ELIG CLIN: HCPCS | Performed by: STUDENT IN AN ORGANIZED HEALTH CARE EDUCATION/TRAINING PROGRAM

## 2025-01-21 PROCEDURE — G8417 CALC BMI ABV UP PARAM F/U: HCPCS | Performed by: STUDENT IN AN ORGANIZED HEALTH CARE EDUCATION/TRAINING PROGRAM

## 2025-01-21 PROCEDURE — 3017F COLORECTAL CA SCREEN DOC REV: CPT | Performed by: STUDENT IN AN ORGANIZED HEALTH CARE EDUCATION/TRAINING PROGRAM

## 2025-01-21 PROCEDURE — 3046F HEMOGLOBIN A1C LEVEL >9.0%: CPT | Performed by: STUDENT IN AN ORGANIZED HEALTH CARE EDUCATION/TRAINING PROGRAM

## 2025-01-21 RX ORDER — MAGNESIUM OXIDE 400 MG/1
400 TABLET ORAL DAILY
Qty: 90 TABLET | Refills: 1 | Status: SHIPPED | OUTPATIENT
Start: 2025-01-21

## 2025-01-21 RX ORDER — ACYCLOVIR 400 MG/1
TABLET ORAL
COMMUNITY
Start: 2025-01-07

## 2025-01-21 RX ORDER — TIRZEPATIDE 2.5 MG/.5ML
INJECTION, SOLUTION SUBCUTANEOUS
COMMUNITY
Start: 2025-01-13

## 2025-01-21 RX ORDER — CARIPRAZINE 3 MG/1
3 CAPSULE, GELATIN COATED ORAL DAILY
Qty: 90 CAPSULE | Refills: 1 | Status: SHIPPED | OUTPATIENT
Start: 2025-01-21 | End: 2025-04-21

## 2025-01-21 RX ORDER — POTASSIUM CHLORIDE 1500 MG/1
20 TABLET, EXTENDED RELEASE ORAL 2 TIMES DAILY
Qty: 180 TABLET | Refills: 1 | Status: SHIPPED | OUTPATIENT
Start: 2025-01-21 | End: 2025-04-21

## 2025-01-21 RX ORDER — ATORVASTATIN CALCIUM 80 MG/1
80 TABLET, FILM COATED ORAL DAILY
COMMUNITY
Start: 2025-01-20

## 2025-01-21 RX ORDER — INSULIN GLARGINE 100 [IU]/ML
50 INJECTION, SOLUTION SUBCUTANEOUS NIGHTLY
COMMUNITY
Start: 2025-01-07 | End: 2026-01-07

## 2025-01-21 RX ORDER — OMEPRAZOLE 40 MG/1
40 CAPSULE, DELAYED RELEASE ORAL
Qty: 90 CAPSULE | Refills: 1 | Status: SHIPPED | OUTPATIENT
Start: 2025-01-21

## 2025-01-21 ASSESSMENT — PATIENT HEALTH QUESTIONNAIRE - PHQ9
SUM OF ALL RESPONSES TO PHQ QUESTIONS 1-9: 0
10. IF YOU CHECKED OFF ANY PROBLEMS, HOW DIFFICULT HAVE THESE PROBLEMS MADE IT FOR YOU TO DO YOUR WORK, TAKE CARE OF THINGS AT HOME, OR GET ALONG WITH OTHER PEOPLE: NOT DIFFICULT AT ALL
4. FEELING TIRED OR HAVING LITTLE ENERGY: NOT AT ALL
6. FEELING BAD ABOUT YOURSELF - OR THAT YOU ARE A FAILURE OR HAVE LET YOURSELF OR YOUR FAMILY DOWN: NOT AT ALL
9. THOUGHTS THAT YOU WOULD BE BETTER OFF DEAD, OR OF HURTING YOURSELF: NOT AT ALL
5. POOR APPETITE OR OVEREATING: NOT AT ALL
SUM OF ALL RESPONSES TO PHQ9 QUESTIONS 1 & 2: 0
1. LITTLE INTEREST OR PLEASURE IN DOING THINGS: NOT AT ALL
SUM OF ALL RESPONSES TO PHQ QUESTIONS 1-9: 0
2. FEELING DOWN, DEPRESSED OR HOPELESS: NOT AT ALL
7. TROUBLE CONCENTRATING ON THINGS, SUCH AS READING THE NEWSPAPER OR WATCHING TELEVISION: NOT AT ALL
3. TROUBLE FALLING OR STAYING ASLEEP: NOT AT ALL
8. MOVING OR SPEAKING SO SLOWLY THAT OTHER PEOPLE COULD HAVE NOTICED. OR THE OPPOSITE, BEING SO FIGETY OR RESTLESS THAT YOU HAVE BEEN MOVING AROUND A LOT MORE THAN USUAL: NOT AT ALL

## 2025-01-21 NOTE — PROGRESS NOTES
focus on dietary changes, including meal prepping to avoid eating out.    4. Palpitations.  She experiences palpitations at night despite taking metoprolol 25 mg at night. She is advised to discuss the possibility of increasing the metoprolol dosage with her cardiologist, Dr. Rivera. A thyroid test will be ordered today to rule out thyroid-related causes of palpitations.    5. Bipolar Disorder.  Continue to monitor. She is stable on her current medications: Lexapro 20 mg, Vraylar 3 mg, and trazodone for sleep. A 90-day supply of Vraylar will be sent to [facility]. She is advised to continue her current medication regimen and follow up with her family doctor for any psychiatric needs.    6. Thyroid Nodules.  Continue to monitor. She has small thyroid nodules that are not concerning according to her endocrinologist. A thyroid test will be ordered today to ensure thyroid function is normal.    7. Gastroesophageal Reflux Disease (GERD).  Stable, chronic. She experiences throat burning and has been taking omeprazole 20 mg twice a day. A prescription for omeprazole 40 mg once a day will be sent to [facility] to simplify her regimen and potentially improve coverage.    8. Constipation.  Stable, chronic. She takes Colace twice a day and is advised to switch to a generic stool softener. She is informed that Mounjaro can worsen constipation and should monitor her symptoms.    9. Hypertension.  Stable, chronic. Her blood pressure is borderline elevated today. She has a history of proteinuria and was previously on lisinopril and propranolol until her first stent placement in 10/2023. She was then switched to metoprolol. She has not been advised to resume lisinopril. She is advised to discuss the possibility of restarting lisinopril with her endocrinologist to protect her kidneys.    10. CAD  Stable, chronic. Continue meds. Continue to follow with cardiology     Follow-up  The patient will follow up in 3

## 2025-01-22 ENCOUNTER — TELEPHONE (OUTPATIENT)
Age: 55
End: 2025-01-22

## 2025-01-22 ENCOUNTER — PATIENT OUTREACH (OUTPATIENT)
Dept: PRIMARY CARE | Facility: CLINIC | Age: 55
End: 2025-01-22
Payer: COMMERCIAL

## 2025-01-22 NOTE — TELEPHONE ENCOUNTER
[facility] called to report that there is a drug interaction between omeprazole and clopidogrel. They are requesting a call back for an ok to fill.    [phone], Ref # [id number]      Thank you.

## 2025-02-11 ENCOUNTER — TELEPHONE (OUTPATIENT)
Dept: CARDIOLOGY CLINIC | Age: 55
End: 2025-02-11

## 2025-02-11 DIAGNOSIS — G47.33 OSA (OBSTRUCTIVE SLEEP APNEA): Primary | ICD-10-CM

## 2025-02-11 NOTE — TELEPHONE ENCOUNTER
Referral for Dr. Savannah briscoe.   Called patient to notify her of new referral. No answer, voicemail left for patient. My chart message also sent to notify the patient of the new referral.

## 2025-02-26 ENCOUNTER — OFFICE VISIT (OUTPATIENT)
Dept: FAMILY MEDICINE CLINIC | Age: 55
End: 2025-02-26

## 2025-02-26 VITALS
SYSTOLIC BLOOD PRESSURE: 126 MMHG | HEART RATE: 105 BPM | HEIGHT: 62 IN | WEIGHT: 190.4 LBS | OXYGEN SATURATION: 97 % | TEMPERATURE: 98.6 F | BODY MASS INDEX: 35.04 KG/M2 | DIASTOLIC BLOOD PRESSURE: 84 MMHG

## 2025-02-26 DIAGNOSIS — B96.89 ACUTE BACTERIAL RHINOSINUSITIS: Primary | ICD-10-CM

## 2025-02-26 DIAGNOSIS — J02.9 ACUTE PHARYNGITIS, UNSPECIFIED ETIOLOGY: ICD-10-CM

## 2025-02-26 DIAGNOSIS — T36.95XA ANTIBIOTIC-INDUCED YEAST INFECTION: ICD-10-CM

## 2025-02-26 DIAGNOSIS — B37.9 ANTIBIOTIC-INDUCED YEAST INFECTION: ICD-10-CM

## 2025-02-26 DIAGNOSIS — R09.82 PND (POST-NASAL DRIP): ICD-10-CM

## 2025-02-26 DIAGNOSIS — J01.90 ACUTE BACTERIAL RHINOSINUSITIS: Primary | ICD-10-CM

## 2025-02-26 LAB
INFLUENZA A ANTIBODY: NORMAL
INFLUENZA B ANTIBODY: NORMAL
Lab: NORMAL
PERFORMING INSTRUMENT: NORMAL
QC PASS/FAIL: NORMAL
S PYO AG THROAT QL: NORMAL
SARS-COV-2, POC: NORMAL

## 2025-02-26 RX ORDER — FLUTICASONE PROPIONATE 50 MCG
SPRAY, SUSPENSION (ML) NASAL
Qty: 16 G | Refills: 1 | Status: SHIPPED | OUTPATIENT
Start: 2025-02-26

## 2025-02-26 RX ORDER — FLUCONAZOLE 150 MG/1
150 TABLET ORAL
Qty: 3 TABLET | Refills: 0 | Status: SHIPPED | OUTPATIENT
Start: 2025-02-26 | End: 2025-03-07

## 2025-02-26 RX ORDER — GLIPIZIDE 10 MG/1
10 TABLET ORAL
COMMUNITY
Start: 2025-01-23 | End: 2026-01-23

## 2025-02-26 ASSESSMENT — ENCOUNTER SYMPTOMS
RHINORRHEA: 1
COUGH: 1
SHORTNESS OF BREATH: 0
SINUS PRESSURE: 0
SORE THROAT: 1
NAUSEA: 0
DIARRHEA: 0
ABDOMINAL PAIN: 0
CHEST TIGHTNESS: 0

## 2025-02-26 NOTE — PROGRESS NOTES
Subjective  Paty Sheridan, 54 y.o. female presents today with:  Chief Complaint   Patient presents with    Pharyngitis     Onset- 3 dayas   Headache- yes   Body aches- no   Ear ache- bilat   Runny/stuffy nose- yes   Problem with smell- no   Problem with taste- no   Sore throat- yes   Cough- yes   Scratchy throat-yes   SOB/ HERNANDEZ- no   Hx of Asthma Chest cold or bronchitis-no   Fever/chills- yes   Nausea/ vomiting- no   Gi symptoms- no   COVID exposures- yes   Treatments- Yes Advil        HPI  Presents to walk-in clinic for acute illness   Symptoms began 2 days prior   C/o sore throat, sweating, headache, nasal congestion, cough & fatigue  Not monitoring temp   Throat discomfort with swallowing   Awake at night d/t throat   Denies chest tightness or SOB   Low appetite   Hydrating   Taking Advil               Past Medical History:   Diagnosis Date    Acute kidney injury 10/24/2023    CAD (coronary artery disease) 06/25/2022    CAD (coronary artery disease) 06/25/2022    CHF (congestive heart failure) (HCC)     Depression     Environmental allergies     Gastroparesis     GERD (gastroesophageal reflux disease)     Headache     Dr. Neri    Heart attack (HCC)     HPV in female     Hyperlipidemia     Hypertension     Leukocytosis     Migraines     Type II or unspecified type diabetes mellitus without mention of complication, not stated as uncontrolled       Past Surgical History:   Procedure Laterality Date    BONE MARROW BIOPSY  2012    (-[facility]    BREAST REDUCTION SURGERY Bilateral 1999    BREAST SURGERY  1999    CARDIAC CATHETERIZATION  2009    (-)[facility]    CARDIAC PROCEDURE N/A 10/30/2023    Left heart cath / coronary angiography performed by Salazar Brower DO at [facility] [place] [facility]ARDI[facility]    CARDIAC PROCEDURE N/A 10/30/2023    Percutaneous coronary intervention performed by Salazar Brower DO at [facility] [place] CARDIAC CATH LAB    CARDIAC PROCEDURE N/A 12/27/2024    Left heart cath / coronary angiography performed by

## 2025-03-03 ENCOUNTER — OFFICE VISIT (OUTPATIENT)
Age: 55
End: 2025-03-03
Payer: COMMERCIAL

## 2025-03-03 VITALS
OXYGEN SATURATION: 98 % | BODY MASS INDEX: 35.88 KG/M2 | HEIGHT: 62 IN | TEMPERATURE: 97.1 F | SYSTOLIC BLOOD PRESSURE: 136 MMHG | HEART RATE: 93 BPM | DIASTOLIC BLOOD PRESSURE: 74 MMHG | WEIGHT: 195 LBS

## 2025-03-03 DIAGNOSIS — R06.02 SHORTNESS OF BREATH: ICD-10-CM

## 2025-03-03 DIAGNOSIS — I50.33 ACUTE ON CHRONIC DIASTOLIC CHF (CONGESTIVE HEART FAILURE) (HCC): ICD-10-CM

## 2025-03-03 DIAGNOSIS — J40 BRONCHITIS: ICD-10-CM

## 2025-03-03 DIAGNOSIS — R05.1 ACUTE COUGH: Primary | ICD-10-CM

## 2025-03-03 PROCEDURE — 3078F DIAST BP <80 MM HG: CPT | Performed by: STUDENT IN AN ORGANIZED HEALTH CARE EDUCATION/TRAINING PROGRAM

## 2025-03-03 PROCEDURE — G8427 DOCREV CUR MEDS BY ELIG CLIN: HCPCS | Performed by: STUDENT IN AN ORGANIZED HEALTH CARE EDUCATION/TRAINING PROGRAM

## 2025-03-03 PROCEDURE — G8417 CALC BMI ABV UP PARAM F/U: HCPCS | Performed by: STUDENT IN AN ORGANIZED HEALTH CARE EDUCATION/TRAINING PROGRAM

## 2025-03-03 PROCEDURE — 99214 OFFICE O/P EST MOD 30 MIN: CPT | Performed by: STUDENT IN AN ORGANIZED HEALTH CARE EDUCATION/TRAINING PROGRAM

## 2025-03-03 PROCEDURE — 3017F COLORECTAL CA SCREEN DOC REV: CPT | Performed by: STUDENT IN AN ORGANIZED HEALTH CARE EDUCATION/TRAINING PROGRAM

## 2025-03-03 PROCEDURE — 3075F SYST BP GE 130 - 139MM HG: CPT | Performed by: STUDENT IN AN ORGANIZED HEALTH CARE EDUCATION/TRAINING PROGRAM

## 2025-03-03 PROCEDURE — 1036F TOBACCO NON-USER: CPT | Performed by: STUDENT IN AN ORGANIZED HEALTH CARE EDUCATION/TRAINING PROGRAM

## 2025-03-03 RX ORDER — AZITHROMYCIN 250 MG/1
TABLET, FILM COATED ORAL
Qty: 6 TABLET | Refills: 0 | Status: SHIPPED | OUTPATIENT
Start: 2025-03-03 | End: 2025-03-13

## 2025-03-03 RX ORDER — TIRZEPATIDE 5 MG/.5ML
INJECTION, SOLUTION SUBCUTANEOUS
COMMUNITY
Start: 2025-02-09

## 2025-03-14 ENCOUNTER — PATIENT OUTREACH (OUTPATIENT)
Dept: PRIMARY CARE | Facility: CLINIC | Age: 55
End: 2025-03-14
Payer: COMMERCIAL

## 2025-03-24 ENCOUNTER — TELEPHONE (OUTPATIENT)
Dept: CARDIOLOGY CLINIC | Age: 55
End: 2025-03-24

## 2025-03-24 DIAGNOSIS — R06.09 DOE (DYSPNEA ON EXERTION): Primary | ICD-10-CM

## 2025-03-24 RX ORDER — SPIRONOLACTONE 50 MG/1
50 TABLET, FILM COATED ORAL DAILY
Qty: 90 TABLET | Refills: 1 | Status: SHIPPED | OUTPATIENT
Start: 2025-03-24

## 2025-03-24 NOTE — TELEPHONE ENCOUNTER
Per Dr. Rivera:  Add Spironolactone 50 qd.  BMP in 4 weeks then OV.     New Rx for spironolactone 50mg daily sent to [facility] for patient. BMP ordered. Called and spoke to patient to notify her of Dr. Rivera's instructions. Patient verbalized understanding. Patient scheduled for appointment with Dr. Rivera 05/02/2025 315PM. Patient confirmed date and time.

## 2025-03-24 NOTE — TELEPHONE ENCOUNTER
Pt called in and states that she is short of breath on exertion and she's is having swelling in her legs. Pt wants to know if she needs to make a appt to be seen sooner, or if some of her meds can be switched around. Please advise

## 2025-04-03 ENCOUNTER — HOSPITAL ENCOUNTER (EMERGENCY)
Age: 55
Discharge: HOME | End: 2025-04-03
Attending: STUDENT IN AN ORGANIZED HEALTH CARE EDUCATION/TRAINING PROGRAM
Payer: COMMERCIAL

## 2025-04-03 ENCOUNTER — APPOINTMENT (OUTPATIENT)
Dept: GENERAL RADIOLOGY | Age: 55
End: 2025-04-03
Payer: COMMERCIAL

## 2025-04-03 VITALS
SYSTOLIC BLOOD PRESSURE: 121 MMHG | HEIGHT: 63 IN | TEMPERATURE: 97.3 F | BODY MASS INDEX: 33.66 KG/M2 | RESPIRATION RATE: 18 BRPM | DIASTOLIC BLOOD PRESSURE: 59 MMHG | WEIGHT: 190 LBS | HEART RATE: 90 BPM | OXYGEN SATURATION: 94 %

## 2025-04-03 DIAGNOSIS — E87.6 HYPOKALEMIA: ICD-10-CM

## 2025-04-03 DIAGNOSIS — R06.02 SHORTNESS OF BREATH: ICD-10-CM

## 2025-04-03 DIAGNOSIS — B34.2 CORONAVIRUS INFECTION: Primary | ICD-10-CM

## 2025-04-03 DIAGNOSIS — J06.9 VIRAL UPPER RESPIRATORY ILLNESS: ICD-10-CM

## 2025-04-03 LAB
ANION GAP SERPL CALCULATED.3IONS-SCNC: 9 MEQ/L (ref 9–15)
B PARAP IS1001 DNA NPH QL NAA+NON-PROBE: NOT DETECTED
B PERT.PT PRMT NPH QL NAA+NON-PROBE: NOT DETECTED
BASOPHILS # BLD: 0.1 K/UL (ref 0–0.2)
BASOPHILS NFR BLD: 0.5 %
BNP BLD-MCNC: 112 PG/ML
BUN SERPL-MCNC: 10 MG/DL (ref 6–20)
C PNEUM DNA NPH QL NAA+NON-PROBE: NOT DETECTED
CALCIUM SERPL-MCNC: 9.2 MG/DL (ref 8.5–9.9)
CHLORIDE SERPL-SCNC: 99 MEQ/L (ref 95–107)
CO2 SERPL-SCNC: 32 MEQ/L (ref 20–31)
CREAT SERPL-MCNC: 0.58 MG/DL (ref 0.5–0.9)
EOSINOPHIL # BLD: 0.3 K/UL (ref 0–0.7)
EOSINOPHIL NFR BLD: 2.3 %
ERYTHROCYTE [DISTWIDTH] IN BLOOD BY AUTOMATED COUNT: 13.5 % (ref 11.5–14.5)
FLUAV RNA NPH QL NAA+NON-PROBE: NOT DETECTED
FLUBV RNA NPH QL NAA+NON-PROBE: NOT DETECTED
GLUCOSE SERPL-MCNC: 152 MG/DL (ref 70–99)
HADV DNA NPH QL NAA+NON-PROBE: NOT DETECTED
HCOV 229E RNA NPH QL NAA+NON-PROBE: NOT DETECTED
HCOV HKU1 RNA NPH QL NAA+NON-PROBE: NOT DETECTED
HCOV NL63 RNA NPH QL NAA+NON-PROBE: NOT DETECTED
HCOV OC43 RNA NPH QL NAA+NON-PROBE: DETECTED
HCT VFR BLD AUTO: 32.7 % (ref 37–47)
HGB BLD-MCNC: 11.1 G/DL (ref 12–16)
HMPV RNA NPH QL NAA+NON-PROBE: NOT DETECTED
HPIV1 RNA NPH QL NAA+NON-PROBE: NOT DETECTED
HPIV2 RNA NPH QL NAA+NON-PROBE: NOT DETECTED
HPIV3 RNA NPH QL NAA+NON-PROBE: NOT DETECTED
HPIV4 RNA NPH QL NAA+NON-PROBE: NOT DETECTED
LYMPHOCYTES # BLD: 2.8 K/UL (ref 1–4.8)
LYMPHOCYTES NFR BLD: 21.2 %
M PNEUMO DNA NPH QL NAA+NON-PROBE: NOT DETECTED
MCH RBC QN AUTO: 30.1 PG (ref 27–31.3)
MCHC RBC AUTO-ENTMCNC: 33.9 % (ref 33–37)
MCV RBC AUTO: 88.6 FL (ref 79.4–94.8)
MONOCYTES # BLD: 1 K/UL (ref 0.2–0.8)
MONOCYTES NFR BLD: 7.6 %
NEUTROPHILS # BLD: 9.1 K/UL (ref 1.4–6.5)
NEUTS SEG NFR BLD: 67.9 %
PLATELET # BLD AUTO: 293 K/UL (ref 130–400)
POTASSIUM SERPL-SCNC: 3 MEQ/L (ref 3.4–4.9)
RBC # BLD AUTO: 3.69 M/UL (ref 4.2–5.4)
RSV RNA NPH QL NAA+NON-PROBE: NOT DETECTED
RV+EV RNA NPH QL NAA+NON-PROBE: NOT DETECTED
SARS-COV-2 RNA NPH QL NAA+NON-PROBE: NOT DETECTED
SODIUM SERPL-SCNC: 140 MEQ/L (ref 135–144)
TROPONIN, HIGH SENSITIVITY: 8 NG/L (ref 0–19)
TROPONIN, HIGH SENSITIVITY: 8 NG/L (ref 0–19)
WBC # BLD AUTO: 13.3 K/UL (ref 4.8–10.8)

## 2025-04-03 PROCEDURE — 84484 ASSAY OF TROPONIN QUANT: CPT

## 2025-04-03 PROCEDURE — 99285 EMERGENCY DEPT VISIT HI MDM: CPT

## 2025-04-03 PROCEDURE — 93005 ELECTROCARDIOGRAM TRACING: CPT | Performed by: STUDENT IN AN ORGANIZED HEALTH CARE EDUCATION/TRAINING PROGRAM

## 2025-04-03 PROCEDURE — 83880 ASSAY OF NATRIURETIC PEPTIDE: CPT

## 2025-04-03 PROCEDURE — 71046 X-RAY EXAM CHEST 2 VIEWS: CPT

## 2025-04-03 PROCEDURE — 85025 COMPLETE CBC W/AUTO DIFF WBC: CPT

## 2025-04-03 PROCEDURE — 0202U NFCT DS 22 TRGT SARS-COV-2: CPT

## 2025-04-03 PROCEDURE — 80048 BASIC METABOLIC PNL TOTAL CA: CPT

## 2025-04-03 PROCEDURE — 6370000000 HC RX 637 (ALT 250 FOR IP): Performed by: STUDENT IN AN ORGANIZED HEALTH CARE EDUCATION/TRAINING PROGRAM

## 2025-04-03 RX ORDER — POTASSIUM CHLORIDE 1500 MG/1
40 TABLET, EXTENDED RELEASE ORAL ONCE
Status: COMPLETED | OUTPATIENT
Start: 2025-04-03 | End: 2025-04-03

## 2025-04-03 RX ADMIN — POTASSIUM CHLORIDE 40 MEQ: 1500 TABLET, EXTENDED RELEASE ORAL at 15:28

## 2025-04-03 ASSESSMENT — PAIN - FUNCTIONAL ASSESSMENT: PAIN_FUNCTIONAL_ASSESSMENT: NONE - DENIES PAIN

## 2025-04-03 ASSESSMENT — LIFESTYLE VARIABLES
HOW MANY STANDARD DRINKS CONTAINING ALCOHOL DO YOU HAVE ON A TYPICAL DAY: 1 OR 2
HOW OFTEN DO YOU HAVE A DRINK CONTAINING ALCOHOL: 2-4 TIMES A MONTH

## 2025-04-03 NOTE — DISCHARGE INSTRUCTIONS
You were seen in the ER today due to concern for shortness of breath that is increased for the past 3-4 days.  Chest x-ray was obtained here in the ED that did not reveal any signs of pneumonia nor any fluid buildup which is reassuring.  Your heart enzymes known as troponins were normal along with your EKG not showing any signs of heart attack.  Please continue on your aspirin and your Plavix.  Your potassium was slightly low at 3, we did give you Klor-Con, please continue with your potassium supplementation at home as prescribed.  You are treating yourself appropriately with your Lasix and spironolactone the cardiology has given you for your heart failure.  Your white blood cell count was slightly elevated at 13.  Your nasal swab was positive for coronavirus which is not COVID-19, it is a common cold coronavirus, another version of the virus which can cause your symptoms of runny nose and congestion and coughing and sore throat.    At this time, your increased shortness of breath with runny nose and congestion and coughing could be related to a viral upper respiratory infection with the coronavirus.  I did offer you Robitussin prescription which is over-the-counter, along with a prescription Tessalon Perle which is to decrease her coughing.  You endorsed that he would rather use her DayQuil and NyQuil at this time which is appropriate.    We did consider blood clot workup although you are appropriately on aspirin and Plavix although people can still get blood clots while on these medications as these are only antiplatelet medications.  At this time you are low risk for this as her oxygen otherwise remained above 93%, your heart rate was not elevated, you had no bloody coughing and you have no signs of DVT to her lower extremities such as redness or swelling or pain and you did not recently have any severe traumatic injuries or surgeries; because of this to make you less likely.  If in the future your shortness of  breath worsens after your illness improves and your oxygen drops down or he develops severe changes in chest pain, you may be amenable to further blood clot workup if appropriate at the time.

## 2025-04-03 NOTE — ED NOTES
Pt given DC instructions and verbalized understanding. Pt has no questions, in no distress. IV removed

## 2025-04-03 NOTE — ED PROVIDER NOTES
[facility] EMERGENCY DEPARTMENT  EMERGENCY DEPARTMENT ENCOUNTER      Pt Name: Paty Sheridan  MRN: [id number]  Birthdate 1970  Date of evaluation: 4/3/2025  Provider: Rk Ortiz MD  11:23 PM    CHIEF COMPLAINT       Chief Complaint   Patient presents with    Shortness of Breath     For the past month         HISTORY OF PRESENT ILLNESS    Paty Sheridan is a 54 y.o. female who presents to the emergency department for shortness of breath    HPI  Patient is a 54-year-old female presenting to the ED due to concern for shortness of breath.  Patient has a past medical history of CHF (on lasix and aldactone), coronary artery disease, mixed hyperlipidemia, send hypertension, GERD, type 2 diabetes, recent PCI of the mid circumflex with a drug-eluting stent in December 2024 with EF of 60-65% at the time with a widely patent stent to the mid LAD with 40-50% distal LAD stenosis, proximal 30% stenosis in the circumflex and 80% stenosis in the mid RCA (on aspirin and plavix); bacterial rhinosinusitis.  Patient endorsed that for several months she has been feeling short of breath with minimal exertion such as with walking around at home.  She endorses on and off shortness of breath with lying down flat.  She endorses chronic chest pain that has not worsened from her baseline.  She also endorses for the past 3 days of having runny nose and congestion and coughing which has been nonproductive along with sore throat.  She endorsed that her cardiologist placed her on spironolactone 4 days ago.  She endorses some swelling around both ankles.  She endorses being compliant with her Lasix.    Nursing Notes were reviewed.    REVIEW OF SYSTEMS       Review of Systems   Constitutional:  Negative for activity change, appetite change, chills, fatigue and fever.   HENT:  Positive for congestion, postnasal drip, rhinorrhea and sore throat.    Eyes:  Negative for photophobia, pain, discharge, redness and itching.   Respiratory:   [id number]  [phone]    Schedule an appointment as soon as possible for a visit on 4/4/2025  As needed      DISCHARGE MEDICATIONS:  Discharge Medication List as of 4/3/2025  4:01 PM        Controlled Substances Monitoring:     RX Monitoring Acute Pain Prescriptions Periodic Controlled Substance Monitoring   10/28/2020  11:59 AM Prescription exceeds daily limit for a specific reason. See comments or note. Possible medication side effects, risk of tolerance/dependence & alternative treatments discussed.       (Please note that portions of this note were completed with a voice recognition program.  Efforts were made to edit the dictations but occasionally words are mis-transcribed.)    Rk Ortiz MD (electronically signed)  Attending Emergency Physician            Rk Ortiz MD  04/05/25 [id number]

## 2025-04-04 LAB
EKG ATRIAL RATE: 87 BPM
EKG P AXIS: -1 DEGREES
EKG P-R INTERVAL: 146 MS
EKG Q-T INTERVAL: 370 MS
EKG QRS DURATION: 84 MS
EKG QTC CALCULATION (BAZETT): 445 MS
EKG R AXIS: -1 DEGREES
EKG T AXIS: 74 DEGREES
EKG VENTRICULAR RATE: 87 BPM

## 2025-04-05 ASSESSMENT — ENCOUNTER SYMPTOMS
PHOTOPHOBIA: 0
EYE PAIN: 0
ABDOMINAL PAIN: 0
EYE DISCHARGE: 0
SHORTNESS OF BREATH: 1
RHINORRHEA: 1
COUGH: 1
SORE THROAT: 1
EYE ITCHING: 0
VOMITING: 0
WHEEZING: 0
EYE REDNESS: 0
CHEST TIGHTNESS: 1
COLOR CHANGE: 0
NAUSEA: 0

## 2025-04-07 ENCOUNTER — OFFICE VISIT (OUTPATIENT)
Age: 55
End: 2025-04-07
Payer: COMMERCIAL

## 2025-04-07 VITALS
SYSTOLIC BLOOD PRESSURE: 90 MMHG | HEART RATE: 89 BPM | TEMPERATURE: 97.5 F | DIASTOLIC BLOOD PRESSURE: 68 MMHG | WEIGHT: 197 LBS | BODY MASS INDEX: 34.91 KG/M2 | OXYGEN SATURATION: 97 % | HEIGHT: 63 IN

## 2025-04-07 DIAGNOSIS — D64.9 ANEMIA, UNSPECIFIED TYPE: Primary | ICD-10-CM

## 2025-04-07 DIAGNOSIS — E11.69 TYPE 2 DIABETES MELLITUS WITH OTHER SPECIFIED COMPLICATION, UNSPECIFIED WHETHER LONG TERM INSULIN USE (HCC): ICD-10-CM

## 2025-04-07 DIAGNOSIS — E03.9 HYPOTHYROIDISM, UNSPECIFIED TYPE: ICD-10-CM

## 2025-04-07 DIAGNOSIS — I25.10 CORONARY ARTERY DISEASE INVOLVING NATIVE HEART, UNSPECIFIED VESSEL OR LESION TYPE, UNSPECIFIED WHETHER ANGINA PRESENT: ICD-10-CM

## 2025-04-07 PROCEDURE — 3017F COLORECTAL CA SCREEN DOC REV: CPT | Performed by: STUDENT IN AN ORGANIZED HEALTH CARE EDUCATION/TRAINING PROGRAM

## 2025-04-07 PROCEDURE — 2022F DILAT RTA XM EVC RTNOPTHY: CPT | Performed by: STUDENT IN AN ORGANIZED HEALTH CARE EDUCATION/TRAINING PROGRAM

## 2025-04-07 PROCEDURE — G8427 DOCREV CUR MEDS BY ELIG CLIN: HCPCS | Performed by: STUDENT IN AN ORGANIZED HEALTH CARE EDUCATION/TRAINING PROGRAM

## 2025-04-07 PROCEDURE — 99214 OFFICE O/P EST MOD 30 MIN: CPT | Performed by: STUDENT IN AN ORGANIZED HEALTH CARE EDUCATION/TRAINING PROGRAM

## 2025-04-07 PROCEDURE — 1036F TOBACCO NON-USER: CPT | Performed by: STUDENT IN AN ORGANIZED HEALTH CARE EDUCATION/TRAINING PROGRAM

## 2025-04-07 PROCEDURE — 3046F HEMOGLOBIN A1C LEVEL >9.0%: CPT | Performed by: STUDENT IN AN ORGANIZED HEALTH CARE EDUCATION/TRAINING PROGRAM

## 2025-04-07 PROCEDURE — 3074F SYST BP LT 130 MM HG: CPT | Performed by: STUDENT IN AN ORGANIZED HEALTH CARE EDUCATION/TRAINING PROGRAM

## 2025-04-07 PROCEDURE — G8417 CALC BMI ABV UP PARAM F/U: HCPCS | Performed by: STUDENT IN AN ORGANIZED HEALTH CARE EDUCATION/TRAINING PROGRAM

## 2025-04-07 PROCEDURE — 3078F DIAST BP <80 MM HG: CPT | Performed by: STUDENT IN AN ORGANIZED HEALTH CARE EDUCATION/TRAINING PROGRAM

## 2025-04-07 NOTE — PROGRESS NOTES
Subjective  Paty Sheridan, 54 y.o. female presents today with:  Chief Complaint   Patient presents with    Follow-up     Went to the ER 4/3 with SOB. States she continues to have SOB. States her legs also feel tingly & weak. She has been having frequent headaches & dizziness. She would like to review her lab results from when she was in the ER.     Health Maintenance     Refuses colonoscopy & cologuard at this time.        History of Present Illness  The patient is a 54-year-old female who presents for an ER follow-up.    She reports experiencing shortness of breath for the past month, which has not improved despite being prescribed spironolactone by Dr. Rivera. She was previously diagnosed with a common cold virus, initially suspected to be COVID-19. The shortness of breath is attributed to residual mucus from a previous sinus infection. Episodes of breathlessness occur during minimal exertion, such as walking short distances. She does not believe her symptoms are cardiac in nature, as she underwent a left heart catheterization in 12/2024 and had a normal echocardiogram last year. An EKG was performed last week.    She has a known diagnosis of sleep apnea but has not undergone a sleep study due to insurance denial. She was referred to Dr. Nuñez by Dr. Rivera but has been unable to schedule an appointment. An appointment with Dr. Sidhu was canceled due to an ER visit over the weekend. She has previously used a CPAP machine but found it uncomfortable and often removed it during the night. Her boyfriend reports that she snores heavily.    Several symptoms consistent with anemia are identified, including leg weakness, headaches, dizziness, and tingling in her extremities. Previous lab results indicated low RBC levels. She was informed by her endocrinologist that her symptoms were not indicative of neuropathy as they were not bilateral, with the right side being more affected. Compliance with her medication regimen is

## 2025-04-10 ENCOUNTER — OFFICE VISIT (OUTPATIENT)
Age: 55
End: 2025-04-10
Payer: COMMERCIAL

## 2025-04-10 VITALS
SYSTOLIC BLOOD PRESSURE: 118 MMHG | WEIGHT: 197 LBS | DIASTOLIC BLOOD PRESSURE: 74 MMHG | OXYGEN SATURATION: 95 % | BODY MASS INDEX: 34.9 KG/M2 | HEART RATE: 101 BPM

## 2025-04-10 DIAGNOSIS — R06.02 SHORTNESS OF BREATH: Primary | ICD-10-CM

## 2025-04-10 DIAGNOSIS — G47.33 OSA (OBSTRUCTIVE SLEEP APNEA): ICD-10-CM

## 2025-04-10 PROCEDURE — 3078F DIAST BP <80 MM HG: CPT | Performed by: INTERNAL MEDICINE

## 2025-04-10 PROCEDURE — 99204 OFFICE O/P NEW MOD 45 MIN: CPT | Performed by: INTERNAL MEDICINE

## 2025-04-10 PROCEDURE — G8427 DOCREV CUR MEDS BY ELIG CLIN: HCPCS | Performed by: INTERNAL MEDICINE

## 2025-04-10 PROCEDURE — 3017F COLORECTAL CA SCREEN DOC REV: CPT | Performed by: INTERNAL MEDICINE

## 2025-04-10 PROCEDURE — 3074F SYST BP LT 130 MM HG: CPT | Performed by: INTERNAL MEDICINE

## 2025-04-10 PROCEDURE — G8417 CALC BMI ABV UP PARAM F/U: HCPCS | Performed by: INTERNAL MEDICINE

## 2025-04-10 PROCEDURE — 1036F TOBACCO NON-USER: CPT | Performed by: INTERNAL MEDICINE

## 2025-04-10 ASSESSMENT — ENCOUNTER SYMPTOMS
RHINORRHEA: 0
VOICE CHANGE: 0
APNEA: 1
SORE THROAT: 0
NAUSEA: 0
CHEST TIGHTNESS: 0
ABDOMINAL PAIN: 0
VOMITING: 0
EYE ITCHING: 0
COUGH: 1
WHEEZING: 0
DIARRHEA: 0
SINUS PRESSURE: 0
TROUBLE SWALLOWING: 0
SHORTNESS OF BREATH: 1
EYE DISCHARGE: 0

## 2025-04-10 NOTE — PROGRESS NOTES
fragments  into spinal canal or extension into the posterior elements. The remaining vertebral bodies are intact.    There is preservation of the intervertebral disc spaces. The spine is in anatomic alignment.    There is no evidence of central canal narrowing or neural foraminal narrowing at any level.    The surrounding soft tissue are within normal limits.    IMPRESSION:  Acute vertebral body compression fracture involving the superior articular surface of L1 with approximately 10% vertebral body height loss. There is no retropulsion of bony fragments to the central canal. The remaining vertebral bodies are  within normal limits.  ]  No results found for this or any previous visit.  ]    Assessment/Plan:     1. Shortness of breath  She is having C/o shortness of breath  with exertion much more in last 1 month . She  had cardiac cath in dec 2024  and I more stent , She had 3 stent before  , She is on Plavix , ASA.   She has no h/o asthma or COPD. No  Wheezing. Mild Cough with recent cold last visit .   She is not using bronchodilator .will check PFT     She has acid reflux on omeprazole    - Full PFT Study With Bronchodilator; Future    2. FRANK (obstructive sleep apnea)  She has sleep apnea over 10 yrs ago could not tolerate CPAP , she would  like to try  CPAP again if she has FRANK , will do home sleep study.      - Home Sleep Study; Future      Return in about 4 weeks (around 5/8/2025) for shortness of breath, frank.      Kelby Carbajal MD

## 2025-04-12 DIAGNOSIS — E03.9 HYPOTHYROIDISM, UNSPECIFIED TYPE: ICD-10-CM

## 2025-04-12 DIAGNOSIS — E11.69 TYPE 2 DIABETES MELLITUS WITH OTHER SPECIFIED COMPLICATION, UNSPECIFIED WHETHER LONG TERM INSULIN USE (HCC): ICD-10-CM

## 2025-04-12 DIAGNOSIS — R06.09 DOE (DYSPNEA ON EXERTION): ICD-10-CM

## 2025-04-12 DIAGNOSIS — D64.9 ANEMIA, UNSPECIFIED TYPE: ICD-10-CM

## 2025-04-12 LAB
ANION GAP SERPL CALCULATED.3IONS-SCNC: 14 MEQ/L (ref 9–15)
BASOPHILS # BLD: 0.1 K/UL (ref 0–0.2)
BASOPHILS NFR BLD: 0.5 %
BUN SERPL-MCNC: 23 MG/DL (ref 6–20)
CALCIUM SERPL-MCNC: 10.6 MG/DL (ref 8.5–9.9)
CHLORIDE SERPL-SCNC: 95 MEQ/L (ref 95–107)
CO2 SERPL-SCNC: 28 MEQ/L (ref 20–31)
CREAT SERPL-MCNC: 0.69 MG/DL (ref 0.5–0.9)
EOSINOPHIL # BLD: 0.3 K/UL (ref 0–0.7)
EOSINOPHIL NFR BLD: 1.6 %
ERYTHROCYTE [DISTWIDTH] IN BLOOD BY AUTOMATED COUNT: 13.5 % (ref 11.5–14.5)
ESTIMATED AVERAGE GLUCOSE: 189 MG/DL
GLUCOSE SERPL-MCNC: 218 MG/DL (ref 70–99)
HBA1C MFR BLD: 8.2 % (ref 4–6)
HCT VFR BLD AUTO: 40.3 % (ref 37–47)
HGB BLD-MCNC: 13.2 G/DL (ref 12–16)
LYMPHOCYTES # BLD: 3.7 K/UL (ref 1–4.8)
LYMPHOCYTES NFR BLD: 23.7 %
MCH RBC QN AUTO: 30.4 PG (ref 27–31.3)
MCHC RBC AUTO-ENTMCNC: 32.8 % (ref 33–37)
MCV RBC AUTO: 92.9 FL (ref 79.4–94.8)
MONOCYTES # BLD: 0.8 K/UL (ref 0.2–0.8)
MONOCYTES NFR BLD: 5.3 %
NEUTROPHILS # BLD: 10.5 K/UL (ref 1.4–6.5)
NEUTS SEG NFR BLD: 68.3 %
PLATELET # BLD AUTO: 358 K/UL (ref 130–400)
POTASSIUM SERPL-SCNC: 4.8 MEQ/L (ref 3.4–4.9)
RBC # BLD AUTO: 4.34 M/UL (ref 4.2–5.4)
SODIUM SERPL-SCNC: 137 MEQ/L (ref 135–144)
TSH REFLEX: 0.59 UIU/ML (ref 0.44–3.86)
WBC # BLD AUTO: 15.4 K/UL (ref 4.8–10.8)

## 2025-04-13 LAB
FERRITIN: 239 NG/ML (ref 15–150)
IRON % SATURATION: 25 % (ref 20–55)
IRON: 89 UG/DL (ref 37–145)
TOTAL IRON BINDING CAPACITY: 353 UG/DL (ref 250–450)
UNSATURATED IRON BINDING CAPACITY: 264 UG/DL (ref 112–347)
VITAMIN B-12: 420 PG/ML (ref 232–1245)

## 2025-04-14 ENCOUNTER — RESULTS FOLLOW-UP (OUTPATIENT)
Age: 55
End: 2025-04-14

## 2025-04-14 NOTE — RESULT ENCOUNTER NOTE
Please inform patient anemia is improved.  A1c improved at 8.2%.  Follow-up as scheduled to review results in detail.  Thanks

## 2025-04-30 DIAGNOSIS — I10 ESSENTIAL HYPERTENSION: ICD-10-CM

## 2025-04-30 RX ORDER — METOPROLOL SUCCINATE 25 MG/1
25 TABLET, EXTENDED RELEASE ORAL DAILY
Qty: 90 TABLET | Refills: 3 | Status: SHIPPED | OUTPATIENT
Start: 2025-04-30

## 2025-04-30 NOTE — TELEPHONE ENCOUNTER
Requesting medication refill. Please approve or deny this request.    Rx requested:  Requested Prescriptions     Pending Prescriptions Disp Refills    metoprolol succinate (TOPROL XL) 25 MG extended release tablet [Pharmacy Med Name: METOPROLOL ER SUCCINATE 25MG TABS] 90 tablet 3     Sig: TAKE 1 TABLET BY MOUTH DAILY         Last Office Visit:   1/3/2025      Next Visit Date:  Future Appointments   Date Time Provider Department Center   5/8/2025  2:45 PM Kelby Carbajal MD [place] [facility]   5/15/2025 12:00 PM [facility] ROOM 1 [facility]   7/15/2025  4:00 PM Emigdio Rivera MD [place] [facility]   9/2/2025  3:15 PM Jay, Shasta A, MD [facility]

## 2025-05-02 ENCOUNTER — OFFICE VISIT (OUTPATIENT)
Dept: URGENT CARE | Age: 55
End: 2025-05-02
Payer: COMMERCIAL

## 2025-05-02 VITALS
BODY MASS INDEX: 34.55 KG/M2 | HEART RATE: 100 BPM | WEIGHT: 195 LBS | HEIGHT: 63 IN | TEMPERATURE: 98.9 F | SYSTOLIC BLOOD PRESSURE: 108 MMHG | DIASTOLIC BLOOD PRESSURE: 73 MMHG | OXYGEN SATURATION: 95 % | RESPIRATION RATE: 18 BRPM

## 2025-05-02 DIAGNOSIS — H66.91 ACUTE RIGHT OTITIS MEDIA: ICD-10-CM

## 2025-05-02 DIAGNOSIS — R39.9 LOWER URINARY TRACT SYMPTOMS (LUTS): Primary | ICD-10-CM

## 2025-05-02 LAB
POC APPEARANCE, URINE: CLEAR
POC BILIRUBIN, URINE: NEGATIVE
POC BLOOD, URINE: NEGATIVE
POC COLOR, URINE: YELLOW
POC GLUCOSE, URINE: NEGATIVE MG/DL
POC KETONES, URINE: NEGATIVE MG/DL
POC LEUKOCYTES, URINE: ABNORMAL
POC NITRITE,URINE: NEGATIVE
POC PH, URINE: 6 PH
POC PROTEIN, URINE: NEGATIVE MG/DL
POC SPECIFIC GRAVITY, URINE: <=1.005
POC UROBILINOGEN, URINE: 0.2 EU/DL
PREGNANCY TEST URINE, POC: NEGATIVE

## 2025-05-02 RX ORDER — AMOXICILLIN AND CLAVULANATE POTASSIUM 875; 125 MG/1; MG/1
875 TABLET, FILM COATED ORAL 2 TIMES DAILY
Qty: 14 TABLET | Refills: 0 | Status: SHIPPED | OUTPATIENT
Start: 2025-05-02 | End: 2025-05-09

## 2025-05-02 RX ORDER — NITROFURANTOIN 25; 75 MG/1; MG/1
100 CAPSULE ORAL 2 TIMES DAILY
Qty: 10 CAPSULE | Refills: 0 | Status: SHIPPED | OUTPATIENT
Start: 2025-05-02 | End: 2025-05-02

## 2025-05-02 ASSESSMENT — ENCOUNTER SYMPTOMS
SORE THROAT: 0
FREQUENCY: 0
FEVER: 0
CHILLS: 0
HEMATURIA: 0
DYSURIA: 0
SHORTNESS OF BREATH: 0
SINUS PAIN: 0
RHINORRHEA: 0
FLANK PAIN: 0
COUGH: 0
SINUS PRESSURE: 0

## 2025-05-02 ASSESSMENT — PAIN SCALES - GENERAL: PAINLEVEL_OUTOF10: 1

## 2025-05-02 NOTE — PROGRESS NOTES
"Subjective   Patient ID: Ruth Zambrano is a 54 y.o. female. They present today with a chief complaint of UTI and Earache (Right ear pain x 2 weeks and suprapubic abdominal pain x 3 days).    History of Present Illness    Patient presents with possible UTI and also right earache. She has had UTI symptoms for the past 3 days and ear issues for the past 2 weeks. Slight urethral pain and lower abdominal pain. No frequency or urgency. No fever/chills. No other URI symptoms.    UTI  Associated symptoms: ear pain    Associated symptoms: no congestion, no cough, no fever, no rhinorrhea, no shortness of breath and no sore throat    Earache   Pertinent negatives include no coughing, rhinorrhea or sore throat.         Past Medical History  Allergies as of 05/02/2025 - Reviewed 05/02/2025   Allergen Reaction Noted    Metformin Diarrhea 02/04/2023    Nabumetone Rash 02/04/2023       Prescriptions Prior to Admission[1]     Medical History[2]    Surgical History[3]     reports that she has quit smoking. Her smoking use included cigarettes. She has never used smokeless tobacco. She reports current alcohol use. She reports that she does not use drugs.    Review of Systems  Review of Systems   Constitutional:  Negative for chills and fever.   HENT:  Positive for ear pain. Negative for congestion, postnasal drip, rhinorrhea, sinus pressure, sinus pain and sore throat.    Respiratory:  Negative for cough and shortness of breath.    Genitourinary:  Positive for pelvic pain. Negative for dysuria, flank pain, frequency, hematuria and urgency.         Objective    Vitals:    05/02/25 1713   BP: 108/73   BP Location: Left arm   Patient Position: Sitting   Pulse: 100   Resp: 18   Temp: 37.2 °C (98.9 °F)   TempSrc: Oral   SpO2: 95%   Weight: 88.5 kg (195 lb)   Height: 1.6 m (5' 3\")     No LMP recorded. Patient has had an ablation.    Physical Exam  Constitutional:       General: She is not in acute distress.     Appearance: Normal " appearance. She is not ill-appearing or toxic-appearing.   HENT:      Head: Normocephalic and atraumatic.      Right Ear: Hearing and external ear normal. A middle ear effusion is present. Tympanic membrane is erythematous and bulging.      Left Ear: Hearing and external ear normal. A middle ear effusion is present. Tympanic membrane is not erythematous or bulging.      Nose: Nose normal.      Mouth/Throat:      Lips: Pink.      Mouth: Mucous membranes are moist.   Cardiovascular:      Rate and Rhythm: Normal rate and regular rhythm.      Heart sounds: Normal heart sounds.   Pulmonary:      Effort: Pulmonary effort is normal. No respiratory distress.      Breath sounds: Normal breath sounds.   Abdominal:      General: Bowel sounds are normal.      Tenderness: There is no abdominal tenderness. There is no right CVA tenderness or left CVA tenderness.   Skin:     General: Skin is warm and dry.   Neurological:      General: No focal deficit present.      Mental Status: She is alert.   Psychiatric:         Attention and Perception: Attention normal.         Mood and Affect: Mood normal.         Speech: Speech normal.         Behavior: Behavior normal.         Procedures    Point of Care Test & Imaging Results from this visit  Results for orders placed or performed in visit on 05/02/25   POCT pregnancy, urine manually resulted   Result Value Ref Range    Preg Test, Ur Negative Negative   POCT UA Automated manually resulted   Result Value Ref Range    POC Color, Urine Yellow Straw, Yellow, Light-Yellow    POC Appearance, Urine Clear Clear    POC Glucose, Urine NEGATIVE NEGATIVE mg/dl    POC Bilirubin, Urine NEGATIVE NEGATIVE    POC Ketones, Urine NEGATIVE NEGATIVE mg/dl    POC Specific Gravity, Urine <=1.005 1.005 - 1.035    POC Blood, Urine NEGATIVE NEGATIVE    POC PH, Urine 6.0 No Reference Range Established PH    POC Protein, Urine NEGATIVE NEGATIVE mg/dl    POC Urobilinogen, Urine 0.2 0.2, 1.0 EU/DL    Poc Nitrite,  Urine NEGATIVE NEGATIVE    POC Leukocytes, Urine MODERATE (2+) (A) NEGATIVE      Imaging  No results found.    Cardiology, Vascular, and Other Imaging  No other imaging results found for the past 2 days      Diagnostic study results (if any) were reviewed by LUBNA Hunter.    Assessment/Plan   Allergies, medications, history, and pertinent labs/EKGs/Imaging reviewed by LUBNA Hunter.     Medical Decision Making  UA positive for leukocytes, sending urine for culture. Right OM, rx for Augmentin.  At time of discharge patient was clinically well-appearing and HDS for outpatient management. She was educated regarding diagnosis, supportive care, OTC and Rx medications. She was given the opportunity to ask questions prior to discharge.  They verbalized understanding of my discussion of the plans for treatment, expected course, indications to return to  or seek further evaluation in ED, and the need for timely follow up as directed.       Orders and Diagnoses  Diagnoses and all orders for this visit:  Lower urinary tract symptoms (LUTS)  -     POCT pregnancy, urine manually resulted  -     POCT UA Automated manually resulted  -     Urine Culture  -     amoxicillin-clavulanate (Augmentin) 875-125 mg tablet; Take 1 tablet (875 mg) by mouth 2 times a day for 7 days.  Acute right otitis media  -     amoxicillin-clavulanate (Augmentin) 875-125 mg tablet; Take 1 tablet (875 mg) by mouth 2 times a day for 7 days.      Medical Admin Record      Patient disposition: Home    Electronically signed by LUBNA Hunter  6:51 PM           [1] (Not in a hospital admission)   [2]   Past Medical History:  Diagnosis Date    Diabetic infection of left foot 02/04/2023    History of mammogram 12/15/2023    cat 2    L1 vertebral fracture (Multi)     Pap test, as part of routine gynecological examination 02/2015    wnl, hpv POSITIVE-sees gyn   [3]   Past Surgical History:  Procedure Laterality Date    BREAST  SURGERY Bilateral 1999    reduction    CHOLECYSTECTOMY  1999    COLONOSCOPY  2013    ENDOMETRIAL ABLATION  2013

## 2025-05-04 LAB — BACTERIA UR CULT: ABNORMAL

## 2025-05-15 ENCOUNTER — HOSPITAL ENCOUNTER (OUTPATIENT)
Dept: PULMONOLOGY | Age: 55
Discharge: HOME | End: 2025-05-15
Payer: COMMERCIAL

## 2025-05-15 DIAGNOSIS — R06.02 SHORTNESS OF BREATH: ICD-10-CM

## 2025-05-15 PROCEDURE — 6360000002 HC RX W HCPCS

## 2025-05-15 PROCEDURE — 94060 EVALUATION OF WHEEZING: CPT

## 2025-05-15 PROCEDURE — 94726 PLETHYSMOGRAPHY LUNG VOLUMES: CPT

## 2025-05-15 PROCEDURE — 94729 DIFFUSING CAPACITY: CPT

## 2025-05-15 RX ORDER — ALBUTEROL SULFATE 0.83 MG/ML
SOLUTION RESPIRATORY (INHALATION)
Status: COMPLETED
Start: 2025-05-15 | End: 2025-05-15

## 2025-05-15 RX ADMIN — ALBUTEROL SULFATE 2.5 MG: 2.5 SOLUTION RESPIRATORY (INHALATION) at 12:20

## 2025-05-16 NOTE — PROCEDURES
[address] [address]                    PULMONARY FUNCTION  Paty Sheridan   54 y.o.   female     Test interpretation     Spirometry is normal with no significant response to bronchodilator  Lung volumes are normal except for reduced ERV due to obesity  Diffusion capacity is normal       Clinical correlation is recommended     Celine George MD [facility], 5/16/2025 11:16 AM

## 2025-05-22 ENCOUNTER — OFFICE VISIT (OUTPATIENT)
Age: 55
End: 2025-05-22
Payer: COMMERCIAL

## 2025-05-22 VITALS
BODY MASS INDEX: 35.43 KG/M2 | WEIGHT: 200 LBS | HEART RATE: 99 BPM | OXYGEN SATURATION: 98 % | SYSTOLIC BLOOD PRESSURE: 122 MMHG | DIASTOLIC BLOOD PRESSURE: 82 MMHG

## 2025-05-22 DIAGNOSIS — R06.02 SHORTNESS OF BREATH: Primary | ICD-10-CM

## 2025-05-22 DIAGNOSIS — G47.33 OSA (OBSTRUCTIVE SLEEP APNEA): ICD-10-CM

## 2025-05-22 PROCEDURE — 3017F COLORECTAL CA SCREEN DOC REV: CPT | Performed by: INTERNAL MEDICINE

## 2025-05-22 PROCEDURE — 99213 OFFICE O/P EST LOW 20 MIN: CPT | Performed by: INTERNAL MEDICINE

## 2025-05-22 PROCEDURE — 3079F DIAST BP 80-89 MM HG: CPT | Performed by: INTERNAL MEDICINE

## 2025-05-22 PROCEDURE — G8427 DOCREV CUR MEDS BY ELIG CLIN: HCPCS | Performed by: INTERNAL MEDICINE

## 2025-05-22 PROCEDURE — 1036F TOBACCO NON-USER: CPT | Performed by: INTERNAL MEDICINE

## 2025-05-22 PROCEDURE — 3074F SYST BP LT 130 MM HG: CPT | Performed by: INTERNAL MEDICINE

## 2025-05-22 PROCEDURE — G8417 CALC BMI ABV UP PARAM F/U: HCPCS | Performed by: INTERNAL MEDICINE

## 2025-05-22 RX ORDER — EZETIMIBE 10 MG/1
10 TABLET ORAL NIGHTLY
COMMUNITY
Start: 2025-04-30

## 2025-05-22 NOTE — PROGRESS NOTES
Subjective:             Paty Sehridan is a 54 y.o. female who complains today of:     Chief Complaint   Patient presents with    Follow-up     4wk f/u for PFT results       HPI  She had PFT done ,  she had normal spirometry, lung volume and diffusion capacity.    She is having C/o shortness of breath  with exertion much more in last 1 month . Bending over  and walking . She has no h/o asthma or COPD. No  Wheezing.  No cough   No Hemoptysis.No Chest tightness. No Chest pain with radiation  or pleuritic pain.  Mild  leg edema on lasix . No orthopnea.No Fever or chills.   No Rhinorrhea and postnasal drip.She has acid reflux on omeprazole  She is not using bronchodilator .       She has sleep apnea over 10 yrs ago could not tolerate CPAP , she would  like to try  CPAP again if she has FRANK , will do home sleep study. She already has equipment and she is going to do on weekend.     She  had cardiac cath in dec 2024  and one  more stent , She 2 stent before  , She is on Plavix , ASA.     Allergies:  Metformin and Nabumetone  Past Medical History:   Diagnosis Date    Acute kidney injury 10/24/2023    CAD (coronary artery disease) 06/25/2022    CAD (coronary artery disease) 06/25/2022    CHF (congestive heart failure) (HCC)     Depression     Environmental allergies     Gastroparesis     GERD (gastroesophageal reflux disease)     Headache     Dr. Neri    Heart attack (HCC)     HPV in female     Hyperlipidemia     Hypertension     Leukocytosis     Migraines     Type 2 diabetes mellitus without complication (HCC)     Type II or unspecified type diabetes mellitus without mention of complication, not stated as uncontrolled      Past Surgical History:   Procedure Laterality Date    BONE MARROW BIOPSY  2012    (-)[facility]    BREAST REDUCTION SURGERY Bilateral 1999    BREAST SURGERY  1999    CARDIAC CATHETERIZATION  2009    (-)[facility]    CARDIAC PROCEDURE N/A 10/30/2023    Left heart cath / coronary angiography performed

## 2025-06-08 ENCOUNTER — PATIENT MESSAGE (OUTPATIENT)
Age: 55
End: 2025-06-08

## 2025-06-08 DIAGNOSIS — E78.2 MIXED HYPERLIPIDEMIA: ICD-10-CM

## 2025-06-08 DIAGNOSIS — K21.9 GASTROESOPHAGEAL REFLUX DISEASE, UNSPECIFIED WHETHER ESOPHAGITIS PRESENT: ICD-10-CM

## 2025-06-09 RX ORDER — ESCITALOPRAM OXALATE 20 MG/1
20 TABLET ORAL DAILY
Qty: 90 TABLET | Refills: 1 | Status: SHIPPED | OUTPATIENT
Start: 2025-06-09

## 2025-06-09 RX ORDER — TRAZODONE HYDROCHLORIDE 100 MG/1
100 TABLET ORAL NIGHTLY
Qty: 90 TABLET | Refills: 1 | Status: SHIPPED | OUTPATIENT
Start: 2025-06-09

## 2025-06-09 RX ORDER — CLOPIDOGREL BISULFATE 75 MG/1
75 TABLET ORAL DAILY
Qty: 90 TABLET | Refills: 3 | Status: SHIPPED | OUTPATIENT
Start: 2025-06-09

## 2025-06-09 RX ORDER — OMEPRAZOLE 40 MG/1
40 CAPSULE, DELAYED RELEASE ORAL
Qty: 90 CAPSULE | Refills: 0 | Status: SHIPPED | OUTPATIENT
Start: 2025-06-09

## 2025-06-09 NOTE — TELEPHONE ENCOUNTER
Requesting medication refill. Please approve or deny this request.    Rx requested:  Requested Prescriptions     Pending Prescriptions Disp Refills    clopidogrel (PLAVIX) 75 MG tablet [Pharmacy Med Name: CLOPIDOGREL 75MG TABLETS] 90 tablet 3     Sig: TAKE 1 TABLET BY MOUTH DAILY         Last Office Visit:   1/3/2025      Next Visit Date:  Future Appointments   Date Time Provider Department Center   7/15/2025  4:00 PM Emigdio Rivera MD [place] [facility]             Please approve or deny.

## 2025-06-09 NOTE — TELEPHONE ENCOUNTER
Requesting medication refill. Please approve or deny this request.    Rx requested:  Requested Prescriptions     Pending Prescriptions Disp Refills    clopidogrel (PLAVIX) 75 MG tablet 90 tablet 3     Sig: Take 1 tablet by mouth daily         Last Office Visit:   1/3/2025      Next Visit Date:  Future Appointments   Date Time Provider Department Center   7/15/2025  4:00 PM Emigdio Rivera MD [facility]             Please approve or deny.

## 2025-06-20 ENCOUNTER — TELEPHONE (OUTPATIENT)
Age: 55
End: 2025-06-20

## 2025-06-20 NOTE — TELEPHONE ENCOUNTER
Sleep lab referral update as of 6/20/25-   After 3 attempts, Pt has not return their calls to schedule sleep study.  They will not make further attempts to schedule this pt.    Update scanned into chart.

## 2025-08-19 ENCOUNTER — OFFICE VISIT (OUTPATIENT)
Dept: URGENT CARE | Age: 55
End: 2025-08-19
Payer: COMMERCIAL

## 2025-08-19 VITALS
SYSTOLIC BLOOD PRESSURE: 96 MMHG | BODY MASS INDEX: 34.38 KG/M2 | OXYGEN SATURATION: 96 % | HEIGHT: 63 IN | HEART RATE: 102 BPM | DIASTOLIC BLOOD PRESSURE: 68 MMHG | TEMPERATURE: 98.7 F | RESPIRATION RATE: 20 BRPM | WEIGHT: 194 LBS

## 2025-08-19 DIAGNOSIS — J02.9 ALLERGIC PHARYNGITIS: Primary | ICD-10-CM

## 2025-08-19 DIAGNOSIS — K12.0 ORAL APHTHOUS ULCER: ICD-10-CM

## 2025-08-19 LAB
POC HUMAN RHINOVIRUS PCR: NEGATIVE
POC INFLUENZA A VIRUS PCR: NEGATIVE
POC INFLUENZA B VIRUS PCR: NEGATIVE
POC RESPIRATORY SYNCYTIAL VIRUS PCR: NEGATIVE
POC STREPTOCOCCUS PYOGENES (GROUP A STREP) PCR: NEGATIVE

## 2025-08-19 PROCEDURE — 3078F DIAST BP <80 MM HG: CPT | Performed by: PHYSICIAN ASSISTANT

## 2025-08-19 PROCEDURE — 99214 OFFICE O/P EST MOD 30 MIN: CPT | Performed by: PHYSICIAN ASSISTANT

## 2025-08-19 PROCEDURE — 3008F BODY MASS INDEX DOCD: CPT | Performed by: PHYSICIAN ASSISTANT

## 2025-08-19 PROCEDURE — 3074F SYST BP LT 130 MM HG: CPT | Performed by: PHYSICIAN ASSISTANT

## 2025-08-19 PROCEDURE — 87631 RESP VIRUS 3-5 TARGETS: CPT | Performed by: PHYSICIAN ASSISTANT

## 2025-08-19 RX ORDER — LIDOCAINE HYDROCHLORIDE 20 MG/ML
SOLUTION OROPHARYNGEAL
Qty: 100 ML | Refills: 1 | Status: SHIPPED | OUTPATIENT
Start: 2025-08-19

## 2025-08-19 RX ORDER — METHYLPREDNISOLONE 4 MG/1
TABLET ORAL
Qty: 21 TABLET | Refills: 0 | Status: SHIPPED | OUTPATIENT
Start: 2025-08-19

## 2025-08-22 ENCOUNTER — OFFICE VISIT (OUTPATIENT)
Age: 55
End: 2025-08-22
Payer: COMMERCIAL

## 2025-08-22 VITALS
SYSTOLIC BLOOD PRESSURE: 138 MMHG | HEIGHT: 63 IN | HEART RATE: 89 BPM | BODY MASS INDEX: 35.43 KG/M2 | TEMPERATURE: 97.5 F | OXYGEN SATURATION: 98 % | DIASTOLIC BLOOD PRESSURE: 76 MMHG

## 2025-08-22 DIAGNOSIS — Z11.3 SCREENING EXAMINATION FOR STD (SEXUALLY TRANSMITTED DISEASE): ICD-10-CM

## 2025-08-22 DIAGNOSIS — K12.0 APHTHOUS ULCER: Primary | ICD-10-CM

## 2025-08-22 DIAGNOSIS — K13.79 MOUTH PAIN: ICD-10-CM

## 2025-08-22 LAB — REAGIN+T PALLIDUM IGG+IGM SERPL-IMP: NORMAL

## 2025-08-22 PROCEDURE — 3075F SYST BP GE 130 - 139MM HG: CPT | Performed by: STUDENT IN AN ORGANIZED HEALTH CARE EDUCATION/TRAINING PROGRAM

## 2025-08-22 PROCEDURE — G8427 DOCREV CUR MEDS BY ELIG CLIN: HCPCS | Performed by: STUDENT IN AN ORGANIZED HEALTH CARE EDUCATION/TRAINING PROGRAM

## 2025-08-22 PROCEDURE — G8417 CALC BMI ABV UP PARAM F/U: HCPCS | Performed by: STUDENT IN AN ORGANIZED HEALTH CARE EDUCATION/TRAINING PROGRAM

## 2025-08-22 PROCEDURE — 3017F COLORECTAL CA SCREEN DOC REV: CPT | Performed by: STUDENT IN AN ORGANIZED HEALTH CARE EDUCATION/TRAINING PROGRAM

## 2025-08-22 PROCEDURE — 3078F DIAST BP <80 MM HG: CPT | Performed by: STUDENT IN AN ORGANIZED HEALTH CARE EDUCATION/TRAINING PROGRAM

## 2025-08-22 PROCEDURE — 1036F TOBACCO NON-USER: CPT | Performed by: STUDENT IN AN ORGANIZED HEALTH CARE EDUCATION/TRAINING PROGRAM

## 2025-08-22 PROCEDURE — 99214 OFFICE O/P EST MOD 30 MIN: CPT | Performed by: STUDENT IN AN ORGANIZED HEALTH CARE EDUCATION/TRAINING PROGRAM

## 2025-08-22 RX ORDER — MELOXICAM 7.5 MG/1
7.5 TABLET ORAL DAILY PRN
Qty: 10 TABLET | Refills: 0 | Status: SHIPPED | OUTPATIENT
Start: 2025-08-22

## 2025-08-23 LAB
HEPATITIS C ANTIBODY: NONREACTIVE
HIV AG/AB: NONREACTIVE

## 2025-08-24 LAB
BACTERIA THROAT AEROBE CULT: NORMAL
PRELIMINARY: NORMAL

## 2025-08-25 LAB
BACTERIA THROAT AEROBE CULT: NORMAL
FINAL REPORT: NORMAL
HSV1+2 IGG SER IA-ACNC: 0.79 IV
PRELIMINARY: NORMAL

## 2025-08-26 ENCOUNTER — RESULTS FOLLOW-UP (OUTPATIENT)
Age: 55
End: 2025-08-26

## 2025-08-26 LAB
C TRACH DNA UR QL NAA+PROBE: NEGATIVE
N GONORRHOEA DNA UR QL NAA+PROBE: NEGATIVE

## 2025-08-26 RX ORDER — VALACYCLOVIR HYDROCHLORIDE 1 G/1
1000 TABLET, FILM COATED ORAL 3 TIMES DAILY
Qty: 21 TABLET | Refills: 0 | Status: SHIPPED | OUTPATIENT
Start: 2025-08-26 | End: 2025-09-02

## 2025-08-31 DIAGNOSIS — F31.81 BIPOLAR 2 DISORDER (HCC): ICD-10-CM

## 2025-09-01 DIAGNOSIS — I10 ESSENTIAL HYPERTENSION: ICD-10-CM

## 2025-09-01 DIAGNOSIS — K21.9 GASTROESOPHAGEAL REFLUX DISEASE, UNSPECIFIED WHETHER ESOPHAGITIS PRESENT: ICD-10-CM

## 2025-09-02 RX ORDER — CARIPRAZINE 3 MG/1
3 CAPSULE, GELATIN COATED ORAL DAILY
Qty: 90 CAPSULE | Refills: 1 | Status: SHIPPED | OUTPATIENT
Start: 2025-09-02 | End: 2025-12-01

## 2025-09-02 RX ORDER — SPIRONOLACTONE 50 MG/1
50 TABLET, FILM COATED ORAL DAILY
Qty: 90 TABLET | Refills: 0 | Status: SHIPPED | OUTPATIENT
Start: 2025-09-02

## 2025-09-02 RX ORDER — OMEPRAZOLE 40 MG/1
40 CAPSULE, DELAYED RELEASE ORAL
Qty: 90 CAPSULE | Refills: 1 | Status: SHIPPED | OUTPATIENT
Start: 2025-09-02

## 2025-09-02 RX ORDER — METOPROLOL SUCCINATE 25 MG/1
25 TABLET, EXTENDED RELEASE ORAL DAILY
Qty: 90 TABLET | Refills: 0 | Status: SHIPPED | OUTPATIENT
Start: 2025-09-02

## (undated) DEVICE — Device

## (undated) DEVICE — KIT MFLD ISOLATN NACL CNTRST PRT TBNG SPIK W/ PRSS TRNSDUC

## (undated) DEVICE — GUIDEWIRE VASC L260CM DIA0.035IN TIP L3MM STD EXCHG PTFE J

## (undated) DEVICE — GLOVE ORANGE PI 7 1/2   MSG9075

## (undated) DEVICE — CATHETER DIAG 5FR L100CM LUMN ID0.047IN JL3.5 CRV 0 SIDE H

## (undated) DEVICE — BAND COMPR L24CM REG CLR PLAS HEMSTAT EXT HK AND LOOP RETEN

## (undated) DEVICE — PML 12 ADULT FLL-MLL PG: Brand: NAMIC

## (undated) DEVICE — PACK PROCEDURE SURG SURG CARDIAC CATH

## (undated) DEVICE — GLIDESHEATH SLENDER STAINLESS STEEL KIT: Brand: GLIDESHEATH SLENDER

## (undated) DEVICE — DEVICE INFLATION ANGIO 30ATM

## (undated) DEVICE — SHEET,DRAPE,53X77,STERILE: Brand: MEDLINE

## (undated) DEVICE — CATHETER COR DIAG PIGTAILS PIG 145 CRV 5FR 110CM 6 SIDE H

## (undated) DEVICE — DRAPE SURG BRACH STRL

## (undated) DEVICE — KIT ANGIO W/ AT P65 PREM HND CTRL FOR CNTRST DEL ANGIOTOUCH

## (undated) DEVICE — CATHETER DIAG 5FR L100CM AL AL 1.0 CRV SZ DBL BRAID WIRE

## (undated) DEVICE — CATHETER GUID 6FR L100CM DIA0.071IN NYL SHFT EBU3.5

## (undated) DEVICE — HI-TORQUE BALANCE MIDDLEWEIGHT UNIVERSAL GUIDE WIRE .014 STRAIGHT TIP 3.0 CM X 190 CM: Brand: HI-TORQUE BALANCE MIDDLEWEIGHT UNIVERSAL

## (undated) DEVICE — CATHETER DIAG AD 5FR L110CM 145DEG COR GRY HYDRPHLC NYL

## (undated) DEVICE — CATHETER ANGIO INFIN 038IN AMPLATZ 534543T

## (undated) DEVICE — SCALPEL SURG NO11 S STL ABS PLAS HNDL SAFT DISP

## (undated) DEVICE — GUIDEWIRE VASC L180CM DIA0.035IN 3MM PTFE J TIP EXCHG FIX

## (undated) DEVICE — CATH BLLN ANGIO 3X15MM NC EUPHORIA RX

## (undated) DEVICE — GLOVE ORANGE PI 8   MSG9080

## (undated) DEVICE — GLOVE SURG SZ 6 THK91MIL LTX FREE SYN POLYISOPRENE ANTI

## (undated) DEVICE — KIT MED IMAG CNTRST AGNT W/ IOPAMIDOL REUSE

## (undated) DEVICE — INFLATION DVCBLUE DMND30 ATM / BR20 MLMP802PHD  ME

## (undated) DEVICE — CATHETER GUID 6FR L100CM COR PERIPH BLU NYL COAT PTFE LNR 67000400] CORDIS]

## (undated) DEVICE — HI-TORQUE BALANCE MIDDLEWEIGHT UNIVERSAL GUIDE WIRE .014 J TIP 3.0 CM X 190 CM: Brand: HI-TORQUE BALANCE MIDDLEWEIGHT UNIVERSAL

## (undated) DEVICE — ELECTRODE,RADIOTRANSLUCENT,FOAM,3PK: Brand: MEDLINE

## (undated) DEVICE — 3M™ TEGADERM™ TRANSPARENT FILM DRESSING FRAME STYLE, 1626W, 4 IN X 4-3/4 IN (10 CM X 12 CM), 50/CT 4CT/CASE: Brand: 3M™ TEGADERM™

## (undated) DEVICE — CATH BLLN ANGIO 3.50X12MM NC EUPHORIA RX

## (undated) DEVICE — HEMOSTASIS VALVE PHD SM BORE WTH SPRNG METAL INSRT TOOL TRQU